# Patient Record
Sex: MALE | Race: AMERICAN INDIAN OR ALASKA NATIVE
[De-identification: names, ages, dates, MRNs, and addresses within clinical notes are randomized per-mention and may not be internally consistent; named-entity substitution may affect disease eponyms.]

---

## 2017-01-25 ENCOUNTER — HOSPITAL ENCOUNTER (EMERGENCY)
Dept: HOSPITAL 5 - ED | Age: 42
Discharge: HOME | End: 2017-01-25
Payer: SELF-PAY

## 2017-01-25 VITALS — DIASTOLIC BLOOD PRESSURE: 94 MMHG | SYSTOLIC BLOOD PRESSURE: 158 MMHG
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
## 2017-01-25 DIAGNOSIS — F12.10: ICD-10-CM

## 2017-01-25 DIAGNOSIS — I10: ICD-10-CM
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
## 2017-01-25 DIAGNOSIS — F17.200: ICD-10-CM

## 2017-01-25 DIAGNOSIS — F14.10: ICD-10-CM
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
## 2017-01-25 DIAGNOSIS — M79.1: ICD-10-CM

## 2017-01-25 DIAGNOSIS — S46.911A: Primary | ICD-10-CM

## 2017-01-25 PROCEDURE — 99282 EMERGENCY DEPT VISIT SF MDM: CPT
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
## 2018-10-04 ENCOUNTER — HOSPITAL ENCOUNTER (EMERGENCY)
Dept: HOSPITAL 5 - ED | Age: 43
Discharge: LEFT BEFORE DISCHARGE | End: 2018-10-04
Payer: SELF-PAY

## 2018-10-04 VITALS — DIASTOLIC BLOOD PRESSURE: 104 MMHG | SYSTOLIC BLOOD PRESSURE: 167 MMHG

## 2018-10-04 DIAGNOSIS — Z53.21: ICD-10-CM

## 2018-10-04 DIAGNOSIS — R07.89: Primary | ICD-10-CM

## 2018-10-04 LAB
BASOPHILS # (AUTO): 0.1 K/MM3 (ref 0–0.1)
BASOPHILS NFR BLD AUTO: 1 % (ref 0–1.8)
BUN SERPL-MCNC: 20 MG/DL (ref 9–20)
BUN/CREAT SERPL: 14 %
CALCIUM SERPL-MCNC: 8.9 MG/DL (ref 8.4–10.2)
EOSINOPHIL # BLD AUTO: 0.2 K/MM3 (ref 0–0.4)
EOSINOPHIL NFR BLD AUTO: 2.7 % (ref 0–4.3)
HCT VFR BLD CALC: 45.3 % (ref 35.5–45.6)
HEMOLYSIS INDEX: 7
HGB BLD-MCNC: 15.3 GM/DL (ref 11.8–15.2)
LYMPHOCYTES # BLD AUTO: 2.4 K/MM3 (ref 1.2–5.4)
LYMPHOCYTES NFR BLD AUTO: 39.7 % (ref 13.4–35)
MCH RBC QN AUTO: 30 PG (ref 28–32)
MCHC RBC AUTO-ENTMCNC: 34 % (ref 32–34)
MCV RBC AUTO: 89 FL (ref 84–94)
MONOCYTES # (AUTO): 0.5 K/MM3 (ref 0–0.8)
MONOCYTES % (AUTO): 9.1 % (ref 0–7.3)
PLATELET # BLD: 223 K/MM3 (ref 140–440)
RBC # BLD AUTO: 5.07 M/MM3 (ref 3.65–5.03)

## 2018-10-04 PROCEDURE — 93010 ELECTROCARDIOGRAM REPORT: CPT

## 2018-10-04 PROCEDURE — 93005 ELECTROCARDIOGRAM TRACING: CPT

## 2018-10-04 PROCEDURE — 85025 COMPLETE CBC W/AUTO DIFF WBC: CPT

## 2018-10-04 PROCEDURE — 80048 BASIC METABOLIC PNL TOTAL CA: CPT

## 2018-10-04 PROCEDURE — 36415 COLL VENOUS BLD VENIPUNCTURE: CPT

## 2018-10-04 PROCEDURE — 84484 ASSAY OF TROPONIN QUANT: CPT

## 2020-10-12 ENCOUNTER — HOSPITAL ENCOUNTER (EMERGENCY)
Dept: HOSPITAL 5 - ED | Age: 45
LOS: 1 days | Discharge: LEFT BEFORE DISCHARGE | End: 2020-10-13
Payer: SELF-PAY

## 2020-10-12 VITALS — SYSTOLIC BLOOD PRESSURE: 158 MMHG | DIASTOLIC BLOOD PRESSURE: 99 MMHG

## 2020-10-12 DIAGNOSIS — Z53.21: ICD-10-CM

## 2020-10-12 DIAGNOSIS — M54.2: Primary | ICD-10-CM

## 2022-03-20 ENCOUNTER — APPOINTMENT (OUTPATIENT)
Dept: GENERAL RADIOLOGY | Age: 47
End: 2022-03-20
Payer: MEDICAID

## 2022-03-20 ENCOUNTER — HOSPITAL ENCOUNTER (EMERGENCY)
Age: 47
Discharge: ANOTHER HOSPITAL | End: 2022-03-21
Attending: EMERGENCY MEDICINE
Payer: MEDICAID

## 2022-03-20 DIAGNOSIS — I50.21 ACUTE SYSTOLIC CONGESTIVE HEART FAILURE (HCC): ICD-10-CM

## 2022-03-20 DIAGNOSIS — I24.9 ACS (ACUTE CORONARY SYNDROME) (HCC): Primary | ICD-10-CM

## 2022-03-20 DIAGNOSIS — I25.9 CHEST PAIN DUE TO MYOCARDIAL ISCHEMIA, UNSPECIFIED ISCHEMIC CHEST PAIN TYPE: ICD-10-CM

## 2022-03-20 LAB
ANION GAP SERPL CALCULATED.3IONS-SCNC: 9 MMOL/L (ref 7–16)
APTT: 28.6 SEC (ref 24.5–35.1)
BUN BLDV-MCNC: 19 MG/DL (ref 6–20)
CALCIUM SERPL-MCNC: 8.5 MG/DL (ref 8.6–10.2)
CHLORIDE BLD-SCNC: 110 MMOL/L (ref 98–107)
CO2: 23 MMOL/L (ref 22–29)
CREAT SERPL-MCNC: 1.6 MG/DL (ref 0.7–1.2)
GFR AFRICAN AMERICAN: 57
GFR NON-AFRICAN AMERICAN: 57 ML/MIN/1.73
GLUCOSE BLD-MCNC: 133 MG/DL (ref 74–99)
HCT VFR BLD CALC: 43 % (ref 37–54)
HEMOGLOBIN: 13.9 G/DL (ref 12.5–16.5)
MCH RBC QN AUTO: 30.5 PG (ref 26–35)
MCHC RBC AUTO-ENTMCNC: 32.3 % (ref 32–34.5)
MCV RBC AUTO: 94.3 FL (ref 80–99.9)
PDW BLD-RTO: 15.2 FL (ref 11.5–15)
PLATELET # BLD: 213 E9/L (ref 130–450)
PMV BLD AUTO: 11.3 FL (ref 7–12)
POTASSIUM SERPL-SCNC: 4.2 MMOL/L (ref 3.5–5)
PRO-BNP: 3926 PG/ML (ref 0–125)
RBC # BLD: 4.56 E12/L (ref 3.8–5.8)
SODIUM BLD-SCNC: 142 MMOL/L (ref 132–146)
TROPONIN, HIGH SENSITIVITY: 25 NG/L (ref 0–11)
TROPONIN, HIGH SENSITIVITY: 26 NG/L (ref 0–11)
WBC # BLD: 5.1 E9/L (ref 4.5–11.5)

## 2022-03-20 PROCEDURE — 2500000003 HC RX 250 WO HCPCS: Performed by: EMERGENCY MEDICINE

## 2022-03-20 PROCEDURE — 6360000002 HC RX W HCPCS: Performed by: EMERGENCY MEDICINE

## 2022-03-20 PROCEDURE — 80048 BASIC METABOLIC PNL TOTAL CA: CPT

## 2022-03-20 PROCEDURE — 93005 ELECTROCARDIOGRAM TRACING: CPT | Performed by: EMERGENCY MEDICINE

## 2022-03-20 PROCEDURE — 96366 THER/PROPH/DIAG IV INF ADDON: CPT

## 2022-03-20 PROCEDURE — 83880 ASSAY OF NATRIURETIC PEPTIDE: CPT

## 2022-03-20 PROCEDURE — 6370000000 HC RX 637 (ALT 250 FOR IP): Performed by: EMERGENCY MEDICINE

## 2022-03-20 PROCEDURE — 84484 ASSAY OF TROPONIN QUANT: CPT

## 2022-03-20 PROCEDURE — 85730 THROMBOPLASTIN TIME PARTIAL: CPT

## 2022-03-20 PROCEDURE — 36415 COLL VENOUS BLD VENIPUNCTURE: CPT

## 2022-03-20 PROCEDURE — 71046 X-RAY EXAM CHEST 2 VIEWS: CPT

## 2022-03-20 PROCEDURE — 99284 EMERGENCY DEPT VISIT MOD MDM: CPT

## 2022-03-20 PROCEDURE — 96375 TX/PRO/DX INJ NEW DRUG ADDON: CPT

## 2022-03-20 PROCEDURE — 96365 THER/PROPH/DIAG IV INF INIT: CPT

## 2022-03-20 PROCEDURE — 85027 COMPLETE CBC AUTOMATED: CPT

## 2022-03-20 RX ORDER — NITROGLYCERIN 20 MG/100ML
5-200 INJECTION INTRAVENOUS CONTINUOUS
Status: DISCONTINUED | OUTPATIENT
Start: 2022-03-20 | End: 2022-03-21 | Stop reason: HOSPADM

## 2022-03-20 RX ORDER — HEPARIN SODIUM 1000 [USP'U]/ML
30 INJECTION, SOLUTION INTRAVENOUS; SUBCUTANEOUS PRN
Status: DISCONTINUED | OUTPATIENT
Start: 2022-03-20 | End: 2022-03-21 | Stop reason: HOSPADM

## 2022-03-20 RX ORDER — HEPARIN SODIUM 1000 [USP'U]/ML
60 INJECTION, SOLUTION INTRAVENOUS; SUBCUTANEOUS ONCE
Status: COMPLETED | OUTPATIENT
Start: 2022-03-20 | End: 2022-03-20

## 2022-03-20 RX ORDER — HEPARIN SODIUM 10000 [USP'U]/100ML
8.2 INJECTION, SOLUTION INTRAVENOUS CONTINUOUS
Status: DISCONTINUED | OUTPATIENT
Start: 2022-03-20 | End: 2022-03-21 | Stop reason: HOSPADM

## 2022-03-20 RX ORDER — HEPARIN SODIUM 1000 [USP'U]/ML
60 INJECTION, SOLUTION INTRAVENOUS; SUBCUTANEOUS PRN
Status: DISCONTINUED | OUTPATIENT
Start: 2022-03-20 | End: 2022-03-21 | Stop reason: HOSPADM

## 2022-03-20 RX ORDER — FENTANYL CITRATE 50 UG/ML
50 INJECTION, SOLUTION INTRAMUSCULAR; INTRAVENOUS ONCE
Status: COMPLETED | OUTPATIENT
Start: 2022-03-20 | End: 2022-03-20

## 2022-03-20 RX ORDER — FUROSEMIDE 10 MG/ML
40 INJECTION INTRAMUSCULAR; INTRAVENOUS ONCE
Status: COMPLETED | OUTPATIENT
Start: 2022-03-20 | End: 2022-03-20

## 2022-03-20 RX ORDER — ASPIRIN 81 MG/1
243 TABLET, CHEWABLE ORAL ONCE
Status: COMPLETED | OUTPATIENT
Start: 2022-03-20 | End: 2022-03-20

## 2022-03-20 RX ADMIN — HEPARIN SODIUM 7350 UNITS: 1000 INJECTION INTRAVENOUS; SUBCUTANEOUS at 21:01

## 2022-03-20 RX ADMIN — ASPIRIN 243 MG: 81 TABLET, CHEWABLE ORAL at 20:04

## 2022-03-20 RX ADMIN — Medication 8.2 UNITS/KG/HR: at 21:01

## 2022-03-20 RX ADMIN — NITROGLYCERIN 5 MCG/MIN: 20 INJECTION INTRAVENOUS at 19:56

## 2022-03-20 RX ADMIN — FENTANYL CITRATE 50 MCG: 50 INJECTION, SOLUTION INTRAMUSCULAR; INTRAVENOUS at 20:03

## 2022-03-20 RX ADMIN — FUROSEMIDE 40 MG: 10 INJECTION, SOLUTION INTRAMUSCULAR; INTRAVENOUS at 20:04

## 2022-03-20 ASSESSMENT — PAIN SCALES - GENERAL
PAINLEVEL_OUTOF10: 8

## 2022-03-20 NOTE — ED PROVIDER NOTES
HPI:  3/20/22, Time: 5:57 PM EDT           Fely Gambino is a 40 y.o. male presenting to the ED for SOB MCDONALD recently diagnosis with Acute CHF while in [facility] on 2/18/2022. States, \" he ran out of lasix. \", beginning several days ago. The complaint has been persistent, moderate in severity, and worsened by walking, moderate exertion. He states he symptoms are worse when he walks. Also states he is having chest heaviness it is midsternal started on Friday 2 days ago, radiates to his left arm. He was worked up while in [facility] diagnosed with acute congestive heart failure he had a EF on transcutaneous echo of less than 25%. CTA of his chest at that time demonstrated no PE or aortic dissection. Patient stated his only given 14 Lasix pills when he was discharged. He moved up to the [facility]. He has no local PCP. No fevers or chills reported he has no cough. He denies melena hematochezia. ROS:   Pertinent positives and negatives are stated within HPI, all other systems reviewed and are negative.  --------------------------------------------- PAST HISTORY ---------------------------------------------  Past Medical History:  has no past medical history on file. Past Surgical History:  has no past surgical history on file. Social History:      Family History: family history is not on file. The patients home medications have been reviewed. Allergies: Patient has no allergy information on record.    ---------------------------------------------------PHYSICAL EXAM--------------------------------------    Constitutional/General: Alert and oriented x3, well appearing, non toxic in NAD  Head: Normocephalic and atraumatic  Eyes: PERRL, EOMI  Mouth: Oropharynx clear, handling secretions, no trismus  Neck: Supple, full ROM, non tender to palpation in the midline, no stridor, no crepitus, no meningeal signs  Pulmonary: Lungs clear to auscultation bilaterally, no wheezes, POS  Rales BL , NO  rhonchi.  Not in respiratory distress  Cardiovascular:  Regular rate. Regular rhythm. No murmurs, gallops, or rubs. 2+ distal pulses  Chest: no chest wall tenderness  Abdomen: Soft. Non tender. Non distended. +BS. No rebound, guarding, or rigidity. No pulsatile masses appreciated. Musculoskeletal: Moves all extremities x 4. Warm and well perfused, no clubbing, cyanosis, or edema. Capillary refill <3 seconds  Skin: warm and dry. No rashes. Neurologic: GCS 15, CN 2-12 grossly intact, no focal deficits, symmetric strength 5/5 in the upper and lower extremities bilaterally  Psych: Normal Affect    -------------------------------------------------- RESULTS -------------------------------------------------  I have personally reviewed all laboratory and imaging results for this patient. Results are listed below.      LABS:  Results for orders placed or performed during the hospital encounter of 03/20/22   CBC   Result Value Ref Range    WBC 5.1 4.5 - 11.5 E9/L    RBC 4.56 3.80 - 5.80 E12/L    Hemoglobin 13.9 12.5 - 16.5 g/dL    Hematocrit 43.0 37.0 - 54.0 %    MCV 94.3 80.0 - 99.9 fL    MCH 30.5 26.0 - 35.0 pg    MCHC 32.3 32.0 - 34.5 %    RDW 15.2 (H) 11.5 - 15.0 fL    Platelets 173 388 - 648 E9/L    MPV 11.3 7.0 - 12.0 fL   Basic Metabolic Panel   Result Value Ref Range    Sodium 142 132 - 146 mmol/L    Potassium 4.2 3.5 - 5.0 mmol/L    Chloride 110 (H) 98 - 107 mmol/L    CO2 23 22 - 29 mmol/L    Anion Gap 9 7 - 16 mmol/L    Glucose 133 (H) 74 - 99 mg/dL    BUN 19 6 - 20 mg/dL    CREATININE 1.6 (H) 0.7 - 1.2 mg/dL    GFR Non-African American 57 >=60 mL/min/1.73    GFR African American 57     Calcium 8.5 (L) 8.6 - 10.2 mg/dL   Troponin   Result Value Ref Range    Troponin, High Sensitivity 26 (H) 0 - 11 ng/L   Brain Natriuretic Peptide   Result Value Ref Range    Pro-BNP 3,926 (H) 0 - 125 pg/mL   Troponin   Result Value Ref Range    Troponin, High Sensitivity 25 (H) 0 - 11 ng/L   APTT   Result Value Ref Range    aPTT 28.6 24.5 - 35.1 sec   EKG 12 Lead   Result Value Ref Range    Ventricular Rate 88 BPM    Atrial Rate 88 BPM    P-R Interval 160 ms    QRS Duration 102 ms    Q-T Interval 396 ms    QTc Calculation (Bazett) 479 ms    P Axis 68 degrees    R Axis -74 degrees    T Axis 145 degrees       RADIOLOGY:  Interpreted by Radiologist.  XR CHEST (2 VW)   Final Result   1. No acute cardiopulmonary pathology. 2.  Lung hyperinflation with coarse interstitial markings. Lingular   atelectasis or scarring. Atherosclerotic disease and cardiomegaly. EKG Interpretation  Interpreted by emergency department physician    Rhythm: normal sinus   Rate: normal  Axis: left  Conduction: normal  ST Segments: nonspecific changes  T Waves: inversion in  lateral leads    Clinical Impression: myocardial ischemia  Comparison to prior EKG: None      ------------------------- NURSING NOTES AND VITALS REVIEWED ---------------------------   The nursing notes within the ED encounter and vital signs as below have been reviewed by myself. BP (!) 160/101   Pulse 85   Temp 96.9 °F (36.1 °C) (Tympanic)   Resp 16   Ht 6' 2\" (1.88 m)   Wt 270 lb (122.5 kg)   SpO2 97%   BMI 34.67 kg/m²   Oxygen Saturation Interpretation: Normal    The patients available past medical records and past encounters were reviewed.         ------------------------------ ED COURSE/MEDICAL DECISION MAKING----------------------  Medications   nitroGLYCERIN 50 mg in dextrose 5% 250 mL infusion (15 mcg/min IntraVENous Rate/Dose Change 3/20/22 2208)   heparin (porcine) injection 7,350 Units (has no administration in time range)   heparin (porcine) injection 3,680 Units (has no administration in time range)   heparin 25,000 units in dextrose 5% 250 mL (premix) infusion (8.2 Units/kg/hr × 122.5 kg IntraVENous New Bag 3/20/22 2101)   aspirin chewable tablet 243 mg (243 mg Oral Given 3/20/22 2004)   furosemide (LASIX) injection 40 mg (40 mg IntraVENous Given 3/20/22 2004)   fentaNYL (SUBLIMAZE) injection 50 mcg (50 mcg IntraVENous Given 3/20/22 2003)   heparin (porcine) injection 7,350 Units (7,350 Units IntraVENous Given 3/20/22 2101)             Medical Decision Making:    Presents with chest pain shortness of breath. Had recent work-up in [facility] several weeks ago. Concern for acute coronary syndrome. Patient has elevated troponin delta was less than 5. Patient's EKG is concerning for lateral ischemia with no previous EKG to compare. He was given aspirin IV nitroglycerin Heparin Lasix and Sublimaze. Re-Evaluations:             Re-evaluation. Patients symptoms are improving, patient still complains of pain but it is improved with above treatment. Consultations:             Consultation was made with [facility] cardiology Dr. Felicia Lugo.  Recommend that transfer for acute coronary syndrome to [facility] for anticipated heart catheterization tomorrow. I did speak with [facility] hospitalist Dr. Jacob Harris who accepted transfer and admission. Critical Care:   CRITICAL CARE:   30 MINUTES. Please note that the withdrawal or failure to initiate urgent interventions for this patient would likely result in a life threatening deterioration or permanent disability. Accordingly this patient received the above mentioned time, excluding separately billable procedures. This patient's ED course included: a personal history and physicial examination, re-evaluation prior to disposition, multiple bedside re-evaluations, IV medications, cardiac monitoring, continuous pulse oximetry, complex medical decision making and emergency management and a personal history and physicial eaxmination    This patient has remained hemodynamically stable, improved and been closely monitored during their ED course. Counseling:    The emergency provider has spoken with the patient and spouse/SO and discussed todays results, in addition to providing specific details for the plan of care and counseling regarding the diagnosis and prognosis. Questions are answered at this time and they are agreeable with the plan.       --------------------------------- IMPRESSION AND DISPOSITION ---------------------------------    IMPRESSION  1. ACS (acute coronary syndrome) ([facility].)    2. Chest pain due to myocardial ischemia, unspecified ischemic chest pain type    3. Acute systolic congestive heart failure ([facility].)        DISPOSITION  Disposition: Transfer to [facility]  Patient condition is serious        NOTE: This report was transcribed using voice recognition software.  Every effort was made to ensure accuracy; however, inadvertent computerized transcription errors may be present        Joel Campbell DO  03/20/22 [id number]

## 2022-03-21 ENCOUNTER — HOSPITAL ENCOUNTER (INPATIENT)
Age: 47
LOS: 3 days | Discharge: HOME | DRG: 192 | End: 2022-03-24
Attending: FAMILY MEDICINE | Admitting: FAMILY MEDICINE
Payer: MEDICAID

## 2022-03-21 VITALS
DIASTOLIC BLOOD PRESSURE: 97 MMHG | OXYGEN SATURATION: 97 % | SYSTOLIC BLOOD PRESSURE: 157 MMHG | WEIGHT: 270 LBS | BODY MASS INDEX: 34.65 KG/M2 | RESPIRATION RATE: 16 BRPM | HEART RATE: 90 BPM | HEIGHT: 74 IN | TEMPERATURE: 96.9 F

## 2022-03-21 DIAGNOSIS — G89.29 CHRONIC MIDLINE LOW BACK PAIN WITHOUT SCIATICA: ICD-10-CM

## 2022-03-21 DIAGNOSIS — I24.9 ACUTE CORONARY SYNDROME (HCC): ICD-10-CM

## 2022-03-21 DIAGNOSIS — I50.23 ACUTE ON CHRONIC SYSTOLIC CONGESTIVE HEART FAILURE (HCC): Primary | ICD-10-CM

## 2022-03-21 DIAGNOSIS — M54.50 CHRONIC MIDLINE LOW BACK PAIN WITHOUT SCIATICA: ICD-10-CM

## 2022-03-21 LAB
AMPHETAMINE SCREEN, URINE: NOT DETECTED
ANION GAP SERPL CALCULATED.3IONS-SCNC: 10 MMOL/L (ref 7–16)
APTT: 34.1 SEC (ref 24.5–35.1)
BARBITURATE SCREEN URINE: NOT DETECTED
BENZODIAZEPINE SCREEN, URINE: POSITIVE
BUN BLDV-MCNC: 17 MG/DL (ref 6–20)
CALCIUM SERPL-MCNC: 8.8 MG/DL (ref 8.6–10.2)
CANNABINOID SCREEN URINE: POSITIVE
CHLORIDE BLD-SCNC: 104 MMOL/L (ref 98–107)
CO2: 24 MMOL/L (ref 22–29)
COCAINE METABOLITE SCREEN URINE: NOT DETECTED
CREAT SERPL-MCNC: 1.6 MG/DL (ref 0.7–1.2)
EKG ATRIAL RATE: 88 BPM
EKG P AXIS: 68 DEGREES
EKG P-R INTERVAL: 160 MS
EKG Q-T INTERVAL: 396 MS
EKG QRS DURATION: 102 MS
EKG QTC CALCULATION (BAZETT): 479 MS
EKG R AXIS: -74 DEGREES
EKG T AXIS: 145 DEGREES
EKG VENTRICULAR RATE: 88 BPM
FENTANYL SCREEN, URINE: NOT DETECTED
GFR AFRICAN AMERICAN: 57
GFR NON-AFRICAN AMERICAN: 57 ML/MIN/1.73
GLUCOSE BLD-MCNC: 106 MG/DL (ref 74–99)
Lab: ABNORMAL
METHADONE SCREEN, URINE: NOT DETECTED
OPIATE SCREEN URINE: NOT DETECTED
OXYCODONE URINE: NOT DETECTED
PHENCYCLIDINE SCREEN URINE: NOT DETECTED
POC ACT LR: 167 SECONDS
POTASSIUM SERPL-SCNC: 4.2 MMOL/L (ref 3.5–5)
SODIUM BLD-SCNC: 138 MMOL/L (ref 132–146)
TROPONIN, HIGH SENSITIVITY: 26 NG/L (ref 0–11)

## 2022-03-21 PROCEDURE — 2500000003 HC RX 250 WO HCPCS: Performed by: FAMILY MEDICINE

## 2022-03-21 PROCEDURE — 6360000002 HC RX W HCPCS

## 2022-03-21 PROCEDURE — 96376 TX/PRO/DX INJ SAME DRUG ADON: CPT

## 2022-03-21 PROCEDURE — 2580000003 HC RX 258: Performed by: INTERNAL MEDICINE

## 2022-03-21 PROCEDURE — 6370000000 HC RX 637 (ALT 250 FOR IP): Performed by: FAMILY MEDICINE

## 2022-03-21 PROCEDURE — 36415 COLL VENOUS BLD VENIPUNCTURE: CPT

## 2022-03-21 PROCEDURE — 85730 THROMBOPLASTIN TIME PARTIAL: CPT

## 2022-03-21 PROCEDURE — 4A023N7 MEASUREMENT OF CARDIAC SAMPLING AND PRESSURE, LEFT HEART, PERCUTANEOUS APPROACH: ICD-10-PCS | Performed by: INTERNAL MEDICINE

## 2022-03-21 PROCEDURE — 99255 IP/OBS CONSLTJ NEW/EST HI 80: CPT | Performed by: INTERNAL MEDICINE

## 2022-03-21 PROCEDURE — 6360000002 HC RX W HCPCS: Performed by: EMERGENCY MEDICINE

## 2022-03-21 PROCEDURE — 93458 L HRT ARTERY/VENTRICLE ANGIO: CPT | Performed by: INTERNAL MEDICINE

## 2022-03-21 PROCEDURE — 6360000002 HC RX W HCPCS: Performed by: INTERNAL MEDICINE

## 2022-03-21 PROCEDURE — 80307 DRUG TEST PRSMV CHEM ANLYZR: CPT

## 2022-03-21 PROCEDURE — 80048 BASIC METABOLIC PNL TOTAL CA: CPT

## 2022-03-21 PROCEDURE — C1760 CLOSURE DEV, VASC: HCPCS

## 2022-03-21 PROCEDURE — C1887 CATHETER, GUIDING: HCPCS

## 2022-03-21 PROCEDURE — 2140000000 HC CCU INTERMEDIATE R&B

## 2022-03-21 PROCEDURE — 93010 ELECTROCARDIOGRAM REPORT: CPT | Performed by: INTERNAL MEDICINE

## 2022-03-21 PROCEDURE — 6360000002 HC RX W HCPCS: Performed by: FAMILY MEDICINE

## 2022-03-21 PROCEDURE — 2709999900 HC NON-CHARGEABLE SUPPLY

## 2022-03-21 PROCEDURE — 6370000000 HC RX 637 (ALT 250 FOR IP): Performed by: INTERNAL MEDICINE

## 2022-03-21 PROCEDURE — C1769 GUIDE WIRE: HCPCS

## 2022-03-21 PROCEDURE — 99152 MOD SED SAME PHYS/QHP 5/>YRS: CPT | Performed by: INTERNAL MEDICINE

## 2022-03-21 PROCEDURE — B2111ZZ FLUOROSCOPY OF MULTIPLE CORONARY ARTERIES USING LOW OSMOLAR CONTRAST: ICD-10-PCS | Performed by: INTERNAL MEDICINE

## 2022-03-21 PROCEDURE — 93458 L HRT ARTERY/VENTRICLE ANGIO: CPT

## 2022-03-21 PROCEDURE — 84484 ASSAY OF TROPONIN QUANT: CPT

## 2022-03-21 PROCEDURE — 85347 COAGULATION TIME ACTIVATED: CPT

## 2022-03-21 PROCEDURE — C1894 INTRO/SHEATH, NON-LASER: HCPCS

## 2022-03-21 RX ORDER — ASPIRIN 81 MG/1
81 TABLET, CHEWABLE ORAL DAILY
COMMUNITY
End: 2022-08-21

## 2022-03-21 RX ORDER — SODIUM CHLORIDE 0.9 % (FLUSH) 0.9 %
5-40 SYRINGE (ML) INJECTION PRN
Status: DISCONTINUED | OUTPATIENT
Start: 2022-03-21 | End: 2022-03-21 | Stop reason: SDUPTHER

## 2022-03-21 RX ORDER — LABETALOL HYDROCHLORIDE 5 MG/ML
15 INJECTION, SOLUTION INTRAVENOUS EVERY 4 HOURS PRN
Status: DISCONTINUED | OUTPATIENT
Start: 2022-03-21 | End: 2022-03-24 | Stop reason: HOSPADM

## 2022-03-21 RX ORDER — ATORVASTATIN CALCIUM 80 MG/1
80 TABLET, FILM COATED ORAL DAILY
Status: DISCONTINUED | OUTPATIENT
Start: 2022-03-22 | End: 2022-03-24 | Stop reason: HOSPADM

## 2022-03-21 RX ORDER — ONDANSETRON 4 MG/1
4 TABLET, ORALLY DISINTEGRATING ORAL EVERY 8 HOURS PRN
Status: DISCONTINUED | OUTPATIENT
Start: 2022-03-21 | End: 2022-03-24 | Stop reason: HOSPADM

## 2022-03-21 RX ORDER — ISOSORBIDE DINITRATE 20 MG/1
20 TABLET ORAL 3 TIMES DAILY
Status: ON HOLD | COMMUNITY
End: 2022-03-24 | Stop reason: HOSPADM

## 2022-03-21 RX ORDER — HEPARIN SODIUM 1000 [USP'U]/ML
4000 INJECTION, SOLUTION INTRAVENOUS; SUBCUTANEOUS PRN
Status: DISCONTINUED | OUTPATIENT
Start: 2022-03-21 | End: 2022-03-24 | Stop reason: HOSPADM

## 2022-03-21 RX ORDER — ATORVASTATIN CALCIUM 40 MG/1
40 TABLET, FILM COATED ORAL NIGHTLY
Status: DISCONTINUED | OUTPATIENT
Start: 2022-03-21 | End: 2022-03-21

## 2022-03-21 RX ORDER — SODIUM CHLORIDE 0.9 % (FLUSH) 0.9 %
5-40 SYRINGE (ML) INJECTION EVERY 12 HOURS SCHEDULED
Status: DISCONTINUED | OUTPATIENT
Start: 2022-03-21 | End: 2022-03-24 | Stop reason: HOSPADM

## 2022-03-21 RX ORDER — HYDRALAZINE HYDROCHLORIDE 25 MG/1
25 TABLET, FILM COATED ORAL 3 TIMES DAILY
Status: ON HOLD | COMMUNITY
End: 2022-03-24 | Stop reason: HOSPADM

## 2022-03-21 RX ORDER — SODIUM CHLORIDE 0.9 % (FLUSH) 0.9 %
5-40 SYRINGE (ML) INJECTION EVERY 12 HOURS SCHEDULED
Status: DISCONTINUED | OUTPATIENT
Start: 2022-03-21 | End: 2022-03-21 | Stop reason: SDUPTHER

## 2022-03-21 RX ORDER — ASPIRIN 81 MG/1
324 TABLET, CHEWABLE ORAL ONCE
Status: COMPLETED | OUTPATIENT
Start: 2022-03-21 | End: 2022-03-21

## 2022-03-21 RX ORDER — SODIUM CHLORIDE 0.9 % (FLUSH) 0.9 %
5-40 SYRINGE (ML) INJECTION PRN
Status: DISCONTINUED | OUTPATIENT
Start: 2022-03-21 | End: 2022-03-24 | Stop reason: HOSPADM

## 2022-03-21 RX ORDER — ISOSORBIDE DINITRATE 10 MG/1
20 TABLET ORAL 3 TIMES DAILY
Status: DISCONTINUED | OUTPATIENT
Start: 2022-03-21 | End: 2022-03-21

## 2022-03-21 RX ORDER — NITROGLYCERIN 20 MG/100ML
5-200 INJECTION INTRAVENOUS CONTINUOUS
Status: DISCONTINUED | OUTPATIENT
Start: 2022-03-21 | End: 2022-03-21

## 2022-03-21 RX ORDER — ACETAMINOPHEN 650 MG/1
650 SUPPOSITORY RECTAL EVERY 6 HOURS PRN
Status: DISCONTINUED | OUTPATIENT
Start: 2022-03-21 | End: 2022-03-24 | Stop reason: HOSPADM

## 2022-03-21 RX ORDER — ONDANSETRON 2 MG/ML
4 INJECTION INTRAMUSCULAR; INTRAVENOUS EVERY 6 HOURS PRN
Status: DISCONTINUED | OUTPATIENT
Start: 2022-03-21 | End: 2022-03-24 | Stop reason: HOSPADM

## 2022-03-21 RX ORDER — ASPIRIN 81 MG/1
81 TABLET, CHEWABLE ORAL DAILY
Status: DISCONTINUED | OUTPATIENT
Start: 2022-03-22 | End: 2022-03-24 | Stop reason: HOSPADM

## 2022-03-21 RX ORDER — CARVEDILOL 6.25 MG/1
12.5 TABLET ORAL 2 TIMES DAILY WITH MEALS
Status: DISCONTINUED | OUTPATIENT
Start: 2022-03-21 | End: 2022-03-21

## 2022-03-21 RX ORDER — VALSARTAN 80 MG/1
80 TABLET ORAL DAILY
Status: DISCONTINUED | OUTPATIENT
Start: 2022-03-22 | End: 2022-03-21

## 2022-03-21 RX ORDER — SODIUM CHLORIDE 9 MG/ML
25 INJECTION, SOLUTION INTRAVENOUS PRN
Status: DISCONTINUED | OUTPATIENT
Start: 2022-03-21 | End: 2022-03-24 | Stop reason: HOSPADM

## 2022-03-21 RX ORDER — HYDRALAZINE HYDROCHLORIDE 25 MG/1
25 TABLET, FILM COATED ORAL 3 TIMES DAILY
Status: DISCONTINUED | OUTPATIENT
Start: 2022-03-21 | End: 2022-03-21

## 2022-03-21 RX ORDER — CARVEDILOL 25 MG/1
25 TABLET ORAL 2 TIMES DAILY WITH MEALS
Status: DISCONTINUED | OUTPATIENT
Start: 2022-03-21 | End: 2022-03-24 | Stop reason: HOSPADM

## 2022-03-21 RX ORDER — FUROSEMIDE 10 MG/ML
40 INJECTION INTRAMUSCULAR; INTRAVENOUS 2 TIMES DAILY
Status: COMPLETED | OUTPATIENT
Start: 2022-03-21 | End: 2022-03-22

## 2022-03-21 RX ORDER — HEPARIN SODIUM 1000 [USP'U]/ML
2000 INJECTION, SOLUTION INTRAVENOUS; SUBCUTANEOUS PRN
Status: DISCONTINUED | OUTPATIENT
Start: 2022-03-21 | End: 2022-03-24 | Stop reason: HOSPADM

## 2022-03-21 RX ORDER — ACETAMINOPHEN 325 MG/1
650 TABLET ORAL EVERY 4 HOURS PRN
Status: DISCONTINUED | OUTPATIENT
Start: 2022-03-21 | End: 2022-03-24 | Stop reason: HOSPADM

## 2022-03-21 RX ORDER — LANOLIN ALCOHOL/MO/W.PET/CERES
3 CREAM (GRAM) TOPICAL NIGHTLY PRN
Status: DISCONTINUED | OUTPATIENT
Start: 2022-03-21 | End: 2022-03-24 | Stop reason: HOSPADM

## 2022-03-21 RX ORDER — HEPARIN SODIUM 10000 [USP'U]/100ML
8.2 INJECTION, SOLUTION INTRAVENOUS CONTINUOUS
Status: DISCONTINUED | OUTPATIENT
Start: 2022-03-21 | End: 2022-03-21

## 2022-03-21 RX ORDER — FUROSEMIDE 40 MG/1
40 TABLET ORAL DAILY
Status: DISCONTINUED | OUTPATIENT
Start: 2022-03-21 | End: 2022-03-21

## 2022-03-21 RX ORDER — SODIUM CHLORIDE 9 MG/ML
25 INJECTION, SOLUTION INTRAVENOUS PRN
Status: DISCONTINUED | OUTPATIENT
Start: 2022-03-21 | End: 2022-03-21 | Stop reason: SDUPTHER

## 2022-03-21 RX ORDER — ACETAMINOPHEN 325 MG/1
650 TABLET ORAL EVERY 6 HOURS PRN
Status: DISCONTINUED | OUTPATIENT
Start: 2022-03-21 | End: 2022-03-21 | Stop reason: SDUPTHER

## 2022-03-21 RX ORDER — FUROSEMIDE 40 MG/1
40 TABLET ORAL DAILY
Status: ON HOLD | COMMUNITY
End: 2022-03-24 | Stop reason: HOSPADM

## 2022-03-21 RX ORDER — FENTANYL CITRATE 50 UG/ML
50 INJECTION, SOLUTION INTRAMUSCULAR; INTRAVENOUS ONCE
Status: COMPLETED | OUTPATIENT
Start: 2022-03-21 | End: 2022-03-21

## 2022-03-21 RX ORDER — POLYETHYLENE GLYCOL 3350 17 G/17G
17 POWDER, FOR SOLUTION ORAL DAILY PRN
Status: DISCONTINUED | OUTPATIENT
Start: 2022-03-21 | End: 2022-03-24 | Stop reason: HOSPADM

## 2022-03-21 RX ORDER — CARVEDILOL 12.5 MG/1
12.5 TABLET ORAL 2 TIMES DAILY WITH MEALS
Status: ON HOLD | COMMUNITY
End: 2022-03-24 | Stop reason: HOSPADM

## 2022-03-21 RX ORDER — VALSARTAN 80 MG/1
80 TABLET ORAL 2 TIMES DAILY
Status: DISCONTINUED | OUTPATIENT
Start: 2022-03-21 | End: 2022-03-23

## 2022-03-21 RX ADMIN — CARVEDILOL 25 MG: 25 TABLET, FILM COATED ORAL at 16:38

## 2022-03-21 RX ADMIN — FENTANYL CITRATE 50 MCG: 50 INJECTION, SOLUTION INTRAMUSCULAR; INTRAVENOUS at 00:44

## 2022-03-21 RX ADMIN — HYDRALAZINE HYDROCHLORIDE 25 MG: 25 TABLET, FILM COATED ORAL at 09:28

## 2022-03-21 RX ADMIN — FUROSEMIDE 40 MG: 10 INJECTION, SOLUTION INTRAMUSCULAR; INTRAVENOUS at 17:44

## 2022-03-21 RX ADMIN — HEPARIN SODIUM AND DEXTROSE 8.2 UNITS/KG/HR: 10000; 5 INJECTION INTRAVENOUS at 04:24

## 2022-03-21 RX ADMIN — VALSARTAN 80 MG: 80 TABLET, FILM COATED ORAL at 21:08

## 2022-03-21 RX ADMIN — SODIUM CHLORIDE, PRESERVATIVE FREE 10 ML: 5 INJECTION INTRAVENOUS at 21:08

## 2022-03-21 RX ADMIN — HEPARIN SODIUM 2000 UNITS: 1000 INJECTION INTRAVENOUS; SUBCUTANEOUS at 07:32

## 2022-03-21 RX ADMIN — ACETAMINOPHEN 650 MG: 325 TABLET ORAL at 05:22

## 2022-03-21 RX ADMIN — CARVEDILOL 12.5 MG: 6.25 TABLET, FILM COATED ORAL at 09:28

## 2022-03-21 RX ADMIN — ASPIRIN 81 MG 324 MG: 81 TABLET ORAL at 09:27

## 2022-03-21 RX ADMIN — ISOSORBIDE DINITRATE 20 MG: 10 TABLET ORAL at 09:28

## 2022-03-21 RX ADMIN — NITROGLYCERIN 15 MCG/MIN: 20 INJECTION INTRAVENOUS at 04:23

## 2022-03-21 ASSESSMENT — PAIN DESCRIPTION - FREQUENCY
FREQUENCY: CONTINUOUS

## 2022-03-21 ASSESSMENT — PAIN SCALES - GENERAL
PAINLEVEL_OUTOF10: 0
PAINLEVEL_OUTOF10: 8
PAINLEVEL_OUTOF10: 8
PAINLEVEL_OUTOF10: 7
PAINLEVEL_OUTOF10: 0
PAINLEVEL_OUTOF10: 0
PAINLEVEL_OUTOF10: 3
PAINLEVEL_OUTOF10: 0

## 2022-03-21 ASSESSMENT — PAIN DESCRIPTION - PROGRESSION
CLINICAL_PROGRESSION: GRADUALLY IMPROVING
CLINICAL_PROGRESSION: NOT CHANGED

## 2022-03-21 ASSESSMENT — PAIN DESCRIPTION - ORIENTATION
ORIENTATION: LEFT;LOWER

## 2022-03-21 ASSESSMENT — PAIN DESCRIPTION - ONSET
ONSET: ON-GOING
ONSET: ON-GOING

## 2022-03-21 ASSESSMENT — PAIN DESCRIPTION - PAIN TYPE
TYPE: ACUTE PAIN

## 2022-03-21 ASSESSMENT — PAIN DESCRIPTION - DESCRIPTORS
DESCRIPTORS: ACHING;HEADACHE;PRESSURE
DESCRIPTORS: ACHING;DISCOMFORT;PRESSURE
DESCRIPTORS: ACHING;PRESSURE;SHARP

## 2022-03-21 ASSESSMENT — PAIN - FUNCTIONAL ASSESSMENT: PAIN_FUNCTIONAL_ASSESSMENT: ACTIVITIES ARE NOT PREVENTED

## 2022-03-21 ASSESSMENT — PAIN DESCRIPTION - LOCATION
LOCATION: CHEST
LOCATION: CHEST
LOCATION: CHEST;HEAD

## 2022-03-21 NOTE — PROGRESS NOTES
Patient admitted after midnight  Taken to cath lab-- Nonischemic cardiomyopathy with severely elevated left filling pressure.  Mild nonobstructive CAD    Continue IV diuresis  Aspirin, statin, BB    Electronically signed by Vasile Jolley DO on 3/21/2022 at 6:10 PM]

## 2022-03-21 NOTE — ED NOTES
Pt reports chest pain still at an 8/10.  Nitro increased to 15units/min     Narda Vera RN  03/20/22 [id number]
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
## 2022-03-21 NOTE — PROGRESS NOTES
Messaged Dr. Carol Tovar re: Pt has arrived to room [phone] from [facility] ED. Home med rec has been updated. In need of admission orders. See orders.

## 2022-03-21 NOTE — PLAN OF CARE
Problem: Pain:  Goal: Pain level will decrease  Description: Pain level will decrease  Outcome: Met This Shift  Goal: Control of acute pain  Description: Control of acute pain  Outcome: Met This Shift  Goal: Control of chronic pain  Description: Control of chronic pain  Outcome: Met This Shift     Problem: Cardiac:  Goal: Ability to maintain an adequate cardiac output will improve  Description: Ability to maintain an adequate cardiac output will improve  Outcome: Met This Shift  Goal: Hemodynamic stability will improve  Description: Hemodynamic stability will improve  Outcome: Met This Shift     Problem: Fluid Volume:  Goal: Ability to achieve and maintain adequate urine output will improve  Description: Ability to achieve and maintain adequate urine output will improve  Outcome: Met This Shift     Problem: Respiratory:  Goal: Respiratory status will improve  Description: Respiratory status will improve  Outcome: Met This Shift

## 2022-03-21 NOTE — PROGRESS NOTES
2126 Called access center for patient transfer to [facility] for acute coronary syndrome   2149 Dr. Lavon Dickinson is accepting physician  [phone] call Nurse to Nurse to [phone] PAS ETA 0409

## 2022-03-21 NOTE — PATIENT CARE CONFERENCE
[facility] Quality Flow/Interdisciplinary Rounds Progress Note        Quality Flow Rounds held on March 21, 2022    Disciplines Attending:  Bedside Nurse, ,  and Nursing Unit Leadership    Lynne Ryan was admitted on 3/21/2022  3:08 AM    Anticipated Discharge Date:  Expected Discharge Date: 03/25/22    Disposition:    Jacky Score:  Jacky Scale Score: 21    Readmission Risk              Risk of Unplanned Readmission:  11           Discussed patient goal for the day, patient clinical progression, and barriers to discharge.   The following Goal(s) of the Day/Commitment(s) have been identified:  Diagnostics - Report Results      Makenzie Napier RN  March 21, 2022

## 2022-03-21 NOTE — PROCEDURES
CARDIAC CATHETERIZATION REPORT    : Merrill Blake MD     Date of procedure: 03/21/22     Indication:  1. HFrEF    Procedures:  Left heart catheterization and Coronary angiogram     Sedation/analgesia:  Midazolam IV and Fentanyl IV     Time sedation was administered: 1205. I was present in the room when sedation was administered. Procedure end time: 1240  Time spent with face to face monitoring of moderate sedation: 35 minutes    Brief history:  55-year-old -American male who was diagnosed with HFrEF with severe LV systolic dysfunction in [facility] last month. He did not undergo coronary angiogram but reportedly underwent a stress test.  He has been out of medications and presented with heart failure symptoms. His high-sensitivity troponin was very mildly elevated and flat. His EKG was consistent with an age-indeterminate inferior infarct with lateral T wave inversions. His history is significant for hypertension, prior cocaine abuse, GERD. Description of procedure: The patient presented to the Cath Lab in a(n) elective fashion. The patient was prepped and draped in a sterile manner. Timeout, airway and ASA assessment were completed. Local anesthesia with 1% lidocaine was administered and sedation/analgesia were administered as above. Access was obtained using the modified Seldinger technique and a micropuncture needle with ultrasound guidance. I was able to obtain access in the right radial and right ulnar arteries but the wire would not thread in either artery. On ultrasound both looked somewhat small and diseased. After failing to gain access there I switched to right femoral access. Coronary anatomy:  Dominance: Left  1. Left main: Short and angiographically unremarkable  2. Left anterior descending: This is a large wraparound vessel with 2 large diagonals. There is mild diffuse disease in the LAD. D1 has a segmental 60% proximal stenosis  3.  Ramus intermedius: Absent  4. Left circumflex: This is a large dominant vessel with small OM1, large OM 2, small OM 3, large bifurcating LPL and a small LPDA. Mild diffuse disease throughout  5. Right coronary artery: Large sized nondominant vessel with no significant LV supply      Hemodynamics:  LVEDP 38  Ao: 127/104 with a mean of 116. .. Hemostasis:  Transradial band was applied for patent arterial hemostasis. Complications: None  Estimated blood loss: Less than 10 mL  Air kerma: 351 mGy  Contrast used: 40 mL    Conclusions:  1. Nonischemic cardiomyopathy with severely elevated left filling pressure  2. Overall mild nonobstructive CAD      Recommendations:  1. Continue IV diuresis for at least another 24 hours  2. Continue aspirin and high intensity statin for CAD  3.  Increase carvedilol to 25 mg p.o. twice daily and start valsartan 80 mg p.o. twice daily with goal of switching to sacubitril/valsartan as outpatient      Al Wilson MD, [facility] [place]  Interventional Cardiology/Structural Heart Disease  Office: [phone]  Coordinator: Spencer Correa

## 2022-03-21 NOTE — CONSULTS
Met with patient and discussed that their physician has ordered a referral to our outpatient Phase II Cardiac Rehabilitation program. Reviewed the benefits of cardiac rehabilitation based on their diagnosis and personal risk factors. Patient demonstrates moderate interest in Cardiac Rehabilitation at this time. Cardiac Rehabilitation brochure provided to patient/family. The Cardiac Rehabilitation Program has been provided the patient's referral information and pertinent patient details and history. The patient may call [facility] at [phone] for additional information or questions. Contact information for [facility] and other choices close to the patient's residence have been provided in the discharge instructions so that the patient may call and schedule an appointment when cleared by their physician.  Thank you for the referral.

## 2022-03-21 NOTE — H&P
Hospitalist History & Physical      PCP: No primary care provider on file. Date of Service: Pt seen/examined on 3/21/2022     Chief Complaint:  had no chief complaint listed for this encounter. History Of Present Illness:    Mr. Fely Gambino, a 40y.o. year old male  who  has a past medical history of CHF (congestive heart failure) ([facility] [address] and Hypertension. Patient was seen at [facility] emergency department with complaints of shortness of breath and chest discomfort. Recently diagnosed with congestive heart failure. Ran out of Lasix several days ago. Shortness of breath worse with exertion. Chest heaviness started on Friday and radiates into his left arm. Vital signs reveal the patient to be hypertensive with pressures 151/111. Troponin of 26. EKG shows ischemic changes. No previous EKG to compare to. Emergency physician consulted cardiology. Started on heparin infusion as well as nitroglycerin infusion. He is being transferred to [facility] for cardiac cath in the morning. Past Medical History:   Diagnosis Date    CHF (congestive heart failure) ([facility] [address] 02/08/2022    Hypertension        Past Surgical History:   Procedure Laterality Date    SKIN GRAFT Right     R thigh       Prior to Admission medications    Medication Sig Start Date End Date Taking?  Authorizing Provider   hydrALAZINE (APRESOLINE) 25 MG tablet Take 25 mg by mouth 3 times daily Take 1.5 tabs three times daily   Yes Historical Provider, MD   furosemide (LASIX) 40 MG tablet Take 40 mg by mouth daily Take 0.5 tablets daily   Yes Historical Provider, MD   isosorbide dinitrate (ISORDIL) 20 MG tablet Take 20 mg by mouth 3 times daily   Yes Historical Provider, MD   carvedilol (COREG) 12.5 MG tablet Take 12.5 mg by mouth 2 times daily (with meals)   Yes Historical Provider, MD   aspirin 81 MG chewable tablet Take 81 mg by mouth daily   Yes Historical Provider, MD         Allergies:  Patient has no known allergies. Social History:    TOBACCO:   reports that he has been smoking cigarettes. He started smoking about 30 years ago. He has been smoking about 0.25 packs per day. He has never used smokeless tobacco.  ETOH:   reports previous alcohol use. Family History:    Reviewed in detail and negative for DM, CAD, Cancer, CVA. Positive as follows\"      Problem Relation Age of Onset    Stroke Maternal Grandmother     Hypertension Maternal Grandmother        REVIEW OF SYSTEMS:   Pertinent positives as noted in the HPI. All other systems reviewed and negative. PHYSICAL EXAM:  BP (!) 151/111   Pulse 89   Temp 97.2 °F (36.2 °C) (Temporal)   Resp 18   Ht 6' 2\" (1.88 m)   Wt 271 lb 12.8 oz (123.3 kg)   SpO2 100%   BMI 34.90 kg/m²   General appearance: No apparent distress, appears stated age and cooperative. HEENT: Normal cephalic, atraumatic without obvious deformity. Pupils equal, round, and reactive to light. Extra ocular muscles intact. Conjunctivae/corneas clear. Neck: Supple, with full range of motion. No jugular venous distention. Trachea midline. Respiratory: CTA  Cardiovascular: RRR  Abdomen: S/NT/ND  Musculoskeletal: No clubbing, cyanosis, edema of bilateral lower extremities. Brisk capillary refill. Skin: Normal skin color. No rashes or lesions. Neurologic:  Neurovascularly intact without any focal sensory/motor deficits. Cranial nerves: II-XII intact, grossly non-focal.  Psychiatric: Alert and oriented, thought content appropriate, normal insight    Reviewed EKG and CXR personally      CBC:   Recent Labs     03/20/22  1751   WBC 5.1   RBC 4.56   HGB 13.9   HCT 43.0   MCV 94.3   RDW 15.2*        BMP:   Recent Labs     03/20/22  1751      K 4.2   *   CO2 23   BUN 19   CREATININE 1.6*     LFT:  No results for input(s): PROT, ALB, ALKPHOS, ALT, AST, BILITOT, AMYLASE, LIPASE in the last 72 hours. CE:  No results for input(s): Josh Killings in the last 72 hours.   PT/INR: on file  Code Status: No Order  Surrogate decision maker confirmed with patient:   Extended Emergency Contact Information  Primary Emergency Contact: Rosa Sanford Phone: [phone]  Mobile Phone: [phone]  Relation: Domestic Partner   needed? No  Secondary Emergency Contact: [facility] Phone: [phone]  Mobile Phone: [phone]  Relation: Brother/Sister   needed? No    DVT Prophylaxis: []Lovenox []Heparin []PCD [] [address] []Encouraged ambulation  Disposition: []Med/Surg [] Intermediate [] ICU/CCU  Admit status: [] Observation [] Inpatient     +++++++++++++++++++++++++++++++++++++++++++++++++  Emerita Tian, DO  +++++++++++++++++++++++++++++++++++++++++++++++++  NOTE: This report was transcribed using voice recognition software. Every effort was made to ensure accuracy; however, inadvertent computerized transcription errors may be present.

## 2022-03-22 LAB
ANION GAP SERPL CALCULATED.3IONS-SCNC: 9 MMOL/L (ref 7–16)
BUN BLDV-MCNC: 22 MG/DL (ref 6–20)
CALCIUM SERPL-MCNC: 8.4 MG/DL (ref 8.6–10.2)
CHLORIDE BLD-SCNC: 105 MMOL/L (ref 98–107)
CHOLESTEROL, TOTAL: 167 MG/DL (ref 0–199)
CO2: 27 MMOL/L (ref 22–29)
CREAT SERPL-MCNC: 2 MG/DL (ref 0.7–1.2)
EKG ATRIAL RATE: 86 BPM
EKG P AXIS: 66 DEGREES
EKG P-R INTERVAL: 168 MS
EKG Q-T INTERVAL: 410 MS
EKG QRS DURATION: 100 MS
EKG QTC CALCULATION (BAZETT): 490 MS
EKG R AXIS: -74 DEGREES
EKG T AXIS: 143 DEGREES
EKG VENTRICULAR RATE: 86 BPM
GFR AFRICAN AMERICAN: 44
GFR NON-AFRICAN AMERICAN: 44 ML/MIN/1.73
GLUCOSE BLD-MCNC: 147 MG/DL (ref 74–99)
HCT VFR BLD CALC: 42.4 % (ref 37–54)
HDLC SERPL-MCNC: 47 MG/DL
HEMOGLOBIN: 13.9 G/DL (ref 12.5–16.5)
LDL CHOLESTEROL CALCULATED: 97 MG/DL (ref 0–99)
LV EF: 25 %
LVEF MODALITY: NORMAL
MAGNESIUM: 2.2 MG/DL (ref 1.6–2.6)
MCH RBC QN AUTO: 30.4 PG (ref 26–35)
MCHC RBC AUTO-ENTMCNC: 32.8 % (ref 32–34.5)
MCV RBC AUTO: 92.8 FL (ref 80–99.9)
PDW BLD-RTO: 15.3 FL (ref 11.5–15)
PLATELET # BLD: 212 E9/L (ref 130–450)
PMV BLD AUTO: 11.6 FL (ref 7–12)
POTASSIUM REFLEX MAGNESIUM: 3.7 MMOL/L (ref 3.5–5)
POTASSIUM SERPL-SCNC: 3.7 MMOL/L (ref 3.5–5)
RBC # BLD: 4.57 E12/L (ref 3.8–5.8)
SODIUM BLD-SCNC: 141 MMOL/L (ref 132–146)
TRIGL SERPL-MCNC: 117 MG/DL (ref 0–149)
VLDLC SERPL CALC-MCNC: 23 MG/DL
WBC # BLD: 4.8 E9/L (ref 4.5–11.5)

## 2022-03-22 PROCEDURE — 85027 COMPLETE CBC AUTOMATED: CPT

## 2022-03-22 PROCEDURE — 2580000003 HC RX 258: Performed by: INTERNAL MEDICINE

## 2022-03-22 PROCEDURE — 83735 ASSAY OF MAGNESIUM: CPT

## 2022-03-22 PROCEDURE — 36415 COLL VENOUS BLD VENIPUNCTURE: CPT

## 2022-03-22 PROCEDURE — 6370000000 HC RX 637 (ALT 250 FOR IP): Performed by: FAMILY MEDICINE

## 2022-03-22 PROCEDURE — 99254 IP/OBS CNSLTJ NEW/EST MOD 60: CPT | Performed by: INTERNAL MEDICINE

## 2022-03-22 PROCEDURE — 6370000000 HC RX 637 (ALT 250 FOR IP): Performed by: INTERNAL MEDICINE

## 2022-03-22 PROCEDURE — 80061 LIPID PANEL: CPT

## 2022-03-22 PROCEDURE — 2140000000 HC CCU INTERMEDIATE R&B

## 2022-03-22 PROCEDURE — 93306 TTE W/DOPPLER COMPLETE: CPT

## 2022-03-22 PROCEDURE — 93010 ELECTROCARDIOGRAM REPORT: CPT | Performed by: INTERNAL MEDICINE

## 2022-03-22 PROCEDURE — 93005 ELECTROCARDIOGRAM TRACING: CPT | Performed by: FAMILY MEDICINE

## 2022-03-22 PROCEDURE — 80048 BASIC METABOLIC PNL TOTAL CA: CPT

## 2022-03-22 PROCEDURE — 6360000002 HC RX W HCPCS: Performed by: INTERNAL MEDICINE

## 2022-03-22 RX ORDER — TORSEMIDE 20 MG/1
40 TABLET ORAL DAILY
Status: DISCONTINUED | OUTPATIENT
Start: 2022-03-23 | End: 2022-03-24 | Stop reason: HOSPADM

## 2022-03-22 RX ADMIN — SODIUM CHLORIDE, PRESERVATIVE FREE 10 ML: 5 INJECTION INTRAVENOUS at 20:38

## 2022-03-22 RX ADMIN — ASPIRIN 81 MG 81 MG: 81 TABLET ORAL at 08:38

## 2022-03-22 RX ADMIN — FUROSEMIDE 40 MG: 10 INJECTION, SOLUTION INTRAMUSCULAR; INTRAVENOUS at 08:38

## 2022-03-22 RX ADMIN — SODIUM CHLORIDE, PRESERVATIVE FREE 10 ML: 5 INJECTION INTRAVENOUS at 08:38

## 2022-03-22 RX ADMIN — CARVEDILOL 25 MG: 25 TABLET, FILM COATED ORAL at 08:38

## 2022-03-22 RX ADMIN — ATORVASTATIN CALCIUM 80 MG: 80 TABLET, FILM COATED ORAL at 08:47

## 2022-03-22 RX ADMIN — VALSARTAN 80 MG: 80 TABLET, FILM COATED ORAL at 20:38

## 2022-03-22 RX ADMIN — FUROSEMIDE 40 MG: 10 INJECTION, SOLUTION INTRAMUSCULAR; INTRAVENOUS at 17:52

## 2022-03-22 RX ADMIN — CARVEDILOL 25 MG: 25 TABLET, FILM COATED ORAL at 17:52

## 2022-03-22 RX ADMIN — VALSARTAN 80 MG: 80 TABLET, FILM COATED ORAL at 08:38

## 2022-03-22 ASSESSMENT — PAIN SCALES - GENERAL
PAINLEVEL_OUTOF10: 0
PAINLEVEL_OUTOF10: 0

## 2022-03-22 NOTE — PROGRESS NOTES
Cardiology Progress Note:    Narrative:  · No acute events overnight  · Remains orthopneic  · Blood pressures improved but remained with systolics in the 804Y 255T  · Net -1.6 L overnight. Creatinine 2.0 up from 1.6      Objective:  BP (!) 137/93   Pulse 77   Temp 97.8 °F (36.6 °C) (Temporal)   Resp 18   Ht 6' 2\" (1.88 m)   Wt 259 lb 8 oz (117.7 kg)   SpO2 96%   BMI 33.32 kg/m²    General: NAD  Lungs: CTAB  Heart: RRR. No M/R/G  Abdomen: soft, NT/ND  Extremities: no pitting edema. Warm. Access site in R groin c/d/i  Neuro: A&Ox3, MAEx4      Recent Labs     03/22/22  0607 03/20/22  1751   WBC 4.8 5.1   HGB 13.9 13.9   HCT 42.4 43.0   MCV 92.8 94.3    213       Lab Results   Component Value Date     03/22/2022    K 3.7 03/22/2022    K 3.7 03/22/2022     03/22/2022    CO2 27 03/22/2022    BUN 22 03/22/2022    CREATININE 2.0 03/22/2022    GLUCOSE 147 03/22/2022    CALCIUM 8.4 03/22/2022          Assessment:  1. Acute on chronic HFrEF due to nonischemic cardiomyopathy:  1. Coronary angiogram 3/21/2022 ([facility]): Mild nonobstructive CAD with LVEDP 38  2. Hypertension  3. Prior cocaine abuse, U tox negative on this admission  4. GERD        Recommendations:  · TTE pending  · Continue aspirin and atorvastatin 80 mg daily  · Continue carvedilol 25 mg p.o. twice daily  · Switch to oral torsemide tomorrow 40 mg daily  · Continue valsartan 80 mg p.o. twice daily for now. Will monitor renal function and adjust as needed  · Anticipate discharge in the next 48 hours        We will follow.       Dorothy Russell MD, [facility]  Interventional Cardiology/Structural Heart Disease  Office: [phone]

## 2022-03-22 NOTE — PATIENT CARE CONFERENCE
[facility] Quality Flow/Interdisciplinary Rounds Progress Note        Quality Flow Rounds held on March 22, 2022    Disciplines Attending:  Bedside Nurse, ,  and Nursing Unit Leadership    Gwen Zurita was admitted on 3/21/2022  3:08 AM    Anticipated Discharge Date:  Expected Discharge Date: 03/23/22    Disposition:    Jacky Score:  Jacky Scale Score: 22    Readmission Risk              Risk of Unplanned Readmission:  9           Discussed patient goal for the day, patient clinical progression, and barriers to discharge.   The following Goal(s) of the Day/Commitment(s) have been identified:  Diagnostics - Report Results and Labs - Report Results      Jonathan Sol RN  March 22, 2022

## 2022-03-22 NOTE — PLAN OF CARE
Referral to CHF Clinic in [place] received and patient scheduled for  4/18/2022 at 9:30am.  After Visit Summary updated to reflect appointment.     Future Appointments   Date Time Provider Henrique Watt   3/30/2022 10:00 AM Corrinne Ras, APRN - CNP [facility]   3/30/2022  2:00 PM MD Ninfa Lees [facility]   4/18/2022  9:30 AM [facility] ROOM 1 [facility] HOD     Electronically signed by Malinda Chou RN on 3/22/2022 at 4:17 PM

## 2022-03-22 NOTE — CARE COORDINATION
SOCIAL WORK/CASEMANAGEMENT TRANSITION OF CARE [id number] Aury Steiner, [address], [place], [phone]): met with pt and his girlfriend in the room this a.m. the pt is planning on returning home with no needs. Creatine Is 2.0 up from 1.6, on iv lasix bid with echo pending. Pastor/clovis to follow.  CASTILLO Sutherland  3/22/2022

## 2022-03-22 NOTE — PROGRESS NOTES
Hospitalist Progress Note      SYNOPSIS: Patient admitted on 3/21/2022 for Acute coronary syndrome [facility])      SUBJECTIVE:cc= ptn affirmed his cc=sob  Still expereincing pnd       Nonischemic cardiomyopathy   Mild nonobstructive CAD  HTN    Rev of systems  Gen= den fever  HEENT= den HA  Pulmon= + PND  GI= den diarrhea        OBJECTIVE:    BP (!) 136/92   Pulse 77   Temp 97.2 °F (36.2 °C) (Temporal)   Resp 17   Ht 6' 2\" (1.88 m)   Wt 259 lb 8 oz (117.7 kg)   SpO2 94%   BMI 33.32 kg/m²     General appearance: Not in extremis not diaphoretic  HEENT: no thrush  . Respiratory:no wheeze unlabored respiratory effort  Cardiovascular: no murmur audible S1-S2    Musculoskeletal: No sweaty palm.      Neurologic: awake, alert and attentive  Mood and affect and thinking are normal  Language intact    ASSESSMENT:    tobias  Nonischemic cardiomyopathy   Mild nonobstructive CAD  HTN  Congestive heart failure with EF of 25%  Chronic kidney disease stage III  Suspect sleep apnea       PLAN:diureses   monit cr    Await tte  Asa and atorvostat  Carvedilol  Valsartan  f-up renal chemistries  Home 02 eval  Would benefit from op polysomnogram    DISPOSITION: home expected    Medications:  REVIEWED DAILY    Infusion Medications    sodium chloride       Scheduled Medications    [START ON 3/23/2022] torsemide  40 mg Oral Daily    aspirin  81 mg Oral Daily    sodium chloride flush  5-40 mL IntraVENous 2 times per day    atorvastatin  80 mg Oral Daily    carvedilol  25 mg Oral BID WC    valsartan  80 mg Oral BID     PRN Meds: heparin (porcine), heparin (porcine), ondansetron **OR** ondansetron, [DISCONTINUED] acetaminophen **OR** acetaminophen, polyethylene glycol, perflutren lipid microspheres, sodium chloride flush, sodium chloride, acetaminophen, labetalol, melatonin    Labs:     Recent Labs     03/20/22  1751 03/22/22  0607   WBC 5.1 4.8   HGB 13.9 13.9   HCT 43.0 42.4    212       Recent Labs     03/20/22  1751 03/20/22  1751 03/21/22  1345 03/22/22  0607     --  138 141   K 4.2   < > 4.2 3.7  3.7   *  --  104 105   CO2 23  --  24 27   BUN 19  --  17 22*   CREATININE 1.6*  --  1.6* 2.0*   CALCIUM 8.5*  --  8.8 8.4*    < > = values in this interval not displayed. Chronic labs:    Lab Results   Component Value Date    CHOL 167 03/22/2022    TRIG 117 03/22/2022    HDL 47 03/22/2022    LDLCALC 97 03/22/2022       Radiology:none today  +++++++++++++++++++++++++++++++++++++++++++++++++  DO Karis Merritt Physician - [address] [place], [place]  +++++++++++++++++++++++++++++++++++++++++++++++++  NOTE: This report was transcribed using voice recognition software. Every effort was made to ensure accuracy; however, inadvertent computerized transcription errors may be present.

## 2022-03-22 NOTE — CONSULTS
New to [facility], last month dx CHF in [address]     Patient currently admitted with diagnosis of Systolic heart failure. Patient was finished with echo at bedside now laying in bed during the consultation. He was engaged and asked appropriate questions throughout the education session. He is agreeable to . Future Appointments   Date Time Provider Henrique Watt   3/30/2022 10:00 AM Lauren Gallagher APRN - CNP [facility]   3/30/2022  2:00 PM Sumaya Britton MD Bessie Holstein [facility]   4/18/2022  9:30 AM [facility] CHF ROOM 1 [facility] [place] HOD     Placed referral to [facility] [address] [place] [address]) Physician Pre-Service number on discharge to set PCP closest to him. He is now set with PCP DR Jose Antonio Almeida. We reviewed the introduction to Heart Failure, the HF zones, signs and symptoms to report on day 1 of onset, medications, medication compliance, the importance of obtaining daily weights, following a low sodium diet, reading food labels for the sodium content, keeping physician appointments, and smoking cessation. We discussed writing / tracking daily weights on a calendar / log because a 5 pound gain in 1 week can sneak up if you are not tracking it. You can also take your charted weights to your doctor appointments to be reviewed. Contributing risk factors for Heart Failure are identified as lives with [facility]. recently moved from [place] [address] (recent dx CHF systolic). ran out of water pill x 1 week, inc sob, chest pain. states he quit tobacco use this admission. Discussed tobacco cessation program, and placed information on discharge instructions. Needs scale. Provided scale and instructed on daily weights. Very eager to learn. teachback used to confirm education understanding. I advised patient they can reduce the risk for Heart Failure exacerbations by modifying / controlling the risk factors.  We discussed self-managed care which includes the following:  to take medications as prescribed, report any intolerable side effects of medications to the cardiologist / doctor, do not just stop taking the medication; follow a cardiac heart healthy / low sodium diet; weigh yourself daily, exercise regularly- per doctor recommendation and not to smoke or use an excess amount of alcohol. We discussed calling the cardiologist / doctor with a weight gain of 3 pounds in one day or a total of 5 pounds or more in one week. Also, if you should have a significant weight loss of 3# or more in one day to call the doctor, they may need to decrease or hold the diuretic dose. On days you feels nauseated and not eating / drinking, having emesis or diarrhea,  informed to call the cardiologist  / doctor, they may need to decrease or hold diuretic to avoid dehydration. I stressed the importance of informing their cardiologist the first day of onset of any of the signs and symptoms in the \"Yellow Zone\" of the HF Zones. Patient verbalizes understanding. Greater than 30 minutes was spent educating patient. The Heart Failure Booklet given to the patient with additional patient education addressing:  · What is Heart Failure?   · Things You Can Do to Live Well with HF  · How to Take Your Medications  · How to Eat Less Salt  · Exercising Well with Heart Failure  · Signs and symptoms of HF to report  · Weight Yourself Each Day  · Home Self Management- activity, weight tracking, taking medications as prescribed, meals /diet planning (sodium and fluid restriction), how to read food labels, keeping physician follow ups, smoking cessation, follow the Heart Failure Zones  · The Heart Failure zones  · Sodium content pamphlet  · What foods I should avoid tip sheet    Instructed  to call 911 if you have any of the following symptoms:    ·    Struggling to breathe unrelieved with rest,  ·    Having chest pain, confusion or can't think clearly  ·    Have confusion or cant think clearly      The patient is ordered:  Diet: ADULT DIET; Regular; Low Sodium (2 gm)   Sodium controlled diet Yes  Fluid restriction daily ordered (fluid restriction recommended if patient is hyponatremic and/or diuretic is initiated or increased) No  FR:   Daily Weights:   Patient Vitals for the past 96 hrs (Last 3 readings):   Weight   03/22/22 0436 259 lb 8 oz (117.7 kg)   03/21/22 0308 271 lb 12.8 oz (123.3 kg)     I/O:     Intake/Output Summary (Last 24 hours) at 3/22/2022 1249  Last data filed at 3/22/2022 1011  Gross per 24 hour   Intake 240 ml   Output 1600 ml   Net -1360 ml            Aspen Guerrier, RN BSN, RN  Heart Failure Navigator        CONGESTIVE HEART FAILURE (CHF) AHA GUIDELINES  (Must be completed for Primary Diagnosis CHF or History of CHF)    Discharge Plan:  I placed the Heart Failure Home Instructions in patient's discharge instructions. Per Heart Failure GWTG, the patient should have a follow-up appointment made within 7 days of discharge. New Diagnosis No   From [facility] EF 15%    ECHO Results most recent:  No results found for: LVEF, LVEFMODE                                     Social History     Tobacco Use   Smoking Status Current Every Day Smoker    Packs/day: 0.25    Types: Cigarettes    Start date: 1992   Smokeless Tobacco Never Used          There is no immunization history on file for this patient.        Angiotensin-Converting-Enzyme (ACE) inhibitor ordered:  [] Yes  [x] No (specify contraindication):    [] Contraindicated  [] Hypotensive patient who was at immediate risk of cardiogenic shock  [] Hospitalized patient who experienced marked azotemia  [] Other Contraindications  [] Not Eligible  [] Not Tolerant  [] Patient Reason  [] System Reason  [] Other Reason    Angiotensin II receptor blockers (ARB) ordered:  [x] Yes  [] No (specify contraindication):    [] Contraindicated  [] Hypotensive patient who was at immediate risk of cardiogenic shock  [] Hospitalized patient who experienced marked azotemia  [] Other Contraindications    ARNI - Angiotensin Receptor Neprilysin Inhibitor ordered:  [] Yes  [x] No (specify contraindication):    [] ACE inhibitor use within the prior 36 hours  [] Allergy  [] Hyperkalemia  [] Hypotension  [] Renal dysfunction defined as creatinine > 2.5 mg/dL in men or > 2.0 mg/dL in women  [] Other Contraindications  [] Not Eligible  [] Not Tolerant  [] Patient Reason  []System Reason  []Other Reason      Beta Blocker (Carvedilol, Metoprolol Succinate, or Bisoprolol) ordered:    [x] Yes  [] No (specify contraindication):    [] Contraindicated  [] Asthma  [] Fluid Overload  [] Low Blood Pressure  [] Patient recently treated with an intravenous positive inotropic agent  [] Other Contraindications  [] Not Eligible  [] Not Tolerant  [] Patient Reason  [] System Reason    SGLT2 Inhibitor ordered:  [] Yes  [x] No (specify contraindication):    [] Contraindicated  [] Patient currently on dialysis  [] Ketoacidosis  [] Known hypersensitivity to the medication  [] Type I diabetes (not approved for use in patients with Type I diabetes due to increased risk of ketoacidosis)  [] Other Contraindications  [] Not Eligible  [] Not Tolerant  [] Patient Reason  [x] System Reason  [] Other Reason    Aldosterone Antagonist ordered:  [] Yes  [x] No (specify contraindication):    [] Contraindicated  [] Allergy due to aldosterone receptor antagonist  [] Hyperkalemia  [] Renal dysfunction defined as creatinine >2.5 mg/dL in men or >2.0 mg/dL in women. [] Other contraindications  [] Not Eligible  [] Not Tolerant  [] Patient Reason  [] System Reason  [x] Other Reason  GDMT to be achieved outpatient f/u cardiology and CHF Clinic.

## 2022-03-22 NOTE — CARE COORDINATION
Patient had cardiac cath and coronary angiogram yesterday. Remains on Iv lasix 2 x day . Echo needs completed. Rhona Keith will transport home when patient is medically cleared. Will need PCP arranged at discharge. CM/Sw will continue to follow.

## 2022-03-22 NOTE — PLAN OF CARE
Patient's chart updated to reflect:      . - HF care plan, HF education points and HF discharge instructions.  -Orders: 2 gram sodium diet, daily weights, I/O.  -PCP and cardiology follow up appointments to be scheduled within 7 days of hospital discharge. -CHF education session will be provided to the patient prior to hospital discharge.     Agata Garay RN RN, BSN  Heart Failure Navigator

## 2022-03-22 NOTE — PLAN OF CARE
Problem: Pain:  Goal: Pain level will decrease  Description: Pain level will decrease  Outcome: Met This Shift  Goal: Control of acute pain  Description: Control of acute pain  Outcome: Met This Shift  Goal: Control of chronic pain  Description: Control of chronic pain  Outcome: Met This Shift     Problem: Cardiac:  Goal: Ability to maintain an adequate cardiac output will improve  Description: Ability to maintain an adequate cardiac output will improve  Outcome: Ongoing  Goal: Hemodynamic stability will improve  Description: Hemodynamic stability will improve  Outcome: Ongoing     Problem: Fluid Volume:  Goal: Ability to achieve and maintain adequate urine output will improve  Description: Ability to achieve and maintain adequate urine output will improve  Outcome: Ongoing     Problem: Respiratory:  Goal: Respiratory status will improve  Description: Respiratory status will improve  Outcome: Ongoing   Pt. Had no complaints of chest pain throughout shift. Uses call light appropriately and is safe ambulating on his own. VS WNL. R groin site intact with small amount of old drainage noted.  Slept well during the night

## 2022-03-23 LAB
ANION GAP SERPL CALCULATED.3IONS-SCNC: 10 MMOL/L (ref 7–16)
BUN BLDV-MCNC: 23 MG/DL (ref 6–20)
CALCIUM SERPL-MCNC: 8.8 MG/DL (ref 8.6–10.2)
CHLORIDE BLD-SCNC: 106 MMOL/L (ref 98–107)
CO2: 26 MMOL/L (ref 22–29)
CREAT SERPL-MCNC: 1.7 MG/DL (ref 0.7–1.2)
GFR AFRICAN AMERICAN: 53
GFR NON-AFRICAN AMERICAN: 53 ML/MIN/1.73
GLUCOSE BLD-MCNC: 117 MG/DL (ref 74–99)
HCT VFR BLD CALC: 45.9 % (ref 37–54)
HEMOGLOBIN: 15.1 G/DL (ref 12.5–16.5)
MAGNESIUM: 2.2 MG/DL (ref 1.6–2.6)
MCH RBC QN AUTO: 30.4 PG (ref 26–35)
MCHC RBC AUTO-ENTMCNC: 32.9 % (ref 32–34.5)
MCV RBC AUTO: 92.5 FL (ref 80–99.9)
PDW BLD-RTO: 15 FL (ref 11.5–15)
PLATELET # BLD: 231 E9/L (ref 130–450)
PMV BLD AUTO: 11.3 FL (ref 7–12)
POTASSIUM SERPL-SCNC: 3.7 MMOL/L (ref 3.5–5)
PRO-BNP: 1720 PG/ML (ref 0–125)
RBC # BLD: 4.96 E12/L (ref 3.8–5.8)
SODIUM BLD-SCNC: 142 MMOL/L (ref 132–146)
WBC # BLD: 4.9 E9/L (ref 4.5–11.5)

## 2022-03-23 PROCEDURE — 36415 COLL VENOUS BLD VENIPUNCTURE: CPT

## 2022-03-23 PROCEDURE — 6370000000 HC RX 637 (ALT 250 FOR IP): Performed by: INTERNAL MEDICINE

## 2022-03-23 PROCEDURE — 83880 ASSAY OF NATRIURETIC PEPTIDE: CPT

## 2022-03-23 PROCEDURE — 6370000000 HC RX 637 (ALT 250 FOR IP): Performed by: FAMILY MEDICINE

## 2022-03-23 PROCEDURE — 85027 COMPLETE CBC AUTOMATED: CPT

## 2022-03-23 PROCEDURE — 80048 BASIC METABOLIC PNL TOTAL CA: CPT

## 2022-03-23 PROCEDURE — 99253 IP/OBS CNSLTJ NEW/EST LOW 45: CPT | Performed by: INTERNAL MEDICINE

## 2022-03-23 PROCEDURE — 2580000003 HC RX 258: Performed by: INTERNAL MEDICINE

## 2022-03-23 PROCEDURE — 94762 N-INVAS EAR/PLS OXIMTRY CONT: CPT

## 2022-03-23 PROCEDURE — 83735 ASSAY OF MAGNESIUM: CPT

## 2022-03-23 PROCEDURE — 2140000000 HC CCU INTERMEDIATE R&B

## 2022-03-23 PROCEDURE — 6370000000 HC RX 637 (ALT 250 FOR IP): Performed by: PHYSICIAN ASSISTANT

## 2022-03-23 RX ORDER — POTASSIUM CHLORIDE 20 MEQ/1
40 TABLET, EXTENDED RELEASE ORAL ONCE
Status: COMPLETED | OUTPATIENT
Start: 2022-03-23 | End: 2022-03-23

## 2022-03-23 RX ORDER — VALSARTAN 160 MG/1
160 TABLET ORAL 2 TIMES DAILY
Status: DISCONTINUED | OUTPATIENT
Start: 2022-03-23 | End: 2022-03-24 | Stop reason: HOSPADM

## 2022-03-23 RX ADMIN — VALSARTAN 160 MG: 160 TABLET, FILM COATED ORAL at 09:22

## 2022-03-23 RX ADMIN — POTASSIUM CHLORIDE 40 MEQ: 20 TABLET, EXTENDED RELEASE ORAL at 12:10

## 2022-03-23 RX ADMIN — SODIUM CHLORIDE, PRESERVATIVE FREE 10 ML: 5 INJECTION INTRAVENOUS at 21:30

## 2022-03-23 RX ADMIN — SODIUM CHLORIDE, PRESERVATIVE FREE 10 ML: 5 INJECTION INTRAVENOUS at 09:23

## 2022-03-23 RX ADMIN — CARVEDILOL 25 MG: 25 TABLET, FILM COATED ORAL at 09:22

## 2022-03-23 RX ADMIN — CARVEDILOL 25 MG: 25 TABLET, FILM COATED ORAL at 17:39

## 2022-03-23 RX ADMIN — TORSEMIDE 40 MG: 20 TABLET ORAL at 09:22

## 2022-03-23 RX ADMIN — ASPIRIN 81 MG 81 MG: 81 TABLET ORAL at 09:23

## 2022-03-23 RX ADMIN — VALSARTAN 160 MG: 160 TABLET, FILM COATED ORAL at 21:27

## 2022-03-23 RX ADMIN — ATORVASTATIN CALCIUM 80 MG: 80 TABLET, FILM COATED ORAL at 09:22

## 2022-03-23 ASSESSMENT — PAIN SCALES - GENERAL
PAINLEVEL_OUTOF10: 0

## 2022-03-23 NOTE — PATIENT CARE CONFERENCE
[facility] Quality Flow/Interdisciplinary Rounds Progress Note        Quality Flow Rounds held on March 23, 2022    Disciplines Attending:  Bedside Nurse, ,  and Nursing Unit Leadership    Gwen Zurita was admitted on 3/21/2022  3:08 AM    Anticipated Discharge Date:  Expected Discharge Date: 03/23/22    Disposition:    Jacky Score:  Jacky Scale Score: 22    Readmission Risk              Risk of Unplanned Readmission:  12           Discussed patient goal for the day, patient clinical progression, and barriers to discharge.   The following Goal(s) of the Day/Commitment(s) have been identified:  Diagnostics - Report Results      Ignacio Lopes RN  March 23, 2022

## 2022-03-23 NOTE — PLAN OF CARE
Problem: Pain:  Goal: Pain level will decrease  Description: Pain level will decrease  Outcome: Met This Shift  Goal: Control of acute pain  Description: Control of acute pain  Outcome: Met This Shift  Goal: Control of chronic pain  Description: Control of chronic pain  Outcome: Met This Shift     Problem: Cardiac:  Goal: Ability to maintain an adequate cardiac output will improve  Description: Ability to maintain an adequate cardiac output will improve  Outcome: Ongoing  Goal: Hemodynamic stability will improve  Description: Hemodynamic stability will improve  Outcome: Ongoing     Problem: Fluid Volume:  Goal: Ability to achieve and maintain adequate urine output will improve  Description: Ability to achieve and maintain adequate urine output will improve  Outcome: Ongoing     Problem: OXYGENATION/RESPIRATORY FUNCTION  Goal: Patient will maintain patent airway  Outcome: Ongoing     Problem: HEMODYNAMIC STATUS  Goal: Patient has stable vital signs and fluid balance  Outcome: Ongoing   Utilizes call light appropriately. No skin issues. Groin site post cath has not changed since 3/21, old drainage remains on dressing. Diastolic BP remains intermittently elevated. Pt. Had 4 beat run of Vtach. Cardiology to be notified this am. Pt. Denied SOB or CP throughout the night.

## 2022-03-23 NOTE — PLAN OF CARE
Problem: Cardiac:  Goal: Ability to maintain an adequate cardiac output will improve  Description: Ability to maintain an adequate cardiac output will improve  3/23/2022 1637 by Glenis Mckee RN  Outcome: Met This Shift

## 2022-03-23 NOTE — PROGRESS NOTES
Hospitalist Progress Note      SYNOPSIS: Patient admitted on 3/21/2022 for Acute coronary syndrome [facility])      SUBJECTIVE:cc= ptn affirmed his cc=sob  Slept better  Less PND       Nonischemic cardiomyopathy   Mild nonobstructive CAD  HTN    Rev of systems  Gen= den fever  HEENT= den HA  Pulmon= + PND  GI= den diarrhea    Echo TTE  Summary    Moderately dilated left ventricle (LADY 6.8, ESD 6.3)    Estimated left ventricle ejection fraction 25+/-5%.    Normal right ventricular size and function.    Moderate mitral regurgitation is present. The jet is very eccentric    related to posterior leaflet tethering and thus may be underestimated.    Mild aortic regurgitation is noted.      OBJECTIVE:    /81   Pulse 77   Temp 96.9 °F (36.1 °C) (Temporal)   Resp 16   Ht 6' 2\" (1.88 m)   Wt 259 lb 2 oz (117.5 kg)   SpO2 97%   BMI 33.27 kg/m²     General appearance: Not in extremis not diaphoretic  Respiratory: unlabored respiratory effort  Neurologic: awake, alert and attentive  Mood and affect and thinking are normal  Language intact    ASSESSMENT:    tobias ser cr holding  Nonischemic cardiomyopathy   Mild nonobstructive CAD  HTN  Congestive heart failure with EF of 25%  Chronic kidney disease stage III  Suspect sleep apnea  chrnc back pain followed with pain manag specialist  in [place]     PLAN:home 02 eval + overnight pulse oximetry   monit cr/fernando chemis am labs  Patient requests pain meds for chrnc back pain -pain meds for discharge  Per cardiology   init Torsemide 40 mg qd  Asa and atorvostat 80 mg qd  Carvedilol 25mg po bid  Valsartan 160 mg po bid    Would benefit from op polysomnogram    DISPOSITION: home expected    Medications:  REVIEWED DAILY    Infusion Medications    sodium chloride       Scheduled Medications    valsartan  160 mg Oral BID    torsemide  40 mg Oral Daily    aspirin  81 mg Oral Daily    sodium chloride flush  5-40 mL IntraVENous 2 times per day    atorvastatin  80 mg Oral Daily    carvedilol  25 mg Oral BID      PRN Meds: heparin (porcine), heparin (porcine), ondansetron **OR** ondansetron, [DISCONTINUED] acetaminophen **OR** acetaminophen, polyethylene glycol, perflutren lipid microspheres, sodium chloride flush, sodium chloride, acetaminophen, labetalol, melatonin    Labs:     Recent Labs     03/20/22  1751 03/22/22  0607 03/23/22  0616   WBC 5.1 4.8 4.9   HGB 13.9 13.9 15.1   HCT 43.0 42.4 45.9    212 231       Recent Labs     03/21/22  1345 03/22/22  0607 03/23/22  0616    141 142   K 4.2 3.7  3.7 3.7    105 106   CO2 24 27 26   BUN 17 22* 23*   CREATININE 1.6* 2.0* 1.7*   CALCIUM 8.8 8.4* 8.8       Chronic labs:    Lab Results   Component Value Date    CHOL 167 03/22/2022    TRIG 117 03/22/2022    HDL 47 03/22/2022    LDLCALC 97 03/22/2022       Radiology:none today  +++++++++++++++++++++++++++++++++++++++++++++++++  DO Karis Dobson Physician - [address] [place], [place]  +++++++++++++++++++++++++++++++++++++++++++++++++  NOTE: This report was transcribed using voice recognition software. Every effort was made to ensure accuracy; however, inadvertent computerized transcription errors may be present.

## 2022-03-24 VITALS
WEIGHT: 259.6 LBS | OXYGEN SATURATION: 100 % | DIASTOLIC BLOOD PRESSURE: 95 MMHG | SYSTOLIC BLOOD PRESSURE: 128 MMHG | HEIGHT: 74 IN | RESPIRATION RATE: 16 BRPM | TEMPERATURE: 97.6 F | HEART RATE: 84 BPM | BODY MASS INDEX: 33.32 KG/M2

## 2022-03-24 PROBLEM — I50.21 ACUTE SYSTOLIC CONGESTIVE HEART FAILURE (HCC): Status: ACTIVE | Noted: 2022-03-24

## 2022-03-24 LAB
ANION GAP SERPL CALCULATED.3IONS-SCNC: 9 MMOL/L (ref 7–16)
BUN BLDV-MCNC: 22 MG/DL (ref 6–20)
CALCIUM SERPL-MCNC: 8.5 MG/DL (ref 8.6–10.2)
CHLORIDE BLD-SCNC: 106 MMOL/L (ref 98–107)
CO2: 27 MMOL/L (ref 22–29)
CREAT SERPL-MCNC: 1.8 MG/DL (ref 0.7–1.2)
GFR AFRICAN AMERICAN: 50
GFR NON-AFRICAN AMERICAN: 50 ML/MIN/1.73
GLUCOSE BLD-MCNC: 103 MG/DL (ref 74–99)
HCT VFR BLD CALC: 48.5 % (ref 37–54)
HEMOGLOBIN: 15.5 G/DL (ref 12.5–16.5)
MAGNESIUM: 2.2 MG/DL (ref 1.6–2.6)
MCH RBC QN AUTO: 29.9 PG (ref 26–35)
MCHC RBC AUTO-ENTMCNC: 32 % (ref 32–34.5)
MCV RBC AUTO: 93.4 FL (ref 80–99.9)
PDW BLD-RTO: 15 FL (ref 11.5–15)
PLATELET # BLD: 227 E9/L (ref 130–450)
PMV BLD AUTO: 11.6 FL (ref 7–12)
POTASSIUM SERPL-SCNC: 4.3 MMOL/L (ref 3.5–5)
RBC # BLD: 5.19 E12/L (ref 3.8–5.8)
SODIUM BLD-SCNC: 142 MMOL/L (ref 132–146)
WBC # BLD: 5.2 E9/L (ref 4.5–11.5)

## 2022-03-24 PROCEDURE — 87591 N.GONORRHOEAE DNA AMP PROB: CPT

## 2022-03-24 PROCEDURE — 2580000003 HC RX 258: Performed by: INTERNAL MEDICINE

## 2022-03-24 PROCEDURE — 83735 ASSAY OF MAGNESIUM: CPT

## 2022-03-24 PROCEDURE — 80048 BASIC METABOLIC PNL TOTAL CA: CPT

## 2022-03-24 PROCEDURE — 6370000000 HC RX 637 (ALT 250 FOR IP): Performed by: INTERNAL MEDICINE

## 2022-03-24 PROCEDURE — 36415 COLL VENOUS BLD VENIPUNCTURE: CPT

## 2022-03-24 PROCEDURE — 99232 SBSQ HOSP IP/OBS MODERATE 35: CPT | Performed by: PHYSICIAN ASSISTANT

## 2022-03-24 PROCEDURE — 85027 COMPLETE CBC AUTOMATED: CPT

## 2022-03-24 PROCEDURE — 87491 CHLMYD TRACH DNA AMP PROBE: CPT

## 2022-03-24 PROCEDURE — 6370000000 HC RX 637 (ALT 250 FOR IP): Performed by: FAMILY MEDICINE

## 2022-03-24 PROCEDURE — 6360000002 HC RX W HCPCS: Performed by: INTERNAL MEDICINE

## 2022-03-24 RX ORDER — DOXYCYCLINE HYCLATE 100 MG/1
100 CAPSULE ORAL 2 TIMES DAILY
Status: DISCONTINUED | OUTPATIENT
Start: 2022-03-24 | End: 2022-03-24

## 2022-03-24 RX ORDER — DOXYCYCLINE HYCLATE 100 MG/1
100 CAPSULE ORAL 2 TIMES DAILY
Status: DISCONTINUED | OUTPATIENT
Start: 2022-03-24 | End: 2022-03-24 | Stop reason: HOSPADM

## 2022-03-24 RX ORDER — DOXYCYCLINE HYCLATE 100 MG/1
100 CAPSULE ORAL EVERY 12 HOURS SCHEDULED
Qty: 7 CAPSULE | Refills: 0 | Status: SHIPPED | OUTPATIENT
Start: 2022-03-24 | End: 2022-03-24

## 2022-03-24 RX ORDER — ATORVASTATIN CALCIUM 80 MG/1
80 TABLET, FILM COATED ORAL DAILY
Qty: 30 TABLET | Refills: 3 | Status: SHIPPED | OUTPATIENT
Start: 2022-03-25 | End: 2022-08-21

## 2022-03-24 RX ORDER — VALSARTAN 160 MG/1
160 TABLET ORAL 2 TIMES DAILY
Qty: 30 TABLET | Refills: 3 | Status: SHIPPED | OUTPATIENT
Start: 2022-03-24 | End: 2022-03-24

## 2022-03-24 RX ORDER — VALSARTAN 160 MG/1
160 TABLET ORAL 2 TIMES DAILY
Qty: 60 TABLET | Refills: 3 | Status: ON HOLD | OUTPATIENT
Start: 2022-03-24 | End: 2022-05-05 | Stop reason: HOSPADM

## 2022-03-24 RX ORDER — OXYCODONE AND ACETAMINOPHEN 10; 325 MG/1; MG/1
1 TABLET ORAL EVERY 6 HOURS PRN
Qty: 12 TABLET | Refills: 0 | Status: SHIPPED | OUTPATIENT
Start: 2022-03-24 | End: 2022-03-27

## 2022-03-24 RX ORDER — DOXYCYCLINE HYCLATE 100 MG/1
100 CAPSULE ORAL EVERY 12 HOURS SCHEDULED
Qty: 14 CAPSULE | Refills: 0 | Status: SHIPPED | OUTPATIENT
Start: 2022-03-24 | End: 2022-03-28

## 2022-03-24 RX ORDER — CEFTRIAXONE 1 G/1
1000 INJECTION, POWDER, FOR SOLUTION INTRAMUSCULAR; INTRAVENOUS ONCE
Status: COMPLETED | OUTPATIENT
Start: 2022-03-24 | End: 2022-03-24

## 2022-03-24 RX ORDER — CARVEDILOL 25 MG/1
25 TABLET ORAL 2 TIMES DAILY WITH MEALS
Qty: 60 TABLET | Refills: 3 | Status: SHIPPED | OUTPATIENT
Start: 2022-03-24 | End: 2022-04-14 | Stop reason: ALTCHOICE

## 2022-03-24 RX ADMIN — CEFTRIAXONE 1000 MG: 1 INJECTION, POWDER, FOR SOLUTION INTRAMUSCULAR; INTRAVENOUS at 14:42

## 2022-03-24 RX ADMIN — TORSEMIDE 40 MG: 20 TABLET ORAL at 10:07

## 2022-03-24 RX ADMIN — CARVEDILOL 25 MG: 25 TABLET, FILM COATED ORAL at 10:06

## 2022-03-24 RX ADMIN — VALSARTAN 160 MG: 160 TABLET, FILM COATED ORAL at 10:06

## 2022-03-24 RX ADMIN — CARVEDILOL 25 MG: 25 TABLET, FILM COATED ORAL at 17:02

## 2022-03-24 RX ADMIN — WATER 3.6 ML: 1 INJECTION INTRAMUSCULAR; INTRAVENOUS; SUBCUTANEOUS at 14:43

## 2022-03-24 RX ADMIN — DOXYCYCLINE HYCLATE 100 MG: 100 CAPSULE ORAL at 16:59

## 2022-03-24 RX ADMIN — ATORVASTATIN CALCIUM 80 MG: 80 TABLET, FILM COATED ORAL at 10:06

## 2022-03-24 RX ADMIN — ASPIRIN 81 MG 81 MG: 81 TABLET ORAL at 10:06

## 2022-03-24 ASSESSMENT — PAIN SCALES - GENERAL: PAINLEVEL_OUTOF10: 0

## 2022-03-24 NOTE — PROGRESS NOTES
Cardiology Progress Note:    Narrative:  · No acute events overnight  · Resting comfortably in bed; denies complaints. Ambulating without difficulty  · Blood pressures improved ranging 120-130s last 24 hours  · I/O not accurate last 24 hours; Cr 1.8 today (stable)      Objective:  BP (!) 128/95   Pulse 84   Temp 97.6 °F (36.4 °C) (Temporal)   Resp 16   Ht 6' 2\" (1.88 m)   Wt 259 lb 9.6 oz (117.8 kg)   SpO2 100%   BMI 33.33 kg/m²    General: NAD  Lungs: CTAB  Heart: RRR. No M/R/G  Abdomen: soft, NT/ND  Extremities: no pitting edema. Warm. Neuro: A&Ox3, MAEx4      Recent Labs     03/24/22  0642 03/23/22  0616 03/22/22  0607   WBC 5.2 4.9 4.8   HGB 15.5 15.1 13.9   HCT 48.5 45.9 42.4   MCV 93.4 92.5 92.8    231 212       Lab Results   Component Value Date     03/24/2022    K 4.3 03/24/2022    K 3.7 03/22/2022     03/24/2022    CO2 27 03/24/2022    BUN 22 03/24/2022    CREATININE 1.8 03/24/2022    GLUCOSE 103 03/24/2022    CALCIUM 8.5 03/24/2022        Echocardiogram 3/22/22: Moderately dilated left ventricle (LADY 6.8, ESD 6.3)   Estimated left ventricle ejection fraction 25+/-5%. Normal right ventricular size and function. Moderate mitral regurgitation is present. The jet is very eccentric   related to posterior leaflet tethering and thus may be underestimated. Mild aortic regurgitation is noted. Assessment:  1. Acute on chronic HFrEF due to nonischemic cardiomyopathy:  1. Coronary angiogram 3/21/2022 ([facility]): Mild nonobstructive CAD with LVEDP 38  2. Hypertension - controlled  3. Prior cocaine abuse, U tox negative on this admission  4.  GERD        Recommendations:  · Continue aspirin and atorvastatin 80 mg daily  · Continue carvedilol 25 mg p.o. twice daily  · Continue Torsemide 40 mg daily  · Increase valsartan to 160 mg p.o. twice daily   · Ok for discharge from cardiac standpoint      Discussed with Dr. Penelope Barber PA-C

## 2022-03-24 NOTE — PROGRESS NOTES
Hospitalist Progress Note      SYNOPSIS: Patient admitted on 6/51/1078 for Acute systolic congestive heart failure (HCC)      SUBJECTIVE:cc= ptn affirmed his cc=sob  02 nocturnal   40 sec episode below 88  Transient         Nonischemic cardiomyopathy   Mild nonobstructive CAD  HTN    Rev of systems  States he notices drainage from penile region   He has some past episode's of std's /gonorrhea  Per cardiol patient will not need WCD  Patient states he has hx of chrnc back pain  OBJECTIVE:    BP (!) 128/95   Pulse 84   Temp 97.6 °F (36.4 °C) (Temporal)   Resp 16   Ht 6' 2\" (1.88 m)   Wt 259 lb 9.6 oz (117.8 kg)   SpO2 100%   BMI 33.33 kg/m²     General appearance: Not in extremis not diaphoretic  Respiratory: unlabored respiratory effort  Neurologic: awake, alert and attentive  Mood and affect and thinking are normal  Language intact    ASSESSMENT:pssble std  Prev gonorrhea std    tobias ser cr holding  Nonischemic cardiomyopathy   Mild nonobstructive CAD  HTN  Congestive heart failure with EF of 25%  Chronic kidney disease stage III  Suspect sleep apnea  chrnc back pain followed with pain manag specialist  in [place]     PLAN:UA for dna Heisser and chlamydia   1 st void  Empiric abx regimen for std   ceftriaxone 1 gm IM  As 117kg  And either doxy 100mg po bid  X 7 days   or azithrom 500mg 1st days then 250 mg po qd x 4 days   there is a suggested 1 dose 1 gm  in one day zithromax option however cure /clearance rate for Chlamy Trachom is lower  And 1 gm of azithromax may have more associated gi side effects  Patient requests pain meds for chrnc back pain -pain meds for discharge  Per cardiology   init Torsemide 40 mg qd  Asa and atorvostat 80 mg qd  Carvedilol 25mg po bid  Valsartan 160 mg po bid    Would benefit from op polysomnogram    DISPOSITION: home expected    Medications:  REVIEWED DAILY    Infusion Medications    sodium chloride       Scheduled Medications    cefTRIAXone  1,000 mg IntraMUSCular Once    doxycycline hyclate  100 mg Oral BID    valsartan  160 mg Oral BID    torsemide  40 mg Oral Daily    aspirin  81 mg Oral Daily    sodium chloride flush  5-40 mL IntraVENous 2 times per day    atorvastatin  80 mg Oral Daily    carvedilol  25 mg Oral BID      PRN Meds: heparin (porcine), heparin (porcine), ondansetron **OR** ondansetron, [DISCONTINUED] acetaminophen **OR** acetaminophen, polyethylene glycol, perflutren lipid microspheres, sodium chloride flush, sodium chloride, acetaminophen, labetalol, melatonin    Labs:     Recent Labs     03/22/22  0607 03/23/22  0616 03/24/22  0642   WBC 4.8 4.9 5.2   HGB 13.9 15.1 15.5   HCT 42.4 45.9 48.5    231 227       Recent Labs     03/22/22  0607 03/23/22  0616 03/24/22  0642    142 142   K 3.7  3.7 3.7 4.3    106 106   CO2 27 26 27   BUN 22* 23* 22*   CREATININE 2.0* 1.7* 1.8*   CALCIUM 8.4* 8.8 8.5*       Chronic labs:    Lab Results   Component Value Date    CHOL 167 03/22/2022    TRIG 117 03/22/2022    HDL 47 03/22/2022    LDLCALC 97 03/22/2022       Radiology:none today  +++++++++++++++++++++++++++++++++++++++++++++++++  Christian Zimmer DO  [place] [address], [place]  +++++++++++++++++++++++++++++++++++++++++++++++++  NOTE: This report was transcribed using voice recognition software. Every effort was made to ensure accuracy; however, inadvertent computerized transcription errors may be present.

## 2022-03-24 NOTE — CARE COORDINATION
Discharge order on chart. Plan remains to return home with his sister, Steph Toledo. Voiced no needs. PCCU  aware to set pt up with new [facility] pcp.

## 2022-03-24 NOTE — DISCHARGE SUMMARY
Hospitalist Discharge Summary    Patient ID: Jonny Pryor   Patient : 1975  Patient's PCP: No primary care provider on file. Admit Date: 3/21/2022   Admitting Physician: Alex Sanchez DO    Discharge Date:  3/24/2022  Discharge Physician: Yandy Gage DO   Discharge Condition: Stable  Discharge Disposition: Home    History of presenting illness:  Cc= sob  Patient was seen at [facility] emergency department with complaints of shortness of breath and chest discomfort. Recently diagnosed with congestive heart failure. Ran out of Lasix several days ago. Shortness of breath worse with exertion. Chest heaviness started on Friday and radiates into his left arm. Vital signs reveal the patient to be hypertensive with pressures 151/111. Troponin of 26. EKG shows ischemic changes. No previous EKG to compare to. Emergency physician consulted cardiology. Started on heparin infusion as well as nitroglycerin infusion.   He is being transferred to [facility] for cardiac cath in the morning    Hospital course in brief:  (Please refer to daily progress notes for a comprehensive review of the hospitalization by requesting medical records)  Remained in hospit for several days  eval by cardiology  Treated for hf symptoms  Patient  did respond to treatment    His symptoms improved    I followed the Patient  for several days  On 3/24 he was felt to have reached a point where ip care was no longer warranted and it was felt safe to dc the patient to home  I did evaluate the patient please see progress notes for details  notabele events of that day  Patient requested prescrip for opiod /narcoti to treat chrnc back pain   stating that he had been prescribed oxy-acetamin 10/325 and tramadol by pain managem special in [place]-   written prescription provide  He also requested treatment and evaluation for urethritis   uc sent  For ness gonorrhe and chlamyd trac   empiric rx with IM ceftriaxone  And doxycycline 100mg po bid x 7 days (14tabs) emr script    Patient felt his cc had been addressed and the diagnosis explained his cc  He also felt that his symptoms improved and cc resolved    Order for dc entered and I had no further contact with patient following that      Consults:   [facility] NURSE/COORDINATOR    Discharge Diagnoses:acute CHF  HFrEF/KVSD/Systolic chf     Nonischemic cardiomyopathy   Mild nonobstructive CAD  HTN  Urethritis possible std  chrnc back pain    Discharge Instructions / Follow up:    Continued appropriate risk factor modification of blood pressure, diabetes and serum lipids will remain essential to reducing risk of future atherosclerotic development    Activity: activity as tolerated    Significant labs:  CBC:   Recent Labs     03/22/22  0607 03/23/22  0616 03/24/22  0642   WBC 4.8 4.9 5.2   RBC 4.57 4.96 5.19   HGB 13.9 15.1 15.5   HCT 42.4 45.9 48.5   MCV 92.8 92.5 93.4   RDW 15.3* 15.0 15.0    231 227     BMP:   Recent Labs     03/22/22  0607 03/23/22  0616 03/24/22  0642    142 142   K 3.7  3.7 3.7 4.3    106 106   CO2 27 26 27   BUN 22* 23* 22*   CREATININE 2.0* 1.7* 1.8*   MG 2.2 2.2 2.2     Urinalysis:  UA c for neisser and chlamyd T pending    Imaging:  Echo Complete    Result Date: 3/23/2022  Transthoracic Echocardiography Report (TTE)  Demographics   Patient Name          Tereza Riddle Gender                 Male   Medical Record Number [id number]      Room Number            6423   Account #             [de-identified]     Procedure Date         03/22/2022   Corporate ID                        Ordering Physician     Alice Leavitt MD   Accession Number      [id number]    Referring Physician   Date of Birth         1975    Sonographer   Age                   40 year(s)    Interpreting Physician Alice Leavitt MD                                       Any Other  Procedure Type of Study   TTE procedure:Echo Complete W/Doppler & Color Flow. Procedure Date Date: 03/22/2022 Start: 10:45 AM Height: 74 inches Weight: 271 pounds BSA: 2.47 m^2 BMI: 34.79 kg/m^2 Allergies   - No known allergies. Findings   Left Ventricle  Moderately dilated left ventricle (LADY 6.8, ESD 6.3)  Estimated left ventricle ejection fraction 25+/-5%. Right Ventricle  Normal right ventricular size and function. Left Atrium  The left atrium is severely dilated. Right Atrium  Normal right atrium size. Mitral Valve  Moderate mitral regurgitation is present. The jet is very eccentric  related to posterior leaflet tethering and thus may be underestimated. Tricuspid Valve  Physiologic and/or trace tricuspid regurgitation. PA systolic pressure could not be estimated. Aortic Valve  Mild aortic regurgitation is noted. Individual aortic valve leaflets are not clearly visualized. No hemodynamically significant aortic stenosis is present. Pulmonic Valve  The pulmonic valve was not well visualized. Pericardial Effusion  No evidence of pericardial effusion. Aorta  Aortic root dimension within normal limits. Miscellaneous  Inferior Vena Cava not well visualized. Conclusions   Summary  Moderately dilated left ventricle (LADY 6.8, ESD 6.3)  Estimated left ventricle ejection fraction 25+/-5%. Normal right ventricular size and function. Moderate mitral regurgitation is present. The jet is very eccentric  related to posterior leaflet tethering and thus may be underestimated. Mild aortic regurgitation is noted.    Signature   ----------------------------------------------------------------  Electronically signed by Fe Trujillo MD(Interpreting physician)  on 03/23/2022 12:26 PM  ----------------------------------------------------------------  M-Mode/2D Measurements & Calculations   LV Diastolic    LV Systolic Dimension: 5.6   AV Cusp Separation: 2.1 cmLA  Dimension: 6.8  cm                           Dimension: 5.6 cmAO Root  cm LV Volume Diastolic: 101 ml  Dimension: 3.6 cm  LV FS:17.7 %    LV Volume Systolic: 317.7 ml  LV PW           LV EDV/LV EDV Index: 533  Diastolic: 1.4  JAEGER/56 DF/Y^0HV ESV/LV ESV  cm              Index: 154.9 ml/63ml/ m^2    RV Diastolic Dimension: 3.5  Septum          EF Calculated: 35.7 %        cm  Diastolic: 1.3  LV Mass Index: 183 l/min*m^2  cm              LV Length: 9.4 cm            Ascending Aorta: 3.4 cm                                               LA volume/Index: 140.6 ml  LV Mass: 452.33 LVOT: 2 cm                   /56.92ml/m^2  g                                            RA Area: 22.9 cm^2  Doppler Measurements & Calculations   MV Peak E-Wave: 1 m/s       AV Peak Velocity:     LVOT Peak Velocity: 0.84  MV Peak A-Wave: 0.43 m/s    1.19 m/s              m/s  MV E/A Ratio: 2.31          AV Peak Gradient:     LVOT Mean Velocity: 0.59  MV Peak Gradient: 5.2 mmHg  5.67 mmHg             m/s  MV Mean Gradient: 2.4 mmHg  AV Mean Velocity:     LVOT Peak Gradient: 2.8  MV Mean Velocity: 0.73 m/s  0.87 m/s              mmHgLVOT Mean Gradient:  MV Deceleration Time: 117   AV Mean Gradient: 3.2 1.6 mmHg  msec                        mmHg  MV P1/2t: 43 msec           AV VTI: 19.9 cm  MVA by PHT:5.12 cm^2        AV Area  MV Area (continuity): 2.5   (Continuity):2.62  cm^2                        cm^2  MV E' Septal Velocity: 0.03 AV Deceleration Time: PV Peak Velocity: 0.66  m/s                         1565.9 msec           m/s  MV E' Lateral Velocity: 4   LVOT VTI: 16.6 cm     PV Peak Gradient: 1.73  m/s                         IVRT: 23.1 msec       mmHg  MR Velocity: 5.05 m/s                             PV Mean Velocity: 0.52  MV GAYLE PISA: 0.22 cm^2                            m/s  MR VTI: 165.7 cm                                  PV Mean Gradient: 1.1  Alias Velocity: 0.36                              mmHg  m/sPISA Radius: 0.7 cm   PISA area: 3.08 cm^2MR flow  rate: 109.34 ml/sMR  volume:36.45 ml [id number]    XR CHEST (2 VW)    Result Date: 3/20/2022  EXAMINATION: TWO XRAY VIEWS OF THE CHEST 3/20/2022 6:14 pm COMPARISON: None. HISTORY: ORDERING SYSTEM PROVIDED HISTORY: SOB MCDONALD TECHNOLOGIST PROVIDED HISTORY: Reason for exam:->SOB MCDONALD FINDINGS: Symmetric lung hyperinflation with coarse interstitial markings and flattening of both hemidiaphragms. Subsegmental atelectasis or scarring in the vicinity of the lingula. There are no additional formed consolidations, pleural effusions, or pneumothoraces. Trachea and central mainstem bronchi appear clear. Minimal atherosclerotic disease and prominence of the cardiac silhouette. The remaining cardiomediastinal silhouette and pulmonary vascularity appear within normal limits. Osseous and thoracic soft tissue structures demonstrate no acute findings. 1.  No acute cardiopulmonary pathology. 2.  Lung hyperinflation with coarse interstitial markings. Lingular atelectasis or scarring. Atherosclerotic disease and cardiomegaly. Discharge Medications:      Medication List      START taking these medications    atorvastatin 80 MG tablet  Commonly known as: LIPITOR  Take 1 tablet by mouth daily  Start taking on: March 25, 2022     doxycycline hyclate 100 MG capsule  Commonly known as: VIBRAMYCIN  Take 1 capsule by mouth every 12 hours for 7 doses     oxyCODONE-acetaminophen  MG per tablet  Commonly known as: Endocet  Take 1 tablet by mouth every 6 hours as needed for Pain for up to 3 days.  Intended supply: 30 days     Torsemide 40 MG Tabs  Take 40 mg by mouth daily  Start taking on: March 25, 2022     valsartan 160 MG tablet  Commonly known as: DIOVAN  Take 1 tablet by mouth 2 times daily        CHANGE how you take these medications    carvedilol 25 MG tablet  Commonly known as: COREG  Take 1 tablet by mouth 2 times daily (with meals)  What changed:   · medication strength  · how much to take        CONTINUE taking these medications    aspirin 81 MG chewable tablet        STOP taking these medications    furosemide 40 MG tablet  Commonly known as: LASIX     hydrALAZINE 25 MG tablet  Commonly known as: APRESOLINE     isosorbide dinitrate 20 MG tablet  Commonly known as: ISORDIL           Where to Get Your Medications      These medications were sent to [facility] [address], [address] [address]    Phone: [phone]   · atorvastatin 80 MG tablet  · carvedilol 25 MG tablet  · doxycycline hyclate 100 MG capsule  · Torsemide 40 MG Tabs  · valsartan 160 MG tablet     You can get these medications from any pharmacy    Bring a paper prescription for each of these medications  · oxyCODONE-acetaminophen  MG per tablet         Time Spent on discharge is more than 35 minutes in the examination, evaluation, counseling and review of medications and discharge plan.    +++++++++++++++++++++++++++++++++++++++++++++++++  Erin Rico DO  [address]  +++++++++++++++++++++++++++++++++++++++++++++++++  NOTE: This report was transcribed using voice recognition software. Every effort was made to ensure accuracy; however, inadvertent computerized transcription errors may be present.

## 2022-03-24 NOTE — PATIENT CARE CONFERENCE
[facility] Quality Flow/Interdisciplinary Rounds Progress Note        Quality Flow Rounds held on March 24, 2022    Disciplines Attending:  Bedside Nurse, ,  and Nursing Unit Leadership    Lynne Ryan was admitted on 3/21/2022  3:08 AM    Anticipated Discharge Date:  Expected Discharge Date: 03/23/22    Disposition:    Jacky Score:  Jacky Scale Score: 22    Readmission Risk              Risk of Unplanned Readmission:  11           Discussed patient goal for the day, patient clinical progression, and barriers to discharge.   The following Goal(s) of the Day/Commitment(s) have been identified:  Diagnostics - Report Results and Labs - Report Results      Marty Hartman RN  March 24, 2022

## 2022-03-25 PROCEDURE — 95806 SLEEP STUDY UNATT&RESP EFFT: CPT | Performed by: INTERNAL MEDICINE

## 2022-03-25 NOTE — PROGRESS NOTES
CLINICAL PHARMACY NOTE: MEDS TO BEDS    Total # of Prescriptions Filled: 6   The following medications were delivered to the patient:  · Valsartan 180 mg  · Atorvastatin 80 mg  · Doxycycline 100 mg  · toresmide 20 mg  · Carvedilol 25 mg  · Oxycodone-APAP 5-25 mg    Additional Documentation:  Delivered meds at 5:15

## 2022-03-25 NOTE — PROGRESS NOTES
[address]                  [address] [place],  [facility]                               SLEEP STUDY REPORT    PATIENT NAME: Meaghan Amador                      :        1975  MED REC NO:   [id number]                            ROOM:       6512  ACCOUNT NO:   [de-identified]                           ADMIT DATE: 2022  PROVIDER:     John Hutson DO    DATE OF STUDY:  2022    Overnight recording oximeter on room air for evaluation of nocturnal  hypoxemia. Total recording time is 8 hours and 22 minutes with adequate  sampling time noted. Highest heart rate recorded 87 beats per minute,  lowest heart rate recorded 56 beats per minute with an average heart  rate of 75 beats per minute. The highest oxygen saturation recorded 99%, the lowest oxygen saturation  was 84% with an average oxygen saturation of 94%. The patient spent 96%  of the total sleep time with adequate oxygenation and 4%, which is  reasonable, saturation is less than 89%. The desaturation event is defined by decreased saturation of 4% or more. Twelve events were desaturation events, over 3 minutes in duration, and  118 desaturation events less than 3 minutes in duration. IMPRESSION:  Multiple desaturation events; however, total reported  desaturation time is reasonable. Recommend overnight polysomnography.         Kareen Sharma DO    D: 2022 18:40:32       T: 2022 22:08:46     [email or web address]  Job#: [id number]     Doc#: [id number]

## 2022-03-28 LAB
C. TRACHOMATIS DNA ,URINE: NEGATIVE
N. GONORRHOEAE DNA, URINE: ABNORMAL
SOURCE: ABNORMAL

## 2022-03-28 SDOH — HEALTH STABILITY: PHYSICAL HEALTH: ON AVERAGE, HOW MANY MINUTES DO YOU ENGAGE IN EXERCISE AT THIS LEVEL?: 0 MIN

## 2022-03-28 SDOH — HEALTH STABILITY: PHYSICAL HEALTH: ON AVERAGE, HOW MANY DAYS PER WEEK DO YOU ENGAGE IN MODERATE TO STRENUOUS EXERCISE (LIKE A BRISK WALK)?: 0 DAYS
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
## 2022-03-29 ENCOUNTER — OFFICE VISIT (OUTPATIENT)
Dept: PRIMARY CARE CLINIC | Age: 47
End: 2022-03-29
Payer: MEDICAID

## 2022-03-29 VITALS
HEIGHT: 74 IN | OXYGEN SATURATION: 97 % | WEIGHT: 263 LBS | DIASTOLIC BLOOD PRESSURE: 62 MMHG | RESPIRATION RATE: 18 BRPM | SYSTOLIC BLOOD PRESSURE: 100 MMHG | BODY MASS INDEX: 33.75 KG/M2 | TEMPERATURE: 96.8 F | HEART RATE: 86 BPM

## 2022-03-29 DIAGNOSIS — R79.89 ELEVATED SERUM CREATININE: ICD-10-CM

## 2022-03-29 DIAGNOSIS — G47.30 SLEEP DISORDER BREATHING: ICD-10-CM

## 2022-03-29 DIAGNOSIS — Z09 HOSPITAL DISCHARGE FOLLOW-UP: Primary | ICD-10-CM

## 2022-03-29 DIAGNOSIS — G89.29 CHRONIC BILATERAL LOW BACK PAIN WITH BILATERAL SCIATICA: ICD-10-CM

## 2022-03-29 DIAGNOSIS — M54.42 CHRONIC BILATERAL LOW BACK PAIN WITH BILATERAL SCIATICA: ICD-10-CM

## 2022-03-29 DIAGNOSIS — K40.21 BILATERAL RECURRENT INGUINAL HERNIA WITHOUT OBSTRUCTION OR GANGRENE: ICD-10-CM

## 2022-03-29 DIAGNOSIS — I42.8 NONISCHEMIC CARDIOMYOPATHY (HCC): ICD-10-CM

## 2022-03-29 DIAGNOSIS — M54.41 CHRONIC BILATERAL LOW BACK PAIN WITH BILATERAL SCIATICA: ICD-10-CM

## 2022-03-29 DIAGNOSIS — I50.20 HFREF (HEART FAILURE WITH REDUCED EJECTION FRACTION) (HCC): ICD-10-CM

## 2022-03-29 LAB
ANION GAP SERPL CALCULATED.3IONS-SCNC: 19 MMOL/L (ref 7–16)
BUN BLDV-MCNC: 35 MG/DL (ref 6–20)
CALCIUM SERPL-MCNC: 9.6 MG/DL (ref 8.6–10.2)
CHLORIDE BLD-SCNC: 110 MMOL/L (ref 98–107)
CO2: 22 MMOL/L (ref 22–29)
CREAT SERPL-MCNC: 2.1 MG/DL (ref 0.7–1.2)
GFR AFRICAN AMERICAN: 41
GFR NON-AFRICAN AMERICAN: 41 ML/MIN/1.73
GLUCOSE BLD-MCNC: 90 MG/DL (ref 74–99)
POTASSIUM SERPL-SCNC: 4.5 MMOL/L (ref 3.5–5)
SODIUM BLD-SCNC: 151 MMOL/L (ref 132–146)

## 2022-03-29 PROCEDURE — 99204 OFFICE O/P NEW MOD 45 MIN: CPT | Performed by: STUDENT IN AN ORGANIZED HEALTH CARE EDUCATION/TRAINING PROGRAM

## 2022-03-29 RX ORDER — LIDOCAINE 50 MG/G
1 PATCH TOPICAL DAILY
Qty: 30 PATCH | Refills: 0 | Status: SHIPPED | OUTPATIENT
Start: 2022-03-29 | End: 2022-04-28

## 2022-03-29 RX ORDER — ACETAMINOPHEN 500 MG
1000 TABLET ORAL 3 TIMES DAILY PRN
Qty: 540 TABLET | Refills: 1 | Status: SHIPPED | OUTPATIENT
Start: 2022-03-29

## 2022-03-29 SDOH — ECONOMIC STABILITY: FOOD INSECURITY: WITHIN THE PAST 12 MONTHS, THE FOOD YOU BOUGHT JUST DIDN'T LAST AND YOU DIDN'T HAVE MONEY TO GET MORE.: NEVER TRUE

## 2022-03-29 SDOH — ECONOMIC STABILITY: FOOD INSECURITY: WITHIN THE PAST 12 MONTHS, YOU WORRIED THAT YOUR FOOD WOULD RUN OUT BEFORE YOU GOT MONEY TO BUY MORE.: NEVER TRUE

## 2022-03-29 ASSESSMENT — PATIENT HEALTH QUESTIONNAIRE - PHQ9
1. LITTLE INTEREST OR PLEASURE IN DOING THINGS: 0
SUM OF ALL RESPONSES TO PHQ QUESTIONS 1-9: 0
SUM OF ALL RESPONSES TO PHQ QUESTIONS 1-9: 0
SUM OF ALL RESPONSES TO PHQ9 QUESTIONS 1 & 2: 0
2. FEELING DOWN, DEPRESSED OR HOPELESS: 0
SUM OF ALL RESPONSES TO PHQ QUESTIONS 1-9: 0
SUM OF ALL RESPONSES TO PHQ QUESTIONS 1-9: 0

## 2022-03-29 ASSESSMENT — SOCIAL DETERMINANTS OF HEALTH (SDOH): HOW HARD IS IT FOR YOU TO PAY FOR THE VERY BASICS LIKE FOOD, HOUSING, MEDICAL CARE, AND HEATING?: NOT HARD AT ALL

## 2022-03-30 DIAGNOSIS — E87.0 HYPERNATREMIA: Primary | ICD-10-CM

## 2022-04-08 PROBLEM — I50.23 ACUTE ON CHRONIC SYSTOLIC CONGESTIVE HEART FAILURE (HCC): Status: ACTIVE | Noted: 2022-03-24

## 2022-04-14 ENCOUNTER — OFFICE VISIT (OUTPATIENT)
Dept: CARDIOLOGY CLINIC | Age: 47
End: 2022-04-14
Payer: MEDICAID

## 2022-04-14 VITALS
WEIGHT: 265 LBS | OXYGEN SATURATION: 98 % | SYSTOLIC BLOOD PRESSURE: 90 MMHG | DIASTOLIC BLOOD PRESSURE: 48 MMHG | BODY MASS INDEX: 34.01 KG/M2 | HEIGHT: 74 IN | RESPIRATION RATE: 16 BRPM | HEART RATE: 79 BPM

## 2022-04-14 DIAGNOSIS — E61.1 IRON DEFICIENCY: ICD-10-CM

## 2022-04-14 DIAGNOSIS — I50.20 HFREF (HEART FAILURE WITH REDUCED EJECTION FRACTION) (HCC): Primary | ICD-10-CM

## 2022-04-14 PROCEDURE — 93000 ELECTROCARDIOGRAM COMPLETE: CPT | Performed by: INTERNAL MEDICINE

## 2022-04-14 PROCEDURE — 99215 OFFICE O/P EST HI 40 MIN: CPT | Performed by: NURSE PRACTITIONER

## 2022-04-14 RX ORDER — METOPROLOL SUCCINATE 25 MG/1
25 TABLET, EXTENDED RELEASE ORAL 2 TIMES DAILY
Qty: 90 TABLET | Refills: 1 | Status: ON HOLD
Start: 2022-04-14 | End: 2022-05-05 | Stop reason: HOSPADM

## 2022-04-14 ASSESSMENT — EJECTION FRACTION
EF_VALUE: 25%
EF_SOURCE: 2D ECHO

## 2022-04-14 NOTE — Clinical Note
Please have viral studies, SPEP, UPEP, immunofixation and iron studies drawn when at CHF clinic next week.  Thank you

## 2022-04-14 NOTE — PATIENT INSTRUCTIONS
1. Decrease torsemide to 20 mg daily - however if you noticed weight gain, shortness of breath, swelling you can take the 40 mg as needed    2. Stop coreg    3. Start toprol 25 mg twice daily     4. Go to CHF clinic as scheduled next week - once we review your visit we will continue to adjust your heart failure medication    5. Referral to cardiac rehab    6. Has sleep study schedule for this week     7. Follow up with Dr. Christy Trujillo as scheduled     8. Weigh yourself daily    -Stay Hydrated    -Diet should sodium restricted to 2 grams    -Again watch your daily weight trends and if you gain water weight please follow below instructions.    -If you gain 3-5 pounds in 2-3 days OR notice that you are retaining fluid in anyway just like you did before then take an extra dose of your water pill (Demadex/Torsemide) every day until you lose the weight or feel better.     -If you notice that you have taken more than 2 extra doses in 1 week then please call and let us know. -If at any time you feel that you are retaining fluid, your medications are not working, or you feel ill in anyway, then please call us for either same day appointment or the next day, and for instructions. Our goal is to keep you out of the emergency room and the hospital and we have ways to do it. You just need to call us in a timely manner.     -If you become sick for other reasons, and notice that you are not urinating as much, the urine is very dark, you have significant diarrhea or vomiting, then please DO NOT take your water pill and CALL US immediately.

## 2022-04-14 NOTE — PROGRESS NOTES
[facility] Cardiology  Office Visit         Reason for Visit: Heart failure    Primary Cardiologist: Dr. Yuridia England        History of Present Illness:     Mr. Maya Cr is a 40year old male with a PMHx of chronic HFrEF, nonischemic cardiomyopathy, mild nonobstructive CAD, HTN, CKD, morbid obesity, probable DERRICK, chronic back pain and prior cocaine abuse. He recently presented to the emergency room with complaints of increased shortness of breath. He had previously been diagnosed with acute heart failure while in [facility] earlier this year he had ran out of his medications including Lasix, developed worsening heart failure symptoms and presented to the emergency room. He was admitted for acute heart failure, IV diuresed and during hospitalization underwent TTE that demonstrated LVEF 25±5%, preserved RV size and function, moderate MR. He was initiated on GDMT and during hospitalization underwent left heart catheterization with moderate nonobstructive CAD. He presents today in postacute heart failure hospitalization follow-up, since discharge from the hospital he has been compliant with all his current cardiac medications. He reports improvement in his symptoms, is monitoring his diet for sodium content and staying well-hydrated. He has chronic dyspnea with exertion, denies worsening shortness of breath, or decline in overall functional capacity. He denies orthopnea, PND, nocturnal cough or hemoptysis. He denies abdominal distention or bloating, early satiety, anorexia/change in appetite, unintentional weight loss. He does not lower extremity edema. He has occasional lightheadedness. He denies palpitations, syncope or near syncope. Review of systems is negative for chest pain, pressure, discomfort. When ambulating on an incline, He does not leg claudication. History is negative for neurological symptoms including transient loss of vision, asymmetric weakness, aphasia, dysphasia, numbness, tingling. Patient Active Problem List    Diagnosis Date Noted    Chronic bilateral low back pain with bilateral sciatica 03/29/2022    Elevated serum creatinine 03/29/2022    Sleep disorder breathing 03/29/2022    Bilateral recurrent inguinal hernia without obstruction or gangrene 03/29/2022    [facility]FrEF (heart failure with reduced ejection fraction) [facility] 03/29/2022    Nonischemic cardiomyopathy ([facility] 03/29/2022    Acute on chronic [facility] 03/24/[facility]22    Acute coronary syndrome ([facility] 03/21/2022           Past Medical History:   Diagnosis Date    CAD (coronary artery disease)     [facility]    GERD (gastroesophageal reflux disease)     Hx of drug abuse ([facility]     cocaine    Hypertension     NICM (nonischemic cardiomyopathy) ([facility]            Past Surgical History:   Procedure Laterality Date    CARDIAC CATHETERIZATION      SKIN GRAFT Right     R thigh             No Known Allergies      Outpatient Medications Marked as Taking for the 4/14/22 encounter (Office Visit) with JONATHAN Carpenter CNP   Medication Sig Dispense Refill    acetaminophen (TYLENOL) 500 MG tablet Take 2 tablets by mouth 3 times daily as needed for Pain 540 tablet 1    diclofenac sodium (VOLTAREN) 1 % GEL Apply 4 g topically 4 times daily 350 g 1    lidocaine (LIDODERM) 5 % Place 1 patch onto the skin daily 12 hours on, 12 hours off.  30 patch 0    atorvastatin (LIPITOR) 80 MG tablet Take 1 tablet by mouth daily 30 tablet 3    carvedilol (COREG) 25 MG tablet Take 1 tablet by mouth 2 times daily (with meals) 60 tablet 3    torsemide 40 MG TABS Take 40 mg by mouth daily 30 tablet 3    valsartan (DIOVAN) 160 MG tablet Take 1 tablet by mouth 2 times daily 60 tablet 3    aspirin 81 MG chewable tablet Take 81 mg by mouth daily         Review of Systems:   Cardiac: As per HPI  General: No fever, chills, rigors  Pulmonary: As per HPI  HEENT: No visual disturbances, difficult swallowing  GI: No nausea, vomiting, abdominal pain  : No dysuria or hematuria  Endocrine: No thyroid disease or diabetes  Musculoskeletal: GIBSON x 4, no focal motor deficits  Skin: Intact, no rashes  Neuro/Psych: No headache or seizures          Weights: Wt Readings from Last 3 Encounters:   04/14/22 265 lb (120.2 kg)   03/29/22 263 lb (119.3 kg)   03/24/22 259 lb 9.6 oz (117.8 kg)           Physical Examination:     BP (!) 90/48 (Site: Right Upper Arm, Position: Sitting, Cuff Size: Large Adult)   Pulse 79   Resp 16   Ht 6' 2\" (1.88 m)   Wt 265 lb (120.2 kg)   SpO2 98%   BMI 34.02 kg/m²     CONSTITUTIONAL: Alert and oriented times 3, no acute distress and cooperative to examination with proper mood and affect. SKIN: Skin color, texture, turgor normal. No rashes or lesions. LYMPH: no cervical nodes, no inguinal nodes  HEENT: Head is normocephalic, atraumatic. EOMI, PERRLA. NECK: Supple, symmetrical, trachea midline, no adenopathy, thyroid symmetric, not enlarged and no tenderness, skin normal.  CHEST/LUNGS: chest symmetric with normal A/P diameter, normal respiratory rate and rhythm, lungs clear to auscultation without wheezes, rales or rhonchi. No accessory muscle use. Scars None   CARDIOVASCULAR: Heart sounds are normal.  Regular rate and rhythm without murmur, gallop or rub. Normal S1 and S2. . Carotid and femoral pulses 2+/4 and equal bilaterally. ABDOMEN: Morbidly obese. No and Laparoscopic scar(s) present. Normal bowel sounds. No bruits. soft, nondistended, no masses or organomegaly. no evidence of hernia. Percussion: Normal without hepatosplenomegally. Tenderness: absent. RECTAL: deferred, not clinically indicated  NEUROLOGIC: There are no focalizing motor or sensory deficits. CN II-XII are grossly intact. Blanchie Bevels EXTREMITIES: no cyanosis, no clubbing and no edema. All the following diagnostics were personally reviewed and interpreted by me.        LAB DATA:     3/29/2022 14:02 Sodium 151 (H)   Potassium 4.5   Chloride 110 (H)   CO2 22   BUN,BUNPL 35 (H)   Creatinine 2.1 (H)   Anion Gap 19 (H)   GFR Non- 41   GFR  41   GLUCOSE, FASTING,GF 90   CALCIUM, SERUM, 267109 9.6       IMAGING:    CXR (3/20/2022)  Impression  1.  No acute cardiopulmonary pathology. 2.  Lung hyperinflation with coarse interstitial markings.  Lingular  atelectasis or scarring.  Atherosclerotic disease and cardiomegaly. CARDIAC TESTING:    [facility] (3/2022)  Coronary anatomy:  Dominance: Left  1. Left main: Short and angiographically unremarkable  2. Left anterior descending: This is a large wraparound vessel with 2 large diagonals. There is mild diffuse disease in the LAD. D1 has a segmental 60% proximal stenosis  3. Ramus intermedius: Absent  4. Left circumflex: This is a large dominant vessel with small OM1, large OM 2, small OM 3, large bifurcating LPL and a small LPDA. Mild diffuse disease throughout  5. Right coronary artery: Large sized nondominant vessel with no significant LV supply  Hemodynamics:  LVEDP 38  Ao: 127/104 with a mean of 116. .. Conclusions:  1. Nonischemic cardiomyopathy with severely elevated left filling pressure  2. Overall mild nonobstructive CAD      TTE (3/23/2022)  Summary   Moderately dilated left ventricle (LADY 6.8, ESD 6.3)   Estimated left ventricle ejection fraction 25+/-5%. Normal right ventricular size and function. Moderate mitral regurgitation is present. The jet is very eccentric   related to posterior leaflet tethering and thus may be underestimated. Mild aortic regurgitation is noted. EKG  Sinus Rhythm   Left axis -anterior fascicular block. Left atrial enlargement. T-abnormality       ASSESSMENT:  1. Chronic HFrEF  2. ACC stage C / NYHA class II  3. Euvolemic   4. Nonischemic cardiomyopathy, possible hypertensive  5. LVEF 25+/-5%, LVEDD 6.8, LVMI 183  6. Mild nonobstructive CAD per [facility] 3/21/2022  7. HTN  8. CKD  9.  Morbid obesity  10. Probable DERRICK  11. GERD  12. Bilateral recurrent inguinal hernia  13. Chronic back pain  14. Prior cocaine abuse      PLAN:  1. Decrease torsemide to 20 mg daily - however if you noticed weight gain, shortness of breath, swelling you can take the 40 mg as needed    2. Stop coreg to allow increased BP room to titrate GDMT    3. Start toprol 25 mg twice daily     4. Go to CHF clinic as scheduled next week - once we review your visit we will continue to adjust your heart failure medication. Check viral and iron studies as well as SPEP, UPEP and immunofixation. 5. Referral to cardiac rehab    6. Has sleep study schedule for this week     7. Follow up with Dr. Davian Vyas as scheduled     8. Weigh yourself daily    -Stay Hydrated    -Diet should sodium restricted to 2 grams    -Again watch your daily weight trends and if you gain water weight please follow below instructions.    -If you gain 3-5 pounds in 2-3 days OR notice that you are retaining fluid in anyway just like you did before then take an extra dose of your water pill (Demadex/Torsemide) every day until you lose the weight or feel better.     -If you notice that you have taken more than 2 extra doses in 1 week then please call and let us know. -If at any time you feel that you are retaining fluid, your medications are not working, or you feel ill in anyway, then please call us for either same day appointment or the next day, and for instructions. Our goal is to keep you out of the emergency room and the hospital and we have ways to do it. You just need to call us in a timely manner.     -If you become sick for other reasons, and notice that you are not urinating as much, the urine is very dark, you have significant diarrhea or vomiting, then please DO NOT take your water pill and CALL US immediately.     > 40 minutes was spent reviewing chart and more than 50 % of that time was spent face to face with patient educating on heart failure, prognosis, treatment, medications and diet.      Evans Rodarte APRN-CNP  [facility]

## 2022-04-18 ENCOUNTER — TELEPHONE (OUTPATIENT)
Dept: OTHER | Age: 47
End: 2022-04-18

## 2022-04-18 ENCOUNTER — HOSPITAL ENCOUNTER (OUTPATIENT)
Dept: OTHER | Age: 47
Setting detail: THERAPIES SERIES
Discharge: HOME | End: 2022-04-18

## 2022-04-18 NOTE — TELEPHONE ENCOUNTER
Patient a no show today for CHF clinic appointment in [facility] and was reminded of CHF clinic visit on Thursday 4/14/2022 because patient had verbalized he would come to the chf clinic after his sleep study was finished today, Monday morning since he would already be in [facility]. Message left for patient to call and reschedule.     Electronically signed by Erma Hays RN on 4/18/2022 at 4:13 PM

## 2022-04-19 ENCOUNTER — TELEPHONE (OUTPATIENT)
Dept: OTHER | Age: 47
End: 2022-04-19

## 2022-04-19 NOTE — TELEPHONE ENCOUNTER
Call placed to patient again regarding rescheduling appointment with CHF clinic since patient did not come on 4/18/2022.     Electronically signed by Jorge Ashley RN on 4/19/2022 at 5:38 PM

## 2022-04-30 ENCOUNTER — ANESTHESIA (OUTPATIENT)
Dept: OPERATING ROOM | Age: 47
DRG: 230 | End: 2022-04-30
Payer: MEDICAID

## 2022-04-30 ENCOUNTER — APPOINTMENT (OUTPATIENT)
Dept: GENERAL RADIOLOGY | Age: 47
DRG: 230 | End: 2022-04-30
Payer: MEDICAID

## 2022-04-30 ENCOUNTER — HOSPITAL ENCOUNTER (INPATIENT)
Age: 47
LOS: 5 days | Discharge: HOME | DRG: 230 | End: 2022-05-05
Attending: EMERGENCY MEDICINE | Admitting: FAMILY MEDICINE
Payer: MEDICAID

## 2022-04-30 ENCOUNTER — APPOINTMENT (OUTPATIENT)
Dept: CT IMAGING | Age: 47
DRG: 230 | End: 2022-04-30
Payer: MEDICAID

## 2022-04-30 ENCOUNTER — ANESTHESIA EVENT (OUTPATIENT)
Dept: OPERATING ROOM | Age: 47
DRG: 230 | End: 2022-04-30
Payer: MEDICAID

## 2022-04-30 VITALS — DIASTOLIC BLOOD PRESSURE: 51 MMHG | TEMPERATURE: 98.4 F | OXYGEN SATURATION: 100 % | SYSTOLIC BLOOD PRESSURE: 110 MMHG

## 2022-04-30 DIAGNOSIS — E87.70 HYPERVOLEMIA, UNSPECIFIED HYPERVOLEMIA TYPE: ICD-10-CM

## 2022-04-30 DIAGNOSIS — N28.9 ACUTE RENAL INSUFFICIENCY: ICD-10-CM

## 2022-04-30 DIAGNOSIS — K40.30 STRANGULATED INGUINAL HERNIA: Primary | ICD-10-CM

## 2022-04-30 LAB
ALBUMIN SERPL-MCNC: 4.4 G/DL (ref 3.5–5.2)
ALP BLD-CCNC: 73 U/L (ref 40–129)
ALT SERPL-CCNC: 33 U/L (ref 0–40)
ANION GAP SERPL CALCULATED.3IONS-SCNC: 11 MMOL/L (ref 7–16)
ANION GAP SERPL CALCULATED.3IONS-SCNC: 11 MMOL/L (ref 7–16)
AST SERPL-CCNC: 30 U/L (ref 0–39)
BASOPHILS ABSOLUTE: 0.04 E9/L (ref 0–0.2)
BASOPHILS ABSOLUTE: 0.08 E9/L (ref 0–0.2)
BASOPHILS RELATIVE PERCENT: 0.2 % (ref 0–2)
BASOPHILS RELATIVE PERCENT: 1.1 % (ref 0–2)
BILIRUB SERPL-MCNC: 0.4 MG/DL (ref 0–1.2)
BUN BLDV-MCNC: 18 MG/DL (ref 6–20)
BUN BLDV-MCNC: 19 MG/DL (ref 6–20)
CALCIUM SERPL-MCNC: 9 MG/DL (ref 8.6–10.2)
CALCIUM SERPL-MCNC: 9.9 MG/DL (ref 8.6–10.2)
CHLORIDE BLD-SCNC: 104 MMOL/L (ref 98–107)
CHLORIDE BLD-SCNC: 105 MMOL/L (ref 98–107)
CO2: 23 MMOL/L (ref 22–29)
CO2: 27 MMOL/L (ref 22–29)
CREAT SERPL-MCNC: 1.7 MG/DL (ref 0.7–1.2)
CREAT SERPL-MCNC: 2.1 MG/DL (ref 0.7–1.2)
CREATININE URINE: 189 MG/DL (ref 40–278)
CREATININE URINE: 189 MG/DL (ref 40–278)
EOSINOPHILS ABSOLUTE: 0 E9/L (ref 0.05–0.5)
EOSINOPHILS ABSOLUTE: 0.17 E9/L (ref 0.05–0.5)
EOSINOPHILS RELATIVE PERCENT: 0 % (ref 0–6)
EOSINOPHILS RELATIVE PERCENT: 2.3 % (ref 0–6)
GFR AFRICAN AMERICAN: 41
GFR AFRICAN AMERICAN: 53
GFR NON-AFRICAN AMERICAN: 41 ML/MIN/1.73
GFR NON-AFRICAN AMERICAN: 53 ML/MIN/1.73
GLUCOSE BLD-MCNC: 173 MG/DL (ref 74–99)
GLUCOSE BLD-MCNC: 90 MG/DL (ref 74–99)
HCT VFR BLD CALC: 46.6 % (ref 37–54)
HCT VFR BLD CALC: 51.3 % (ref 37–54)
HEMOGLOBIN: 15.7 G/DL (ref 12.5–16.5)
HEMOGLOBIN: 17.1 G/DL (ref 12.5–16.5)
IMMATURE GRANULOCYTES #: 0.01 E9/L
IMMATURE GRANULOCYTES #: 0.07 E9/L
IMMATURE GRANULOCYTES %: 0.1 % (ref 0–5)
IMMATURE GRANULOCYTES %: 0.4 % (ref 0–5)
INFLUENZA A: NOT DETECTED
INFLUENZA B: NOT DETECTED
LACTIC ACID: 1 MMOL/L (ref 0.5–2.2)
LACTIC ACID: 1.6 MMOL/L (ref 0.5–2.2)
LIPASE: 36 U/L (ref 13–60)
LYMPHOCYTES ABSOLUTE: 0.77 E9/L (ref 1.5–4)
LYMPHOCYTES ABSOLUTE: 2.51 E9/L (ref 1.5–4)
LYMPHOCYTES RELATIVE PERCENT: 34.2 % (ref 20–42)
LYMPHOCYTES RELATIVE PERCENT: 4.2 % (ref 20–42)
MAGNESIUM: 1.9 MG/DL (ref 1.6–2.6)
MCH RBC QN AUTO: 29.8 PG (ref 26–35)
MCH RBC QN AUTO: 30.2 PG (ref 26–35)
MCHC RBC AUTO-ENTMCNC: 33.3 % (ref 32–34.5)
MCHC RBC AUTO-ENTMCNC: 33.7 % (ref 32–34.5)
MCV RBC AUTO: 89.4 FL (ref 80–99.9)
MCV RBC AUTO: 89.6 FL (ref 80–99.9)
MICROALBUMIN UR-MCNC: 26 MG/L
MICROALBUMIN/CREAT UR-RTO: 13.8 (ref 0–30)
MONOCYTES ABSOLUTE: 0.84 E9/L (ref 0.1–0.95)
MONOCYTES ABSOLUTE: 0.86 E9/L (ref 0.1–0.95)
MONOCYTES RELATIVE PERCENT: 11.7 % (ref 2–12)
MONOCYTES RELATIVE PERCENT: 4.6 % (ref 2–12)
NEUTROPHILS ABSOLUTE: 16.63 E9/L (ref 1.8–7.3)
NEUTROPHILS ABSOLUTE: 3.71 E9/L (ref 1.8–7.3)
NEUTROPHILS RELATIVE PERCENT: 50.6 % (ref 43–80)
NEUTROPHILS RELATIVE PERCENT: 90.6 % (ref 43–80)
OSMOLALITY URINE: 730 MOSM/KG (ref 300–900)
PDW BLD-RTO: 13.9 FL (ref 11.5–15)
PDW BLD-RTO: 14.1 FL (ref 11.5–15)
PHOSPHORUS: 2.9 MG/DL (ref 2.5–4.5)
PLATELET # BLD: 256 E9/L (ref 130–450)
PLATELET # BLD: 322 E9/L (ref 130–450)
PMV BLD AUTO: 11.1 FL (ref 7–12)
PMV BLD AUTO: 12 FL (ref 7–12)
POTASSIUM SERPL-SCNC: 4.1 MMOL/L (ref 3.5–5)
POTASSIUM SERPL-SCNC: 4.6 MMOL/L (ref 3.5–5)
PRO-BNP: 3430 PG/ML (ref 0–125)
RBC # BLD: 5.2 E12/L (ref 3.8–5.8)
RBC # BLD: 5.74 E12/L (ref 3.8–5.8)
SARS-COV-2 RNA, RT PCR: NOT DETECTED
SODIUM BLD-SCNC: 139 MMOL/L (ref 132–146)
SODIUM BLD-SCNC: 142 MMOL/L (ref 132–146)
SODIUM URINE: 91 MMOL/L
TOTAL PROTEIN: 7.3 G/DL (ref 6.4–8.3)
TROPONIN, HIGH SENSITIVITY: 14 NG/L (ref 0–11)
TROPONIN, HIGH SENSITIVITY: 20 NG/L (ref 0–11)
UREA NITROGEN, UR: 853 MG/DL (ref 800–1666)
WBC # BLD: 18.4 E9/L (ref 4.5–11.5)
WBC # BLD: 7.3 E9/L (ref 4.5–11.5)

## 2022-04-30 PROCEDURE — 96375 TX/PRO/DX INJ NEW DRUG ADDON: CPT

## 2022-04-30 PROCEDURE — 83605 ASSAY OF LACTIC ACID: CPT

## 2022-04-30 PROCEDURE — 7100000001 HC PACU RECOVERY - ADDTL 15 MIN: Performed by: SURGERY

## 2022-04-30 PROCEDURE — 6360000002 HC RX W HCPCS: Performed by: ANESTHESIOLOGY

## 2022-04-30 PROCEDURE — 2709999900 HC NON-CHARGEABLE SUPPLY: Performed by: SURGERY

## 2022-04-30 PROCEDURE — 80053 COMPREHEN METABOLIC PANEL: CPT

## 2022-04-30 PROCEDURE — 2580000003 HC RX 258: Performed by: EMERGENCY MEDICINE

## 2022-04-30 PROCEDURE — 44120 REMOVAL OF SMALL INTESTINE: CPT | Performed by: SURGERY

## 2022-04-30 PROCEDURE — 99285 EMERGENCY DEPT VISIT HI MDM: CPT

## 2022-04-30 PROCEDURE — 44604 SUTURE LARGE INTESTINE: CPT | Performed by: SURGERY

## 2022-04-30 PROCEDURE — 2580000003 HC RX 258: Performed by: ANESTHESIOLOGY

## 2022-04-30 PROCEDURE — 87081 CULTURE SCREEN ONLY: CPT

## 2022-04-30 PROCEDURE — 74176 CT ABD & PELVIS W/O CONTRAST: CPT

## 2022-04-30 PROCEDURE — 2580000003 HC RX 258: Performed by: SURGERY

## 2022-04-30 PROCEDURE — 83880 ASSAY OF NATRIURETIC PEPTIDE: CPT

## 2022-04-30 PROCEDURE — A4217 STERILE WATER/SALINE, 500 ML: HCPCS | Performed by: SURGERY

## 2022-04-30 PROCEDURE — 44050 REDUCE BOWEL OBSTRUCTION: CPT | Performed by: SURGERY

## 2022-04-30 PROCEDURE — 2000000000 HC ICU R&B

## 2022-04-30 PROCEDURE — 37799 UNLISTED PX VASCULAR SURGERY: CPT

## 2022-04-30 PROCEDURE — A4216 STERILE WATER/SALINE, 10 ML: HCPCS | Performed by: STUDENT IN AN ORGANIZED HEALTH CARE EDUCATION/TRAINING PROGRAM

## 2022-04-30 PROCEDURE — 84100 ASSAY OF PHOSPHORUS: CPT

## 2022-04-30 PROCEDURE — 6360000002 HC RX W HCPCS: Performed by: STUDENT IN AN ORGANIZED HEALTH CARE EDUCATION/TRAINING PROGRAM

## 2022-04-30 PROCEDURE — 2720000010 HC SURG SUPPLY STERILE: Performed by: SURGERY

## 2022-04-30 PROCEDURE — 2500000003 HC RX 250 WO HCPCS: Performed by: STUDENT IN AN ORGANIZED HEALTH CARE EDUCATION/TRAINING PROGRAM

## 2022-04-30 PROCEDURE — 84300 ASSAY OF URINE SODIUM: CPT

## 2022-04-30 PROCEDURE — 2500000003 HC RX 250 WO HCPCS

## 2022-04-30 PROCEDURE — 88302 TISSUE EXAM BY PATHOLOGIST: CPT

## 2022-04-30 PROCEDURE — 2580000003 HC RX 258: Performed by: NURSE PRACTITIONER

## 2022-04-30 PROCEDURE — 84484 ASSAY OF TROPONIN QUANT: CPT

## 2022-04-30 PROCEDURE — 88307 TISSUE EXAM BY PATHOLOGIST: CPT

## 2022-04-30 PROCEDURE — 83935 ASSAY OF URINE OSMOLALITY: CPT

## 2022-04-30 PROCEDURE — 99223 1ST HOSP IP/OBS HIGH 75: CPT | Performed by: SURGERY

## 2022-04-30 PROCEDURE — 84540 ASSAY OF URINE/UREA-N: CPT

## 2022-04-30 PROCEDURE — 83735 ASSAY OF MAGNESIUM: CPT

## 2022-04-30 PROCEDURE — 36415 COLL VENOUS BLD VENIPUNCTURE: CPT

## 2022-04-30 PROCEDURE — 3600000014 HC SURGERY LEVEL 4 ADDTL 15MIN: Performed by: SURGERY

## 2022-04-30 PROCEDURE — 2700000000 HC OXYGEN THERAPY PER DAY

## 2022-04-30 PROCEDURE — 6360000002 HC RX W HCPCS

## 2022-04-30 PROCEDURE — 0DB80ZZ EXCISION OF SMALL INTESTINE, OPEN APPROACH: ICD-10-PCS | Performed by: SURGERY

## 2022-04-30 PROCEDURE — 3700000000 HC ANESTHESIA ATTENDED CARE: Performed by: SURGERY

## 2022-04-30 PROCEDURE — 3700000001 HC ADD 15 MINUTES (ANESTHESIA): Performed by: SURGERY

## 2022-04-30 PROCEDURE — 93005 ELECTROCARDIOGRAM TRACING: CPT | Performed by: EMERGENCY MEDICINE

## 2022-04-30 PROCEDURE — 82044 UR ALBUMIN SEMIQUANTITATIVE: CPT

## 2022-04-30 PROCEDURE — 88304 TISSUE EXAM BY PATHOLOGIST: CPT

## 2022-04-30 PROCEDURE — 6370000000 HC RX 637 (ALT 250 FOR IP): Performed by: NURSE PRACTITIONER

## 2022-04-30 PROCEDURE — 87636 SARSCOV2 & INF A&B AMP PRB: CPT

## 2022-04-30 PROCEDURE — 85025 COMPLETE CBC W/AUTO DIFF WBC: CPT

## 2022-04-30 PROCEDURE — 71046 X-RAY EXAM CHEST 2 VIEWS: CPT

## 2022-04-30 PROCEDURE — 3600000004 HC SURGERY LEVEL 4 BASE: Performed by: SURGERY

## 2022-04-30 PROCEDURE — 0YUA0JZ SUPPLEMENT BILATERAL INGUINAL REGION WITH SYNTHETIC SUBSTITUTE, OPEN APPROACH: ICD-10-PCS | Performed by: SURGERY

## 2022-04-30 PROCEDURE — 2580000003 HC RX 258: Performed by: STUDENT IN AN ORGANIZED HEALTH CARE EDUCATION/TRAINING PROGRAM

## 2022-04-30 PROCEDURE — 0YJA4ZZ INSPECTION OF BILATERAL INGUINAL REGION, PERCUTANEOUS ENDOSCOPIC APPROACH: ICD-10-PCS | Performed by: SURGERY

## 2022-04-30 PROCEDURE — 82570 ASSAY OF URINE CREATININE: CPT

## 2022-04-30 PROCEDURE — 96374 THER/PROPH/DIAG INJ IV PUSH: CPT

## 2022-04-30 PROCEDURE — 83690 ASSAY OF LIPASE: CPT

## 2022-04-30 PROCEDURE — 6360000002 HC RX W HCPCS: Performed by: EMERGENCY MEDICINE

## 2022-04-30 PROCEDURE — 7100000000 HC PACU RECOVERY - FIRST 15 MIN: Performed by: SURGERY

## 2022-04-30 PROCEDURE — 80048 BASIC METABOLIC PNL TOTAL CA: CPT

## 2022-04-30 PROCEDURE — C1781 MESH (IMPLANTABLE): HCPCS | Performed by: SURGERY

## 2022-04-30 PROCEDURE — 49521 REREPAIR ING HERNIA BLOCKED: CPT | Performed by: SURGERY

## 2022-04-30 DEVICE — MESH HERN W10XL15CM POLYPR FLAT L PORE MFIL SFT KNIT LTWT: Type: IMPLANTABLE DEVICE | Site: ABDOMEN | Status: FUNCTIONAL

## 2022-04-30 RX ORDER — MIDAZOLAM HYDROCHLORIDE 2 MG/2ML
2 INJECTION, SOLUTION INTRAMUSCULAR; INTRAVENOUS
Status: DISCONTINUED | OUTPATIENT
Start: 2022-04-30 | End: 2022-04-30 | Stop reason: HOSPADM

## 2022-04-30 RX ORDER — SODIUM CHLORIDE 9 MG/ML
INJECTION, SOLUTION INTRAVENOUS PRN
Status: DISCONTINUED | OUTPATIENT
Start: 2022-04-30 | End: 2022-05-05 | Stop reason: HOSPADM

## 2022-04-30 RX ORDER — MEPERIDINE HYDROCHLORIDE 25 MG/ML
12.5 INJECTION INTRAMUSCULAR; INTRAVENOUS; SUBCUTANEOUS EVERY 5 MIN PRN
Status: DISCONTINUED | OUTPATIENT
Start: 2022-04-30 | End: 2022-04-30 | Stop reason: HOSPADM

## 2022-04-30 RX ORDER — ONDANSETRON 2 MG/ML
INJECTION INTRAMUSCULAR; INTRAVENOUS PRN
Status: DISCONTINUED | OUTPATIENT
Start: 2022-04-30 | End: 2022-04-30 | Stop reason: SDUPTHER

## 2022-04-30 RX ORDER — METOPROLOL SUCCINATE 50 MG/1
25 TABLET, EXTENDED RELEASE ORAL 2 TIMES DAILY
Status: DISCONTINUED | OUTPATIENT
Start: 2022-04-30 | End: 2022-05-01

## 2022-04-30 RX ORDER — PROPOFOL 10 MG/ML
INJECTION, EMULSION INTRAVENOUS PRN
Status: DISCONTINUED | OUTPATIENT
Start: 2022-04-30 | End: 2022-04-30 | Stop reason: SDUPTHER

## 2022-04-30 RX ORDER — ONDANSETRON 4 MG/1
4 TABLET, ORALLY DISINTEGRATING ORAL EVERY 8 HOURS PRN
Status: CANCELLED | OUTPATIENT
Start: 2022-04-30

## 2022-04-30 RX ORDER — HYDRALAZINE HYDROCHLORIDE 20 MG/ML
20 INJECTION INTRAMUSCULAR; INTRAVENOUS EVERY 30 MIN PRN
Status: DISCONTINUED | OUTPATIENT
Start: 2022-04-30 | End: 2022-05-05 | Stop reason: HOSPADM

## 2022-04-30 RX ORDER — ETOMIDATE 2 MG/ML
INJECTION INTRAVENOUS PRN
Status: DISCONTINUED | OUTPATIENT
Start: 2022-04-30 | End: 2022-04-30 | Stop reason: SDUPTHER

## 2022-04-30 RX ORDER — ACETAMINOPHEN 325 MG/1
650 TABLET ORAL EVERY 6 HOURS PRN
Status: CANCELLED | OUTPATIENT
Start: 2022-04-30

## 2022-04-30 RX ORDER — SODIUM CHLORIDE 0.9 % (FLUSH) 0.9 %
5-40 SYRINGE (ML) INJECTION PRN
Status: DISCONTINUED | OUTPATIENT
Start: 2022-04-30 | End: 2022-05-02

## 2022-04-30 RX ORDER — FUROSEMIDE 10 MG/ML
40 INJECTION INTRAMUSCULAR; INTRAVENOUS 2 TIMES DAILY
Status: CANCELLED | OUTPATIENT
Start: 2022-04-30

## 2022-04-30 RX ORDER — HYDROMORPHONE HCL 110MG/55ML
PATIENT CONTROLLED ANALGESIA SYRINGE INTRAVENOUS PRN
Status: DISCONTINUED | OUTPATIENT
Start: 2022-04-30 | End: 2022-04-30 | Stop reason: SDUPTHER

## 2022-04-30 RX ORDER — FENTANYL CITRATE 50 UG/ML
INJECTION, SOLUTION INTRAMUSCULAR; INTRAVENOUS PRN
Status: DISCONTINUED | OUTPATIENT
Start: 2022-04-30 | End: 2022-04-30 | Stop reason: SDUPTHER

## 2022-04-30 RX ORDER — 0.9 % SODIUM CHLORIDE 0.9 %
1000 INTRAVENOUS SOLUTION INTRAVENOUS ONCE
Status: COMPLETED | OUTPATIENT
Start: 2022-04-30 | End: 2022-04-30

## 2022-04-30 RX ORDER — HEPARIN SODIUM 10000 [USP'U]/ML
5000 INJECTION, SOLUTION INTRAVENOUS; SUBCUTANEOUS EVERY 8 HOURS SCHEDULED
Status: DISCONTINUED | OUTPATIENT
Start: 2022-04-30 | End: 2022-05-05 | Stop reason: HOSPADM

## 2022-04-30 RX ORDER — ROCURONIUM BROMIDE 10 MG/ML
INJECTION, SOLUTION INTRAVENOUS PRN
Status: DISCONTINUED | OUTPATIENT
Start: 2022-04-30 | End: 2022-04-30 | Stop reason: SDUPTHER

## 2022-04-30 RX ORDER — VECURONIUM BROMIDE 1 MG/ML
INJECTION, POWDER, LYOPHILIZED, FOR SOLUTION INTRAVENOUS PRN
Status: DISCONTINUED | OUTPATIENT
Start: 2022-04-30 | End: 2022-04-30 | Stop reason: SDUPTHER

## 2022-04-30 RX ORDER — LIDOCAINE HYDROCHLORIDE 20 MG/ML
INJECTION, SOLUTION INTRAVENOUS PRN
Status: DISCONTINUED | OUTPATIENT
Start: 2022-04-30 | End: 2022-04-30 | Stop reason: SDUPTHER

## 2022-04-30 RX ORDER — VALSARTAN 80 MG/1
160 TABLET ORAL 2 TIMES DAILY
Status: CANCELLED | OUTPATIENT
Start: 2022-04-30

## 2022-04-30 RX ORDER — ONDANSETRON 2 MG/ML
4 INJECTION INTRAMUSCULAR; INTRAVENOUS EVERY 6 HOURS PRN
Status: CANCELLED | OUTPATIENT
Start: 2022-04-30

## 2022-04-30 RX ORDER — ASPIRIN 81 MG/1
81 TABLET, CHEWABLE ORAL DAILY
Status: CANCELLED | OUTPATIENT
Start: 2022-04-30

## 2022-04-30 RX ORDER — LABETALOL HYDROCHLORIDE 5 MG/ML
20 INJECTION, SOLUTION INTRAVENOUS EVERY 30 MIN PRN
Status: DISCONTINUED | OUTPATIENT
Start: 2022-04-30 | End: 2022-05-05 | Stop reason: HOSPADM

## 2022-04-30 RX ORDER — ONDANSETRON 2 MG/ML
4 INJECTION INTRAMUSCULAR; INTRAVENOUS
Status: DISCONTINUED | OUTPATIENT
Start: 2022-04-30 | End: 2022-04-30 | Stop reason: HOSPADM

## 2022-04-30 RX ORDER — MIDAZOLAM HYDROCHLORIDE 1 MG/ML
INJECTION INTRAMUSCULAR; INTRAVENOUS PRN
Status: DISCONTINUED | OUTPATIENT
Start: 2022-04-30 | End: 2022-04-30 | Stop reason: SDUPTHER

## 2022-04-30 RX ORDER — ATORVASTATIN CALCIUM 80 MG/1
80 TABLET, FILM COATED ORAL DAILY
Status: DISCONTINUED | OUTPATIENT
Start: 2022-05-01 | End: 2022-05-05 | Stop reason: HOSPADM

## 2022-04-30 RX ORDER — MAGNESIUM HYDROXIDE 1200 MG/15ML
LIQUID ORAL CONTINUOUS PRN
Status: COMPLETED | OUTPATIENT
Start: 2022-04-30 | End: 2022-04-30

## 2022-04-30 RX ORDER — MORPHINE SULFATE 4 MG/ML
4 INJECTION, SOLUTION INTRAMUSCULAR; INTRAVENOUS ONCE
Status: COMPLETED | OUTPATIENT
Start: 2022-04-30 | End: 2022-04-30

## 2022-04-30 RX ORDER — PHENYLEPHRINE HCL IN 0.9% NACL 1 MG/10 ML
SYRINGE (ML) INTRAVENOUS PRN
Status: DISCONTINUED | OUTPATIENT
Start: 2022-04-30 | End: 2022-04-30 | Stop reason: SDUPTHER

## 2022-04-30 RX ORDER — SODIUM CHLORIDE, SODIUM LACTATE, POTASSIUM CHLORIDE, CALCIUM CHLORIDE 600; 310; 30; 20 MG/100ML; MG/100ML; MG/100ML; MG/100ML
INJECTION, SOLUTION INTRAVENOUS CONTINUOUS
Status: DISCONTINUED | OUTPATIENT
Start: 2022-04-30 | End: 2022-05-05 | Stop reason: HOSPADM

## 2022-04-30 RX ORDER — HYDRALAZINE HYDROCHLORIDE 20 MG/ML
10 INJECTION INTRAMUSCULAR; INTRAVENOUS
Status: DISCONTINUED | OUTPATIENT
Start: 2022-04-30 | End: 2022-04-30 | Stop reason: HOSPADM

## 2022-04-30 RX ORDER — POLYETHYLENE GLYCOL 3350 17 G/17G
17 POWDER, FOR SOLUTION ORAL DAILY PRN
Status: CANCELLED | OUTPATIENT
Start: 2022-04-30

## 2022-04-30 RX ORDER — SODIUM CHLORIDE 0.9 % (FLUSH) 0.9 %
5-40 SYRINGE (ML) INJECTION EVERY 12 HOURS SCHEDULED
Status: DISCONTINUED | OUTPATIENT
Start: 2022-04-30 | End: 2022-04-30 | Stop reason: HOSPADM

## 2022-04-30 RX ORDER — SODIUM CHLORIDE 9 MG/ML
25 INJECTION, SOLUTION INTRAVENOUS PRN
Status: DISCONTINUED | OUTPATIENT
Start: 2022-04-30 | End: 2022-05-02

## 2022-04-30 RX ORDER — NITROGLYCERIN 5 MG/ML
INJECTION, SOLUTION INTRAVENOUS PRN
Status: DISCONTINUED | OUTPATIENT
Start: 2022-04-30 | End: 2022-04-30 | Stop reason: SDUPTHER

## 2022-04-30 RX ORDER — ACETAMINOPHEN 650 MG/1
650 SUPPOSITORY RECTAL EVERY 6 HOURS PRN
Status: CANCELLED | OUTPATIENT
Start: 2022-04-30

## 2022-04-30 RX ORDER — LABETALOL HYDROCHLORIDE 5 MG/ML
20 INJECTION, SOLUTION INTRAVENOUS
Status: DISCONTINUED | OUTPATIENT
Start: 2022-04-30 | End: 2022-04-30

## 2022-04-30 RX ORDER — DEXAMETHASONE SODIUM PHOSPHATE 10 MG/ML
INJECTION INTRAMUSCULAR; INTRAVENOUS PRN
Status: DISCONTINUED | OUTPATIENT
Start: 2022-04-30 | End: 2022-04-30 | Stop reason: SDUPTHER

## 2022-04-30 RX ORDER — SODIUM CHLORIDE 9 MG/ML
25 INJECTION, SOLUTION INTRAVENOUS PRN
Status: DISCONTINUED | OUTPATIENT
Start: 2022-04-30 | End: 2022-04-30 | Stop reason: HOSPADM

## 2022-04-30 RX ORDER — HYDROMORPHONE HCL IN WATER/PF 6 MG/30 ML
PATIENT CONTROLLED ANALGESIA SYRINGE INTRAVENOUS CONTINUOUS
Status: DISCONTINUED | OUTPATIENT
Start: 2022-04-30 | End: 2022-04-30

## 2022-04-30 RX ORDER — SODIUM CHLORIDE 0.9 % (FLUSH) 0.9 %
5-40 SYRINGE (ML) INJECTION EVERY 12 HOURS SCHEDULED
Status: DISCONTINUED | OUTPATIENT
Start: 2022-04-30 | End: 2022-05-05 | Stop reason: HOSPADM

## 2022-04-30 RX ORDER — ONDANSETRON 2 MG/ML
4 INJECTION INTRAMUSCULAR; INTRAVENOUS EVERY 6 HOURS PRN
Status: DISCONTINUED | OUTPATIENT
Start: 2022-04-30 | End: 2022-05-05 | Stop reason: HOSPADM

## 2022-04-30 RX ORDER — SODIUM CHLORIDE 0.9 % (FLUSH) 0.9 %
5-40 SYRINGE (ML) INJECTION PRN
Status: DISCONTINUED | OUTPATIENT
Start: 2022-04-30 | End: 2022-05-05 | Stop reason: HOSPADM

## 2022-04-30 RX ORDER — LABETALOL HYDROCHLORIDE 5 MG/ML
10 INJECTION, SOLUTION INTRAVENOUS
Status: DISCONTINUED | OUTPATIENT
Start: 2022-04-30 | End: 2022-04-30 | Stop reason: HOSPADM

## 2022-04-30 RX ORDER — POTASSIUM CHLORIDE 20 MEQ/1
40 TABLET, EXTENDED RELEASE ORAL PRN
Status: DISCONTINUED | OUTPATIENT
Start: 2022-04-30 | End: 2022-05-02

## 2022-04-30 RX ORDER — IPRATROPIUM BROMIDE AND ALBUTEROL SULFATE 2.5; .5 MG/3ML; MG/3ML
1 SOLUTION RESPIRATORY (INHALATION)
Status: DISCONTINUED | OUTPATIENT
Start: 2022-04-30 | End: 2022-04-30 | Stop reason: HOSPADM

## 2022-04-30 RX ORDER — HYDRALAZINE HYDROCHLORIDE 20 MG/ML
20 INJECTION INTRAMUSCULAR; INTRAVENOUS
Status: DISCONTINUED | OUTPATIENT
Start: 2022-04-30 | End: 2022-04-30

## 2022-04-30 RX ORDER — GLYCOPYRROLATE 1 MG/5 ML
SYRINGE (ML) INTRAVENOUS PRN
Status: DISCONTINUED | OUTPATIENT
Start: 2022-04-30 | End: 2022-04-30 | Stop reason: SDUPTHER

## 2022-04-30 RX ORDER — HYDRALAZINE HYDROCHLORIDE 20 MG/ML
INJECTION INTRAMUSCULAR; INTRAVENOUS PRN
Status: DISCONTINUED | OUTPATIENT
Start: 2022-04-30 | End: 2022-04-30 | Stop reason: SDUPTHER

## 2022-04-30 RX ORDER — POTASSIUM CHLORIDE 7.45 MG/ML
10 INJECTION INTRAVENOUS PRN
Status: DISCONTINUED | OUTPATIENT
Start: 2022-04-30 | End: 2022-05-02

## 2022-04-30 RX ORDER — MAGNESIUM SULFATE IN WATER 40 MG/ML
2000 INJECTION, SOLUTION INTRAVENOUS PRN
Status: DISCONTINUED | OUTPATIENT
Start: 2022-04-30 | End: 2022-05-02

## 2022-04-30 RX ORDER — ONDANSETRON 4 MG/1
4 TABLET, ORALLY DISINTEGRATING ORAL EVERY 8 HOURS PRN
Status: DISCONTINUED | OUTPATIENT
Start: 2022-04-30 | End: 2022-05-05 | Stop reason: HOSPADM

## 2022-04-30 RX ORDER — NEOSTIGMINE METHYLSULFATE 1 MG/ML
INJECTION, SOLUTION INTRAVENOUS PRN
Status: DISCONTINUED | OUTPATIENT
Start: 2022-04-30 | End: 2022-04-30 | Stop reason: SDUPTHER

## 2022-04-30 RX ORDER — SODIUM CHLORIDE 0.9 % (FLUSH) 0.9 %
5-40 SYRINGE (ML) INJECTION PRN
Status: DISCONTINUED | OUTPATIENT
Start: 2022-04-30 | End: 2022-04-30 | Stop reason: HOSPADM

## 2022-04-30 RX ORDER — NALOXONE HYDROCHLORIDE 0.4 MG/ML
INJECTION, SOLUTION INTRAMUSCULAR; INTRAVENOUS; SUBCUTANEOUS PRN
Status: DISCONTINUED | OUTPATIENT
Start: 2022-04-30 | End: 2022-04-30

## 2022-04-30 RX ORDER — SODIUM CHLORIDE 0.9 % (FLUSH) 0.9 %
5-40 SYRINGE (ML) INJECTION EVERY 12 HOURS SCHEDULED
Status: DISCONTINUED | OUTPATIENT
Start: 2022-04-30 | End: 2022-05-02

## 2022-04-30 RX ORDER — HYDRALAZINE HYDROCHLORIDE 20 MG/ML
10 INJECTION INTRAMUSCULAR; INTRAVENOUS EVERY 4 HOURS PRN
Status: DISCONTINUED | OUTPATIENT
Start: 2022-04-30 | End: 2022-05-02

## 2022-04-30 RX ADMIN — HYDRALAZINE HYDROCHLORIDE 5 MG: 20 INJECTION INTRAMUSCULAR; INTRAVENOUS at 14:51

## 2022-04-30 RX ADMIN — Medication 10 ML: at 21:21

## 2022-04-30 RX ADMIN — PROPOFOL 20 MG: 10 INJECTION, EMULSION INTRAVENOUS at 14:37

## 2022-04-30 RX ADMIN — NITROGLYCERIN 25 MCG: 5 INJECTION, SOLUTION INTRAVENOUS at 17:43

## 2022-04-30 RX ADMIN — ETOMIDATE 20 MG: 20 INJECTION, SOLUTION INTRAVENOUS at 14:13

## 2022-04-30 RX ADMIN — SODIUM CHLORIDE, PRESERVATIVE FREE 10 ML: 5 INJECTION INTRAVENOUS at 21:21

## 2022-04-30 RX ADMIN — HYDROMORPHONE HYDROCHLORIDE 0.5 MG: 2 INJECTION, SOLUTION INTRAMUSCULAR; INTRAVENOUS; SUBCUTANEOUS at 17:41

## 2022-04-30 RX ADMIN — MIDAZOLAM 2 MG: 1 INJECTION INTRAMUSCULAR; INTRAVENOUS at 14:03

## 2022-04-30 RX ADMIN — Medication 2000 MG: at 14:28

## 2022-04-30 RX ADMIN — HYDROMORPHONE HYDROCHLORIDE 0.5 MG: 2 INJECTION, SOLUTION INTRAMUSCULAR; INTRAVENOUS; SUBCUTANEOUS at 15:54

## 2022-04-30 RX ADMIN — HYDRALAZINE HYDROCHLORIDE 5 MG: 20 INJECTION INTRAMUSCULAR; INTRAVENOUS at 15:21

## 2022-04-30 RX ADMIN — VECURONIUM BROMIDE 2 MG: 10 INJECTION, POWDER, LYOPHILIZED, FOR SOLUTION INTRAVENOUS at 15:17

## 2022-04-30 RX ADMIN — VECURONIUM BROMIDE 2 MG: 10 INJECTION, POWDER, LYOPHILIZED, FOR SOLUTION INTRAVENOUS at 17:13

## 2022-04-30 RX ADMIN — NITROGLYCERIN 25 MCG: 5 INJECTION, SOLUTION INTRAVENOUS at 17:40

## 2022-04-30 RX ADMIN — PROPOFOL 50 MG: 10 INJECTION, EMULSION INTRAVENOUS at 14:24

## 2022-04-30 RX ADMIN — HYDRALAZINE HYDROCHLORIDE 5 MG: 20 INJECTION INTRAMUSCULAR; INTRAVENOUS at 15:34

## 2022-04-30 RX ADMIN — Medication 100 MCG: at 14:35

## 2022-04-30 RX ADMIN — HYDROMORPHONE HYDROCHLORIDE 0.5 MG: 2 INJECTION, SOLUTION INTRAMUSCULAR; INTRAVENOUS; SUBCUTANEOUS at 17:30

## 2022-04-30 RX ADMIN — HYDRALAZINE HYDROCHLORIDE 5 MG: 20 INJECTION INTRAMUSCULAR; INTRAVENOUS at 18:38

## 2022-04-30 RX ADMIN — LIDOCAINE HYDROCHLORIDE 100 MG: 20 INJECTION, SOLUTION INTRAVENOUS at 14:13

## 2022-04-30 RX ADMIN — ROCURONIUM BROMIDE 50 MG: 10 SOLUTION INTRAVENOUS at 14:26

## 2022-04-30 RX ADMIN — Medication 0.6 MG: at 18:18

## 2022-04-30 RX ADMIN — FAMOTIDINE 20 MG: 10 INJECTION INTRAVENOUS at 11:28

## 2022-04-30 RX ADMIN — HYDROMORPHONE HYDROCHLORIDE 1 MG: 1 INJECTION, SOLUTION INTRAMUSCULAR; INTRAVENOUS; SUBCUTANEOUS at 23:10

## 2022-04-30 RX ADMIN — ONDANSETRON 4 MG: 2 INJECTION INTRAMUSCULAR; INTRAVENOUS at 17:48

## 2022-04-30 RX ADMIN — HYDROMORPHONE HYDROCHLORIDE 0.5 MG: 2 INJECTION, SOLUTION INTRAMUSCULAR; INTRAVENOUS; SUBCUTANEOUS at 14:50

## 2022-04-30 RX ADMIN — SODIUM CHLORIDE 1000 ML: 9 INJECTION, SOLUTION INTRAVENOUS at 09:27

## 2022-04-30 RX ADMIN — HEPARIN SODIUM 5000 UNITS: 10000 INJECTION, SOLUTION INTRAVENOUS; SUBCUTANEOUS at 21:30

## 2022-04-30 RX ADMIN — METHOCARBAMOL 500 MG: 100 INJECTION INTRAMUSCULAR; INTRAVENOUS at 23:13

## 2022-04-30 RX ADMIN — HYDROMORPHONE HYDROCHLORIDE 0.5 MG: 2 INJECTION, SOLUTION INTRAMUSCULAR; INTRAVENOUS; SUBCUTANEOUS at 14:49

## 2022-04-30 RX ADMIN — VECURONIUM BROMIDE 2 MG: 10 INJECTION, POWDER, LYOPHILIZED, FOR SOLUTION INTRAVENOUS at 16:44

## 2022-04-30 RX ADMIN — MORPHINE SULFATE 4 MG: 4 INJECTION, SOLUTION INTRAMUSCULAR; INTRAVENOUS at 08:05

## 2022-04-30 RX ADMIN — PROPOFOL 80 MG: 10 INJECTION, EMULSION INTRAVENOUS at 14:56

## 2022-04-30 RX ADMIN — HYDROMORPHONE HYDROCHLORIDE 0.5 MG: 1 INJECTION, SOLUTION INTRAMUSCULAR; INTRAVENOUS; SUBCUTANEOUS at 12:21

## 2022-04-30 RX ADMIN — SODIUM CHLORIDE, POTASSIUM CHLORIDE, SODIUM LACTATE AND CALCIUM CHLORIDE: 600; 310; 30; 20 INJECTION, SOLUTION INTRAVENOUS at 21:19

## 2022-04-30 RX ADMIN — HYDROMORPHONE HYDROCHLORIDE 0.5 MG: 1 INJECTION, SOLUTION INTRAMUSCULAR; INTRAVENOUS; SUBCUTANEOUS at 20:04

## 2022-04-30 RX ADMIN — HYDRALAZINE HYDROCHLORIDE 5 MG: 20 INJECTION INTRAMUSCULAR; INTRAVENOUS at 14:47

## 2022-04-30 RX ADMIN — LABETALOL HYDROCHLORIDE 20 MG: 5 INJECTION INTRAVENOUS at 22:12

## 2022-04-30 RX ADMIN — FENTANYL CITRATE 50 MCG: 50 INJECTION, SOLUTION INTRAMUSCULAR; INTRAVENOUS at 14:40

## 2022-04-30 RX ADMIN — VECURONIUM BROMIDE 1 MG: 10 INJECTION, POWDER, LYOPHILIZED, FOR SOLUTION INTRAVENOUS at 16:19

## 2022-04-30 RX ADMIN — METOPROLOL SUCCINATE 25 MG: 50 TABLET, EXTENDED RELEASE ORAL at 21:30

## 2022-04-30 RX ADMIN — HYDRALAZINE HYDROCHLORIDE 10 MG: 20 INJECTION INTRAMUSCULAR; INTRAVENOUS at 17:47

## 2022-04-30 RX ADMIN — VECURONIUM BROMIDE 2 MG: 10 INJECTION, POWDER, LYOPHILIZED, FOR SOLUTION INTRAVENOUS at 15:28

## 2022-04-30 RX ADMIN — PROPOFOL 20 MG: 10 INJECTION, EMULSION INTRAVENOUS at 14:38

## 2022-04-30 RX ADMIN — HYDROMORPHONE HYDROCHLORIDE 0.5 MG: 2 INJECTION, SOLUTION INTRAMUSCULAR; INTRAVENOUS; SUBCUTANEOUS at 17:53

## 2022-04-30 RX ADMIN — SODIUM CHLORIDE: 9 INJECTION, SOLUTION INTRAVENOUS at 14:01

## 2022-04-30 RX ADMIN — HYDROMORPHONE HYDROCHLORIDE 0.5 MG: 2 INJECTION, SOLUTION INTRAMUSCULAR; INTRAVENOUS; SUBCUTANEOUS at 18:23

## 2022-04-30 RX ADMIN — FENTANYL CITRATE 50 MCG: 50 INJECTION, SOLUTION INTRAMUSCULAR; INTRAVENOUS at 14:45

## 2022-04-30 RX ADMIN — Medication 100 MCG: at 18:16

## 2022-04-30 RX ADMIN — FENTANYL CITRATE 100 MCG: 50 INJECTION, SOLUTION INTRAMUSCULAR; INTRAVENOUS at 14:04

## 2022-04-30 RX ADMIN — Medication 3 MG: at 18:18

## 2022-04-30 RX ADMIN — DEXAMETHASONE SODIUM PHOSPHATE 10 MG: 10 INJECTION INTRAMUSCULAR; INTRAVENOUS at 14:31

## 2022-04-30 RX ADMIN — PROPOFOL 10 MG: 10 INJECTION, EMULSION INTRAVENOUS at 14:36

## 2022-04-30 RX ADMIN — HYDROMORPHONE HYDROCHLORIDE 0.5 MG: 2 INJECTION, SOLUTION INTRAMUSCULAR; INTRAVENOUS; SUBCUTANEOUS at 18:27

## 2022-04-30 ASSESSMENT — PULMONARY FUNCTION TESTS
PIF_VALUE: 1
PIF_VALUE: 22
PIF_VALUE: 22
PIF_VALUE: 24
PIF_VALUE: 19
PIF_VALUE: 22
PIF_VALUE: 23
PIF_VALUE: 24
PIF_VALUE: 24
PIF_VALUE: 23
PIF_VALUE: 0
PIF_VALUE: 23
PIF_VALUE: 22
PIF_VALUE: 21
PIF_VALUE: 23
PIF_VALUE: 23
PIF_VALUE: 24
PIF_VALUE: 23
PIF_VALUE: 22
PIF_VALUE: 21
PIF_VALUE: 26
PIF_VALUE: 21
PIF_VALUE: 33
PIF_VALUE: 40
PIF_VALUE: 24
PIF_VALUE: 23
PIF_VALUE: 22
PIF_VALUE: 23
PIF_VALUE: 22
PIF_VALUE: 23
PIF_VALUE: 20
PIF_VALUE: 3
PIF_VALUE: 23
PIF_VALUE: 23
PIF_VALUE: 21
PIF_VALUE: 26
PIF_VALUE: 23
PIF_VALUE: 22
PIF_VALUE: 23
PIF_VALUE: 23
PIF_VALUE: 24
PIF_VALUE: 24
PIF_VALUE: 32
PIF_VALUE: 23
PIF_VALUE: 0
PIF_VALUE: 22
PIF_VALUE: 23
PIF_VALUE: 21
PIF_VALUE: 1
PIF_VALUE: 1
PIF_VALUE: 23
PIF_VALUE: 22
PIF_VALUE: 23
PIF_VALUE: 26
PIF_VALUE: 23
PIF_VALUE: 24
PIF_VALUE: 23
PIF_VALUE: 23
PIF_VALUE: 24
PIF_VALUE: 22
PIF_VALUE: 23
PIF_VALUE: 24
PIF_VALUE: 22
PIF_VALUE: 23
PIF_VALUE: 22
PIF_VALUE: 23
PIF_VALUE: 24
PIF_VALUE: 21
PIF_VALUE: 22
PIF_VALUE: 23
PIF_VALUE: 21
PIF_VALUE: 22
PIF_VALUE: 21
PIF_VALUE: 24
PIF_VALUE: 23
PIF_VALUE: 40
PIF_VALUE: 23
PIF_VALUE: 24
PIF_VALUE: 23
PIF_VALUE: 23
PIF_VALUE: 21
PIF_VALUE: 24
PIF_VALUE: 24
PIF_VALUE: 22
PIF_VALUE: 23
PIF_VALUE: 21
PIF_VALUE: 20
PIF_VALUE: 22
PIF_VALUE: 20
PIF_VALUE: 23
PIF_VALUE: 23
PIF_VALUE: 24
PIF_VALUE: 21
PIF_VALUE: 22
PIF_VALUE: 24
PIF_VALUE: 22
PIF_VALUE: 24
PIF_VALUE: 24
PIF_VALUE: 22
PIF_VALUE: 1
PIF_VALUE: 23
PIF_VALUE: 22
PIF_VALUE: 24
PIF_VALUE: 22
PIF_VALUE: 23
PIF_VALUE: 1
PIF_VALUE: 23
PIF_VALUE: 23
PIF_VALUE: 24
PIF_VALUE: 24
PIF_VALUE: 22
PIF_VALUE: 22
PIF_VALUE: 23
PIF_VALUE: 8
PIF_VALUE: 25
PIF_VALUE: 25
PIF_VALUE: 22
PIF_VALUE: 19
PIF_VALUE: 23
PIF_VALUE: 33
PIF_VALUE: 24
PIF_VALUE: 23
PIF_VALUE: 24
PIF_VALUE: 0
PIF_VALUE: 23
PIF_VALUE: 25
PIF_VALUE: 23
PIF_VALUE: 25
PIF_VALUE: 22
PIF_VALUE: 22
PIF_VALUE: 23
PIF_VALUE: 25
PIF_VALUE: 21
PIF_VALUE: 0
PIF_VALUE: 33
PIF_VALUE: 22
PIF_VALUE: 22
PIF_VALUE: 26
PIF_VALUE: 22
PIF_VALUE: 23
PIF_VALUE: 23
PIF_VALUE: 21
PIF_VALUE: 23
PIF_VALUE: 33
PIF_VALUE: 0
PIF_VALUE: 23
PIF_VALUE: 23
PIF_VALUE: 15
PIF_VALUE: 32
PIF_VALUE: 3
PIF_VALUE: 24
PIF_VALUE: 22
PIF_VALUE: 22
PIF_VALUE: 24
PIF_VALUE: 24
PIF_VALUE: 22
PIF_VALUE: 24
PIF_VALUE: 23
PIF_VALUE: 24
PIF_VALUE: 22
PIF_VALUE: 22
PIF_VALUE: 23
PIF_VALUE: 22
PIF_VALUE: 21
PIF_VALUE: 22
PIF_VALUE: 24
PIF_VALUE: 21
PIF_VALUE: 24
PIF_VALUE: 23
PIF_VALUE: 22
PIF_VALUE: 23
PIF_VALUE: 24
PIF_VALUE: 0
PIF_VALUE: 22
PIF_VALUE: 31
PIF_VALUE: 0
PIF_VALUE: 19
PIF_VALUE: 23
PIF_VALUE: 22
PIF_VALUE: 0
PIF_VALUE: 23
PIF_VALUE: 22
PIF_VALUE: 24
PIF_VALUE: 22
PIF_VALUE: 23
PIF_VALUE: 24
PIF_VALUE: 22
PIF_VALUE: 25
PIF_VALUE: 22
PIF_VALUE: 1
PIF_VALUE: 21
PIF_VALUE: 21
PIF_VALUE: 26
PIF_VALUE: 23
PIF_VALUE: 23
PIF_VALUE: 27
PIF_VALUE: 20
PIF_VALUE: 24
PIF_VALUE: 0
PIF_VALUE: 22
PIF_VALUE: 23
PIF_VALUE: 20
PIF_VALUE: 23
PIF_VALUE: 24
PIF_VALUE: 23
PIF_VALUE: 23
PIF_VALUE: 24
PIF_VALUE: 24
PIF_VALUE: 23
PIF_VALUE: 25
PIF_VALUE: 0
PIF_VALUE: 24
PIF_VALUE: 22
PIF_VALUE: 23
PIF_VALUE: 23
PIF_VALUE: 22
PIF_VALUE: 23
PIF_VALUE: 33
PIF_VALUE: 31
PIF_VALUE: 24
PIF_VALUE: 23
PIF_VALUE: 22
PIF_VALUE: 28
PIF_VALUE: 22
PIF_VALUE: 25
PIF_VALUE: 20
PIF_VALUE: 23
PIF_VALUE: 19
PIF_VALUE: 23
PIF_VALUE: 27
PIF_VALUE: 23
PIF_VALUE: 24
PIF_VALUE: 23
PIF_VALUE: 41
PIF_VALUE: 24
PIF_VALUE: 0
PIF_VALUE: 23
PIF_VALUE: 23
PIF_VALUE: 33
PIF_VALUE: 20
PIF_VALUE: 24
PIF_VALUE: 25
PIF_VALUE: 24
PIF_VALUE: 23
PIF_VALUE: 23

## 2022-04-30 ASSESSMENT — PAIN DESCRIPTION - ORIENTATION
ORIENTATION: MID;UPPER;LOWER
ORIENTATION: RIGHT;LEFT;LOWER
ORIENTATION: MID;LOWER

## 2022-04-30 ASSESSMENT — ENCOUNTER SYMPTOMS
NAUSEA: 1
EYES NEGATIVE: 1
BLOOD IN STOOL: 0
DIARRHEA: 0
SHORTNESS OF BREATH: 1
VOMITING: 0
CONSTIPATION: 1
ABDOMINAL DISTENTION: 1
ABDOMINAL PAIN: 0

## 2022-04-30 ASSESSMENT — PAIN DESCRIPTION - DESCRIPTORS
DESCRIPTORS: PATIENT UNABLE TO DESCRIBE
DESCRIPTORS: DISCOMFORT
DESCRIPTORS: ACHING;DISCOMFORT

## 2022-04-30 ASSESSMENT — PAIN DESCRIPTION - PAIN TYPE: TYPE: SURGICAL PAIN

## 2022-04-30 ASSESSMENT — PAIN DESCRIPTION - LOCATION
LOCATION: ABDOMEN
LOCATION: GROIN
LOCATION: ABDOMEN
LOCATION: ABDOMEN

## 2022-04-30 ASSESSMENT — PAIN SCALES - GENERAL
PAINLEVEL_OUTOF10: 8
PAINLEVEL_OUTOF10: 7
PAINLEVEL_OUTOF10: 8
PAINLEVEL_OUTOF10: 8
PAINLEVEL_OUTOF10: 10

## 2022-04-30 ASSESSMENT — LIFESTYLE VARIABLES: SMOKING_STATUS: 1

## 2022-04-30 ASSESSMENT — PAIN DESCRIPTION - FREQUENCY: FREQUENCY: INTERMITTENT

## 2022-04-30 ASSESSMENT — PAIN DESCRIPTION - ONSET: ONSET: ON-GOING

## 2022-04-30 ASSESSMENT — PAIN - FUNCTIONAL ASSESSMENT: PAIN_FUNCTIONAL_ASSESSMENT: 0-10

## 2022-04-30 NOTE — ED NOTES
Pt consent forms signed by Dr. Conrad Holder, patient, and this RN, placed in pt roller chart      Roscoe Beck RN  04/30/22 [id number]

## 2022-04-30 NOTE — ED NOTES
Patient transport form signed at this time by patient, and placed into the chart.      Bianca Ponce RN  04/30/22 6820

## 2022-04-30 NOTE — BRIEF OP NOTE
Brief Postoperative Note      Patient: Ulysses Gross  YOB: 1975  MRN: [id number]    Date of Procedure: 4/30/2022    Pre-Op Diagnosis: bilateral inguinal hernia, incarcerated    Post-Op Diagnosis: ischemic small bowel segment, L indirect inguinal and femoral hernia, R indirect inguinal hernia, R cord lipoma       Procedure(s):  Diagnostic laparoscopy converted to exploratory laparotomy, resection of ~20cm segment of distal small bowel >20cm from the terminal ileum with primary stapled side to side anastomosis, bilateral open inguinal hernia repair with mesh      Surgeon(s):  Santhosh Kelsey MD    Assistant:  Resident: Curt Camilo MD; Awa Amaya MD    Anesthesia: General    Estimated Blood Loss (mL): less than 50     Complications: None    Specimens:   ID Type Source Tests Collected by Time Destination   A : A. SMALL BOWEL Tissue Duodenum SURGICAL PATHOLOGY Santhosh Kelsey MD 4/30/2022 1501    B : [address] [place] [id number] Tissue Tissue SURGICAL PATHOLOGY Santhosh Kelsey MD 4/30/2022 1700    C : MARGARET Qureshi MD 4/30/2022 1702        Implants:  Implant Name Type Inv.  Item Serial No.  Lot No. LRB No. Used Action   MESH PERLA [id number] POLYPR FLAT L PORE MFIL SFT KNIT LTWT - [id number]  MESH PERLA [id number] POLYPR FLAT L PORE MFIL SFT KNIT LTWT  BARD DAVOL-WD [id number] Left 1 Implanted   MESH PERLA [id number] POLYPR FLAT L PORE MFIL SFT KNIT LTWT - [id number]  MESH PERLA [id number] POLYPR FLAT L PORE MFIL SFT KNIT LTWT  BARD DAVOL-WD [id number] Right 1 Implanted         Drains:   Urinary Catheter Loya (Active)   Site Assessment No urethral drainage 04/30/22 1745   Urine Color Yellow 04/30/22 1745   Urine Appearance Clear 04/30/22 1745   Collection Container Standard 04/30/22 1745   Securement Method Securing device (Describe) 04/30/22 1745       Findings: ischemic distal small bowel segment with creeping fat and significant thickening of the wall with non-palpable lumen that was resected (~20cm) >20cm proximal to the TI; after resection we performed bilateral open inguinal hernia repairs; at the L, there was a large sac at the indirect space with herniated sigmoid colon which was associated with a non-expanding mesenteric hematoma and was thinner walled than the remainder of the colon but intact, a serosal tear was noted proximal to the herniated segment of sigmoid colon which was lemberted; the floor of the inguinal canal was reapproximated and a femoral defect was also repaired; mesh was used and the incision was closed; then performed right open inguinal hernia repair, indirect defect was found and hernia sac was freed and amputated at a stalk; a zeeshan repair was performed at the right side only and this incision was closed; we then performed the stapled anastomosis of small bowel and closed the mesenteric defect; the abdominal wall fascia was reapproximated, the subcutaneous tissues were approximated with layers of vicryl and midline stapled closed     Testicles palpated within the scrotum at the conclusion of the procedure    Electronically signed by Jodie Gunn MD on 4/30/2022 at 6:40 PM

## 2022-04-30 NOTE — ED NOTES
Nurse to nurse report called to the floor, spoke to charge RN. Patient's test results reviewed, and vitals signs reported to the receiving nurse. And any and all other important information regarding the patient disclosed.         Sunshine Mauro RN  04/30/22 1037

## 2022-04-30 NOTE — ED NOTES
Blood collected and sent to the lab. Rapid influenza/COVID  swab collected and sent to lab at this time.      Coco Miller RN  04/30/22 1897

## 2022-04-30 NOTE — ED PROVIDER NOTES
HPI:  4/30/22, Time: 7:55 AM ARVINDT         Inez Juddsin is a 40 y.o. male presenting to the ED for bilateral inguinal hernias for a couple years but suddenly became worse with increased pain and bulging that won't go away, beginning a couple hours ago this morning. The complaint has been persistent, moderate in severity, and worsened by standing up, coughing or anything that increases pressure in that area. He denies trauma, hematuria, dysuria, abdominal pain, nausea vomiting or diarrhea. He also reports non-productive cough for 2 weeks without fever, chills or other associated symptoms. Patient states he recently relocated here from [facility] . Patient denies fever/chills, sore throat, congestion, chest pain, shortness of breath, edema, headache, visual disturbances, focal paresthesias, focal weakness, abdominal pain, nausea, vomiting, diarrhea, constipation, dysuria, hematuria, trauma, neck or back pain, rash or other complaints. ROS:   A complete review of systems was performed and all pertinent positives and negatives are stated within HPI, all other systems reviewed and are negative.      --------------------------------------------- PAST HISTORY ---------------------------------------------  Past Medical History:  has a past medical history of CAD (coronary artery disease), CHF (congestive heart failure) ([facility] GERD (gastroesophageal reflux disease), Hx of drug abuse ([facility].), Hypertension, and NICM (nonischemic cardiomyopathy) ([facility] Past Surgical History:  has a past surgical history that includes Skin graft (Right) and Cardiac catheterization. Social History:  reports that he quit smoking about 2 months ago. His smoking use included cigarettes. He started smoking about 30 years ago. He has a 7.50 pack-year smoking history. He has never used smokeless tobacco. He reports previous alcohol use. He reports previous drug use. Drugs: Cocaine and Marijuana (Gladystine Blanco).     Family History: family history includes Hypertension in his maternal grandmother and mother; Stroke in his maternal grandmother; Thyroid Disease in his mother. The patients home medications have been reviewed. Allergies: Patient has no known allergies. ----------------------------------------PHYSICAL EXAM--------------------------------------  Constitutional:  Well developed, well nourished, no acute distress, non-toxic appearance   Eyes:  PERRL, conjunctiva normal, EOMI  HENT:  Atraumatic, external ears normal, nose normal, oropharynx moist. Neck- normal range of motion, no nuchal rigidity   Respiratory:  No respiratory distress, normal breath sounds, no rales, no wheezing   Cardiovascular:  Normal rate, normal rhythm, no murmurs, no gallops, no rubs. Radial and DP pulses 2+ bilaterally. Compartments are soft. He is warm and well perfused. Cap refill less than 3 seconds. GI:  Soft, nondistended, normal bowel sounds, nontender, no organomegaly, no mass, no rebound, no guarding. Bilateral inguinal hernias noted that are tender, firm and extend into the scrotum. They are too tender to reduce. :  No costovertebral angle tenderness   Musculoskeletal:  No edema, no tenderness, no deformities. Back- no tenderness  Integument:  Well hydrated, no visible rash. Adequate perfusion. Lymphatic:  No cervical lymphadenopathy noted   Neurologic:  Alert & oriented x 3, CN 2-12 normal,no focal deficits noted. Normal gait. Psychiatric:  Speech and behavior appropriate       -------------------------------------------------- RESULTS -------------------------------------------------  I have personally reviewed all laboratory and imaging results for this patient. Results are listed below.      LABS:  Results for orders placed or performed during the hospital encounter of 04/30/22   COVID-19 & Influenza Combo    Specimen: Nasopharyngeal Swab   Result Value Ref Range    SARS-CoV-2 RNA, RT PCR NOT DETECTED NOT DETECTED    INFLUENZA A NOT DETECTED NOT DETECTED    INFLUENZA B NOT DETECTED NOT DETECTED   CBC with Auto Differential   Result Value Ref Range    WBC 7.3 4.5 - 11.5 E9/L    RBC 5.20 3.80 - 5.80 E12/L    Hemoglobin 15.7 12.5 - 16.5 g/dL    Hematocrit 46.6 37.0 - 54.0 %    MCV 89.6 80.0 - 99.9 fL    MCH 30.2 26.0 - 35.0 pg    MCHC 33.7 32.0 - 34.5 %    RDW 13.9 11.5 - 15.0 fL    Platelets 252 714 - 431 E9/L    MPV 11.1 7.0 - 12.0 fL    Neutrophils % 50.6 43.0 - 80.0 %    Immature Granulocytes % 0.1 0.0 - 5.0 %    Lymphocytes % 34.2 20.0 - 42.0 %    Monocytes % 11.7 2.0 - 12.0 %    Eosinophils % 2.3 0.0 - 6.0 %    Basophils % 1.1 0.0 - 2.0 %    Neutrophils Absolute 3.71 1.80 - 7.30 E9/L    Immature Granulocytes # 0.01 E9/L    Lymphocytes Absolute 2.51 1.50 - 4.00 E9/L    Monocytes Absolute 0.86 0.10 - 0.95 E9/L    Eosinophils Absolute 0.17 0.05 - 0.50 E9/L    Basophils Absolute 0.08 0.00 - 0.20 E9/L   Comprehensive Metabolic Panel   Result Value Ref Range    Sodium 142 132 - 146 mmol/L    Potassium 4.1 3.5 - 5.0 mmol/L    Chloride 104 98 - 107 mmol/L    CO2 27 22 - 29 mmol/L    Anion Gap 11 7 - 16 mmol/L    Glucose 90 74 - 99 mg/dL    BUN 19 6 - 20 mg/dL    CREATININE 2.1 (H) 0.7 - 1.2 mg/dL    GFR Non-African American 41 >=60 mL/min/1.73    GFR African American 41     Calcium 9.9 8.6 - 10.2 mg/dL    Total Protein 7.3 6.4 - 8.3 g/dL    Albumin 4.4 3.5 - 5.2 g/dL    Total Bilirubin 0.4 0.0 - 1.2 mg/dL    Alkaline Phosphatase 73 40 - 129 U/L    ALT 33 0 - 40 U/L    AST 30 0 - 39 U/L   Troponin   Result Value Ref Range    Troponin, High Sensitivity 20 (H) 0 - 11 ng/L   Brain Natriuretic Peptide   Result Value Ref Range    Pro-BNP 3,430 (H) 0 - 125 pg/mL   Lactic Acid   Result Value Ref Range    Lactic Acid 1.6 0.5 - 2.2 mmol/L   Lipase   Result Value Ref Range    Lipase 36 13 - 60 U/L       RADIOLOGY:  Interpreted by Radiologist.  CT ABDOMEN PELVIS WO CONTRAST Additional Contrast? None   Preliminary Result   Strangulation identified of the bowel within the right inguinal hernia with   fluid and stranding of the fat. The proximal small bowel demonstrates   dilatation with fluid filling the proximal small bowel. Early partial   obstruction is of concern. The left inguinal hernia contains colon and fat with no signs of   strangulation at this time. Small umbilical hernia containing fat only. XR CHEST (2 VW)   Final Result   The lungs are without acute focal process. There is no effusion or   pneumothorax. The cardiomediastinal silhouette is without acute process. The   osseous structures are without acute process. IMPRESSION:   No acute process. EKG Interpretation  Time: 7:31 AM EDT  Rhythm: normal sinus   Rate: 75  Axis: left  Conduction: normal  ST Segments: nonspecific changes  T Waves: non specific changes  Clinical Impression: non-specific EKG  Comparison to prior EKG: stable as compared to patient's most recent EKG      ------------------------- NURSING NOTES AND VITALS REVIEWED ---------------------------  The nursing notes within the ED encounter and vital signs as below have been reviewed by myself. BP (!) 168/110   Pulse 66   Temp 97.6 °F (36.4 °C) (Oral)   Resp 18   Wt 260 lb (117.9 kg)   SpO2 99%   BMI 33.38 kg/m²   Oxygen Saturation Interpretation: Normal      The patients available past medical records and past encounters were reviewed.         ------------------------------ ED COURSE/MEDICAL DECISION MAKING----------------------  Medications   0.9 % sodium chloride bolus (1,000 mLs IntraVENous New Bag 4/30/22 0466)   morphine sulfate (PF) injection 4 mg (4 mg IntraVENous Given 4/30/22 2558)           Procedures:   none      Medical Decision Making:    IV saline, NPO, IV morphine   Incarcerated right inguinal hernia   Left inguinal hernia, not incarcerated   Renal insufficiency and volume overload, no pulm edema on CXR       This patient's ED course included: re-evaluation prior to disposition, IV medications, cardiac monitoring, continuous pulse oximetry and a personal history and physicial eaxmination    This patient has remained hemodynamically stable, remained unchanged and been closely monitored during their ED course. Re-Evaluations:  Time: 9:30 AM EDT   Re-evaluation. Patients symptoms show no change  Repeat physical examination is not changed      Consultations:   9:30 AM EDT  Spoke with Dr Raphael Manzanares, general surgery, discussed case, accepts transfer to [facility] ER for general surgery consultatin  9:30 AM EDT  Spoke with Dr. Lida Christie , [facility] ER, discussed case, accepts transfer to ER    Critical Care: none    I, Yonis Kolb, DO, am the Primary Provider of Record    Counseling: The emergency provider has spoken with the patient and discussed todays results, in addition to providing specific details for the plan of care and counseling regarding the diagnosis and prognosis. Questions are answered at this time and they are agreeable with the plan.    --------------------------- IMPRESSION AND DISPOSITION ---------------------------------    IMPRESSION  1. Strangulated inguinal hernia    2. Acute renal insufficiency    3.  Hypervolemia, unspecified hypervolemia type        DISPOSITION  Disposition: Transfer to [facility] to ER  Patient condition is stable             Jose Alfredo Ríos DO  04/30/22 8947

## 2022-04-30 NOTE — ED PROVIDER NOTES
Department of Emergency Medicine   ED  Provider Note  Admit Date/RoomTime: 4/30/2022  7:29 AM  ED Room: 11/11          History of Present Illness:  4/30/22, Time: 12:01 PM EDT  Chief Complaint   Patient presents with    Hernia     FEELS LIKE HERNIA BROKE INSIDE THEM    Cough     CAUGHT A COLD, AND COUGHING ALLOT HURTS CHEST         Agata Puri is a 40 y.o. male presenting to the ED for Abdominal pain. The patient reports that he has multiple hernias. Is been present for 3 years. He states he was going to follow-up as an outpatient however he did not get them taken care of where he lived in [facility] at the time. Patient returns with worsening abdominal pain particularly in the right lower quadrant and bilateral lower abdominal region. He was found to have a strangulated inguinal hernia at the outlying facility. Patient reports intermittent nausea with pain in this region. Other makes symptoms better or worse they are constant duration. Review of Systems:  Review of systems obtained and negative unless stated otherwise above in the HPI.    --------------------------------------------- PAST HISTORY ---------------------------------------------  Past Medical History:  has a past medical history of CAD (coronary artery disease), CHF (congestive heart failure) ([facility] GERD (gastroesophageal reflux disease), Hx of drug abuse ([facility].), Hypertension, and NICM (nonischemic cardiomyopathy) ([facility].). Past Surgical History:  has a past surgical history that includes Skin graft (Right) and Cardiac catheterization. Social History:  reports that he quit smoking about 2 months ago. His smoking use included cigarettes. He started smoking about 30 years ago. He has a 7.50 pack-year smoking history. He has never used smokeless tobacco. He reports previous alcohol use. He reports previous drug use. Drugs: Cocaine and Marijuana (Janyth Rilbenny).     Family History: family history includes Hypertension in his maternal grandmother and mother; Stroke in his maternal grandmother; Thyroid Disease in his mother. . Unless otherwise noted, family history is non contributory    The patients home medications have been reviewed. Allergies: Patient has no known allergies. I have reviewed the past medical history, past surgical history, social history, and family history    ---------------------------------------------------PHYSICAL EXAM--------------------------------------    Constitutional: Appears in no distress  Head: Normocephalic, atraumatic  Eyes: Non-icteric slcera, no conjunctival injection  ENT: Moist mucous membranes,  Neck: Trachea midline, no JVD  Respiratory: Nonlabored respirations. Lungs clear to auscultation bilaterally, no wheezes, rales, or rhonchi. Cardiovascular: Regular rate. Regular rhythm. No murmurs, no gallops, no rubs. Gastrointestinal: Abdomen is soft, tender in the bilateral lower quadrants, the patient has a obvious inguinal hernia tracking down into his scrotum  Extremities: No lower extremity edema  Genitourinary: No CVA tenderness, no suprapubic tenderness  Musculoskeletal: Moves all extremities, no deformity  Skin: Pink, warm, dry without rash. Neurologic: Alert, symmetric facies, no aphasia    -------------------------------------------------- RESULTS -------------------------------------------------  I have personally reviewed all laboratory and imaging results for this patient. Results are listed below.      LABS: (Lab results interpreted by me)  Results for orders placed or performed during the hospital encounter of 04/30/22   COVID-19 & Influenza Combo    Specimen: Nasopharyngeal Swab   Result Value Ref Range    SARS-CoV-2 RNA, RT PCR NOT DETECTED NOT DETECTED    INFLUENZA A NOT DETECTED NOT DETECTED    INFLUENZA B NOT DETECTED NOT DETECTED   CBC with Auto Differential   Result Value Ref Range    WBC 7.3 4.5 - 11.5 E9/L    RBC 5.20 3.80 - 5.80 E12/L    Hemoglobin 15.7 12.5 - 16.5 g/dL    Hematocrit 46.6 37.0 - 54.0 %    MCV 89.6 80.0 - 99.9 fL    MCH 30.2 26.0 - 35.0 pg    MCHC 33.7 32.0 - 34.5 %    RDW 13.9 11.5 - 15.0 fL    Platelets 409 996 - 283 E9/L    MPV 11.1 7.0 - 12.0 fL    Neutrophils % 50.6 43.0 - 80.0 %    Immature Granulocytes % 0.1 0.0 - 5.0 %    Lymphocytes % 34.2 20.0 - 42.0 %    Monocytes % 11.7 2.0 - 12.0 %    Eosinophils % 2.3 0.0 - 6.0 %    Basophils % 1.1 0.0 - 2.0 %    Neutrophils Absolute 3.71 1.80 - 7.30 E9/L    Immature Granulocytes # 0.01 E9/L    Lymphocytes Absolute 2.51 1.50 - 4.00 E9/L    Monocytes Absolute 0.86 0.10 - 0.95 E9/L    Eosinophils Absolute 0.17 0.05 - 0.50 E9/L    Basophils Absolute 0.08 0.00 - 0.20 E9/L   Comprehensive Metabolic Panel   Result Value Ref Range    Sodium 142 132 - 146 mmol/L    Potassium 4.1 3.5 - 5.0 mmol/L    Chloride 104 98 - 107 mmol/L    CO2 27 22 - 29 mmol/L    Anion Gap 11 7 - 16 mmol/L    Glucose 90 74 - 99 mg/dL    BUN 19 6 - 20 mg/dL    CREATININE 2.1 (H) 0.7 - 1.2 mg/dL    GFR Non-African American 41 >=60 mL/min/1.73    GFR African American 41     Calcium 9.9 8.6 - 10.2 mg/dL    Total Protein 7.3 6.4 - 8.3 g/dL    Albumin 4.4 3.5 - 5.2 g/dL    Total Bilirubin 0.4 0.0 - 1.2 mg/dL    Alkaline Phosphatase 73 40 - 129 U/L    ALT 33 0 - 40 U/L    AST 30 0 - 39 U/L   Troponin   Result Value Ref Range    Troponin, High Sensitivity 20 (H) 0 - 11 ng/L   Brain Natriuretic Peptide   Result Value Ref Range    Pro-BNP 3,430 (H) 0 - 125 pg/mL   Lactic Acid   Result Value Ref Range    Lactic Acid 1.6 0.5 - 2.2 mmol/L   Lipase   Result Value Ref Range    Lipase 36 13 - 60 U/L   ,       RADIOLOGY:  Interpreted by Radiologist unless otherwise specified  CT ABDOMEN PELVIS WO CONTRAST Additional Contrast? None   Preliminary Result   Strangulation identified of the bowel within the right inguinal hernia with   fluid and stranding of the fat. The proximal small bowel demonstrates   dilatation with fluid filling of the proximal small bowel.   Early partial   obstruction is of concern. The left inguinal hernia contains colon and fat with no signs of   strangulation at this time. Small umbilical hernia containing fat only. XR CHEST (2 VW)   Final Result   The lungs are without acute focal process. There is no effusion or   pneumothorax. The cardiomediastinal silhouette is without acute process. The   osseous structures are without acute process. IMPRESSION:   No acute process. ------------------------- NURSING NOTES AND VITALS REVIEWED ---------------------------   The nursing notes within the ED encounter and vital signs as below have been reviewed by myself  BP (!) 175/112   Pulse 71   Temp 97.3 °F (36.3 °C)   Resp 15   Wt 260 lb (117.9 kg)   SpO2 98%   BMI 33.38 kg/m²     Oxygen Saturation Interpretation: Normal    The patients available past medical records and past encounters were reviewed. ------------------------------ ED COURSE/MEDICAL DECISION MAKING----------------------  Medications   HYDROmorphone (DILAUDID) injection 0.5 mg (has no administration in time range)   morphine sulfate (PF) injection 4 mg (4 mg IntraVENous Given 4/30/22 0805)   0.9 % sodium chloride bolus (0 mLs IntraVENous Stopped 4/30/22 1034)   famotidine (PEPCID) 20 mg in sodium chloride (PF) 10 mL injection (20 mg IntraVENous Given 4/30/22 1128)        Re-Evaluations: This patient's ED course included:a personal history and physicial examination and re-evaluation prior to disposition    This patient has remained hemodynamically stable during their ED course. Consultations:  None    Medical Decision Making:   Patient presents emergency department with the aforementioned complaint. His vital signs are remarkable for elevated blood pressure otherwise normal.    Labs and imaging are reviewed. Patient is a mild lactic acidosis of 2.1. CT abdomen pelvis is remarkable for strangulation in the right inguinal hernia region.     Patient's pain is increasing now on reevaluation so I did order him a dose of pain medication. He will be evaluated and admitted by the general surgery service    Counseling: The emergency provider has spoken with the patient and discussed todays results, in addition to providing specific details for the plan of care and counseling regarding the diagnosis and prognosis. Questions are answered at this time and they are agreeable with the plan.       --------------------------------- IMPRESSION AND DISPOSITION ---------------------------------    IMPRESSION  1. Strangulated inguinal hernia    2. Acute renal insufficiency    3. Hypervolemia, unspecified hypervolemia type        DISPOSITION  Disposition: Admit to medicine  Patient condition is stable    IDr. Susanna, am the primary provider of record    Susanna Willingham DO  Emergency Medicine    NOTE: This report was transcribed using voice recognition software.  Every effort was made to ensure accuracy; however, inadvertent computerized transcription errors may be present         Shadia Munguia DO  04/30/22 0521

## 2022-04-30 NOTE — ANESTHESIA PROCEDURE NOTES
Arterial Line:    An arterial line was placed using surface landmarks, in the OR for the following indication(s): continuous blood pressure monitoring and blood sampling needed. A 20 gauge (size), 1 and 3/4 inch (length), Arrow (type) catheter was placed, Seldinger technique used, into the left radial artery, secured by Tegaderm. Anesthesia type: Local  Local infiltration: Injection    Events:  patient tolerated procedure well with no complications and EBL < 5mL. 4/30/2022 2:05 PM4/30/2022 2:15 PM  Anesthesiologist: Lovely Herrera MD  Resident/CRNA: Phillip Ramon APRN - ROHIT  Other anesthesia staff: Bernarda Archer RN  Performed: Resident/CRNA   Preanesthetic Checklist  Completed: patient identified, IV checked, site marked, risks and benefits discussed, surgical consent, monitors and equipment checked, pre-op evaluation, timeout performed, anesthesia consent given, oxygen available and patient being monitored

## 2022-04-30 NOTE — ED NOTES
Pt procedural and anesthetic consent forms have been printed, filled out, and placed in the pt roller chart, awaiting signature acquisition.      Claudetta Parker, RN  04/30/22 6668

## 2022-04-30 NOTE — ANESTHESIA PRE PROCEDURE
Department of Anesthesiology  Preprocedure Note       Name:  Jigar Murguia   Age:  40 y.o.  :  1975                                          MRN:  [id number]         Date:  2022      Surgeon: Yazan Burns):  Naga Hui MD    Procedure: Procedure(s):  LAPAROSCOPY DIAGNOSTIC,  EXPLORATORY  LAPAROTOMY EXPLORATORY BILATERAL INGUINAL HERNIA REPAIR, POSSIBLE OPEN, POSSIBLE BOWEL RESECTION, POSSIBLE MESH, POSSIBLE OSTOM    Medications prior to admission:   Prior to Admission medications    Medication Sig Start Date End Date Taking?  Authorizing Provider   metoprolol succinate (TOPROL XL) 25 MG extended release tablet Take 1 tablet by mouth 2 times daily 22   Lily Lorenzana APRN - CNP   acetaminophen (TYLENOL) 500 MG tablet Take 2 tablets by mouth 3 times daily as needed for Pain 3/29/22   Layman Line, DO   diclofenac sodium (VOLTAREN) 1 % GEL Apply 4 g topically 4 times daily 3/29/22   Layman Line, DO   atorvastatin (LIPITOR) 80 MG tablet Take 1 tablet by mouth daily 3/25/22   [place] Parcel, DO   torsemide 40 MG TABS Take 40 mg by mouth daily 3/25/22   [place] Parcel, DO   valsartan (DIOVAN) 160 MG tablet Take 1 tablet by mouth 2 times daily 3/24/22   [facility], DO   aspirin 81 MG chewable tablet Take 81 mg by mouth daily    Historical Provider, MD       Current medications:    Current Facility-Administered Medications   Medication Dose Route Frequency Provider Last Rate Last Admin    ceFAZolin (ANCEF) 2000 mg in sterile water 20 mL IV syringe  2,000 mg IntraVENous On Call to [facility]         Current Outpatient Medications   Medication Sig Dispense Refill    metoprolol succinate (TOPROL XL) 25 MG extended release tablet Take 1 tablet by mouth 2 times daily 90 tablet 1    acetaminophen (TYLENOL) 500 MG tablet Take 2 tablets by mouth 3 times daily as needed for Pain 540 tablet 1    diclofenac sodium (VOLTAREN) 1 % GEL Apply 4 g topically 4 times daily 350 g 1    atorvastatin (LIPITOR) 80 MG tablet Take 1 tablet by mouth daily 30 tablet 3    torsemide 40 MG TABS Take 40 mg by mouth daily 30 tablet 3    valsartan (DIOVAN) 160 MG tablet Take 1 tablet by mouth 2 times daily 60 tablet 3    aspirin 81 MG chewable tablet Take 81 mg by mouth daily         Allergies:  No Known Allergies    Problem List:    Patient Active Problem List   Diagnosis Code    Acute coronary syndrome ([facility]) I24.9    Acute on chronic systolic congestive heart failure ([facility]) I50.23    Chronic bilateral low back pain with bilateral sciatica M54.42, M54.41, G89.29    Elevated serum creatinine R79.89    Sleep disorder breathing G47.30    Bilateral recurrent inguinal hernia without obstruction or gangrene K40.21    HFrEF (heart failure with reduced ejection fraction) ([facility]) I50.20    Nonischemic cardiomyopathy ([facility]) I42.8    Strangulated inguinal hernia K40.30       Past Medical History:        Diagnosis Date    CAD (coronary artery disease)     CHF (congestive heart failure) ([facility]) 2022    GERD (gastroesophageal reflux disease)     Hx of drug abuse ([facility])     cocaine    Hypertension     NICM (nonischemic cardiomyopathy) ([facility] [address]        Past Surgical History:        Procedure Laterality Date    CARDIAC CATHETERIZATION      SKIN GRAFT Right     R thigh       Social History:    Social History     Tobacco Use    Smoking status: Former Smoker     Packs/day: 0.25     Years: 30.00     Pack years: 7.50     Types: Cigarettes     Start date:      Quit date: 2022     Years since quittin.2    Smokeless tobacco: Never Used   Substance Use Topics    Alcohol use: Not Currently                                Counseling given: Not Answered      Vital Signs (Current):   Vitals:    22 1019 22 1101 22 1121 22 1217   BP: (!) 174/116 (!) 193/131 (!) 175/112 (!) 163/110   Pulse: 76 84 71 75   Resp: 18 16 15 18   Temp:  97.3 °F (36.3 °C)     TempSrc:       SpO2: 100% 97% 98% 98%   Weight:                                                  BP Readings from Last 3 Encounters:   04/30/22 (!) 163/110   04/14/22 (!) 90/48   03/29/22 100/62       NPO Status:                                                                                 BMI:   Wt Readings from Last 3 Encounters:   04/30/22 260 lb (117.9 kg)   04/14/22 265 lb (120.2 kg)   03/29/22 263 lb (119.3 kg)     Body mass index is 33.38 kg/m². CBC:   Lab Results   Component Value Date    WBC 7.3 04/30/2022    RBC 5.20 04/30/2022    HGB 15.7 04/30/2022    HCT 46.6 04/30/2022    MCV 89.6 04/30/2022    RDW 13.9 04/30/2022     04/30/2022       CMP:   Lab Results   Component Value Date     04/30/2022    K 4.1 04/30/2022    K 3.7 03/22/2022     04/30/2022    CO2 27 04/30/2022    BUN 19 04/30/2022    CREATININE 2.1 04/30/2022    GFRAA 41 04/30/2022    LABGLOM 41 04/30/2022    GLUCOSE 90 04/30/2022    PROT 7.3 04/30/2022    CALCIUM 9.9 04/30/2022    BILITOT 0.4 04/30/2022    ALKPHOS 73 04/30/2022    AST 30 04/30/2022    ALT 33 04/30/2022       POC Tests: No results for input(s): POCGLU, POCNA, POCK, POCCL, POCBUN, POCHEMO, POCHCT in the last 72 hours.     Coags:   Lab Results   Component Value Date    APTT 34.1 03/21/2022       HCG (If Applicable): No results found for: PREGTESTUR, PREGSERUM, HCG, HCGQUANT     ABGs: No results found for: PHART, PO2ART, TUF2ZZO, ZRJ0LTN, BEART, N7SJKJCA     Type & Screen (If Applicable):  No results found for: LABABO, LABRH    Drug/Infectious Status (If Applicable):  No results found for: HIV, HEPCAB    COVID-19 Screening (If Applicable):   Lab Results   Component Value Date    COVID19 NOT DETECTED 04/30/2022           Anesthesia Evaluation  Patient summary reviewed no history of anesthetic complications:   Airway: Mallampati: II  TM distance: >3 FB   Neck ROM: full  Mouth opening: > = 3 FB Dental: normal exam         Pulmonary:Negative Pulmonary ROS breath sounds clear to auscultation  (+) current smoker (Smokes Marijuana)          Patient did not smoke on day of surgery. Cardiovascular:    (+) hypertension:, CAD:, CHF (Nonischemic cardiomyopathy ):,       ECG reviewed  Rhythm: regular  Rate: normal  Echocardiogram reviewed               ROS comment: ECHO 3/21/22  Summary   Moderately dilated left ventricle (LADY 6.8, ESD 6.3)   Estimated left ventricle ejection fraction 25+/-5%. Normal right ventricular size and function. Moderate mitral regurgitation is present. The jet is very eccentric   related to posterior leaflet tethering and thus may be underestimated. Mild aortic regurgitation is noted. Neuro/Psych:   (+) neuromuscular disease:,              ROS comment: Chronic bilateral low back pain with bilateral sciatica GI/Hepatic/Renal:   (+) GERD:,          ROS comment: POSSIBLE STRANGULATED HERNIA. Endo/Other:                      ROS comment: Cocaine use 4 Days Ago Abdominal:   (+) obese,           Vascular: negative vascular ROS. Other Findings:           Anesthesia Plan      general     ASA 4 - emergent       Induction: intravenous. arterial line  MIPS: Postoperative opioids intended and Prophylactic antiemetics administered. Anesthetic plan and risks discussed with patient. Use of blood products discussed with patient whom consented to blood products. Plan discussed with CRNA.             Last ate 6:30 AM    Mk Gonzalez MD   4/30/2022

## 2022-04-30 NOTE — CONSULTS
GENERAL SURGERY  CONSULT NOTE  4/30/2022    Physician Consulted: Dr. Rissa Flowers  Reason for Consult: bilateral inguinal hernias  Referring Physician: Dr. Ada Cabello    CONCEPCION Walton is a 40 y.o. male who presents to the ER for evaluation of bilateral inguinal hernias. The patient has known about these hernias for at least 3 years. He feels daily pain from these. This morning, around 5am, he began to experience severe pain at his right groin. The pain has persisted and nothing seems to make the pain better. He last ate breakfast this morning. He has had some nausea, but no vomiting. Last BM was yesterday evening and was hard stool, which is common for him. He continues to pass flatus. Former smoker. Used cocaine last 4 days ago. No other substances. He has never had abdominal surgery. Medical history is significant for HF w/ reduced EF - 20% about 1 month ago when he was admitted for CHF exacerbation. He also has HTN and says he had an MI in 2016 w/o stent placement. He does not take blood thinners at home. Past Medical History:   Diagnosis Date    CAD (coronary artery disease)     CHF (congestive heart failure) ([facility] [address] 02/08/2022    GERD (gastroesophageal reflux disease)     Hx of drug abuse ([facility] [address]     cocaine    Hypertension     NICM (nonischemic cardiomyopathy) ([facility] [address]        Past Surgical History:   Procedure Laterality Date    CARDIAC CATHETERIZATION      SKIN GRAFT Right     R thigh       Medications Prior to Admission:    Prior to Admission medications    Medication Sig Start Date End Date Taking?  Authorizing Provider   metoprolol succinate (TOPROL XL) 25 MG extended release tablet Take 1 tablet by mouth 2 times daily 4/14/22   JONATHAN Rankin CNP   acetaminophen (TYLENOL) 500 MG tablet Take 2 tablets by mouth 3 times daily as needed for Pain 3/29/22   Amos Quiros DO   diclofenac sodium (VOLTAREN) 1 % GEL Apply 4 g topically 4 times daily 3/29/22   Usman Alarcon DO atorvastatin (LIPITOR) 80 MG tablet Take 1 tablet by mouth daily 3/25/22   Jones Ayala, DO   torsemide 40 MG TABS Take 40 mg by mouth daily 3/25/22   Jones Ayala, DO   valsartan (DIOVAN) 160 MG tablet Take 1 tablet by mouth 2 times daily 3/24/22   Jones Escobaring, DO   aspirin 81 MG chewable tablet Take 81 mg by mouth daily    Historical Provider, MD       No Known Allergies    Family History   Problem Relation Age of Onset    Thyroid Disease Mother     Hypertension Mother     Stroke Maternal Grandmother     Hypertension Maternal Grandmother        Social History     Tobacco Use    Smoking status: Former Smoker     Packs/day: 0.25     Years: 30.00     Pack years: 7.50     Types: Cigarettes     Start date:      Quit date: 2022     Years since quittin.2    Smokeless tobacco: Never Used   Vaping Use    Vaping Use: Never used   Substance Use Topics    Alcohol use: Not Currently    Drug use: Not Currently     Types: Cocaine, Marijuana (Lyndal Locus)     Comment: past use; none since 2022         Review of Systems   Constitutional: Negative. HENT: Negative. Eyes: Negative. Respiratory: Positive for shortness of breath. With activity, after 100 yards he says     Cardiovascular: Negative for chest pain and leg swelling. Gastrointestinal: Positive for abdominal distention, constipation and nausea. Negative for abdominal pain, blood in stool, diarrhea and vomiting. Endocrine: Negative. Genitourinary: Negative for difficulty urinating. Bulge at bilateral groins     Musculoskeletal: Negative. Skin: Negative. Neurological: Negative. PHYSICAL EXAM:    Vitals:    22 1121   BP: (!) 175/112   Pulse: 71   Resp: 15   Temp:    SpO2: 98%       General Appearance:  awake, alert, oriented, lying in bed, responds to questions appropriately  Skin:  Skin color, texture, turgor normal. No rashes or lesions.  There is no erythema at the bilateral groins or scrotum  Head:  NCAT. No scleral icterus or conjunctival pallor  Lungs/Chest:  Normal expansion. No chest wall tenderness  Cardiovascular:  Warm throughout. Normal S1 and s2 WITHOUT S3 or murmurs  Abdomen:  Soft, non tender , minimally distended. No guarding. Right groin with TTP within the inguinal canal, +impulse with cough, reducible, no erythema. Left groin with TTP within the inguinal canal, mass appreciated that is non reducible. No erythema. Extremities: Extremities warm to touch with no edema. LABS:  CBC  Recent Labs     04/30/22  0807   WBC 7.3   HGB 15.7   HCT 46.6        BMP  Recent Labs     04/30/22  0807      K 4.1      CO2 27   BUN 19   CREATININE 2.1*   CALCIUM 9.9     Liver Function  Recent Labs     04/30/22  0807   LIPASE 36   BILITOT 0.4   AST 30   ALT 33   ALKPHOS 73   PROT 7.3   LABALBU 4.4     No results for input(s): LACTATE in the last 72 hours. No results for input(s): INR, PTT in the last 72 hours. Invalid input(s): PT    RADIOLOGY  CT scan reviewed   Pertinent findings bilateral inguinal hernias with reactive change worse on the right; no signs of perforation but non con scan limited for evaluation for bowel viability      I have personally reviewed all relevant labs and imaging. ASSESSMENT:  40 y.o. male with bilateral inguinal hernias with incarceration and possibly strangulation. Discussed with the patient need for operative intervention for evaluation of bowel viability, possible resection, possible ostomy and repair of bilateral inguinal hernias. Risks and benefits discussed with the patient at length including bleeding, infection, damage to surrounding structures and nearby tissues, and need for additional operations or procedures in the future. I also discussed with him the higher risk that he has with anesthetic complications given his CHF. He voiced understanding and is willing to proceed.     PLAN  Npo    OR - treatment consent for laparoscopic bilateral inguinal hernia repair, possible open, possible mesh, possible bowel resection, possible ostomy    Judicious IVF use    Pre-op antibiotics          Electronically signed by Sergey Castellanos DO on 4/30/22 at 12:14 PM EDT

## 2022-04-30 NOTE — H&P
Hospitalist History & Physical      PCP: Saskia Garcia DO    Date of Service: Pt seen/examined on 4/30/2022 and is admitted to Inpatient with expected LOS greater than two midnights due to medical therapy. Chief Complaint:  had concerns including Hernia (FEELS LIKE HERNIA BROKE INSIDE THEM) and Cough (CAUGHT A COLD, AND COUGHING ALLOT HURTS CHEST). History of Present Illness:    Mr. Evelina Crawford, a 40y.o. year old male  who  has a past medical history of CAD (coronary artery disease), CHF (congestive heart failure) ([address] GERD (gastroesophageal reflux disease), Hx of drug abuse ([facility] [address] Hypertension, and NICM (nonischemic cardiomyopathy) ([facility] [address] Patient originally presented to [facility] ED with sudden worsening of pain and bulging of right groin. He has chronic bilateral inguinal hernias that are mildly bothersome at baseline. He also endorses a cough productive of clear sputum x 2 weeks. He denies any chest pain or SOB. Of note, he had a follow up appointment with cardiology on 4/14 at which time he was told to start taking 20 mg of torsemide daily as opposed to 40 mg d/t hypotension. ED workup revealed a strangulated right inguinal hernia and stable left inguinal hernia. He was transferred [facility] to [facility] ED per the request of the surgical team, however, we are asked to admit the patient given decompensated HFrEF and JOSE.     Last admission: 3/21/22 - 3/24/2022   Decompensated HFrEF (EF 20-30%)  Nonischemic cardiomyopathy  HTN  Chronic back pain  Suspected STI - treated empirically with IM ceftri + doxy     Past Medical History:        Diagnosis Date    CAD (coronary artery disease)     CHF (congestive heart failure) ([facility] [address] 02/08/2022    GERD (gastroesophageal reflux disease)     Hx of drug abuse ([facility] [address]     cocaine    Hypertension     NICM (nonischemic cardiomyopathy) ([facility] [address]        Past Surgical History:        Procedure Laterality Date    CARDIAC CATHETERIZATION      SKIN GRAFT Right     R thigh     Medications Prior to Admission:      Prior to Admission medications    Medication Sig Start Date End Date Taking? Authorizing Provider   metoprolol succinate (TOPROL XL) 25 MG extended release tablet Take 1 tablet by mouth 2 times daily 4/14/22   JONATHAN Rivas CNP   acetaminophen (TYLENOL) 500 MG tablet Take 2 tablets by mouth 3 times daily as needed for Pain 3/29/22   Andrew Shirts, DO   diclofenac sodium (VOLTAREN) 1 % GEL Apply 4 g topically 4 times daily 3/29/22   Andrew Shirts, DO   atorvastatin (LIPITOR) 80 MG tablet Take 1 tablet by mouth daily 3/25/22   [facility], DO   torsemide 40 MG TABS Take 40 mg by mouth daily 3/25/22   [facility]ianaCare Drones, DO   valsartan (DIOVAN) 160 MG tablet Take 1 tablet by mouth 2 times daily 3/24/22   [facility] Drones, DO   aspirin 81 MG chewable tablet Take 81 mg by mouth daily    Historical Provider, MD       Allergies:  Patient has no known allergies. Social History:    RESIDENCE: Private  TOBACCO:   reports that he quit smoking about 2 months ago. His smoking use included cigarettes. He started smoking about 30 years ago. He has a 7.50 pack-year smoking history. He has never used smokeless tobacco.  ETOH:   reports previous alcohol use. Family History:          Problem Relation Age of Onset    Thyroid Disease Mother     Hypertension Mother     Stroke Maternal Grandmother     Hypertension Maternal Grandmother        Review of Systems: All bolded are positive; please see HPI  General:  Fever, chills, diaphoresis, fatigue, malaise, night sweats, weight loss  Psychological:  Anxiety, disorientation, hallucinations. ENT:  Epistaxis, headaches, vertigo, visual changes. Cardiovascular:  Chest pain, irregular heartbeats, palpitations, paroxysmal nocturnal dyspnea. Respiratory:  Shortness of breath, coughing, sputum production, hemoptysis, wheezing, orthopnea.   Gastrointestinal:  Nausea, vomiting, diarrhea, heartburn, constipation, abdominal pain, hematemesis, hematochezia, melena, acholic stools  Genito-Urinary:  Dysuria, urgency, frequency, hematuria  Musculoskeletal:  Joint pain, joint stiffness, joint swelling, muscle pain, right groin pain  Neurology:  Headache, focal neurological deficits, weakness, numbness, paresthesia  Derm:  Rashes, ulcers, excoriations, bruising  Extremities:  Decreased ROM, peripheral edema, mottling    Physical Exam:  BP (!) 163/110   Pulse 75   Temp 97.3 °F (36.3 °C)   Resp 18   Wt 260 lb (117.9 kg)   SpO2 98%   BMI 33.38 kg/m²   General appearance: Obese middle aged male in no apparent distress, appears stated age and cooperative. HEENT: Conjunctivae/corneas clear. Mucous membranes moist.  Neck: Supple. No JVD. Respiratory:  Clear to auscultation bilaterally. Normal respiratory effort. Cardiovascular:  IRRR. S1, S2 without MRG. SR with PVCs on bedside monitor. PV: Pulses palpable. No edema. Abdomen: Soft, non-tender, non-distended. +BS  Musculoskeletal: No obvious deformities. Skin: Normal skin color. No rashes or lesions. Good turgor. Neurologic:  Grossly non-focal. Awake, alert, following commands. Psychiatric: Alert and oriented, thought content appropriate, normal insight and judgement    Reviewed EKG and CXR personally      CBC:   Recent Labs     04/30/22  0807   WBC 7.3   RBC 5.20   HGB 15.7   HCT 46.6   MCV 89.6   RDW 13.9        BMP:   Recent Labs     04/30/22  0807      K 4.1      CO2 27   BUN 19   CREATININE 2.1*     LFT:  Recent Labs     04/30/22  0807   PROT 7.3   ALKPHOS 73   ALT 33   AST 30   BILITOT 0.4   LIPASE 36     CE:  No results for input(s): Bobby Favre in the last 72 hours. PT/INR: No results for input(s): INR, APTT in the last 72 hours. BNP: No results for input(s): BNP in the last 72 hours.   ESR: No results found for: SEDRATE  CRP: No results found for: CRP  D Dimer: No results found for: DDIMER   Folate and B12: No results found for: [id number], No results found for: FOLATE  Lactic Acid:   Lab Results   Component Value Date    LACTA 1.6 04/30/2022     Thyroid Studies: No results found for: TSH, [facility] Heart    Oupatient labs:  Lab Results   Component Value Date    CHOL 167 03/22/2022    TRIG 117 03/22/2022    HDL 47 03/22/2022    LDLCALC 97 03/22/2022       Urinalysis:  No results found for: NITRU, WBCUA, BACTERIA, RBCUA, BLOODU, SPECGRAV, GLUCOSEU    Imaging:  CT ABDOMEN PELVIS WO CONTRAST Additional Contrast? None    Result Date: 4/30/2022  Strangulation identified of the bowel within the right inguinal hernia with fluid and stranding of the fat. The proximal small bowel demonstrates dilatation with fluid filling of the proximal small bowel. Early partial obstruction is of concern. The left inguinal hernia contains colon and fat with no signs of strangulation at this time. Small umbilical hernia containing fat only. XR CHEST (2 VW)    Result Date: 4/30/2022  The lungs are without acute focal process. There is no effusion or pneumothorax. The cardiomediastinal silhouette is without acute process. The osseous structures are without acute process. IMPRESSION: No acute process.          Assessment:  Strangulated right inguinal hernia  Decompensated HFrEF (EF 20-30% per echo 3/2022)  JOSE on suspected CKD 2/2 to above  Elevated troponin  Nonischemic cardiomyopathy  Suspected DERRICK  HTN  Chronic back pain  Hx of cocaine abuse    Plan:  General surgery following  Start ASA and Heparin for DVT prophy pending surgical plans  IV lasix BID  Monitor renal function, I&O  Check urine studies   Repeat troponin to assess delta  Trend BNP  Holding valsartan for now given JOSE  PRN hydralazine   Recommend OP sleep study    Diet: Diet NPO  Code Status: Prior  Surrogate decision maker confirmed with patient:   Extended Emergency Contact Information  Primary Emergency Contact: SalvadorJazmyn Gaby [place] Phone: [phone]  Mobile Phone: [phone]  Relation: Domestic Partner   needed? No  Secondary Emergency Contact: [facility] Phone: [phone]  Mobile Phone: [phone]  Relation: Brother/Sister   needed? No    DVT Prophylaxis: []Lovenox []Heparin [x]PCD [] [address] []Encouraged ambulation  Disposition: []Med/Surg [x] Intermediate [] ICU/CCU  Admit status: [] Observation [x] Inpatient     +++++++++++++++++++++++++++++++++++++++++++++++++  JOAN Adams/ [address]  +++++++++++++++++++++++++++++++++++++++++++++++++  NOTE: This report was transcribed using voice recognition software. Every effort was made to ensure accuracy; however, inadvertent computerized transcription errors may be present.

## 2022-05-01 LAB
AMPHETAMINE SCREEN, URINE: NOT DETECTED
ANION GAP SERPL CALCULATED.3IONS-SCNC: 14 MMOL/L (ref 7–16)
BARBITURATE SCREEN URINE: NOT DETECTED
BENZODIAZEPINE SCREEN, URINE: POSITIVE
BUN BLDV-MCNC: 18 MG/DL (ref 6–20)
CALCIUM SERPL-MCNC: 8.8 MG/DL (ref 8.6–10.2)
CANNABINOID SCREEN URINE: NOT DETECTED
CHLORIDE BLD-SCNC: 105 MMOL/L (ref 98–107)
CO2: 21 MMOL/L (ref 22–29)
COCAINE METABOLITE SCREEN URINE: POSITIVE
CREAT SERPL-MCNC: 1.6 MG/DL (ref 0.7–1.2)
FENTANYL SCREEN, URINE: POSITIVE
GFR AFRICAN AMERICAN: 57
GFR NON-AFRICAN AMERICAN: 57 ML/MIN/1.73
GLUCOSE BLD-MCNC: 119 MG/DL (ref 74–99)
HCT VFR BLD CALC: 49.8 % (ref 37–54)
HEMOGLOBIN: 16 G/DL (ref 12.5–16.5)
Lab: ABNORMAL
MAGNESIUM: 2.1 MG/DL (ref 1.6–2.6)
MCH RBC QN AUTO: 29.5 PG (ref 26–35)
MCHC RBC AUTO-ENTMCNC: 32.1 % (ref 32–34.5)
MCV RBC AUTO: 91.9 FL (ref 80–99.9)
METHADONE SCREEN, URINE: NOT DETECTED
OPIATE SCREEN URINE: POSITIVE
OXYCODONE URINE: NOT DETECTED
PDW BLD-RTO: 14.4 FL (ref 11.5–15)
PHENCYCLIDINE SCREEN URINE: NOT DETECTED
PHOSPHORUS: 3.7 MG/DL (ref 2.5–4.5)
PLATELET # BLD: 284 E9/L (ref 130–450)
PMV BLD AUTO: 11.8 FL (ref 7–12)
POTASSIUM SERPL-SCNC: 4.7 MMOL/L (ref 3.5–5)
RBC # BLD: 5.42 E12/L (ref 3.8–5.8)
SODIUM BLD-SCNC: 140 MMOL/L (ref 132–146)
WBC # BLD: 14.7 E9/L (ref 4.5–11.5)

## 2022-05-01 PROCEDURE — 2580000003 HC RX 258: Performed by: STUDENT IN AN ORGANIZED HEALTH CARE EDUCATION/TRAINING PROGRAM

## 2022-05-01 PROCEDURE — 87491 CHLMYD TRACH DNA AMP PROBE: CPT

## 2022-05-01 PROCEDURE — 99255 IP/OBS CONSLTJ NEW/EST HI 80: CPT | Performed by: INTERNAL MEDICINE

## 2022-05-01 PROCEDURE — 6360000002 HC RX W HCPCS: Performed by: STUDENT IN AN ORGANIZED HEALTH CARE EDUCATION/TRAINING PROGRAM

## 2022-05-01 PROCEDURE — 6360000002 HC RX W HCPCS: Performed by: NURSE PRACTITIONER

## 2022-05-01 PROCEDURE — 36415 COLL VENOUS BLD VENIPUNCTURE: CPT

## 2022-05-01 PROCEDURE — 2500000003 HC RX 250 WO HCPCS: Performed by: STUDENT IN AN ORGANIZED HEALTH CARE EDUCATION/TRAINING PROGRAM

## 2022-05-01 PROCEDURE — 2580000003 HC RX 258: Performed by: NURSE PRACTITIONER

## 2022-05-01 PROCEDURE — 2000000000 HC ICU R&B

## 2022-05-01 PROCEDURE — 2700000000 HC OXYGEN THERAPY PER DAY

## 2022-05-01 PROCEDURE — 84100 ASSAY OF PHOSPHORUS: CPT

## 2022-05-01 PROCEDURE — 6370000000 HC RX 637 (ALT 250 FOR IP): Performed by: NURSE PRACTITIONER

## 2022-05-01 PROCEDURE — 6370000000 HC RX 637 (ALT 250 FOR IP): Performed by: INTERNAL MEDICINE

## 2022-05-01 PROCEDURE — 80048 BASIC METABOLIC PNL TOTAL CA: CPT

## 2022-05-01 PROCEDURE — 6370000000 HC RX 637 (ALT 250 FOR IP): Performed by: STUDENT IN AN ORGANIZED HEALTH CARE EDUCATION/TRAINING PROGRAM

## 2022-05-01 PROCEDURE — 99291 CRITICAL CARE FIRST HOUR: CPT | Performed by: SURGERY

## 2022-05-01 PROCEDURE — 85027 COMPLETE CBC AUTOMATED: CPT

## 2022-05-01 PROCEDURE — 83735 ASSAY OF MAGNESIUM: CPT

## 2022-05-01 PROCEDURE — 37799 UNLISTED PX VASCULAR SURGERY: CPT

## 2022-05-01 PROCEDURE — 80307 DRUG TEST PRSMV CHEM ANLYZR: CPT

## 2022-05-01 PROCEDURE — 87591 N.GONORRHOEAE DNA AMP PROB: CPT

## 2022-05-01 RX ORDER — ASPIRIN 81 MG/1
81 TABLET ORAL DAILY
Status: DISCONTINUED | OUTPATIENT
Start: 2022-05-01 | End: 2022-05-05 | Stop reason: HOSPADM

## 2022-05-01 RX ORDER — CARVEDILOL 25 MG/1
25 TABLET ORAL 2 TIMES DAILY WITH MEALS
Status: DISCONTINUED | OUTPATIENT
Start: 2022-05-01 | End: 2022-05-05 | Stop reason: HOSPADM

## 2022-05-01 RX ADMIN — LABETALOL HYDROCHLORIDE 20 MG: 5 INJECTION INTRAVENOUS at 10:42

## 2022-05-01 RX ADMIN — CARVEDILOL 25 MG: 25 TABLET, FILM COATED ORAL at 08:55

## 2022-05-01 RX ADMIN — METHOCARBAMOL 500 MG: 100 INJECTION INTRAMUSCULAR; INTRAVENOUS at 08:46

## 2022-05-01 RX ADMIN — HYDROMORPHONE HYDROCHLORIDE 1 MG: 1 INJECTION, SOLUTION INTRAMUSCULAR; INTRAVENOUS; SUBCUTANEOUS at 08:43

## 2022-05-01 RX ADMIN — HYDROMORPHONE HYDROCHLORIDE 1 MG: 1 INJECTION, SOLUTION INTRAMUSCULAR; INTRAVENOUS; SUBCUTANEOUS at 18:14

## 2022-05-01 RX ADMIN — CARVEDILOL 25 MG: 25 TABLET, FILM COATED ORAL at 16:23

## 2022-05-01 RX ADMIN — SODIUM CHLORIDE, PRESERVATIVE FREE 10 ML: 5 INJECTION INTRAVENOUS at 08:46

## 2022-05-01 RX ADMIN — HYDRALAZINE HYDROCHLORIDE 10 MG: 20 INJECTION INTRAMUSCULAR; INTRAVENOUS at 16:11

## 2022-05-01 RX ADMIN — SACUBITRIL AND VALSARTAN 1 TABLET: 24; 26 TABLET, FILM COATED ORAL at 20:36

## 2022-05-01 RX ADMIN — METHOCARBAMOL 500 MG: 100 INJECTION INTRAMUSCULAR; INTRAVENOUS at 20:35

## 2022-05-01 RX ADMIN — ATORVASTATIN CALCIUM 80 MG: 80 TABLET, FILM COATED ORAL at 08:43

## 2022-05-01 RX ADMIN — HEPARIN SODIUM 5000 UNITS: 10000 INJECTION, SOLUTION INTRAVENOUS; SUBCUTANEOUS at 07:39

## 2022-05-01 RX ADMIN — HEPARIN SODIUM 5000 UNITS: 10000 INJECTION, SOLUTION INTRAVENOUS; SUBCUTANEOUS at 13:47

## 2022-05-01 RX ADMIN — SODIUM CHLORIDE, POTASSIUM CHLORIDE, SODIUM LACTATE AND CALCIUM CHLORIDE: 600; 310; 30; 20 INJECTION, SOLUTION INTRAVENOUS at 05:20

## 2022-05-01 RX ADMIN — SACUBITRIL AND VALSARTAN 1 TABLET: 24; 26 TABLET, FILM COATED ORAL at 12:21

## 2022-05-01 RX ADMIN — LABETALOL HYDROCHLORIDE 20 MG: 5 INJECTION INTRAVENOUS at 04:34

## 2022-05-01 RX ADMIN — ASPIRIN 81 MG: 81 TABLET, COATED ORAL at 08:55

## 2022-05-01 RX ADMIN — HYDROMORPHONE HYDROCHLORIDE 1 MG: 1 INJECTION, SOLUTION INTRAMUSCULAR; INTRAVENOUS; SUBCUTANEOUS at 04:39

## 2022-05-01 RX ADMIN — HYDROMORPHONE HYDROCHLORIDE 1 MG: 1 INJECTION, SOLUTION INTRAMUSCULAR; INTRAVENOUS; SUBCUTANEOUS at 22:23

## 2022-05-01 RX ADMIN — HYDROMORPHONE HYDROCHLORIDE 1 MG: 1 INJECTION, SOLUTION INTRAMUSCULAR; INTRAVENOUS; SUBCUTANEOUS at 15:13

## 2022-05-01 RX ADMIN — METHOCARBAMOL 500 MG: 100 INJECTION INTRAMUSCULAR; INTRAVENOUS at 03:01

## 2022-05-01 RX ADMIN — HEPARIN SODIUM 5000 UNITS: 10000 INJECTION, SOLUTION INTRAVENOUS; SUBCUTANEOUS at 22:24

## 2022-05-01 RX ADMIN — HYDROMORPHONE HYDROCHLORIDE 1 MG: 1 INJECTION, SOLUTION INTRAMUSCULAR; INTRAVENOUS; SUBCUTANEOUS at 12:18

## 2022-05-01 RX ADMIN — SODIUM CHLORIDE, POTASSIUM CHLORIDE, SODIUM LACTATE AND CALCIUM CHLORIDE: 600; 310; 30; 20 INJECTION, SOLUTION INTRAVENOUS at 17:47

## 2022-05-01 RX ADMIN — LABETALOL HYDROCHLORIDE 20 MG: 5 INJECTION INTRAVENOUS at 00:53

## 2022-05-01 RX ADMIN — Medication 10 ML: at 08:46

## 2022-05-01 RX ADMIN — LABETALOL HYDROCHLORIDE 20 MG: 5 INJECTION INTRAVENOUS at 13:44

## 2022-05-01 RX ADMIN — METHOCARBAMOL 500 MG: 100 INJECTION INTRAMUSCULAR; INTRAVENOUS at 14:40

## 2022-05-01 RX ADMIN — Medication 10 ML: at 20:36

## 2022-05-01 RX ADMIN — EMPAGLIFLOZIN 10 MG: 10 TABLET, FILM COATED ORAL at 12:21

## 2022-05-01 ASSESSMENT — PAIN DESCRIPTION - LOCATION
LOCATION: ABDOMEN
LOCATION: ABDOMEN

## 2022-05-01 ASSESSMENT — PAIN DESCRIPTION - PAIN TYPE
TYPE: SURGICAL PAIN
TYPE: SURGICAL PAIN

## 2022-05-01 ASSESSMENT — PAIN SCALES - GENERAL
PAINLEVEL_OUTOF10: 10
PAINLEVEL_OUTOF10: 4
PAINLEVEL_OUTOF10: 10
PAINLEVEL_OUTOF10: 2
PAINLEVEL_OUTOF10: 8
PAINLEVEL_OUTOF10: 8

## 2022-05-01 ASSESSMENT — PAIN DESCRIPTION - ONSET: ONSET: ON-GOING

## 2022-05-01 ASSESSMENT — PAIN DESCRIPTION - FREQUENCY: FREQUENCY: INTERMITTENT

## 2022-05-01 ASSESSMENT — PAIN - FUNCTIONAL ASSESSMENT
PAIN_FUNCTIONAL_ASSESSMENT: PREVENTS OR INTERFERES WITH MANY ACTIVE NOT PASSIVE ACTIVITIES
PAIN_FUNCTIONAL_ASSESSMENT: PREVENTS OR INTERFERES WITH MANY ACTIVE NOT PASSIVE ACTIVITIES

## 2022-05-01 ASSESSMENT — PAIN DESCRIPTION - DESCRIPTORS
DESCRIPTORS: ACHING;DULL
DESCRIPTORS: ACHING;DISCOMFORT

## 2022-05-01 ASSESSMENT — PAIN DESCRIPTION - ORIENTATION: ORIENTATION: LOWER

## 2022-05-01 NOTE — PLAN OF CARE
Problem: Pain  Goal: Verbalizes/displays adequate comfort level or baseline comfort level  5/1/2022 1008 by Pavan Díaz RN  Outcome: Progressing  5/1/2022 0009 by Veronica Gold RN  Outcome: Progressing  Flowsheets (Taken 5/1/2022 0009)  Verbalizes/displays adequate comfort level or baseline comfort level:   Encourage patient to monitor pain and request assistance   Assess pain using appropriate pain scale   Administer analgesics based on type and severity of pain and evaluate response   Implement non-pharmacological measures as appropriate and evaluate response   Consider cultural and social influences on pain and pain management   Notify Licensed Independent Practitioner if interventions unsuccessful or patient reports new pain     Problem: Safety - Adult  Goal: Free from fall injury  5/1/2022 1008 by Pavan Díaz RN  Outcome: Progressing  5/1/2022 0009 by Veronica Gold RN  Outcome: Progressing     Problem: ABCDS Injury Assessment  Goal: Absence of physical injury  5/1/2022 1008 by Pavan Díaz RN  Outcome: Progressing  5/1/2022 0009 by Veronica Gold RN  Outcome: Progressing

## 2022-05-01 NOTE — ANESTHESIA POSTPROCEDURE EVALUATION
Department of Anesthesiology  Postprocedure Note    Patient: Clara Marin  MRN: [id number]  YOB: 1975  Date of evaluation: 5/1/2022  Time:  6:41 AM     Procedure Summary     Date: 04/30/22 Room / Location: [facility] OR  / [facility]    Anesthesia Start: 0673 Anesthesia Stop: 4605    Procedures:       LAPAROSCOPY DIAGNOSTIC,  EXPLORATORY INVERTED TO EXPLORATORY LAPOROTOMY, DISTAL SMALL BOWEL RESECTION, PRIMARY STAPLED ANASTAMOSIS, BILATERAL INGUINAL HERNIA REPAIR WITH MESH (N/A Abdomen)      LAPAROTOMY EXPLORATORY BILATERAL INGUINAL HERNIA REPAIR OPEN,  BOWEL RESECTION,  MESH X2 (N/A Abdomen) Diagnosis: (POSSIBLE STRANGULATED HERNIA)    Surgeons: Subhash Morse MD Responsible Provider: Cortes Jama MD    Anesthesia Type: general ASA Status: 4 - Emergent          Anesthesia Type: general    Cecilia Phase I: Cecilia Score: 8    Cecilia Phase II:      Last vitals: Reviewed and per EMR flowsheets.        Anesthesia Post Evaluation    Patient location during evaluation: PACU  Patient participation: complete - patient participated  Level of consciousness: awake  Airway patency: patent  Nausea & Vomiting: no nausea and no vomiting  Complications: no  Cardiovascular status: hemodynamically stable  Respiratory status: acceptable  Hydration status: stable

## 2022-05-01 NOTE — PROGRESS NOTES
Dr. Sami Miramontes bedside assessing pt, due to pt continued hypertension and tachycardia, he felt pt needed to be monitored more closely as his was a more involved abdominal surgery. Will await orders. He is aware PCA pump unavailable at this time, call placed to nursing supervisor, will await her call back.

## 2022-05-01 NOTE — PLAN OF CARE
Problem: Pain  Goal: Verbalizes/displays adequate comfort level or baseline comfort level  5/1/2022 1736 by Topher Garcia RN  Outcome: Progressing  5/1/2022 1008 by Topher Garcia RN  Outcome: Progressing     Problem: Safety - Adult  Goal: Free from fall injury  5/1/2022 1736 by Topher Garcia RN  Outcome: Progressing  5/1/2022 1008 by Topher Garcia RN  Outcome: Progressing     Problem: ABCDS Injury Assessment  Goal: Absence of physical injury  5/1/2022 1736 by Topher Garcia RN  Outcome: Progressing  5/1/2022 1008 by Topher Garcia RN  Outcome: Progressing
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
## 2022-05-01 NOTE — PROGRESS NOTES
Hospitalist Progress Note      Synopsis: Patient admitted for a right strangulated inguinal hernia. Patient originally presented to [facility] ED with sudden worsening of pain and bulging of right groin. He has chronic bilateral inguinal hernias that are mildly bothersome at baseline. He also endorses a cough productive of clear sputum x 2 weeks. He denies any chest pain or SOB. Of note, he had a follow up appointment with cardiology on  at which time he was told to start taking 20 mg of torsemide daily as opposed to 40 mg d/t hypotension. ED workup revealed a strangulated right inguinal hernia and stable left inguinal hernia. He was transferred here to [facility] ED per the request of the surgical team, however, we are asked to admit the patient given decompensated HFrEF and JOSE. He underwent a bilateral hernia repair with a distal small bowel resection on  and was subsequently admitted to ICU. Hospital day 1     Subjective:  Stable overnight. No issues reported. Patient seen and examined. Doing well. Pain is well controlled. No SOB or cough. Records reviewed. Temp (24hrs), Av.2 °F (36.2 °C), Min:95 °F (35 °C), Max:99.7 °F (37.6 °C)    DIET: Diet NPO  CODE: Full Code    Intake/Output Summary (Last 24 hours) at 2022 0731  Last data filed at 2022 7661  Gross per 24 hour   Intake 2155.77 ml   Output 1170 ml   Net 985.77 ml       Review of Systems: All bolded are positive; please see HPI  General:  Fever, chills, diaphoresis, fatigue, malaise, night sweats, weight loss  Psychological:  Anxiety, disorientation, hallucinations. ENT:  Epistaxis, headaches, vertigo, visual changes. Cardiovascular:  Chest pain, irregular heartbeats, palpitations, paroxysmal nocturnal dyspnea. Respiratory:  Shortness of breath, coughing, sputum production, hemoptysis, wheezing, orthopnea.   Gastrointestinal:  Nausea, vomiting, diarrhea, heartburn, constipation, abdominal pain, hematemesis, hematochezia, melena, acholic stools  Genito-Urinary:  Dysuria, urgency, frequency, hematuria  Musculoskeletal:  Joint pain, joint stiffness, joint swelling, muscle pain,   Neurology:  Headache, focal neurological deficits, weakness, numbness, paresthesia  Derm:  Rashes, ulcers, excoriations, bruising  Extremities:  Decreased ROM, peripheral edema, mottling    Objective:    BP (!) 160/95   Pulse 85   Temp 99.4 °F (37.4 °C) (Oral)   Resp 14   Ht 6' 2\" (1.88 m)   Wt 254 lb 3.1 oz (115.3 kg)   SpO2 100%   BMI 32.64 kg/m²     General appearance: Obese middle aged male in no apparent distress, appears stated age and cooperative. HEENT: Conjunctivae/corneas clear. Mucous membranes moist.  Neck: Supple. No JVD. Respiratory:  Clear to auscultation bilaterally. Normal respiratory effort. Cardiovascular:  IRRR. S1, S2 without MRG. SR with PVCs on bedside monitor. PV: Pulses palpable. No edema. Abdomen: Soft, tenderness with palpation, non-distended. +BS, hypoactive  Musculoskeletal: No obvious deformities. Skin: Normal skin color. No rashes or lesions. Good turgor. Incisions with dressing that are CDI   Neurologic:  Grossly non-focal. Awake, alert, following commands.    Psychiatric: Alert and oriented, thought content appropriate, normal insight and judgement    Medications:  REVIEWED DAILY    Infusion Medications    sodium chloride      lactated ringers 100 mL/hr at 05/01/22 4600    sodium chloride       Scheduled Medications    atorvastatin  80 mg Oral Daily    metoprolol succinate  25 mg Oral BID    sodium chloride flush  5-40 mL IntraVENous 2 times per day    sodium chloride flush  5-40 mL IntraVENous 2 times per day    heparin (porcine)  5,000 Units SubCUTAneous 3 times per day    methocarbamol IVPB  500 mg IntraVENous Q6H     PRN Meds: sodium chloride flush, sodium chloride, potassium chloride **OR** potassium alternative oral replacement **OR** potassium chloride, magnesium sulfate, hydrALAZINE, sodium chloride flush, sodium chloride, ondansetron **OR** ondansetron, HYDROmorphone **OR** HYDROmorphone, labetalol, hydrALAZINE    Labs:     Recent Labs     04/30/22  0807 04/30/22 1920 05/01/22  0430   WBC 7.3 18.4* 14.7*   HGB 15.7 17.1* 16.0   HCT 46.6 51.3 49.8    322 284       Recent Labs     04/30/22  0807 04/30/22  1920 05/01/22  0430    139 140   K 4.1 4.6 4.7    105 105   CO2 27 23 21*   BUN 19 18 18   CREATININE 2.1* 1.7* 1.6*   CALCIUM 9.9 9.0 8.8   PHOS  --  2.9 3.7       Recent Labs     04/30/22  0807   PROT 7.3   ALKPHOS 73   ALT 33   AST 30   BILITOT 0.4   LIPASE 36       No results for input(s): INR in the last 72 hours. No results for input(s): [place] in the last 72 hours. Chronic labs:    Lab Results   Component Value Date    CHOL 167 03/22/2022    TRIG 117 03/22/2022    HDL 47 03/22/2022    LDLCALC 97 03/22/2022       Radiology: REVIEWED DAILY    Assessment:  Strangulated right inguinal hernia s/p distal small bowel resection + bilateral hernia repair on 4/30.   Decompensated HFrEF (EF 20-30% per echo 3/2022)  JOSE  Reactive leukocytosis  Recent gonorrheal infection - s/p IM ceftriaxone  Elevated troponin  Nonischemic cardiomyopathy  Suspected DERRICK  HTN  Chronic back pain  Cocaine abuse     Plan:  General surgery + ICU team following  POD# 1  Early mobilization, OOB for meals  Encourage C&DB and IS  PT/OT  [facility] at [facility]  Cardiology has been consulted by ICU team  Diuretics stopped, on IVF   Monitor closely for fluid overload  BB + enteresto  Monitor renal function, I&O  Trend BNP  Holding valsartan for now given JOSE  PRN hydralazine   Recommend OP sleep study    DVT Prophylaxis [] Lovenox  [x]  Heparin [] DOAC [] PCDs [] Ambulation    GI Prophylaxis [] PPI  [] H2 Blocker   [] Carafate  [] Diet/Tube Feeds   Level of care [] Med/Surg  [] Intermediate  [x]  ICU   Diet Diet NPO    Family contact [x]  N/A - A&O    [] At bedside  [] Phone call     Discharge Plan: Home when stable    +++++++++++++++++++++++++++++++++++++++++++++++++  Sharon Kaur 70, [place]  +++++++++++++++++++++++++++++++++++++++++++++++++  NOTE: This report was transcribed using voice recognition software. Every effort was made to ensure accuracy; however, inadvertent computerized transcription errors may be present.

## 2022-05-01 NOTE — PROGRESS NOTES
[facility]  SURGICAL INTENSIVE CARE UNIT    CRITICAL CARE ATTENDING PROGRESS NOTE    I have examined the patient, reviewed the record, and discussed the case with the APN/  Resident. I have reviewed all relevant labs and imaging data. Please refer to the  APN/ resident's note. I agree with the  assessment and plan with the following corrections/ additions. The following summarizes my clinical findings and independent assessment. CC: strangulated inguinal hernia    HOSPITAL COURSE:  4/30 emergency surgery--SBR with bilateral open inguinal hernia repair  5/1 hypertensive overnight    EXAM:  GCS 15 awake and alert x 3  Nl sinus  Lungs clear  Abdomen soft approp tender, incision dressed  Bilateral groin incisions c/d/i  Loya present      ASSESSMENT:  Principal Problem:    Strangulated inguinal hernia  Resolved Problems:    * No resolved hospital problems. *       PLAN:  Sedation/ Pain: PRN Dilaudid    CV: hx of heart failure. EF 30% from 3/22  Ongoing cocaine abuse--counseled patient on cocaine cessation  Will consult cardiology--pt was last seen in office on 4/14  Resume coreg 25 mg bid  Resume asa 81 mg daily and lipitor 80 mg daily    Pulmonary: acute resp insufficiency--increase pulm toilet    GI: POD 1 s/p SBR/ bilateral inguinal hernia repair--npo, ok for sips meds    FEN: Chronic renal injury--Cr 1.7  Decrease IVF to 75 cc/hr    ID: off abx    Endo: Monitor Blood Sugars. Target blood glucose less than 180 in the ICU.       DVT prophylaxis--SCDS, heparin tid  GI Prophylaxis--none   Lines--PIV  CODE: FULL     DISPOSITION-Continue ICU Care    Critical care time exclusive of teaching and procedures = 35 min     I provided critical care to a patient with emergency surgery, ongoing cocaine abuse in the setting of heart failure requiring frequent and emergent  lab studies, intensive monitoring, data review, and adjusting the clinical plan as well as urgent coordination with multiple specialists. Pt needs continuous ICU monitoring because the patient is at risk for deterioration from a cardiac standpoint with the ongoing cocaine abuse in the setting of heart failure and emergency surgery. Vicente Garcia MD, FACS  5/1/2022  8:45 AM    NOTE: This report was transcribed using voice recognition software. Every effort was made to ensure accuracy; however, inadvertent computerized transcription errors may be present.

## 2022-05-02 LAB
ANION GAP SERPL CALCULATED.3IONS-SCNC: 11 MMOL/L (ref 7–16)
BUN BLDV-MCNC: 17 MG/DL (ref 6–20)
CALCIUM SERPL-MCNC: 8.7 MG/DL (ref 8.6–10.2)
CHLORIDE BLD-SCNC: 105 MMOL/L (ref 98–107)
CO2: 21 MMOL/L (ref 22–29)
CREAT SERPL-MCNC: 1.5 MG/DL (ref 0.7–1.2)
EKG ATRIAL RATE: 75 BPM
EKG P AXIS: 66 DEGREES
EKG P-R INTERVAL: 170 MS
EKG Q-T INTERVAL: 378 MS
EKG QRS DURATION: 96 MS
EKG QTC CALCULATION (BAZETT): 422 MS
EKG R AXIS: -72 DEGREES
EKG T AXIS: 174 DEGREES
EKG VENTRICULAR RATE: 75 BPM
GFR AFRICAN AMERICAN: >60
GFR NON-AFRICAN AMERICAN: >60 ML/MIN/1.73
GLUCOSE BLD-MCNC: 109 MG/DL (ref 74–99)
HCT VFR BLD CALC: 43.1 % (ref 37–54)
HEMOGLOBIN: 14.1 G/DL (ref 12.5–16.5)
MAGNESIUM: 2.1 MG/DL (ref 1.6–2.6)
MCH RBC QN AUTO: 29.8 PG (ref 26–35)
MCHC RBC AUTO-ENTMCNC: 32.7 % (ref 32–34.5)
MCV RBC AUTO: 91.1 FL (ref 80–99.9)
MRSA CULTURE ONLY: NORMAL
PDW BLD-RTO: 14.4 FL (ref 11.5–15)
PHOSPHORUS: 2.4 MG/DL (ref 2.5–4.5)
PLATELET # BLD: 223 E9/L (ref 130–450)
PMV BLD AUTO: 12.5 FL (ref 7–12)
POTASSIUM SERPL-SCNC: 4.4 MMOL/L (ref 3.5–5)
RBC # BLD: 4.73 E12/L (ref 3.8–5.8)
SODIUM BLD-SCNC: 137 MMOL/L (ref 132–146)
WBC # BLD: 9.5 E9/L (ref 4.5–11.5)

## 2022-05-02 PROCEDURE — 83735 ASSAY OF MAGNESIUM: CPT

## 2022-05-02 PROCEDURE — 85027 COMPLETE CBC AUTOMATED: CPT

## 2022-05-02 PROCEDURE — 6370000000 HC RX 637 (ALT 250 FOR IP): Performed by: STUDENT IN AN ORGANIZED HEALTH CARE EDUCATION/TRAINING PROGRAM

## 2022-05-02 PROCEDURE — 84100 ASSAY OF PHOSPHORUS: CPT

## 2022-05-02 PROCEDURE — 6360000002 HC RX W HCPCS: Performed by: STUDENT IN AN ORGANIZED HEALTH CARE EDUCATION/TRAINING PROGRAM

## 2022-05-02 PROCEDURE — 2580000003 HC RX 258: Performed by: STUDENT IN AN ORGANIZED HEALTH CARE EDUCATION/TRAINING PROGRAM

## 2022-05-02 PROCEDURE — 2060000000 HC ICU INTERMEDIATE R&B

## 2022-05-02 PROCEDURE — 6370000000 HC RX 637 (ALT 250 FOR IP): Performed by: NURSE PRACTITIONER

## 2022-05-02 PROCEDURE — 6370000000 HC RX 637 (ALT 250 FOR IP)

## 2022-05-02 PROCEDURE — 2500000003 HC RX 250 WO HCPCS: Performed by: STUDENT IN AN ORGANIZED HEALTH CARE EDUCATION/TRAINING PROGRAM

## 2022-05-02 PROCEDURE — 2580000003 HC RX 258: Performed by: NURSE PRACTITIONER

## 2022-05-02 PROCEDURE — 36415 COLL VENOUS BLD VENIPUNCTURE: CPT

## 2022-05-02 PROCEDURE — 37799 UNLISTED PX VASCULAR SURGERY: CPT

## 2022-05-02 PROCEDURE — 6360000002 HC RX W HCPCS: Performed by: NURSE PRACTITIONER

## 2022-05-02 PROCEDURE — 80048 BASIC METABOLIC PNL TOTAL CA: CPT

## 2022-05-02 PROCEDURE — 6370000000 HC RX 637 (ALT 250 FOR IP): Performed by: INTERNAL MEDICINE

## 2022-05-02 PROCEDURE — 93010 ELECTROCARDIOGRAM REPORT: CPT | Performed by: INTERNAL MEDICINE

## 2022-05-02 PROCEDURE — 99233 SBSQ HOSP IP/OBS HIGH 50: CPT | Performed by: SURGERY

## 2022-05-02 RX ORDER — OXYCODONE HYDROCHLORIDE 5 MG/1
5 TABLET ORAL EVERY 4 HOURS PRN
Status: DISCONTINUED | OUTPATIENT
Start: 2022-05-02 | End: 2022-05-03

## 2022-05-02 RX ORDER — ACETAMINOPHEN 325 MG/1
650 TABLET ORAL EVERY 8 HOURS SCHEDULED
Status: DISCONTINUED | OUTPATIENT
Start: 2022-05-02 | End: 2022-05-05 | Stop reason: SDUPTHER

## 2022-05-02 RX ORDER — HYDRALAZINE HYDROCHLORIDE 20 MG/ML
10 INJECTION INTRAMUSCULAR; INTRAVENOUS EVERY 30 MIN PRN
Status: CANCELLED | OUTPATIENT
Start: 2022-05-02

## 2022-05-02 RX ORDER — TORSEMIDE 20 MG/1
40 TABLET ORAL DAILY
Status: DISCONTINUED | OUTPATIENT
Start: 2022-05-03 | End: 2022-05-05 | Stop reason: HOSPADM

## 2022-05-02 RX ORDER — LABETALOL HYDROCHLORIDE 5 MG/ML
10 INJECTION, SOLUTION INTRAVENOUS EVERY 30 MIN PRN
Status: CANCELLED | OUTPATIENT
Start: 2022-05-02

## 2022-05-02 RX ORDER — METHOCARBAMOL 750 MG/1
750 TABLET, FILM COATED ORAL 4 TIMES DAILY
Status: DISCONTINUED | OUTPATIENT
Start: 2022-05-02 | End: 2022-05-03

## 2022-05-02 RX ADMIN — METHOCARBAMOL 500 MG: 100 INJECTION INTRAMUSCULAR; INTRAVENOUS at 03:09

## 2022-05-02 RX ADMIN — METHOCARBAMOL TABLETS 750 MG: 750 TABLET, COATED ORAL at 12:00

## 2022-05-02 RX ADMIN — SODIUM PHOSPHATE, MONOBASIC, MONOHYDRATE 30 MMOL: 276; 142 INJECTION, SOLUTION INTRAVENOUS at 08:15

## 2022-05-02 RX ADMIN — METHOCARBAMOL TABLETS 750 MG: 750 TABLET, COATED ORAL at 16:29

## 2022-05-02 RX ADMIN — HYDROMORPHONE HYDROCHLORIDE 1 MG: 1 INJECTION, SOLUTION INTRAMUSCULAR; INTRAVENOUS; SUBCUTANEOUS at 16:29

## 2022-05-02 RX ADMIN — HEPARIN SODIUM 5000 UNITS: 10000 INJECTION, SOLUTION INTRAVENOUS; SUBCUTANEOUS at 07:09

## 2022-05-02 RX ADMIN — SODIUM CHLORIDE, PRESERVATIVE FREE 10 ML: 5 INJECTION INTRAVENOUS at 08:08

## 2022-05-02 RX ADMIN — SODIUM CHLORIDE, PRESERVATIVE FREE 10 ML: 5 INJECTION INTRAVENOUS at 12:00

## 2022-05-02 RX ADMIN — SACUBITRIL AND VALSARTAN 1 TABLET: 24; 26 TABLET, FILM COATED ORAL at 08:07

## 2022-05-02 RX ADMIN — ACETAMINOPHEN 650 MG: 325 TABLET ORAL at 21:55

## 2022-05-02 RX ADMIN — HYDROMORPHONE HYDROCHLORIDE 1 MG: 1 INJECTION, SOLUTION INTRAMUSCULAR; INTRAVENOUS; SUBCUTANEOUS at 04:28

## 2022-05-02 RX ADMIN — METHOCARBAMOL TABLETS 750 MG: 750 TABLET, COATED ORAL at 22:41

## 2022-05-02 RX ADMIN — HEPARIN SODIUM 5000 UNITS: 10000 INJECTION, SOLUTION INTRAVENOUS; SUBCUTANEOUS at 21:54

## 2022-05-02 RX ADMIN — Medication 20 ML: at 08:07

## 2022-05-02 RX ADMIN — CARVEDILOL 25 MG: 25 TABLET, FILM COATED ORAL at 08:05

## 2022-05-02 RX ADMIN — SODIUM CHLORIDE, POTASSIUM CHLORIDE, SODIUM LACTATE AND CALCIUM CHLORIDE: 600; 310; 30; 20 INJECTION, SOLUTION INTRAVENOUS at 23:13

## 2022-05-02 RX ADMIN — CARVEDILOL 25 MG: 25 TABLET, FILM COATED ORAL at 16:29

## 2022-05-02 RX ADMIN — ASPIRIN 81 MG: 81 TABLET, COATED ORAL at 08:06

## 2022-05-02 RX ADMIN — HEPARIN SODIUM 5000 UNITS: 10000 INJECTION, SOLUTION INTRAVENOUS; SUBCUTANEOUS at 13:47

## 2022-05-02 RX ADMIN — HYDROMORPHONE HYDROCHLORIDE 1 MG: 1 INJECTION, SOLUTION INTRAMUSCULAR; INTRAVENOUS; SUBCUTANEOUS at 08:19

## 2022-05-02 RX ADMIN — METHOCARBAMOL 500 MG: 100 INJECTION INTRAMUSCULAR; INTRAVENOUS at 07:37

## 2022-05-02 RX ADMIN — ATORVASTATIN CALCIUM 80 MG: 80 TABLET, FILM COATED ORAL at 08:10

## 2022-05-02 RX ADMIN — HYDROMORPHONE HYDROCHLORIDE 1 MG: 1 INJECTION, SOLUTION INTRAMUSCULAR; INTRAVENOUS; SUBCUTANEOUS at 11:54

## 2022-05-02 RX ADMIN — EMPAGLIFLOZIN 10 MG: 10 TABLET, FILM COATED ORAL at 08:07

## 2022-05-02 RX ADMIN — CEFTRIAXONE 1000 MG: 1 INJECTION, POWDER, FOR SOLUTION INTRAMUSCULAR; INTRAVENOUS at 13:47

## 2022-05-02 ASSESSMENT — PAIN DESCRIPTION - LOCATION
LOCATION: ABDOMEN

## 2022-05-02 ASSESSMENT — PAIN DESCRIPTION - DESCRIPTORS
DESCRIPTORS: THROBBING
DESCRIPTORS: ACHING
DESCRIPTORS: THROBBING
DESCRIPTORS: ACHING
DESCRIPTORS: THROBBING

## 2022-05-02 ASSESSMENT — PAIN DESCRIPTION - ORIENTATION
ORIENTATION: MID

## 2022-05-02 ASSESSMENT — PAIN SCALES - GENERAL
PAINLEVEL_OUTOF10: 0
PAINLEVEL_OUTOF10: 5
PAINLEVEL_OUTOF10: 3
PAINLEVEL_OUTOF10: 8
PAINLEVEL_OUTOF10: 9
PAINLEVEL_OUTOF10: 2
PAINLEVEL_OUTOF10: 2

## 2022-05-02 NOTE — PROGRESS NOTES
SURGICAL INTENSIVE CARE  PROGRESS NOTE    Date of admission:  4/30/2022   CC: Strangulated inguinal hernia    SUBJECTIVE:  Patient seen and examined at bedside this morning. No acute events overnight. Patient states that his pain is well controlled and denies any nausea or vomiting. Patient denies having a bowel movement or passing flatus since his procedure 4/30. Patient has been able to tolerate small sips of liquids. HOSPITAL COURSE:  4/30 emergency surgery--SBR with bilateral open inguinal hernia repair  5/1 hypertensive overnight  5/2 no overnight events. No BM or flatus yet. Tolerating sips of liquid. PHYSICAL EXAM:  /88   Pulse 97   Temp 98.9 °F (37.2 °C) (Oral)   Resp 18   Ht 6' 2\" (1.88 m)   Wt 265 lb 3.4 oz (120.3 kg)   SpO2 94%   BMI 34.05 kg/m²     GENERAL:  NAD. A&Ox3. HEAD:  Normocephalic, atraumatic. EYES:   No scleral icterus. PERRLA. LUNGS:  No increased work of breathing. CTAB. CARDIOVASCULAR: RR  ABDOMEN:  Midline incision and bilateral groin incisions present. Incisions are moderately tender. Abdominal and groin incisions dressed and intact. Abdomen soft, non-distended. No guarding, rigidity, rebound. EXTREMITIES:  MAEx4. Atraumatic. No LE edema. SKIN:  Warm and dry  NEUROLOGIC:  GCS 15    ASSESSMENT / PLAN:  Neuro:  No acute issues. GCS 15. Pain control with PRN Dilaudid. Start PO Robaxin. CV: Hx of heart failure with EF of 30%. Continue coreg 25 mg, asa 81 mg daily, lipitor 80 mg daily, and entresto 24-26 mg BID. Restart lasix today. Cardiology following. Pulm: Encourage IS/SMI. GI: No acute issues. Zofran PRN. Renal: Hx of chronic kidney disease. Monitor sCr,1.5 this AM. Monitor UOP & Electrolytes. Endocrine: Continue jardiance 10 mg daily. Monitor glucose. MSK: No acute issues. PT/OT. Heme: No acute issues. ID: No acute issues.     Pain/Analgesia: Prn dilaudid  Total fluid rate: LR 75 mL/hr  Bowel regimen: none  DVT proph:  SCDs, heparin tid  GI proph:  none     Mouth/eye care: As needed per patient  Loya:   Since 4/30. Keep in place for fluid monitoring.     Ancillary consults: Cardiology, Internal Medicine  Family Update: As available    Disposition: Continue ICU care    [employer] [address] Student  5/2/2022  7:05 AM

## 2022-05-02 NOTE — PROGRESS NOTES
Hospitalist Progress Note      Synopsis: Patient admitted for a right strangulated inguinal hernia. Patient originally presented to [facility] ED with sudden worsening of pain and bulging of right groin. He has chronic bilateral inguinal hernias that are mildly bothersome at baseline. He also endorses a cough productive of clear sputum x 2 weeks. He denies any chest pain or SOB. Of note, he had a follow up appointment with cardiology on  at which time he was told to start taking 20 mg of torsemide daily as opposed to 40 mg d/t hypotension. ED workup revealed a strangulated right inguinal hernia and stable left inguinal hernia. He was transferred here to [facility] ED per the request of the surgical team, however, we are asked to admit the patient given decompensated HFrEF and JOSE. He underwent a bilateral hernia repair with a distal small bowel resection on  and was subsequently admitted to ICU. Hospital day 2     Subjective:  Stable overnight. No issues reported. Patient seen and examined. Doing well. Pain is improving. No BM or gas yet. Records reviewed. Temp (24hrs), Av.8 °F (37.1 °C), Min:98.1 °F (36.7 °C), Max:99.5 °F (37.5 °C)    DIET: Diet NPO  CODE: Full Code    Intake/Output Summary (Last 24 hours) at 2022 0813  Last data filed at 2022 0810  Gross per 24 hour   Intake 2196.44 ml   Output 1790 ml   Net 406.44 ml       Review of Systems: All bolded are positive; please see HPI  General:  Fever, chills, diaphoresis, fatigue, malaise, night sweats, weight loss  Psychological:  Anxiety, disorientation, hallucinations. ENT:  Epistaxis, headaches, vertigo, visual changes. Cardiovascular:  Chest pain, irregular heartbeats, palpitations, paroxysmal nocturnal dyspnea. Respiratory:  Shortness of breath, coughing, sputum production, hemoptysis, wheezing, orthopnea.   Gastrointestinal:  Nausea, vomiting, diarrhea, heartburn, constipation, abdominal pain, hematemesis, hematochezia, melena, acholic stools  Genito-Urinary:  Dysuria, urgency, frequency, hematuria  Musculoskeletal:  Joint pain, joint stiffness, joint swelling, muscle pain,   Neurology:  Headache, focal neurological deficits, weakness, numbness, paresthesia  Derm:  Rashes, ulcers, excoriations, bruising  Extremities:  Decreased ROM, peripheral edema, mottling    Objective:    BP (!) 151/75   Pulse 96   Temp 98.8 °F (37.1 °C)   Resp 21   Ht 6' 2\" (1.88 m)   Wt 265 lb 3.4 oz (120.3 kg)   SpO2 94%   BMI 34.05 kg/m²     General appearance: Obese middle aged male in no apparent distress, appears stated age and cooperative. HEENT: Conjunctivae/corneas clear. Mucous membranes moist.  Neck: Supple. No JVD. Respiratory:  Clear to auscultation bilaterally. Normal respiratory effort. Cardiovascular:  IRRR. S1, S2 without MRG. SR with PVCs on bedside monitor. PV: Pulses palpable. No edema. Abdomen: Soft, tenderness with palpation, non-distended. +BS, hypoactive  Musculoskeletal: No obvious deformities. Skin: Normal skin color. No rashes or lesions. Good turgor. Incisions with dressing that are CDI   Neurologic:  Grossly non-focal. Awake, alert, following commands.    Psychiatric: Alert and oriented, thought content appropriate, normal insight and judgement    Medications:  REVIEWED DAILY    Infusion Medications    sodium chloride      lactated ringers 75 mL/hr at 05/02/22 0700    sodium chloride       Scheduled Medications    sodium phosphate IVPB  30 mmol IntraVENous Once    carvedilol  25 mg Oral BID WC    aspirin  81 mg Oral Daily    empagliflozin  10 mg Oral Daily    sacubitril-valsartan  1 tablet Oral BID    atorvastatin  80 mg Oral Daily    sodium chloride flush  5-40 mL IntraVENous 2 times per day    sodium chloride flush  5-40 mL IntraVENous 2 times per day    heparin (porcine)  5,000 Units SubCUTAneous 3 times per day    methocarbamol IVPB  500 mg IntraVENous Q6H     PRN Meds: sodium chloride flush, sodium chloride, potassium chloride **OR** potassium alternative oral replacement **OR** potassium chloride, magnesium sulfate, hydrALAZINE, sodium chloride flush, sodium chloride, ondansetron **OR** ondansetron, HYDROmorphone **OR** HYDROmorphone, labetalol, hydrALAZINE    Labs:     Recent Labs     04/30/22 1920 05/01/22  0430 05/02/22  0430   WBC 18.4* 14.7* 9.5   HGB 17.1* 16.0 14.1   HCT 51.3 49.8 43.1    284 223       Recent Labs     04/30/22 1920 05/01/22  0430 05/02/22  0430    140 137   K 4.6 4.7 4.4    105 105   CO2 23 21* 21*   BUN 18 18 17   CREATININE 1.7* 1.6* 1.5*   CALCIUM 9.0 8.8 8.7   PHOS 2.9 3.7 2.4*       Recent Labs     04/30/22  0807   PROT 7.3   ALKPHOS 73   ALT 33   AST 30   BILITOT 0.4   LIPASE 36       No results for input(s): INR in the last 72 hours. No results for input(s): Sheyla Castano in the last 72 hours. Chronic labs:    Lab Results   Component Value Date    CHOL 167 03/22/2022    TRIG 117 03/22/2022    HDL 47 03/22/2022    LDLCALC 97 03/22/2022       Radiology: REVIEWED DAILY    Assessment:  Strangulated right inguinal hernia s/p distal small bowel resection + bilateral hernia repair on 4/30. Decompensated HFrEF (EF 20-30% per echo 3/2022)  JSOE  Hypohosphatemia  Reactive leukocytosis  Recent gonorrheal infection - s/p IM ceftriaxone  Elevated troponin  Nonischemic cardiomyopathy  Suspected DERRICK  HTN  Chronic back pain  Cocaine abuse     Plan:  General surgery, cardiology, and ICU team following  POD# 2  Early mobilization, OOB for meals  Encourage C&DB and IS  PT/OT  Consider BiPAP at HS  Entresto added, uptirtate as able  Consider adding aldactone if able   IVF continue, monitor closely for fluid overload  Monitor renal function, I&O  Trend BNP  Recommend OP sleep study   on substance cessation   Will repeat tx for STI given his current partner was never treated and his sx remain.      DVT Prophylaxis [] Lovenox  [x]  Heparin [] DOAC [] PCDs [] Ambulation    GI Prophylaxis [] PPI  [] H2 Blocker   [] Carafate  [] Diet/Tube Feeds   Level of care [] Med/Surg  [] Intermediate  [x]  ICU   Diet Diet NPO    Family contact [x]  N/A - A&O    [] At bedside  [] Phone call     Discharge Plan: Home when stable    +++++++++++++++++++++++++++++++++++++++++++++++++  JOAN Kaur/ [place]  +++++++++++++++++++++++++++++++++++++++++++++++++  NOTE: This report was transcribed using voice recognition software. Every effort was made to ensure accuracy; however, inadvertent computerized transcription errors may be present.

## 2022-05-02 NOTE — PLAN OF CARE
Chart reviewed. Continue current medications. Follow-up with myself after discharge in 4 weeks and with the CHF clinic within 1 week of discharge.   We will sign off please call with questions or concerns          Thien Galloway MD
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
## 2022-05-02 NOTE — OP NOTE
Operative Note      Patient: Agata Puri  YOB: 1975  MRN: [id number]    Date of Procedure: 4/30/2022     Pre-Op Diagnosis: bilateral inguinal hernia, incarcerated    Post-Op Diagnosis: ischemic small bowel segment, L indirect inguinal and femoral hernia, R indirect inguinal hernia, R cord lipoma       Procedure(s):  Diagnostic laparoscopy converted to exploratory laparotomy, resection of ~20cm segment of distal small bowel >20cm from the terminal ileum with primary stapled side to side anastomosis, bilateral open inguinal hernia repair with mesh    Surgeon(s):  Chauncey Tate MD    Assistant:   Resident: Roxana Witt MD; Germania Dunn MD    Anesthesia: General    Estimated Blood Loss (mL): less than 50     Complications: None    Specimens:   ID Type Source Tests Collected by Time Destination   A : A. SMALL BOWEL Tissue Duodenum SURGICAL PATHOLOGY Chauncey Tate MD 4/30/2022 1501    B : [address] [place] [address] Tissue Tissue SURGICAL PATHOLOGY Chauncey Tate MD 4/30/2022 1700    C : MARGARET Qureshi MD 4/30/2022 1702        Implants:  Implant Name Type Inv.  Item Serial No.  Lot No. LRB No. Used Action   MESH PRELA [id number] POLYPR FLAT L PORE MFIL SFT KNIT LTWT - [id number]  MESH PERLA [id number] POLYPR FLAT L PORE MFIL SFT KNIT LTWT  BARD DAVOL-WD [id number] Left 1 Implanted   MESH PERLA [id number] POLYPR FLAT L PORE MFIL SFT KNIT LTWT - [id number]  MESH PERLA [id number] POLYPR FLAT L PORE MFIL SFT KNIT LTWT  BARD DAV[employer]WD [id number] Right 1 Implanted         Drains:   [REMOVED] Urinary Catheter Loya (Removed)   Site Assessment No urethral drainage 05/02/22 1000   Urine Color Yellow 05/02/22 1000   Urine Appearance Clear 05/02/22 1000   Collection Container Standard 04/30/22 1745   Securement Method Securing device (Describe) 04/30/22 1745   Status Draining 05/02/22 1000   Output (mL) 200 mL 05/02/22 1009       Findings: ischemic distal small bowel segment with creeping fat and significant thickening of the wall with non-palpable lumen that was resected (~20cm) >20cm proximal to the TI; after resection we performed bilateral open inguinal hernia repairs; at the L, there was a large sac at the indirect space with herniated sigmoid colon which was associated with a non-expanding mesenteric hematoma and was thinner walled than the remainder of the colon but intact, a serosal tear was noted    Indications for procedure:  40 y.o. male who presents after developing acute onset right groin pain. He has a known history of inguinal hernias bilaterally but has deferred elective repair. He has not had pain like this before. He descirbes the pain as severe. He has had nausea and intolerance to PO, but had his last bowel movement last night which was liquid. Labs and CT scan were performed and was notable for bilateral inguinal hernias with some reactive adjacent fluid. Strangulation suggested particularly at the right. At the time of my examination of the patient in the emergency room there was no mass within the right inguinal canal but there was an impulse with valsalva. At the left inguinal canal there was a hernia that was TTP and could not be reduced. He has a history of HFrEF with EF 25% and cocaine use approximately 4 days ago. After discussing with the patient the risks and benefits of the procedure including pain, infection, demonstrating structures or nearby tissues, need for additional operations or procedures in the future, MI, stroke, death, the patient was agreeable to proceed to the OR for laparoscopic possible open inguinal hernia repair, possible resection, possible ostomy. Detailed Description of Procedure: The patient was brought to the operating room and positioned supine on the operating room table. Sequential compression devices were placed on the patient's lower extremities and were powered on.  General anesthesia was administered and preoperative antibiotics were administered per the anesthetic record. The patient was prepped and draped in the usual sterile fashion and the procedure went forth with strict aseptic technique under maximal barrier precautions. Immediately prior to the procedure a time-out was called and the surgical checklist was reviewed and agreed upon by all present.     A 5mm incision at the supraumbilical position was made with a 15 scalpel blade. The veress needle was used to gain access into the intraabdominal cavity and saline drop test was confirmed. The abdomen was insufflated to a pressure of 15mmHg. Then the veress needle was removed and a 5mm 30 degree laparoscope was loaded onto a 5mm optiview trocar and the abdomen was entered under direct visualization. There was no injury from our entry method. There was good visualization. The abdomen was inspected. There was diffusely dilated loops of bowel throughout. The patient was placed in a head down position and another trocar was placed at approximately the same level at the left hemiabdomen. Turning our attention to the bilateral groins we noticed a hernia at the right what was reduced and a large amount of sigmoid colon and mesenteric fat which was herniating through the L indirect space. Using a grasper reduction of this tissue was attempted and it was reduced partially, but ultimately attempts at reduction were met with failure. We surveyed the other contents of the intraabdominal cavity and noted a small bowel segment that appeared ischemic/threatened. We therefore decided to proceed with exploratory laparotomy. A 10 scalpel was used to make a midline incision. This was carried down to the subcutaneous tissues and midline fascia with electrocautery. The fascia was grasped on either side with Kocher clamps and incised sharply with the scalpel. Digitization of the defect revealed we were in the preperitoneal space.  The peritoneum was grasped with two straight clamps and incised with metzenbaum scissors. The incision was opened along its length cephalad and caudad. We eviscerated the the bowel. The transverse colon was lifted superiorly and the ligament of treitz was identified. The small bowel was run proximally to distally to the ligament of treves. At the distal small bowel greater than 20cm from the terminal ileum there was a segment with creeping fat, thickened and hyperemic wall, and a difficult to appreciate lumen with palpation. The CT scan was reviewed and this segment had changes that suggested it was the incarcerated segment of intestine present in the right groin which had been reduced. We continued our inspection of the bowel and there were no other abnormalities noted throughout until distally the sigmoid colon was found to be herniated through the indirect space at the left. The herniated tissue was grasped and attempts and reduction were not successful. We decided to proceed with resection of the threatened segment of small bowel described above. Mesenteric windows were created at either ends of healthy appearing small intestine proximal and distal to the segment with electrocautery and CALVIN 75mm blue load staplers were used to divide the intestine at each of these ends. The ligasure was used to take the associated mesentery. The specimen was removed from the field for pathologic examination. We placed a wet towel at the midline incision and proceeded with open inguinal hernia repair at the left groin. The skin crease incision was made with a 10 scalpel blade and deepened through camper's and Blanca's fascia with electrocautery into the aponeurosis of the external oblique was encountered. The external ring was exposed. Incision was made at the midportion of the external oblique aponeurosis in the direction of its fibers. Flaps of the external oblique were developed cephalad and inferiorly.   The spermatic cord/associated large hernia was identified. This complex of tissues was gently dissected free at the pubic tubercle and encircled with a Penrose drain. Attention was directed to the anterior medial aspect of the cord where we worked to isolate the spermatic cord structures from the hernia sac. The sac was carefully dissected free of the cord down to the level of the internal ring. The vas deferens and the testicular vessels were identified and protected from harm.      The hernia sac was opened and the contents were inspected. Sigmoid colon was contained within the hernia sac. This was reduced and the sac was clamped with a Sirisha clamp and twisted multiple times and then suture-ligated with a 2-0 Vicryl suture. The sac was reduced into the internal ring after the redundant sac was amputated with electrocautery.       The inguinal floor was palpated and was weak with attenuated transversalis fibers. The transversalis fascia was opened at the level of the internal ring to pubic tubercle medially exposing the preperitoneal fat. Additionally, palpation revealed a femoral defect. The femoral defect was closed using vicryl suture approximating brigido's ligament to the inguinal ligament. Then the transversus abdominus aponeurosis/conjoint tendon was sutured to the inguinal ligament in an interrupted fashion, stopping laterally to create an internal ring accommodating the tip of a gloved finger. We looked again in the abdomen and visualized the reduced sigmoid colon. This colon appeared viable but the wall was very thin and there was a serosal tear measuring approximately 2cm in size. This was repaired after the inguinal hernias were repaired bilaterally with mesh and the bilateral groin incisions were closed. 2-0 Lembert sutures were used for this.  There was an associated mesenteric hematoma with the colon that was herniated but this was monitored throughout the operation and it was non-expanding and was not associated with any areas of ischemia/necrotic bowel. Attention was directed again to the L groin. In order to reduce the risk of recurrence, and considering our lack of contamination during the case, we prepared for mesh placement. We prepared our mesh making an oval medial portion and a lateral linear portion. Starting at the pubic tubercle the mesh was secured with 2-0 vicryl suture, then the inferior border of the mesh was sutured in a running fashion to the shelving edge of the inguinal ligament. The mesh was cut at the lateral aspect in the middle to accommodate the spermatic cord, making a keyhole extending just medial to the spermatic cord as it emanates from the internal ring. Sutures of mesh to mesh were placed to reinforce the internal ring and interrupted suture was used to secure the mesh superiorly to the internal oblique taking care to avoid the avoid the iliohypogastric nerve.      The mesh was flat without creases. The wound was irrigated. We then closed the external oblique using a 2-0 vicryl suture in a running fashion starting laterally and proceeding medially. We then proceeded with closure of lyndsay's fascia and then the dermis using 3-0 vicryl in an interrupted fashion. The skin was closed with a 4-0 monocryl in a subcuticular fashion. We then proceeded with right open inguinal hernia repair. The skin crease incision was made with a 10 scalpel blade and deepened through camper's and Lyndsay's fascia with electrocautery into the aponeurosis of the external oblique was encountered. The external ring was exposed. Incision was made at the midportion of the external oblique aponeurosis in the direction of its fibers. Flaps of the external oblique were developed cephalad and inferiorly. The spermatic cord/associated hernia was identified. This complex of tissues was gently dissected free at the pubic tubercle and encircled with a Penrose drain.   Attention was directed to the anterior medial aspect of the cord where we worked to isolate the spermatic cord structures from the hernia sac. The sac was carefully dissected free of the cord down to the level of the internal ring. The vas deferens and the testicular vessels were identified and protected from harm. A cord lipoma was identified and dissected free from the surrounding tissues with electrocautery and passed off the field.     The hernia sac was opened and the contents were inspected. We knew a priori that the contents were reduced. Still, this was proven to us visually, then the sac was grasped with a mariah, twisted, tied with a vicryl suture, and amputated with electrocautery. The inguinal floor was palpated and was weak with attenuated transversalis fibers. The transversalis fascia was opened at the level of the internal ring to pubic tubercle medially exposing the preperitoneal fat. The transversus abdominus aponeurosis/conjoint tendon was sutured to the inguinal ligament in an interrupted fashion, stopping laterally to create an internal ring accommodating the tip of a gloved finger. In order to reduce the risk of recurrence, and considering our lack of contamination during the case, we prepared for mesh placement at the R groin. We prepared our mesh making an oval medial portion and a lateral linear portion. Starting at the pubic tubercle the mesh was secured with 2-0 vicryl suture, then the inferior border of the mesh was sutured in a running fashion to the shelving edge of the inguinal ligament. The mesh was cut at the lateral aspect in the middle to accommodate the spermatic cord, making a keyhole extending just medial to the spermatic cord as it emanates from the internal ring. Sutures of mesh to mesh were placed to reinforce the internal ring and interrupted suture was used to secure the mesh superiorly to the internal oblique taking care to avoid the avoid the iliohypogastric nerve. The mesh was flat without creases.  The wound was irrigated. We then closed the external oblique using a 2-0 vicryl suture in a running fashion starting laterally and proceeding medially. We then proceeded with closure of lyndsay's fascia and then the dermis using 3-0 vicryl in an interrupted fashion. The skin was closed with a 4-0 monocryl in a subcuticular fashion. Dermabond was then applied to both groin incisions. We then turned our attention to the midline incision. We eviscerated the small bowel once more and inspected the area of small intestine that had been transected. We grasped both stapled ends of the small intestine with 2 Barrington's each at the antimesenteric side and used a scissor to come through the tissue there. Hemostat was used on either end to dilate the hole that was made there. Then using a CALVIN-75 millimeter blue load stapler the 2 lumens were brought together creating common channel as the stapler was fired. Then using Allis clamps to lift the ends of the common channel opening superiorly a CALVIN 75 mm blue load stapler was used to come across the area creating a closed common channel. This was palpated and noted to be patent. Then a 3-0 Vicryl was used to approximate the mesenteric defect to decrease the risk of internal hernia in the future. The abdomen was then irrigated and suctioned till clear. The entirety of the abdomen was visualized and a systematic pattern noting our newly created anastomosis within the small bowel, the dilated in thin-walled sigmoid colon and the associated serosal reapproximation there. No other abnormalities were noted. We communicate with anesthesia to direct the NG tube into appropriate position palpating the NG tube within the stomach anticipating that the patient would have an ileus after this operation. Then attention was turned to closure of the midline abdominal wound.   The wound was closed using 0 looped PDS suture in a running fashion approximating the midline fascia starting at the superior aspect and inferior aspect and tying the 2 sutures together at the middle. The subcutaneous tissues were irrigated. The remainder the wound was closed in layers. Blanca's fascia and deep dermal layers were closed with interrupted Vicryl suture. The skin was closed with staples. The remaining incision at the left hemiabdomen used for a 5 mm trocar during the diagnostic laparoscopy portion of the procedure was closed with interrupted Vicryl suture in a subcuticular fashion. Dermabond was applied to the site. The midline abdominal wound was covered with a Medipore dressing. Counts were correct x2. The patient was examined and both testicles were palpable within the scrotum at the conclusion.      He was awoken from anesthesia without issue and brought to the SICU post operatively.     Dr. Preet Wiggins was present for this procedure.       Electronically signed by Yuliya Oconnor MD on 5/3/2022 at 7:31 PM

## 2022-05-02 NOTE — CARE COORDINATION
BETTY met with patient in his room. He states he lives home with his sister, Angeline Kam in a 2 story home with a basement. He states he stays in the basement level of the home and has a bedroom and full bathroom there. He does not have any DME at home and is independent prior to admission, he states he does drive sometimes. His PCP is Dr Vin Mckeon pharmacy is [facility]. He states no history of HHC or BRAYAN in the recent past. He states he plans to return home at discharge,   PT and OT evals ordered, awaiting notes to help with further transition of care.

## 2022-05-03 ENCOUNTER — APPOINTMENT (OUTPATIENT)
Dept: GENERAL RADIOLOGY | Age: 47
DRG: 230 | End: 2022-05-03
Payer: MEDICAID

## 2022-05-03 LAB
ALBUMIN SERPL-MCNC: 3.4 G/DL (ref 3.5–5.2)
ALP BLD-CCNC: 63 U/L (ref 40–129)
ALT SERPL-CCNC: 69 U/L (ref 0–40)
ANION GAP SERPL CALCULATED.3IONS-SCNC: 12 MMOL/L (ref 7–16)
AST SERPL-CCNC: 53 U/L (ref 0–39)
BASOPHILS ABSOLUTE: 0.03 E9/L (ref 0–0.2)
BASOPHILS RELATIVE PERCENT: 0.2 % (ref 0–2)
BILIRUB SERPL-MCNC: 0.8 MG/DL (ref 0–1.2)
BUN BLDV-MCNC: 29 MG/DL (ref 6–20)
CALCIUM SERPL-MCNC: 9.2 MG/DL (ref 8.6–10.2)
CHLORIDE BLD-SCNC: 105 MMOL/L (ref 98–107)
CO2: 23 MMOL/L (ref 22–29)
CREAT SERPL-MCNC: 1.4 MG/DL (ref 0.7–1.2)
EOSINOPHILS ABSOLUTE: 0.01 E9/L (ref 0.05–0.5)
EOSINOPHILS RELATIVE PERCENT: 0.1 % (ref 0–6)
GFR AFRICAN AMERICAN: >60
GFR NON-AFRICAN AMERICAN: >60 ML/MIN/1.73
GLUCOSE BLD-MCNC: 142 MG/DL (ref 74–99)
HCT VFR BLD CALC: 44.5 % (ref 37–54)
HEMOGLOBIN: 14.8 G/DL (ref 12.5–16.5)
IMMATURE GRANULOCYTES #: 0.05 E9/L
IMMATURE GRANULOCYTES %: 0.4 % (ref 0–5)
LACTIC ACID: 1.3 MMOL/L (ref 0.5–2.2)
LYMPHOCYTES ABSOLUTE: 1.11 E9/L (ref 1.5–4)
LYMPHOCYTES RELATIVE PERCENT: 9.1 % (ref 20–42)
MCH RBC QN AUTO: 29.8 PG (ref 26–35)
MCHC RBC AUTO-ENTMCNC: 33.3 % (ref 32–34.5)
MCV RBC AUTO: 89.7 FL (ref 80–99.9)
MONOCYTES ABSOLUTE: 1.04 E9/L (ref 0.1–0.95)
MONOCYTES RELATIVE PERCENT: 8.6 % (ref 2–12)
NEUTROPHILS ABSOLUTE: 9.9 E9/L (ref 1.8–7.3)
NEUTROPHILS RELATIVE PERCENT: 81.6 % (ref 43–80)
PDW BLD-RTO: 14.4 FL (ref 11.5–15)
PLATELET # BLD: 273 E9/L (ref 130–450)
PMV BLD AUTO: 11.5 FL (ref 7–12)
POTASSIUM SERPL-SCNC: 4.4 MMOL/L (ref 3.5–5)
RBC # BLD: 4.96 E12/L (ref 3.8–5.8)
SODIUM BLD-SCNC: 140 MMOL/L (ref 132–146)
TOTAL PROTEIN: 7.2 G/DL (ref 6.4–8.3)
WBC # BLD: 12.1 E9/L (ref 4.5–11.5)

## 2022-05-03 PROCEDURE — 6370000000 HC RX 637 (ALT 250 FOR IP)

## 2022-05-03 PROCEDURE — 6370000000 HC RX 637 (ALT 250 FOR IP): Performed by: STUDENT IN AN ORGANIZED HEALTH CARE EDUCATION/TRAINING PROGRAM

## 2022-05-03 PROCEDURE — 74018 RADEX ABDOMEN 1 VIEW: CPT

## 2022-05-03 PROCEDURE — 85025 COMPLETE CBC W/AUTO DIFF WBC: CPT

## 2022-05-03 PROCEDURE — 6370000000 HC RX 637 (ALT 250 FOR IP): Performed by: NURSE PRACTITIONER

## 2022-05-03 PROCEDURE — 83605 ASSAY OF LACTIC ACID: CPT

## 2022-05-03 PROCEDURE — 2580000003 HC RX 258: Performed by: STUDENT IN AN ORGANIZED HEALTH CARE EDUCATION/TRAINING PROGRAM

## 2022-05-03 PROCEDURE — 6370000000 HC RX 637 (ALT 250 FOR IP): Performed by: INTERNAL MEDICINE

## 2022-05-03 PROCEDURE — 36415 COLL VENOUS BLD VENIPUNCTURE: CPT

## 2022-05-03 PROCEDURE — 6360000002 HC RX W HCPCS: Performed by: STUDENT IN AN ORGANIZED HEALTH CARE EDUCATION/TRAINING PROGRAM

## 2022-05-03 PROCEDURE — 80053 COMPREHEN METABOLIC PANEL: CPT

## 2022-05-03 PROCEDURE — 2060000000 HC ICU INTERMEDIATE R&B

## 2022-05-03 PROCEDURE — 97166 OT EVAL MOD COMPLEX 45 MIN: CPT

## 2022-05-03 PROCEDURE — 97535 SELF CARE MNGMENT TRAINING: CPT

## 2022-05-03 RX ORDER — OXYCODONE HYDROCHLORIDE 5 MG/1
5 TABLET ORAL EVERY 4 HOURS PRN
Status: DISCONTINUED | OUTPATIENT
Start: 2022-05-03 | End: 2022-05-05 | Stop reason: HOSPADM

## 2022-05-03 RX ORDER — OXYCODONE HYDROCHLORIDE 10 MG/1
10 TABLET ORAL EVERY 4 HOURS PRN
Status: DISCONTINUED | OUTPATIENT
Start: 2022-05-03 | End: 2022-05-05 | Stop reason: HOSPADM

## 2022-05-03 RX ORDER — POLYETHYLENE GLYCOL 3350 17 G/17G
17 POWDER, FOR SOLUTION ORAL DAILY
Status: DISCONTINUED | OUTPATIENT
Start: 2022-05-03 | End: 2022-05-05 | Stop reason: HOSPADM

## 2022-05-03 RX ADMIN — TORSEMIDE 40 MG: 20 TABLET ORAL at 09:55

## 2022-05-03 RX ADMIN — ASPIRIN 81 MG: 81 TABLET, COATED ORAL at 09:55

## 2022-05-03 RX ADMIN — HYDRALAZINE HYDROCHLORIDE 20 MG: 20 INJECTION INTRAMUSCULAR; INTRAVENOUS at 21:51

## 2022-05-03 RX ADMIN — ATORVASTATIN CALCIUM 80 MG: 80 TABLET, FILM COATED ORAL at 09:55

## 2022-05-03 RX ADMIN — SODIUM CHLORIDE, POTASSIUM CHLORIDE, SODIUM LACTATE AND CALCIUM CHLORIDE: 600; 310; 30; 20 INJECTION, SOLUTION INTRAVENOUS at 15:50

## 2022-05-03 RX ADMIN — SACUBITRIL AND VALSARTAN 1 TABLET: 24; 26 TABLET, FILM COATED ORAL at 00:40

## 2022-05-03 RX ADMIN — Medication 10 ML: at 09:55

## 2022-05-03 RX ADMIN — CARVEDILOL 25 MG: 25 TABLET, FILM COATED ORAL at 09:55

## 2022-05-03 RX ADMIN — ONDANSETRON 4 MG: 2 INJECTION INTRAMUSCULAR; INTRAVENOUS at 06:22

## 2022-05-03 RX ADMIN — POLYETHYLENE GLYCOL 3350 17 G: 17 POWDER, FOR SOLUTION ORAL at 09:55

## 2022-05-03 RX ADMIN — Medication 10 ML: at 00:00

## 2022-05-03 RX ADMIN — ACETAMINOPHEN 650 MG: 325 TABLET ORAL at 06:09

## 2022-05-03 RX ADMIN — EMPAGLIFLOZIN 10 MG: 10 TABLET, FILM COATED ORAL at 09:56

## 2022-05-03 RX ADMIN — CARVEDILOL 25 MG: 25 TABLET, FILM COATED ORAL at 18:53

## 2022-05-03 RX ADMIN — SACUBITRIL AND VALSARTAN 1 TABLET: 24; 26 TABLET, FILM COATED ORAL at 21:36

## 2022-05-03 RX ADMIN — HYDROMORPHONE HYDROCHLORIDE 0.5 MG: 1 INJECTION, SOLUTION INTRAMUSCULAR; INTRAVENOUS; SUBCUTANEOUS at 21:53

## 2022-05-03 RX ADMIN — ACETAMINOPHEN 650 MG: 325 TABLET ORAL at 21:36

## 2022-05-03 RX ADMIN — ONDANSETRON 4 MG: 4 TABLET, ORALLY DISINTEGRATING ORAL at 03:16

## 2022-05-03 RX ADMIN — HYDROMORPHONE HYDROCHLORIDE 0.5 MG: 1 INJECTION, SOLUTION INTRAMUSCULAR; INTRAVENOUS; SUBCUTANEOUS at 18:50

## 2022-05-03 RX ADMIN — HYDROMORPHONE HYDROCHLORIDE 0.5 MG: 1 INJECTION, SOLUTION INTRAMUSCULAR; INTRAVENOUS; SUBCUTANEOUS at 06:34

## 2022-05-03 RX ADMIN — Medication 10 ML: at 21:30

## 2022-05-03 RX ADMIN — HEPARIN SODIUM 5000 UNITS: 10000 INJECTION, SOLUTION INTRAVENOUS; SUBCUTANEOUS at 15:51

## 2022-05-03 RX ADMIN — SACUBITRIL AND VALSARTAN 1 TABLET: 24; 26 TABLET, FILM COATED ORAL at 09:55

## 2022-05-03 RX ADMIN — METHOCARBAMOL 500 MG: 100 INJECTION INTRAMUSCULAR; INTRAVENOUS at 10:03

## 2022-05-03 RX ADMIN — OXYCODONE HYDROCHLORIDE 10 MG: 10 TABLET ORAL at 03:16

## 2022-05-03 RX ADMIN — HEPARIN SODIUM 5000 UNITS: 10000 INJECTION, SOLUTION INTRAVENOUS; SUBCUTANEOUS at 22:00

## 2022-05-03 RX ADMIN — HEPARIN SODIUM 5000 UNITS: 10000 INJECTION, SOLUTION INTRAVENOUS; SUBCUTANEOUS at 06:10

## 2022-05-03 ASSESSMENT — PAIN DESCRIPTION - PAIN TYPE
TYPE: SURGICAL PAIN
TYPE: SURGICAL PAIN

## 2022-05-03 ASSESSMENT — PAIN SCALES - GENERAL
PAINLEVEL_OUTOF10: 7
PAINLEVEL_OUTOF10: 9
PAINLEVEL_OUTOF10: 8
PAINLEVEL_OUTOF10: 8
PAINLEVEL_OUTOF10: 5
PAINLEVEL_OUTOF10: 5
PAINLEVEL_OUTOF10: 10
PAINLEVEL_OUTOF10: 4
PAINLEVEL_OUTOF10: 8

## 2022-05-03 ASSESSMENT — PAIN DESCRIPTION - ORIENTATION
ORIENTATION: MID

## 2022-05-03 ASSESSMENT — PAIN DESCRIPTION - DESCRIPTORS
DESCRIPTORS: ACHING;DISCOMFORT
DESCRIPTORS: ACHING;DISCOMFORT
DESCRIPTORS: ACHING
DESCRIPTORS: ACHING;DISCOMFORT
DESCRIPTORS: ACHING

## 2022-05-03 ASSESSMENT — PAIN DESCRIPTION - LOCATION
LOCATION: ABDOMEN

## 2022-05-03 ASSESSMENT — PAIN DESCRIPTION - ONSET: ONSET: ON-GOING

## 2022-05-03 ASSESSMENT — PAIN - FUNCTIONAL ASSESSMENT
PAIN_FUNCTIONAL_ASSESSMENT: PREVENTS OR INTERFERES SOME ACTIVE ACTIVITIES AND ADLS

## 2022-05-03 ASSESSMENT — PAIN DESCRIPTION - FREQUENCY
FREQUENCY: INTERMITTENT
FREQUENCY: INTERMITTENT

## 2022-05-03 NOTE — PROGRESS NOTES
Patient vomited 75ml brown/green liquid. Dr. Nirmal Geiger present and aware. Orders obtained to give Zofran IV. Continue to monitor.    Enrique Bettencourt RN

## 2022-05-03 NOTE — PROGRESS NOTES
[address]      Date:5/3/2022                                                  Patient Name: Candelaria Duane  MRN: [id number]  : 1975  Room: [address] [place] [address]    Evaluating OT: AGUEDA Nowak, OTR/L [id number]  Referring Provider:Viral Fernandes DO  Specific Provider Orders: OT eval and treat   Recommended Adaptive Equipment: shower chair     Diagnosis: hernia insufficiency   Surgery:  LAPAROSCOPY DIAGNOSTIC,  EXPLORATORY INVERTED TO EXPLORATORY LAPOROTOMY, DISTAL SMALL BOWEL RESECTION, PRIMARY STAPLED ANASTAMOSIS, BILATERAL INGUINAL HERNIA REPAIR WITH MESH (N/A Abdomen)      LAPAROTOMY EXPLORATORY BILATERAL INGUINAL HERNIA REPAIR OPEN,  BOWEL RESECTION,  MESH X2   Pertinent Medical History: CAD, CHF, HTN, substance abuse   Precautions:  Fall Risk, abdominal    Assessment of current deficits   [x] Functional mobility  [x]ADLs  [x] Strength               [x]Cognition   [x] Functional transfers   [x] IADLs         [x] Safety Awareness   [x]Endurance   [] Fine Coordination              [x] Balance      [] Vision/perception   [x]Sensation    []Gross Motor Coordination  [] ROM  [] Delirium                   [] Motor Control     OT PLAN OF CARE   OT POC based on physician orders, patient diagnosis and results of clinical assessment    Frequency/Duration: 2-4 days/wk for 2 weeks PRN   Specific OT Treatment to include:   * Instruction/training on adapted ADL techniques and AE recommendations to increase functional independence within precautions       * Training on energy conservation strategies, correct breathing pattern and techniques to improve independence/tolerance for self-care routine  * Functional transfer/mobility training/DME recommendations for increased independence, safety, and fall prevention  * Patient/Family education to increase follow through with safety techniques and functional independence  * Recommendation of environmental modifications for increased safety with functional transfers/mobility and ADLs  * Therapeutic exercise to improve motor endurance, ROM, and functional strength for ADLs/functional transfers  * Therapeutic activities to facilitate/challenge dynamic balance, stand tolerance for increased safety and independence with ADLs  * Neuro-muscular re-education: facilitation of righting/equilibrium reactions, midline orientation, scapular stability/mobility, normalization of muscle tone, and facilitation of volitional active controled movement    Home Living: Pt lives with sister in a 2 story home; bed/bath in basement   Bathroom setup: walk in shower   Equipment owned: none  Prior Level of Function: IND with ADLs/IADLs; using no AD for functional mobility   Driving: yes    Pain Level: surgical site  Cognition: A&O: 3/4; Follows 1 step directions, with repetition and increased time   Memory:  good    Sequencing:  good    Problem solving:  fair    Judgement/safety:  fair     Functional Assessment:  AM-PAC Daily Activity Raw Score: 16/24   Initial Eval Status  Date: 5/3/22 Treatment Status  Date: STGs=LTGs  Time Frame: 10-14 days   Feeding Mod I (despite NPO status)      Grooming SBA (seated for oral care)   IND   UB Dressing SBA   IND   LB Dressing Mod A (assist with socks)  Min A    Bathing Mod A (simulated)  Min A    Toileting Mod A  Min A   Bed Mobility  Log roll: SBA  Supine to sit: SBA   Sit to supine: SBA   Log roll IND  Supine to sit: IND   Sit to supine: IND   Functional Transfers Sit to stand:SBA   Stand to sit:SBA  Commode: SBA  IND   Functional Mobility SBA (using no AD, to/from bathroom)  IND   Balance Sitting: SBA  Standing: SBA     Activity Tolerance fair     Visual/  Perceptual Glasses: no              UE ROM: RUE:  WFL  LUE:  WFL  Strength: RUE: grossly 4/5 LUE: grossly 4/5   Strength: B WFL  Fine Motor Coordination:  WFL     Hearing: WFL  Sensation:  No c/o numbness/tingling Tone:  WFL  Edema: none noted                            Comments:Cleared by RN to see pt. Upon arrival, patient supine in bed and agreeable to OT session. At end of session, patient supine in bed with call light and phone within reach, all lines and tubes intact. Pt would benefit from continued OT to increase functional independence and quality of life. Treatment:  Pt required vc's and physical assist for proper technique/safety with hand placement/body mechanics/posture for bed mobility/ADLs/functional tranfers/mobility. Pt required vc's for sequencing/initiation of ADLs/functional transfers. Pt able to  sit EOB ~10 mins to increase core strength/balance/activity tolerance for ease with ADLs. Pt required increased time to complete ADLs/functional transfers due to management of multiple lines and rest breaks. Educated on abdominal precautions. Pt appeared to have tolerated session well and appears motivated/cooperative/pleasant . Pt instructed on use of call light for assistance and fall prevention. Pt demo'ing fair understanding of education provided. Continue to educate. Pt had a large amount of emesis at end of session. RN notified. Eval Complexity: Low    Rehab Potential: Good for established goals, pt. assisted in establishment of goals. LTG: maximize independence with ADLs to return to PLOF    Patient instructed on diagnosis, prognosis/goals and plan of care. Demonstrated fair understanding. [] Malnutrition indicators have been identified and nursing has been notified to ensure a dietitian consult is ordered. Evaluation time includes thorough review of current medical information, gathering information on past medical & social history & PLOF, completion of standardized testing, informal observation of tasks, consultation with other medical professions/disciplines, assessment of data & development of POC/goals.      Time In: 1100       Time Out: 1125     Total treatment time: 15 Treatment Charges: Mins Units   OT Eval Low 10634 X    OT Eval Medium 33248     OT Eval High 63699     OT Re-Eval [id number]     Ther Ex  68314       Manual Therapy 36065       Thera Activities 23040       ADL/Home Mgt [id number] 15 1   Neuro Re-ed [id number]       Group Therapy        Orthotic manage/training  72614       Non-Billable Time           [facility], OTR/L [id number]

## 2022-05-03 NOTE — PROGRESS NOTES
Order placed for NG by physician at approx; 2:30 pm. This nurse explianed order for NG, patient is refusing, attempted to educate patient as to why NG was ordered, patient refused to allow placement. Messaged Surgeon who came to talk to the patient and placed NG at bedside with this nurse presant. Addendum: Assessed patient on floor and spoke with nurse at approximately noon. Verbal orders provided to RN for NG tube placement and repeat H/H at this time, RN agreed. At approximately 2:00pm is was found that orders were not complete and were placed at this time.

## 2022-05-03 NOTE — PLAN OF CARE
Patient's chart updated to reflect:      . - HF care plan, HF education points and HF discharge instructions.  -Orders: 2 gram sodium diet, daily weights, I/O.  -PCP and/or Cardiologist appointment to be scheduled within 7 days of hospital discharge.  -History of HF, not primary admission Dx.   Patient admitted for treatment of Incarcerated B/L inguinal hernia and ischemic small bowel segment s/p distal small bowel resection and bilateral open inguinal hernia repair with mesh on 4/30/22    Stacy Duarte RN RN, BSN  Heart Failure Navigator

## 2022-05-03 NOTE — PROGRESS NOTES
Patient seen this morning. He has had 1 episode of emesis which was dark brown and large-volume. The patient opted to not undergo NG tube placement at this time. Discussed with patient that I would plan for a KUB this morning and depending on his results and his clinical exam we would proceed with NG tube placement given his high risk for aspiration. The patient had multiple episodes of emesis since that time. Discussed with the staff to place NG tube however the patient refused. Went to see and examined patient and explained again that NG tube placement would result and resolution of his symptoms and would protect him from aspiration. The patient was agreeable and I placed an 25 French NG tube at the bedside. Confirmation with large volume of brown liquid output in the canister which smelled mostly like ginger ale. NG tube placement to be confirmed with KUB. Please place NG tube to low intermittent wall suction. The patient's 2 daughters were updated at the bedside during that time I had went to the patient's room to place his NG tube.         Rita Bertrand MD

## 2022-05-03 NOTE — PROGRESS NOTES
Patient did not have an NG placed when the x-ray techs went up to x-ray at 3:12. Nurse said [facility].

## 2022-05-03 NOTE — PROGRESS NOTES
Hospitalist Progress Note      Synopsis: Patient admitted for a right strangulated inguinal hernia. Patient originally presented to [facility] ED with sudden worsening of pain and bulging of right groin. He has chronic bilateral inguinal hernias that are mildly bothersome at baseline. He also endorses a cough productive of clear sputum x 2 weeks. He denies any chest pain or SOB. Of note, he had a follow up appointment with cardiology on  at which time he was told to start taking 20 mg of torsemide daily as opposed to 40 mg d/t hypotension. ED workup revealed a strangulated right inguinal hernia and stable left inguinal hernia. He was transferred here to [facility] ED per the request of the surgical team, however, we are asked to admit the patient given decompensated HFrEF and JOSE. He underwent a bilateral hernia repair with a distal small bowel resection on  and was subsequently admitted to ICU. Hospital day 3     Subjective:  He generally feels better but reports 1 episode of vomiting. No chest pain no abdominal pain. Still has not passed flatus no BM    Temp (24hrs), Av °F (36.7 °C), Min:97.3 °F (36.3 °C), Max:98.7 °F (37.1 °C)    DIET: Diet NPO Exceptions are: Sips of Water with Meds, Ice Chips  CODE: Full Code    Intake/Output Summary (Last 24 hours) at 5/3/2022 1339  Last data filed at 5/3/2022 0300  Gross per 24 hour   Intake 547.33 ml   Output 950 ml   Net -402.67 ml       Objective:    BP (!) 135/94   Pulse 100   Temp 97.3 °F (36.3 °C) (Temporal)   Resp 20   Ht 6' 2\" (1.88 m)   Wt 265 lb 6.4 oz (120.4 kg)   SpO2 95%   BMI 34.08 kg/m²     General appearance: Obese middle aged male in no apparent distress, appears stated age and cooperative. HEENT: Conjunctivae/corneas clear. Mucous membranes moist.  Neck: Supple. No JVD. Respiratory:  Clear to auscultation bilaterally. Normal respiratory effort. Cardiovascular:  IRRR. S1, S2 without MRG. SR with PVCs on bedside monitor.    PV: Pulses palpable. No edema. Abdomen: Soft, tenderness with palpation, non-distended. +BS, hypoactive  Musculoskeletal: No obvious deformities. Skin: Normal skin color. No rashes or lesions. Incisions with dressing that are CDI   Neurologic:  Grossly non-focal. Awake, alert, following commands. Psychiatric: Alert and oriented, thought content appropriate, normal insight and judgement    Medications:  REVIEWED DAILY    Infusion Medications    sodium chloride      lactated ringers 75 mL/hr at 05/02/22 2313     Scheduled Medications    polyethylene glycol  17 g Oral Daily    methocarbamol IVPB  500 mg IntraVENous Q8H    torsemide  40 mg Oral Daily    acetaminophen  650 mg Oral 3 times per day    carvedilol  25 mg Oral BID WC    aspirin  81 mg Oral Daily    empagliflozin  10 mg Oral Daily    sacubitril-valsartan  1 tablet Oral BID    atorvastatin  80 mg Oral Daily    sodium chloride flush  5-40 mL IntraVENous 2 times per day    heparin (porcine)  5,000 Units SubCUTAneous 3 times per day     PRN Meds: oxyCODONE **OR** oxyCODONE, HYDROmorphone **OR** [DISCONTINUED] HYDROmorphone, promethazine, sodium chloride, sodium chloride flush, ondansetron **OR** ondansetron, labetalol, hydrALAZINE    Labs:     Recent Labs     04/30/22 1920 05/01/22  0430 05/02/22  0430   WBC 18.4* 14.7* 9.5   HGB 17.1* 16.0 14.1   HCT 51.3 49.8 43.1    284 223       Recent Labs     04/30/22 1920 05/01/22  0430 05/02/22  0430    140 137   K 4.6 4.7 4.4    105 105   CO2 23 21* 21*   BUN 18 18 17   CREATININE 1.7* 1.6* 1.5*   CALCIUM 9.0 8.8 8.7   PHOS 2.9 3.7 2.4*       No results for input(s): PROT, ALB, ALKPHOS, ALT, AST, BILITOT, AMYLASE, LIPASE in the last 72 hours. No results for input(s): INR in the last 72 hours. No results for input(s): Amrita Hall in the last 72 hours.     Chronic labs:    Lab Results   Component Value Date    CHOL 167 03/22/2022    TRIG 117 03/22/2022    HDL 47 03/22/2022 [address] 97 03/22/2022       Radiology: REVIEWED DAILY    Assessment:  Strangulated right inguinal hernia s/p distal small bowel resection + bilateral hernia repair on 4/30. Decompensated HFrEF (EF 20-30% per echo 3/2022)  JOSE  Hypohosphatemia  Reactive leukocytosis  Recent gonorrheal infection - s/p IM ceftriaxone  Elevated troponin  Nonischemic cardiomyopathy  Suspected DERRICK  HTN  Chronic back pain  Cocaine abuse     Plan:  General surgery, cardiology, and ICU team following, continue to monitor   POD# 3  Early mobilization, OOB for meals  Encourage C&DB and IS  PT/OT  Consider BiPAP at HS  Entresto added, uptirtate as able  Consider adding aldactone if able   IVF continue, monitor closely for fluid overload, serum creatinine improved to 1.5 from 2.1  Trend BNP  Recommend OP sleep study   on substance cessation   repeat tx for STI given his current partner was never treated and his sx remain. DVT Prophylaxis [] Lovenox  [x]  Heparin [] DOAC [] PCDs [] Ambulation    GI Prophylaxis [] PPI  [] H2 Blocker   [] Carafate  [] Diet/Tube Feeds   Level of care [] Med/Surg  [] Intermediate  [x]  ICU   Diet Diet NPO Exceptions are: Sips of Water with Meds, Ice Chips    Family contact [x]  N/A - A&O    [] At bedside  [] Phone call     Discharge Plan: Home when stable pending bowel movement improvement    +++++++++++++++++++++++++++++++++++++++++++++++++  MD JOAN Giron/ Ziggy [address]  +++++++++++++++++++++++++++++++++++++++++++++++++  NOTE: This report was transcribed using voice recognition software. Every effort was made to ensure accuracy; however, inadvertent computerized transcription errors may be present.

## 2022-05-03 NOTE — PROGRESS NOTES
Patient wanting to ambulate at this time. Patient states this is the first time out of bed since surgery. Patient unable to tolerate more than to steps. Assisted back to bed and medicated for pain and nausea. Patient education provided on the importance of ambulation after surgery. Goal set to get OOB to chair today.    Nat Aguirre RN

## 2022-05-03 NOTE — PROGRESS NOTES
[facility]  PROGRESS NOTE  ATTENDING NOTE    CRITICAL CARE    Chief Complaint   Patient presents with    Hernia     FEELS LIKE HERNIA BROKE INSIDE THEM    Cough     CAUGHT A COLD, AND COUGHING ALLOT HURTS CHEST       HPI  40 y.o. male  with chronic bilatereral inguinal hernias that are reducible     The patient reported  acute, constant   pain localized to the right groin that started this AM. He developed nausea. .   The intensity of the pain is severe. There are no alleviating or worsening factors regarding the pain. He ate and had nausea. Last BM was last night. Pt has an extensive cardiac history. He last used cocaine 4 days ago. He has heart failure with EF 25%    Patient Active Problem List   Diagnosis    Acute coronary syndrome (HCC)    Acute on chronic systolic congestive heart failure (HCC)    Chronic bilateral low back pain with bilateral sciatica    Elevated serum creatinine    Sleep disorder breathing    Bilateral recurrent inguinal hernia without obstruction or gangrene    HFrEF (heart failure with reduced ejection fraction) ([address]    Nonischemic cardiomyopathy ([address]    Strangulated inguinal hernia       OVERNIGHT EVENTS:  No events overnight, no flatus yet    HOSPITAL COURSE:  4/30 emergency surgery--SBR with bilateral open inguinal hernia repair  5/1 hypertensive overnight  5/2 transferred from SICU, Robaxin stopped, A-line removed    BP 84/64   Pulse 85   Temp 98.7 °F (37.1 °C) (Axillary)   Resp 25   Ht 6' 2\" (1.88 m)   Wt 265 lb 3.4 oz (120.3 kg)   SpO2 92%   BMI 34.05 kg/m²   Physical Exam  Constitutional:       Appearance: Normal appearance. He is obese. HENT:      Head: Normocephalic and atraumatic. Nose: Nose normal.      Mouth/Throat:      Mouth: Mucous membranes are moist.      Pharynx: Oropharynx is clear. Eyes:      Extraocular Movements: Extraocular movements intact. Pupils: Pupils are equal, round, and reactive to light. Cardiovascular:      Rate and Rhythm: Normal rate and regular rhythm. Pulses: Normal pulses. Heart sounds: Normal heart sounds. Pulmonary:      Effort: Pulmonary effort is normal.      Breath sounds: Normal breath sounds. Abdominal:      General: There is distension. Palpations: Abdomen is soft. Tenderness: There is abdominal tenderness. Comments: Dressing with moderate amount of strikethrough  Abdomen softly distended with appropriate TTP  Bilateral inguinal wounds:  C/d/i   Musculoskeletal:         General: No tenderness or signs of injury. Cervical back: Normal range of motion and neck supple. Skin:     General: Skin is warm and dry. Neurological:      General: No focal deficit present. Mental Status: He is alert and oriented to person, place, and time. Psychiatric:         Mood and Affect: Mood normal.         Behavior: Behavior normal.         Thought Content: Thought content normal.         Judgment: Judgment normal.         Lines: Loya:  no  Central line:  no  PICC:  no    I have reviewed the laboratory studies    CXR:  none    ASSESSMENT/PLAN:  1. Neuro: acute pain syndrome--continue oxycodone and Dilaudid as needed  a. Cocaine abuse--social work for SBI  2. Cardio: Heart failure with ejection fraction 25%--cardiology following, continue Demadex, Jardiance, Entresto, Coreg, aspirin  a. Hypertension--continue Coreg  b. Hyperlipidemia--continue Lipitor  3. Respiratory: No acute issues  4. GI: Strangulated right inguinal hernia--status post small bowel resection, await return of bowel function  5. FEN: Hypophosphatemia-replace  6. Renal: CKD stage II--monitor urine output, monitor creatinine  7. Heme: No acute issues  8. Endocrine: Keep glucose less than 180  9.  ID: Recent gonorrhea infection--treated by medicine with IM ceftriaxone      DVT/GI ppx: Heparin, GI prophylaxis not required    Ok to transfer to monitored floor    Sana Goodman MD, MSc, FACS  5/2/2022  8:25 PM

## 2022-05-04 LAB
ALBUMIN SERPL-MCNC: 3.1 G/DL (ref 3.5–5.2)
ALP BLD-CCNC: 61 U/L (ref 40–129)
ALT SERPL-CCNC: 58 U/L (ref 0–40)
ANION GAP SERPL CALCULATED.3IONS-SCNC: 9 MMOL/L (ref 7–16)
AST SERPL-CCNC: 35 U/L (ref 0–39)
BILIRUB SERPL-MCNC: 0.6 MG/DL (ref 0–1.2)
BUN BLDV-MCNC: 31 MG/DL (ref 6–20)
C. TRACHOMATIS DNA ,URINE: NEGATIVE
CALCIUM SERPL-MCNC: 8.8 MG/DL (ref 8.6–10.2)
CHLORIDE BLD-SCNC: 105 MMOL/L (ref 98–107)
CO2: 27 MMOL/L (ref 22–29)
CREAT SERPL-MCNC: 1.4 MG/DL (ref 0.7–1.2)
GFR AFRICAN AMERICAN: >60
GFR NON-AFRICAN AMERICAN: >60 ML/MIN/1.73
GLUCOSE BLD-MCNC: 114 MG/DL (ref 74–99)
HCT VFR BLD CALC: 41.7 % (ref 37–54)
HEMOGLOBIN: 13.8 G/DL (ref 12.5–16.5)
MCH RBC QN AUTO: 29.9 PG (ref 26–35)
MCHC RBC AUTO-ENTMCNC: 33.1 % (ref 32–34.5)
MCV RBC AUTO: 90.3 FL (ref 80–99.9)
N. GONORRHOEAE DNA, URINE: NEGATIVE
PDW BLD-RTO: 14.5 FL (ref 11.5–15)
PLATELET # BLD: 334 E9/L (ref 130–450)
PMV BLD AUTO: 11.5 FL (ref 7–12)
POTASSIUM REFLEX MAGNESIUM: 3.7 MMOL/L (ref 3.5–5)
RBC # BLD: 4.62 E12/L (ref 3.8–5.8)
SODIUM BLD-SCNC: 141 MMOL/L (ref 132–146)
SOURCE: NORMAL
TOTAL PROTEIN: 6.4 G/DL (ref 6.4–8.3)
WBC # BLD: 9.5 E9/L (ref 4.5–11.5)

## 2022-05-04 PROCEDURE — 6370000000 HC RX 637 (ALT 250 FOR IP): Performed by: STUDENT IN AN ORGANIZED HEALTH CARE EDUCATION/TRAINING PROGRAM

## 2022-05-04 PROCEDURE — 97161 PT EVAL LOW COMPLEX 20 MIN: CPT

## 2022-05-04 PROCEDURE — 36415 COLL VENOUS BLD VENIPUNCTURE: CPT

## 2022-05-04 PROCEDURE — 2060000000 HC ICU INTERMEDIATE R&B

## 2022-05-04 PROCEDURE — 6370000000 HC RX 637 (ALT 250 FOR IP)

## 2022-05-04 PROCEDURE — 6360000002 HC RX W HCPCS: Performed by: STUDENT IN AN ORGANIZED HEALTH CARE EDUCATION/TRAINING PROGRAM

## 2022-05-04 PROCEDURE — 85027 COMPLETE CBC AUTOMATED: CPT

## 2022-05-04 PROCEDURE — 97530 THERAPEUTIC ACTIVITIES: CPT

## 2022-05-04 PROCEDURE — 97535 SELF CARE MNGMENT TRAINING: CPT

## 2022-05-04 PROCEDURE — 6370000000 HC RX 637 (ALT 250 FOR IP): Performed by: INTERNAL MEDICINE

## 2022-05-04 PROCEDURE — 2580000003 HC RX 258: Performed by: STUDENT IN AN ORGANIZED HEALTH CARE EDUCATION/TRAINING PROGRAM

## 2022-05-04 PROCEDURE — 80053 COMPREHEN METABOLIC PANEL: CPT

## 2022-05-04 PROCEDURE — 99024 POSTOP FOLLOW-UP VISIT: CPT | Performed by: SURGERY

## 2022-05-04 PROCEDURE — 6370000000 HC RX 637 (ALT 250 FOR IP): Performed by: NURSE PRACTITIONER

## 2022-05-04 RX ORDER — SODIUM CHLORIDE, SODIUM LACTATE, POTASSIUM CHLORIDE, CALCIUM CHLORIDE 600; 310; 30; 20 MG/100ML; MG/100ML; MG/100ML; MG/100ML
1000 INJECTION, SOLUTION INTRAVENOUS ONCE
Status: COMPLETED | OUTPATIENT
Start: 2022-05-04 | End: 2022-05-04

## 2022-05-04 RX ADMIN — METHOCARBAMOL 500 MG: 100 INJECTION INTRAMUSCULAR; INTRAVENOUS at 09:28

## 2022-05-04 RX ADMIN — SACUBITRIL AND VALSARTAN 1 TABLET: 24; 26 TABLET, FILM COATED ORAL at 20:41

## 2022-05-04 RX ADMIN — ACETAMINOPHEN 650 MG: 325 TABLET ORAL at 13:55

## 2022-05-04 RX ADMIN — HEPARIN SODIUM 5000 UNITS: 10000 INJECTION, SOLUTION INTRAVENOUS; SUBCUTANEOUS at 13:54

## 2022-05-04 RX ADMIN — CARVEDILOL 25 MG: 25 TABLET, FILM COATED ORAL at 17:24

## 2022-05-04 RX ADMIN — POLYETHYLENE GLYCOL 3350 17 G: 17 POWDER, FOR SOLUTION ORAL at 09:24

## 2022-05-04 RX ADMIN — HYDROMORPHONE HYDROCHLORIDE 0.5 MG: 1 INJECTION, SOLUTION INTRAMUSCULAR; INTRAVENOUS; SUBCUTANEOUS at 03:48

## 2022-05-04 RX ADMIN — METHOCARBAMOL 500 MG: 100 INJECTION INTRAMUSCULAR; INTRAVENOUS at 17:20

## 2022-05-04 RX ADMIN — SODIUM CHLORIDE, POTASSIUM CHLORIDE, SODIUM LACTATE AND CALCIUM CHLORIDE 1000 ML: 600; 310; 30; 20 INJECTION, SOLUTION INTRAVENOUS at 11:00

## 2022-05-04 RX ADMIN — SACUBITRIL AND VALSARTAN 1 TABLET: 24; 26 TABLET, FILM COATED ORAL at 09:24

## 2022-05-04 RX ADMIN — METHOCARBAMOL 500 MG: 100 INJECTION INTRAMUSCULAR; INTRAVENOUS at 05:46

## 2022-05-04 RX ADMIN — SODIUM CHLORIDE, POTASSIUM CHLORIDE, SODIUM LACTATE AND CALCIUM CHLORIDE: 600; 310; 30; 20 INJECTION, SOLUTION INTRAVENOUS at 03:57

## 2022-05-04 RX ADMIN — HYDROMORPHONE HYDROCHLORIDE 0.5 MG: 1 INJECTION, SOLUTION INTRAMUSCULAR; INTRAVENOUS; SUBCUTANEOUS at 12:47

## 2022-05-04 RX ADMIN — OXYCODONE HYDROCHLORIDE 10 MG: 10 TABLET ORAL at 11:02

## 2022-05-04 RX ADMIN — BENZOCAINE AND MENTHOL 1 LOZENGE: 15; 3.6 LOZENGE ORAL at 10:53

## 2022-05-04 RX ADMIN — TORSEMIDE 40 MG: 20 TABLET ORAL at 09:23

## 2022-05-04 RX ADMIN — ATORVASTATIN CALCIUM 80 MG: 80 TABLET, FILM COATED ORAL at 09:24

## 2022-05-04 RX ADMIN — ASPIRIN 81 MG: 81 TABLET, COATED ORAL at 09:24

## 2022-05-04 RX ADMIN — HEPARIN SODIUM 5000 UNITS: 10000 INJECTION, SOLUTION INTRAVENOUS; SUBCUTANEOUS at 06:12

## 2022-05-04 RX ADMIN — Medication 10 ML: at 09:24

## 2022-05-04 RX ADMIN — SODIUM CHLORIDE, POTASSIUM CHLORIDE, SODIUM LACTATE AND CALCIUM CHLORIDE: 600; 310; 30; 20 INJECTION, SOLUTION INTRAVENOUS at 12:26

## 2022-05-04 RX ADMIN — ONDANSETRON 4 MG: 2 INJECTION INTRAMUSCULAR; INTRAVENOUS at 03:48

## 2022-05-04 RX ADMIN — ACETAMINOPHEN 650 MG: 325 TABLET ORAL at 20:41

## 2022-05-04 RX ADMIN — HYDROMORPHONE HYDROCHLORIDE 0.5 MG: 1 INJECTION, SOLUTION INTRAMUSCULAR; INTRAVENOUS; SUBCUTANEOUS at 20:46

## 2022-05-04 RX ADMIN — OXYCODONE HYDROCHLORIDE 10 MG: 10 TABLET ORAL at 18:55

## 2022-05-04 RX ADMIN — HEPARIN SODIUM 5000 UNITS: 10000 INJECTION, SOLUTION INTRAVENOUS; SUBCUTANEOUS at 20:41

## 2022-05-04 RX ADMIN — EMPAGLIFLOZIN 10 MG: 10 TABLET, FILM COATED ORAL at 09:23

## 2022-05-04 RX ADMIN — ACETAMINOPHEN 650 MG: 325 TABLET ORAL at 05:45

## 2022-05-04 RX ADMIN — CARVEDILOL 25 MG: 25 TABLET, FILM COATED ORAL at 09:23

## 2022-05-04 ASSESSMENT — PAIN DESCRIPTION - ORIENTATION
ORIENTATION: MID

## 2022-05-04 ASSESSMENT — PAIN DESCRIPTION - DESCRIPTORS
DESCRIPTORS: ACHING;SORE
DESCRIPTORS: ACHING;SORE
DESCRIPTORS: ACHING
DESCRIPTORS: ACHING;SORE
DESCRIPTORS: ACHING
DESCRIPTORS: ACHING;SORE
DESCRIPTORS: ACHING
DESCRIPTORS: SORE
DESCRIPTORS: ACHING;SORE

## 2022-05-04 ASSESSMENT — PAIN DESCRIPTION - LOCATION
LOCATION: ABDOMEN;THROAT
LOCATION: ABDOMEN
LOCATION: ABDOMEN
LOCATION: ABDOMEN;THROAT
LOCATION: ABDOMEN;THROAT
LOCATION: ABDOMEN
LOCATION: ABDOMEN;THROAT
LOCATION: ABDOMEN
LOCATION: ABDOMEN

## 2022-05-04 ASSESSMENT — PAIN DESCRIPTION - PAIN TYPE: TYPE: ACUTE PAIN;SURGICAL PAIN

## 2022-05-04 ASSESSMENT — PAIN SCALES - GENERAL
PAINLEVEL_OUTOF10: 2
PAINLEVEL_OUTOF10: 8
PAINLEVEL_OUTOF10: 8
PAINLEVEL_OUTOF10: 9
PAINLEVEL_OUTOF10: 7
PAINLEVEL_OUTOF10: 9
PAINLEVEL_OUTOF10: 7
PAINLEVEL_OUTOF10: 10
PAINLEVEL_OUTOF10: 7
PAINLEVEL_OUTOF10: 4

## 2022-05-04 ASSESSMENT — PAIN - FUNCTIONAL ASSESSMENT
PAIN_FUNCTIONAL_ASSESSMENT: PREVENTS OR INTERFERES SOME ACTIVE ACTIVITIES AND ADLS

## 2022-05-04 ASSESSMENT — PAIN DESCRIPTION - FREQUENCY: FREQUENCY: CONTINUOUS

## 2022-05-04 NOTE — CARE COORDINATION
Care Coordination:  Following for discharge needs. 39 yo s/p ex lap b/l IH repair with post op ileus in setting of CHF   Plan remains home to basement apartment at sister's home when medically cleared. PT OT following . Functional levels support safety of this plan. NG clamped and advanced to clear diet today. Discharge home when tolerating diet and medically stable.  No needs

## 2022-05-04 NOTE — PROGRESS NOTES
OCCUPATIONAL THERAPY TREATMENT NOTE     [address] [address] [address]         Date:2022  Patient Name: Jigar Murguia  MRN: [id number]  : 1975  Room: [address] [place] [address]          Evaluating OT: AGUEDA Carias, OTR/L [id number]  Referring Provider:Viral Fernandes DO  Specific Provider Orders: OT eval and treat   Recommended Adaptive Equipment: shower chair   Pt issued LE dressing/bathing AE      Diagnosis: hernia insufficiency   Surgery:  LAPAROSCOPY DIAGNOSTIC,  EXPLORATORY INVERTED TO EXPLORATORY LAPOROTOMY, DISTAL SMALL BOWEL RESECTION, PRIMARY STAPLED ANASTAMOSIS, BILATERAL INGUINAL HERNIA REPAIR WITH MESH (N/A Abdomen)      LAPAROTOMY EXPLORATORY BILATERAL INGUINAL HERNIA REPAIR OPEN,  BOWEL RESECTION,  MESH X2   Pertinent Medical History: CAD, CHF, HTN, substance abuse   Precautions:  Fall Risk, abdominal     Assessment of current deficits   [x]? Functional mobility           [x]? ADLs           [x]? Strength                  [x]? Cognition   [x]? Functional transfers         [x]? IADLs         [x]? Safety Awareness   [x]? Endurance   []? Fine Coordination                         [x]? Balance      []? Vision/perception   [x]? Sensation     []? Gross Motor Coordination             []? ROM           []?  Delirium                   []? Motor Control      OT PLAN OF CARE   OT POC based on physician orders, patient diagnosis and results of clinical assessment     Frequency/Duration: 2-4 days/wk for 2 weeks PRN   Specific OT Treatment to include:   * Instruction/training on adapted ADL techniques and AE recommendations to increase functional independence within precautions       * Training on energy conservation strategies, correct breathing pattern and techniques to improve independence/tolerance for self-care routine  * Functional transfer/mobility training/DME recommendations for increased independence, safety, and fall prevention  * Patient/Family education to increase follow through with safety techniques and functional independence  * Recommendation of environmental modifications for increased safety with functional transfers/mobility and ADLs  * Therapeutic exercise to improve motor endurance, ROM, and functional strength for ADLs/functional transfers  * Therapeutic activities to facilitate/challenge dynamic balance, stand tolerance for increased safety and independence with ADLs  * Neuro-muscular re-education: facilitation of righting/equilibrium reactions, midline orientation, scapular stability/mobility, normalization of muscle tone, and facilitation of volitional active controled movement     Home Living: Pt lives with sister in a 2 story home; bed/bath in basement   Bathroom setup: walk in shower   Equipment owned: none  Prior Level of Function: IND with ADLs/IADLs; using no AD for functional mobility   Driving: yes     Pain Level: surgical site  Cognition: A&O: 3/4; Follows 1 step directions, with repetition and increased time             Memory:  good              Sequencing:  good              Problem solving:  fair              Judgement/safety:  fair      Functional Assessment:  AM-PAC Daily Activity Raw Score: 16/24    Initial Eval Status  Date: 5/3/22 Treatment Status  Date: 5/4/22 STGs=LTGs  Time Frame: 10-14 days   Feeding Mod I (despite NPO status)  MOD I seated EOB nursing present giving meds with pt bringing cup to mouth and take drink with meds     Grooming SBA (seated for oral care)   SBA simulated EOB  IND   UB Dressing SBA  n/t IND   LB Dressing Mod A (assist with socks) MIN A to christa pants using reacher  To christa/doff socks using sock aide, LH shoe horn pt requiring assist to thread sock onto device 2* to instruction, v/c's for instruction on LE dressing AE  Min A    Bathing Mod A (simulated)  pt educated with regards to DME, bathing AE, safety awareness  Min A    Toileting Mod A N/t;  Min A   Bed Mobility  Log roll: SBA  Supine to sit: SBA   Sit to supine: SBA Supine<>sit SBA pt able to complete log roll technique Log roll IND  Supine to sit: IND   Sit to supine: IND   Functional Transfers Sit to stand:SBA   Stand to sit:SBA  Commode: SBA Sit<>stand from EOB no AD    IND   Functional Mobility SBA (using no AD, to/from bathroom) N/t  IND   Balance Sitting: SBA  Standing: SBA Sitting:   Static: SBA  Dynamic: SBA   Standing: SBA       Activity Tolerance fair  fair      Visual/  Perceptual Glasses: no                     Comments: Upon arrival pt supine in bed, agreeable to therapy session. Pt educated with regards to bed mobility, functional transfers, hand placement, fall prevention, abdominal precautions, LE dressing AE, DME, bathing AE, importance of increased activity. At end of session pt supine in bed,  all lines and tubes intact, call light within reach. · Pt has made fair+ progress towards set goals.    · Continue with current plan of care      Treatment Time In:1340            Treatment Time Out: 8158               Treatment Charges: Mins Units   Ther Ex  80683     Manual Therapy Oniel Spangler 4332 13720 15 1   ADL/Home Mgt 22325 23 2   Neuro Re-ed 16331     Group Therapy      Orthotic manage/training  [id number]     Non-Billable Time     Total Timed Treatment [address] [place]/L [address]

## 2022-05-04 NOTE — PROGRESS NOTES
Patient C/O abdominal pain and throat pain. Wants to speak to doctor about removing NG tube. Educated patient on the need for NG tube for Decompression of the stomach. Pt offered  dilaudid and Zofran for pain and comfort. Patient accepted & medicated. Continue to monitor.   Sharon Chery RN

## 2022-05-04 NOTE — PROGRESS NOTES
Patient C/O abdominal pain 8/10-N. Denies nausea at this time. Medicated with dilaudid 0.5mg IV. /95 medicated with Hydralazine 20mg IV. Asked patient if he voided, patient stated \"yes\" but was emptied by family in bathroom. Instructed patient that staff need to record how much urine he outputs, along with how much ice chip/sips or water he takes in. Patient and daughter verbalized understanding.   Malu Marquez RN

## 2022-05-04 NOTE — PROGRESS NOTES
Hospitalist Progress Note      Synopsis: Patient admitted for a right strangulated inguinal hernia. Patient originally presented to [facility] ED with sudden worsening of pain and bulging of right groin. He has chronic bilateral inguinal hernias that are mildly bothersome at baseline. He also endorses a cough productive of clear sputum x 2 weeks. He denies any chest pain or SOB. Of note, he had a follow up appointment with cardiology on  at which time he was told to start taking 20 mg of torsemide daily as opposed to 40 mg d/t hypotension. ED workup revealed a strangulated right inguinal hernia and stable left inguinal hernia. He was transferred here to [facility] ED per the request of the surgical team, however, we are asked to admit the patient given decompensated HFrEF and JOSE. He underwent a bilateral hernia repair with a distal small bowel resection on  and was subsequently admitted to ICU. Hospital day 4     Subjective:    Is better today NG tube in place. Patient reports passing Flatus. Remains afebrile. Temp (24hrs), Av.3 °F (36.3 °C), Min:97 °F (36.1 °C), Max:97.6 °F (36.4 °C)    DIET: ADULT DIET; Clear Liquid  CODE: Full Code    Intake/Output Summary (Last 24 hours) at 2022 1224  Last data filed at 2022 9848  Gross per 24 hour   Intake 420 ml   Output 2240 ml   Net -1820 ml       Objective:    /80   Pulse 90   Temp 97.4 °F (36.3 °C) (Oral)   Resp 16   Ht 6' 2\" (1.88 m)   Wt 268 lb 3.2 oz (121.7 kg)   SpO2 96%   BMI 34.43 kg/m²     General appearance: Obese middle aged male in no apparent distress, appears stated age and cooperative. NG tube in place  HEENT: Conjunctivae/corneas clear. Mucous membranes moist.  Neck: Supple. No JVD. Respiratory:  Clear to auscultation bilaterally. Normal respiratory effort. Cardiovascular:  IRRR. S1, S2 without MRG. SR with PVCs on bedside monitor. PV: Pulses palpable. No edema.    Abdomen: Soft, tenderness with palpation, non-distended. +BS, hypoactive  Skin: Normal skin color. No rashes or lesions. Incisions with dressing that are CDI   Neurologic:  Grossly non-focal. Awake, alert, following commands. Psychiatric: Alert and oriented, thought content appropriate, normal insight and judgement    Medications:  REVIEWED DAILY    Infusion Medications    sodium chloride      lactated ringers 75 mL/hr at 05/04/22 0357     Scheduled Medications    polyethylene glycol  17 g Oral Daily    methocarbamol IVPB  500 mg IntraVENous Q8H    torsemide  40 mg Oral Daily    acetaminophen  650 mg Oral 3 times per day    carvedilol  25 mg Oral BID WC    aspirin  81 mg Oral Daily    empagliflozin  10 mg Oral Daily    sacubitril-valsartan  1 tablet Oral BID    atorvastatin  80 mg Oral Daily    sodium chloride flush  5-40 mL IntraVENous 2 times per day    heparin (porcine)  5,000 Units SubCUTAneous 3 times per day     PRN Meds: benzocaine-menthol, oxyCODONE **OR** oxyCODONE, HYDROmorphone **OR** [DISCONTINUED] HYDROmorphone, promethazine, sodium chloride, sodium chloride flush, ondansetron **OR** ondansetron, labetalol, hydrALAZINE    Labs:     Recent Labs     05/02/22  0430 05/03/22  1441 05/04/22  0811   WBC 9.5 12.1* 9.5   HGB 14.1 14.8 13.8   HCT 43.1 44.5 41.7    273 334       Recent Labs     05/02/22  0430 05/03/22  1441 05/04/22  0811    140 141   K 4.4 4.4 3.7    105 105   CO2 21* 23 27   BUN 17 29* 31*   CREATININE 1.5* 1.4* 1.4*   CALCIUM 8.7 9.2 8.8   PHOS 2.4*  --   --        Recent Labs     05/03/22  1441 05/04/22  0811   PROT 7.2 6.4   ALKPHOS 63 61   ALT 69* 58*   AST 53* 35   BILITOT 0.8 0.6       No results for input(s): INR in the last 72 hours. No results for input(s): Khushi Kirby in the last 72 hours.     Chronic labs:    Lab Results   Component Value Date    CHOL 167 03/22/2022    TRIG 117 03/22/2022    HDL 47 03/22/2022    LDLCALC 97 03/22/2022       Radiology: REVIEWED DAILY    Assessment:  Strangulated right inguinal hernia s/p distal small bowel resection + bilateral hernia repair on 4/30. Decompensated HFrEF (EF 20-30% per echo 3/2022)  JOSE  Hypohosphatemia  Reactive leukocytosis  Recent gonorrheal infection - s/p IM ceftriaxone  Elevated troponin  Nonischemic cardiomyopathy  Suspected DERRICK  HTN  Chronic back pain  Cocaine abuse     Plan:  General surgery, cardiology, and ICU team following, continue to monitor   POD# 4  Early mobilization, OOB for meals  Encourage C&DB and IS  PT/OT  Consider BiPAP at HS  Entresto added, uptirtate as able  Consider adding aldactone if able   IVF continue, monitor closely for fluid overload, serum creatinine improved to 1.4 from 2.1  Recommend OP sleep study   on substance cessation       DVT Prophylaxis [] Lovenox  [x]  Heparin [] DOAC [] PCDs [] Ambulation    GI Prophylaxis [] PPI  [] H2 Blocker   [] Carafate  [] Diet/Tube Feeds   Level of care [] Med/Surg  [] Intermediate  [x]  ICU   Diet ADULT DIET; Clear Liquid    Family contact [x]  N/A - A&O    [] At bedside  [] Phone call     Discharge Plan: Home when stable pending bowel movement improvement    +++++++++++++++++++++++++++++++++++++++++++++++++  Sarah Oconnor MD   C/ Ziggy [address]  +++++++++++++++++++++++++++++++++++++++++++++++++  NOTE: This report was transcribed using voice recognition software. Every effort was made to ensure accuracy; however, inadvertent computerized transcription errors may be present.

## 2022-05-04 NOTE — PLAN OF CARE

## 2022-05-04 NOTE — PROGRESS NOTES
Physical Therapy  Physical Therapy Initial Assessment     Name: Sathya Ortega  : 1975  MRN: [id number]      Date of Service: 2022    Evaluating PT:  Walter Malinlem, PT, DPT [id number]    Room #:  7079/4419-X  Diagnosis:  Strangulated inguinal hernia [K40.30]  Acute renal insufficiency [N28.9]  Hypervolemia, unspecified hypervolemia type [E87.70]  PMHx/PSHx:  CHF, HTN, drug abuse, NICM, CAD, GERD  Procedure/Surgery:  Diagnostic laparoscopy converted to exploratory laparotomy, resection of ~20cm segment of distal small bowel >20cm from the terminal ileum with primary stapled side to side anastomosis, bilateral open inguinal hernia repair with mesh (2022)  Precautions:  Falls, abdominal precautions, NG tube  Equipment Needs:  TBD  Equipment Owned:  None    SUBJECTIVE:    Pt lives with sister in a 2 story home with + basement with 2 stair(s) to enter and 2 rail(s). Bed is on basement floor and bath is on basement floor. There is a full flight of stairs with 1 rail to basement; full flight of stairs with 2 rails to 2nd floor. Pt ambulated with no AD PTA. OBJECTIVE:   Initial Evaluation  Date: 2022 Treatment Short Term/ Long Term   Goals   AM-PAC 6 Clicks      Was pt agreeable to Eval/treatment? Yes     Does pt have pain? Mild throat, abdomen     Bed Mobility  Rolling: SBA  Supine to sit: NT  Sit to supine: SBA  Scooting: NT  Rolling: Independent  Supine to sit: Independent  Sit to supine: Independent  Scooting: Independent   Transfers Sit to stand: SBA  Stand to sit: SBA  Stand pivot: NT  Sit to stand: Independent  Stand to sit:  Independent  Stand pivot: Independent with no AD   Ambulation    20', 20' with no AD (pushing IV pole) SBA  150 feet with no AD Independent   Stair negotiation: ascended and descended  NT  12 step(s) with 1 rail(s) Sanjuana   ROM BUE:  See OT note  BLE:  WFL     Strength BUE:  See OT note  BLE:  WFL     Balance Sitting EOB:  Independent  Dynamic Standing:  SBA with no AD (pushing IV pole)  Sitting EOB:  Independent  Dynamic Standing:  Independent with no AD     Pt is A & O x 4  Sensation:  Pt denies numbness and tingling to extremities  Edema:  Unremarkable    Vitals:   , SpO2 96% at rest.  HR 94, SpO2 94% following activity. Patient education  Pt educated on role of PT, abdominal precautions, safety during functional mobility. Patient response to education:   Pt verbalized understanding Pt demonstrated skill Pt requires further education in this area   Yes Yes Yes     ASSESSMENT:    Conditions Requiring Skilled Therapeutic Intervention:    [x]Decreased strength     []Decreased ROM  [x]Decreased functional mobility  [x]Decreased balance   [x]Decreased endurance   [x]Decreased posture  []Decreased sensation  []Decreased coordination   []Decreased vision  [x]Decreased safety awareness   [x]Increased pain       Comments:  Session cleared by nursing. Patient standing in room upon arrival; agreeable to PT evaluation. Performed multiple sit <> stand transfers; required increased time, verbal cues related to positioning/sequencing to ensure safety during such. Ambulated with decreased speed and steadiness. Rest break provided between ambulation bouts. Required increased time to perform bed mobility via log roll technique. Denied dizziness, shortness of breath. Vitals monitored and noted. Patient left in semi-Post's position with nursing present, call light in reach. Patient would benefit from continued skilled PT services to address functional deficits and prevent deconditioning.     Treatment:  Patient practiced and was instructed in the following treatment:     Bed mobility - education and verbal cues to facilitate proper positioning and sequencing regarding log roll technique; physical assistance provided as needed during activity   Functional transfers - verbal cues to facilitate proper positioning and sequencing, particularly related to hand/foot placement; physical assistance provided as needed during activity   Ambulation - verbal cues to facilitate proper positioning; physical assistance provided as needed during activity    Pt's/ family goals   1. Return home    Prognosis is good for reaching above PT goals. Patient and or family understand(s) diagnosis, prognosis, and plan of care. Yes    PHYSICAL THERAPY PLAN OF CARE:    PT POC is established based on physician order and patient diagnosis     Referring provider/PT Order:    05/03/22 0930  PT eval and treat  Start:  05/03/22 0930,   End:  05/03/22 0930,   ONE TIME,   Standing Count:  1 Occurrences,   R         Rafa Meadows MD       Diagnosis:  Strangulated inguinal hernia [K40.30]  Acute renal insufficiency [N28.9]  Hypervolemia, unspecified hypervolemia type [E87.70]  Specific instructions for next treatment:  Continue to facilitate safe performance of functional mobility; progress as appropriate. Current Treatment Recommendations:     [x] Strengthening to improve independence with functional mobility   [] ROM to improve independence with functional mobility   [x] Balance Training to improve static/dynamic balance and to reduce fall risk  [x] Endurance Training to improve activity tolerance during functional mobility   [x] Transfer Training to improve safety and independence with all functional transfers   [x] Gait Training to improve gait mechanics, endurance and assess need for appropriate assistive device  [x] Stair Training in preparation for safe discharge home and/or into the community   [x] Positioning to prevent skin breakdown and contractures  [x] Safety and Education Training   [x] Patient/Caregiver Education   [] HEP  [] Other     PT long term treatment goals are located in above grid    Frequency of treatments: 2-5x/week x 1-2 weeks.     Time in  0905  Time out  0925    Total Treatment Time  10 minutes     Evaluation Time includes thorough review of current medical information, gathering information on past medical history/social history and prior level of function, completion of standardized testing/informal observation of tasks, assessment of data and education on plan of care and goals.     CPT codes:  [x] Low Complexity PT evaluation 53609  [] Moderate Complexity PT evaluation 59076  [] High Complexity PT evaluation 52872  [] PT Re-evaluation 32755  [] Gait training 28475 0 minutes  [] Manual therapy 47667 0 minutes  [x] Therapeutic activities 89874 10 minutes  [] Therapeutic exercises 48304 0 minutes  [] Neuromuscular reeducation 55985 0 minutes     Simon Griffin, PT, DPT  [id number]

## 2022-05-04 NOTE — PROGRESS NOTES
General Surgery Progress Note:    CC: post op    S: ilues starting to resolve, passing some flatus just c/o soar throat.        Objective:  @/80   Pulse 90   Temp 97.4 °F (36.3 °C) (Oral)   Resp 16   Ht 6' 2\" (1.88 m)   Wt 268 lb 3.2 oz (121.7 kg)   SpO2 96%   BMI 34.43 kg/m² @    Physical -     Gen: NAD  Resp: Breathing Comfortably, no resp distress clear   CV: Reg Rate no extra heart sounds   Abd: softly distended   EXT nvi    Assessment/Plan: 41 yo s/p ex lap b/l IH repair with post op ileus in setting of CHF     - clamp clear trial   - lozenges for throat ok gum hard candy   - ambulate    Asa heparin scds,     Taiwo Powell MD FACS   See d/c summary     9:49 AM

## 2022-05-05 VITALS
SYSTOLIC BLOOD PRESSURE: 122 MMHG | HEART RATE: 92 BPM | WEIGHT: 269.1 LBS | HEIGHT: 74 IN | TEMPERATURE: 97 F | DIASTOLIC BLOOD PRESSURE: 80 MMHG | BODY MASS INDEX: 34.53 KG/M2 | RESPIRATION RATE: 18 BRPM | OXYGEN SATURATION: 97 %

## 2022-05-05 LAB
ALBUMIN SERPL-MCNC: 3.3 G/DL (ref 3.5–5.2)
ALP BLD-CCNC: 66 U/L (ref 40–129)
ALT SERPL-CCNC: 61 U/L (ref 0–40)
ANION GAP SERPL CALCULATED.3IONS-SCNC: 12 MMOL/L (ref 7–16)
AST SERPL-CCNC: 42 U/L (ref 0–39)
BILIRUB SERPL-MCNC: 0.6 MG/DL (ref 0–1.2)
BUN BLDV-MCNC: 27 MG/DL (ref 6–20)
CALCIUM SERPL-MCNC: 8.6 MG/DL (ref 8.6–10.2)
CHLORIDE BLD-SCNC: 102 MMOL/L (ref 98–107)
CO2: 28 MMOL/L (ref 22–29)
CREAT SERPL-MCNC: 1.5 MG/DL (ref 0.7–1.2)
GFR AFRICAN AMERICAN: >60
GFR NON-AFRICAN AMERICAN: >60 ML/MIN/1.73
GLUCOSE BLD-MCNC: 120 MG/DL (ref 74–99)
HCT VFR BLD CALC: 38.5 % (ref 37–54)
HEMOGLOBIN: 12.7 G/DL (ref 12.5–16.5)
MAGNESIUM: 2.2 MG/DL (ref 1.6–2.6)
MCH RBC QN AUTO: 30.1 PG (ref 26–35)
MCHC RBC AUTO-ENTMCNC: 33 % (ref 32–34.5)
MCV RBC AUTO: 91.2 FL (ref 80–99.9)
PDW BLD-RTO: 14.2 FL (ref 11.5–15)
PLATELET # BLD: 287 E9/L (ref 130–450)
PMV BLD AUTO: 11.7 FL (ref 7–12)
POTASSIUM REFLEX MAGNESIUM: 3.4 MMOL/L (ref 3.5–5)
RBC # BLD: 4.22 E12/L (ref 3.8–5.8)
SODIUM BLD-SCNC: 142 MMOL/L (ref 132–146)
TOTAL PROTEIN: 6.2 G/DL (ref 6.4–8.3)
WBC # BLD: 7.2 E9/L (ref 4.5–11.5)

## 2022-05-05 PROCEDURE — 6370000000 HC RX 637 (ALT 250 FOR IP): Performed by: STUDENT IN AN ORGANIZED HEALTH CARE EDUCATION/TRAINING PROGRAM

## 2022-05-05 PROCEDURE — 6370000000 HC RX 637 (ALT 250 FOR IP)

## 2022-05-05 PROCEDURE — 2580000003 HC RX 258: Performed by: INTERNAL MEDICINE

## 2022-05-05 PROCEDURE — 6370000000 HC RX 637 (ALT 250 FOR IP): Performed by: INTERNAL MEDICINE

## 2022-05-05 PROCEDURE — 99024 POSTOP FOLLOW-UP VISIT: CPT | Performed by: SURGERY

## 2022-05-05 PROCEDURE — 6360000002 HC RX W HCPCS: Performed by: STUDENT IN AN ORGANIZED HEALTH CARE EDUCATION/TRAINING PROGRAM

## 2022-05-05 PROCEDURE — 36415 COLL VENOUS BLD VENIPUNCTURE: CPT

## 2022-05-05 PROCEDURE — 2580000003 HC RX 258: Performed by: STUDENT IN AN ORGANIZED HEALTH CARE EDUCATION/TRAINING PROGRAM

## 2022-05-05 PROCEDURE — 80053 COMPREHEN METABOLIC PANEL: CPT

## 2022-05-05 PROCEDURE — 6370000000 HC RX 637 (ALT 250 FOR IP): Performed by: NURSE PRACTITIONER

## 2022-05-05 PROCEDURE — 85027 COMPLETE CBC AUTOMATED: CPT

## 2022-05-05 PROCEDURE — 83735 ASSAY OF MAGNESIUM: CPT

## 2022-05-05 RX ORDER — HYDROCODONE BITARTRATE AND ACETAMINOPHEN 5; 325 MG/1; MG/1
1 TABLET ORAL EVERY 4 HOURS PRN
Qty: 12 TABLET | Refills: 0 | Status: SHIPPED | OUTPATIENT
Start: 2022-05-05 | End: 2022-05-07

## 2022-05-05 RX ORDER — SPIRONOLACTONE 25 MG/1
25 TABLET ORAL DAILY
Qty: 30 TABLET | Refills: 0 | Status: SHIPPED | OUTPATIENT
Start: 2022-05-05 | End: 2022-08-05 | Stop reason: SDUPTHER

## 2022-05-05 RX ORDER — POTASSIUM CHLORIDE 20 MEQ/1
20 TABLET, EXTENDED RELEASE ORAL ONCE
Status: COMPLETED | OUTPATIENT
Start: 2022-05-05 | End: 2022-05-05

## 2022-05-05 RX ORDER — ACETAMINOPHEN 160 MG/5ML
650 SOLUTION ORAL EVERY 8 HOURS SCHEDULED
Status: DISCONTINUED | OUTPATIENT
Start: 2022-05-05 | End: 2022-05-05 | Stop reason: SDUPTHER

## 2022-05-05 RX ORDER — ACETAMINOPHEN 325 MG/1
650 TABLET ORAL EVERY 8 HOURS SCHEDULED
Status: DISCONTINUED | OUTPATIENT
Start: 2022-05-05 | End: 2022-05-05 | Stop reason: HOSPADM

## 2022-05-05 RX ORDER — CARVEDILOL 25 MG/1
25 TABLET ORAL 2 TIMES DAILY WITH MEALS
Qty: 60 TABLET | Refills: 0 | Status: SHIPPED | OUTPATIENT
Start: 2022-05-05 | End: 2022-08-05 | Stop reason: SDUPTHER

## 2022-05-05 RX ADMIN — HEPARIN SODIUM 5000 UNITS: 10000 INJECTION, SOLUTION INTRAVENOUS; SUBCUTANEOUS at 05:54

## 2022-05-05 RX ADMIN — ASPIRIN 81 MG: 81 TABLET, COATED ORAL at 10:33

## 2022-05-05 RX ADMIN — SODIUM CHLORIDE, POTASSIUM CHLORIDE, SODIUM LACTATE AND CALCIUM CHLORIDE: 600; 310; 30; 20 INJECTION, SOLUTION INTRAVENOUS at 02:12

## 2022-05-05 RX ADMIN — POLYETHYLENE GLYCOL 3350 17 G: 17 POWDER, FOR SOLUTION ORAL at 10:40

## 2022-05-05 RX ADMIN — SACUBITRIL AND VALSARTAN 1 TABLET: 24; 26 TABLET, FILM COATED ORAL at 10:40

## 2022-05-05 RX ADMIN — ATORVASTATIN CALCIUM 80 MG: 80 TABLET, FILM COATED ORAL at 10:33

## 2022-05-05 RX ADMIN — TORSEMIDE 40 MG: 20 TABLET ORAL at 12:25

## 2022-05-05 RX ADMIN — METHOCARBAMOL 500 MG: 100 INJECTION INTRAMUSCULAR; INTRAVENOUS at 02:12

## 2022-05-05 RX ADMIN — POTASSIUM CHLORIDE 20 MEQ: 20 TABLET, EXTENDED RELEASE ORAL at 10:40

## 2022-05-05 RX ADMIN — EMPAGLIFLOZIN 10 MG: 10 TABLET, FILM COATED ORAL at 10:33

## 2022-05-05 RX ADMIN — CARVEDILOL 25 MG: 25 TABLET, FILM COATED ORAL at 10:33

## 2022-05-05 RX ADMIN — OXYCODONE HYDROCHLORIDE 10 MG: 10 TABLET ORAL at 10:31

## 2022-05-05 RX ADMIN — ACETAMINOPHEN 650 MG: 325 TABLET ORAL at 05:54

## 2022-05-05 RX ADMIN — OXYCODONE HYDROCHLORIDE 10 MG: 10 TABLET ORAL at 04:33

## 2022-05-05 ASSESSMENT — PAIN SCALES - GENERAL
PAINLEVEL_OUTOF10: 7
PAINLEVEL_OUTOF10: 10

## 2022-05-05 ASSESSMENT — PAIN DESCRIPTION - ONSET
ONSET: SUDDEN
ONSET: ON-GOING

## 2022-05-05 ASSESSMENT — PAIN DESCRIPTION - FREQUENCY
FREQUENCY: CONTINUOUS
FREQUENCY: CONTINUOUS

## 2022-05-05 ASSESSMENT — PAIN DESCRIPTION - LOCATION
LOCATION: ABDOMEN

## 2022-05-05 ASSESSMENT — PAIN - FUNCTIONAL ASSESSMENT: PAIN_FUNCTIONAL_ASSESSMENT: PREVENTS OR INTERFERES SOME ACTIVE ACTIVITIES AND ADLS

## 2022-05-05 ASSESSMENT — PAIN DESCRIPTION - DESCRIPTORS
DESCRIPTORS: ACHING;SHARP;DISCOMFORT
DESCRIPTORS: ACHING;DISCOMFORT
DESCRIPTORS: ACHING;DISCOMFORT

## 2022-05-05 ASSESSMENT — PAIN DESCRIPTION - PAIN TYPE
TYPE: ACUTE PAIN;SURGICAL PAIN

## 2022-05-05 ASSESSMENT — PAIN DESCRIPTION - ORIENTATION
ORIENTATION: RIGHT;LEFT;MID

## 2022-05-05 NOTE — DISCHARGE INSTR - COC
Continuity of Care Form    Patient Name: Erika Castro   :  1975  MRN:  [id number]    Admit date:  2022  Discharge date:  ***    Code Status Order: Full Code   Advance Directives:      Admitting Physician:  Leydi Oliver MD  PCP: Aleksandr Cano DO    Discharging Nurse: [facility] Unit/Room#: 6254/3051-E  Discharging Unit Phone Number: ***    Emergency Contact:   Extended Emergency Contact Information  Primary Emergency Contact: Rosa Sanford Phone: [phone]  Mobile Phone: [phone]  Relation: Domestic Partner   needed? No  Secondary Emergency Contact: [facility] Phone: [phone]  Mobile Phone: [phone]  Relation: Brother/Sister   needed? No    Past Surgical History:  Past Surgical History:   Procedure Laterality Date    CARDIAC CATHETERIZATION      LAPAROSCOPY N/A 2022    LAPAROSCOPY DIAGNOSTIC,  EXPLORATORY INVERTED TO EXPLORATORY LAPOROTOMY, DISTAL SMALL BOWEL RESECTION, PRIMARY STAPLED ANASTAMOSIS, BILATERAL INGUINAL HERNIA REPAIR WITH MESH performed by Jama Arteaga MD at [address] [place] [address] N/A 2022    LAPAROTOMY EXPLORATORY BILATERAL INGUINAL HERNIA REPAIR OPEN,  BOWEL RESECTION,  MESH X2 performed by Jama Arteaga MD at [place] [address] Right     R thigh       Immunization History: There is no immunization history on file for this patient.     Active Problems:  Patient Active Problem List   Diagnosis Code    Acute coronary syndrome ([facility]) I24.9    Acute on chronic systolic congestive heart failure (HCC) I50.23    Chronic bilateral low back pain with bilateral sciatica M54.42, M54.41, G89.29    Elevated serum creatinine R79.89    Sleep disorder breathing G47.30    Bilateral recurrent inguinal hernia without obstruction or gangrene K40.21    HFrEF (heart failure with reduced ejection fraction) ([facility]) I50.20    Nonischemic cardiomyopathy (HCC) I42.8    Strangulated inguinal hernia K40.30 Isolation/Infection:   Isolation            No Isolation          Patient Infection Status       Infection Onset Added Last Indicated Last Indicated By Review Planned Expiration Resolved Resolved By    None active    Resolved    COVID-19 (Rule Out) 22 COVID-19 & Influenza Combo (Ordered)   22 Rule-Out Test Resulted            Nurse Assessment:  Last Vital Signs: BP (!) 106/57   Pulse 83   Temp 96.6 °F (35.9 °C) (Temporal)   Resp 18   Ht 6' 2\" (1.88 m)   Wt 269 lb 1.6 oz (122.1 kg)   SpO2 97%   BMI 34.55 kg/m²     Last documented pain score (0-10 scale): Pain Level: 10  Last Weight:   Wt Readings from Last 1 Encounters:   22 269 lb 1.6 oz (122.1 kg)     Mental Status:  {IP PT MENTAL STATUS:}    IV Access:  { DINO IV ACCESS:584288716}    Nursing Mobility/ADLs:  Walking   {CHP DME QGVF:373348860}  Transfer  {CHP DME JPQC:923737188}  Bathing  {CHP DME DXW}  Dressing  {CHP DME VPVQ:770131246}  Toileting  {CHP DME CMMH:628677865}  Feeding  {CHP DME YLIO:659461146}  Med Admin  {CHP DME QGXD:786126943}  Med Delivery   { DINO MED Delivery:901705036}    Wound Care Documentation and Therapy:  Incision 22 Abdomen (Active)   Dressing Status Intact 22 0930   Dressing/Treatment Open to air 22 0016   Closure Staples 22 0016   Margins Approximated 22   Incision Assessment Dry 22 0016   Drainage Amount None 22 0016   Drainage Description Sanguinous 22 0830   Odor None 22 001   Tessa-incision Assessment Intact 22 001   Number of days: 4       Incision 22 Groin Anterior;Right (Active)   Dressing Status Intact 22 0930   Dressing/Treatment Open to air 22 0016   Closure Surgical glue 22 0016   Margins Approximated 22   Incision Assessment Dry 22 0016   Drainage Amount None 22 0016   Odor None 22   Tessa-incision Assessment Intact 22 0016   Number of days: 4       Incision 22 Groin Anterior; Left (Active)   Dressing Status Intact 22 0930   Dressing/Treatment Open to air 22 0016   Closure Surgical glue 22 0016   Margins Approximated 22 0016   Incision Assessment Dry 22 0016   Drainage Amount None 22 0016   Odor None 22 0016   Tessa-incision Assessment Intact 22 001   Number of days: 4        Elimination:  Continence: Bowel: {YES / IP:76086}  Bladder: {YES / CB:74643}  Urinary Catheter: {Urinary Catheter:472976272}   Colostomy/Ileostomy/Ileal Conduit: {YES / JQ:98799}       Date of Last BM: ***    Intake/Output Summary (Last 24 hours) at 2022 1048  Last data filed at 2022 0913  Gross per 24 hour   Intake 780 ml   Output 1000 ml   Net -220 ml     I/O last 3 completed shifts:   In: 0 [P.O.:1020; NG/GT:30]  Out: 2540 [Urine:1150; Emesis/NG output:1390]    Safety Concerns:     [employer] Safety Concerns:202747813}    Impairments/Disabilities:      [employer] Impairments/Disabilities:862501874}    Nutrition Therapy:  Current Nutrition Therapy:   [employer] Diet List:383704318}    Routes of Feeding: {CHP DME Other Feedings:968532302}  Liquids: {Slp liquid thickness:97944}  Daily Fluid Restriction: {CHP DME Yes amt example:765081118}  Last Modified Barium Swallow with Video (Video Swallowing Test): {Done Not Done WA:747172905}    Treatments at the Time of Hospital Discharge:   Respiratory Treatments: ***  Oxygen Therapy:  {Therapy; copd oxygen:93496}  Ventilator:    { CC Vent LCUC:360193354}    Rehab Therapies: {THERAPEUTIC INTERVENTION:0012563643}  Weight Bearing Status/Restrictions: [employer]  Weight Bearin}  Other Medical Equipment (for information only, NOT a DME order):  {EQUIPMENT:818222055}  Other Treatments: ***    Patient's personal belongings (please select all that are sent with patient):  {CHP DME Belongings:060500081}    RN SIGNATURE:  {Esignature:492349233}    CASE MANAGEMENT/SOCIAL WORK SECTION    Inpatient Status Date: ***    Readmission Risk Assessment Score:  Readmission Risk              Risk of Unplanned Readmission:  16           Discharging to Facility/ Agency   Name:   Address:  Phone:  Fax:    Dialysis Facility (if applicable)   Name:  Address:  Dialysis Schedule:  Phone:  Fax:    / signature: {Esignature:476203590}    PHYSICIAN SECTION    Prognosis: {Prognosis:3848643388}    Condition at Discharge: [address] Ashlie Medrano Patient Condition:814459040}    Rehab Potential (if transferring to Rehab): {Prognosis:1507362881}    Recommended Labs or Other Treatments After Discharge: ***    Physician Certification: I certify the above information and transfer of Aquilino Delgado  is necessary for the continuing treatment of the diagnosis listed and that he requires {Admit to Appropriate Level of Care:64418} for {GREATER/LESS:939464704} 30 days.      Update Admission H&P: {CHP DME Changes in JYVKV:617374344}    PHYSICIAN SIGNATURE:  Electronically signed by Adriel Clark MD on 5/5/22 at 10:49 AM EDT

## 2022-05-05 NOTE — PLAN OF CARE
Problem: Discharge Planning  Goal: Discharge to home or other facility with appropriate resources  5/4/2022 2327 by Jose Restrepo RN  Outcome: Progressing  5/4/2022 1251 by Kalyani Powell RN  Outcome: Progressing  Flowsheets (Taken 5/4/2022 0930)  Discharge to home or other facility with appropriate resources:   Identify barriers to discharge with patient and caregiver   Arrange for needed discharge resources and transportation as appropriate     Problem: Pain  Goal: Verbalizes/displays adequate comfort level or baseline comfort level  5/4/2022 2327 by Jose Restrepo RN  Outcome: Progressing  5/4/2022 1251 by Kalyani Powell RN  Outcome: Progressing     Problem: Safety - Adult  Goal: Free from fall injury  5/4/2022 2327 by Jose Restrepo RN  Outcome: Progressing  5/4/2022 1251 by Kalyani Powell RN  Outcome: Progressing  Flowsheets (Taken 5/4/2022 0930)  Free From Fall Injury: Instruct family/caregiver on patient safety     Problem: ABCDS Injury Assessment  Goal: Absence of physical injury  5/4/2022 2327 by Jose Restrepo RN  Outcome: Progressing  5/4/2022 1251 by Kalyani Powell RN  Outcome: Progressing     Problem: Skin/Tissue Integrity  Goal: Absence of new skin breakdown  5/4/2022 2327 by Jose Restrepo RN  Outcome: Progressing  5/4/2022 1251 by Kalyani Powell RN  Outcome: Progressing     Problem: Chronic Conditions and Co-morbidities  Goal: Patient's chronic conditions and co-morbidity symptoms are monitored and maintained or improved  5/4/2022 2327 by Jose Restrepo RN  Outcome: Progressing  5/4/2022 1251 by Kalyani Powell RN  Outcome: Progressing  Flowsheets (Taken 5/4/2022 0930)  Care Plan - Patient's Chronic Conditions and Co-Morbidity Symptoms are Monitored and Maintained or Improved: Monitor and assess patient's chronic conditions and comorbid symptoms for stability, deterioration, or improvement

## 2022-05-05 NOTE — PROGRESS NOTES
General Surgery Progress Note:    CC: post op    S: ilues starting to resolve, passing some flatus just c/o soar throat.        Objective:  @/80   Pulse 92   Temp 97 °F (36.1 °C) (Temporal)   Resp 18   Ht 6' 2\" (1.88 m)   Wt 269 lb 1.6 oz (122.1 kg)   SpO2 97%   BMI 34.55 kg/m² @    Physical -     Gen: NAD  Resp: Breathing Comfortably, no resp distress clear   CV: Reg Rate no extra heart sounds   Abd: softly distended   EXT nvi    Assessment/Plan: 39 yo s/p ex lap b/l IH repair with post op ileus in setting of CHF     - diet as tolerates     Ok for dc from my pov please call as needed     Asa heparin scds,     Tyree Valencia MD FACS   See d/c summary     1:24 PM

## 2022-05-05 NOTE — CARE COORDINATION
Discharge order noted, messaged general surgery via [employer], message read, awaiting response at time of review. Discharge plan is home today with no needs.

## 2022-05-05 NOTE — PROGRESS NOTES
[facility] messaged via perfect serve to check if patient can be discharged from their POV.   Dr Stevenson Almodovar taking messages

## 2022-05-05 NOTE — DISCHARGE INSTR - DIET

## 2022-05-05 NOTE — DISCHARGE SUMMARY
Hospitalist Discharge Summary    Patient ID: Sathya Ortega   Patient : 1975  Patient's PCP: Brennan Vernon DO    Admit Date: 2022   Admitting Physician: Ernesto Gannon MD    Discharge Date:  2022  Discharge Physician: Siomara Maloney MD   Discharge Condition: Stable  Discharge Disposition: Home    History of presenting illness:  Mr. Sathya Ortega, a 40y.o. year old male  who  has a past medical history of CAD (coronary artery disease), CHF (congestive heart failure) ([facility] [address] GERD (gastroesophageal reflux disease), Hx of drug abuse ([facility] [address] Hypertension, and NICM (nonischemic cardiomyopathy) ([facility] [address] Patient originally presented to [facility] ED with sudden worsening of pain and bulging of right groin. He has chronic bilateral inguinal hernias that are mildly bothersome at baseline. He also endorses a cough productive of clear sputum x 2 weeks. He denies any chest pain or SOB. Of note, he had a follow up appointment with cardiology on  at which time he was told to start taking 20 mg of torsemide daily as opposed to 40 mg d/t hypotension. ED workup revealed a strangulated right inguinal hernia and stable left inguinal hernia. He was transferred here to [facility] ED per the request of the surgical team, however, we are asked to admit the patient given decompensated HFrEF and JOSE. Hospital course in brief:  (Please refer to daily progress notes for a comprehensive review of the hospitalization by requesting medical records)  Patient originally presented to [facility] ED with sudden worsening of pain and bulging of right groin. He has chronic bilateral inguinal hernias that are mildly bothersome at baseline. He also endorses a cough productive of clear sputum x 2 weeks. He denies any chest pain or SOB.  Of note, he had a follow up appointment with cardiology on  at which time he was told to start taking 20 mg of torsemide daily as opposed to 40 mg d/t hypotension. ED workup revealed a strangulated right inguinal hernia and stable left inguinal hernia. He was transferred here to [address] [place] [address] ED per the request of the surgical team, however, we are asked to admit the patient given decompensated HFrEF and JOSE. He underwent a bilateral hernia repair with a distal small bowel resection on 4/30 and was subsequently admitted to ICU. He was started on diuretics with improvement in renal function. Serum creatinine improved to 1.4 at the time of discharge from 2.1 upon admission. Decompensated congestive heart failure improved  His EF is 20 to 30% per 2D echo March 2022  Had hypophosphatemia which was replaced  He had reactive leukocytosis which resolved  Mild elevation in troponin likely demand ischemia  He tested positive for illicit drug use including cocaine benzo opiates and fentanyl. He was evaluated by surgery and underwent surgical bilateral inguinal hernia repair  He had areas postsurgery requiring NG tube which was removed successfully. Patient had several bowel movements. Abdominal pain resolved  He remained hemodynamically stable and feels much better. He was evaluated by cardiology during his stay and medications were adjusted. He feels much better and will be discharged home to follow-up with PCP cardiology and surgery. He was found to have hyperglycemia and was started on Jardiance by cardiology      Consults:   IP [place] TO INTERNAL MEDICINE  IP CONSULT TO CARDIOLOGY    Discharge Diagnoses:  Strangulated right inguinal hernia s/p distal small bowel resection + bilateral hernia repair on 4/30.   Decompensated HFrEF (EF 20-30% per echo 3/2022)  JOSE  Hypohosphatemia  Reactive leukocytosis  Recent gonorrheal infection - s/p IM ceftriaxone  Elevated troponin  Nonischemic cardiomyopathy  Suspected DERRICK  HTN  Chronic back pain  Cocaine abuse  Hyperglycemia    Discharge Instructions / Follow up:    Continued appropriate risk factor modification of blood pressure, diabetes and serum lipids will remain essential to reducing risk of future atherosclerotic development    Activity: activity as tolerated    Significant labs:  CBC:   Recent Labs     05/03/22  1441 05/04/22  0811 05/05/22  0638   WBC 12.1* 9.5 7.2   RBC 4.96 4.62 4.22   HGB 14.8 13.8 12.7   HCT 44.5 41.7 38.5   MCV 89.7 90.3 91.2   RDW 14.4 14.5 14.2    334 287     BMP:   Recent Labs     05/03/22  1441 05/04/22  0811 05/05/22  0638    141 142   K 4.4 3.7 3.4*    105 102   CO2 23 27 28   BUN 29* 31* 27*   CREATININE 1.4* 1.4* 1.5*   MG  --   --  2.2     LFT:  Recent Labs     05/03/22  1441 05/04/22  0811 05/05/22  0638   PROT 7.2 6.4 6.2*   ALKPHOS 63 61 66   ALT 69* 58* 61*   AST 53* 35 42*   BILITOT 0.8 0.6 0.6     PT/INR: No results for input(s): INR, APTT in the last 72 hours. BNP: No results for input(s): BNP in the last 72 hours. Hgb A1C: No results found for: LABA1C  Folate and B12: No results found for: DWOUBNPN96, No results found for: FOLATE  Thyroid Studies: No results found for: TSH, W9ODOTQ, C9YOQGY, THYROIDAB    Urinalysis:  No results found for: NITRU, WBCUA, BACTERIA, RBCUA, BLOODU, SPECGRAV, GLUCOSEU    Imaging:  CT ABDOMEN PELVIS WO CONTRAST Additional Contrast? None    Result Date: 4/30/2022  EXAMINATION: CT OF THE ABDOMEN AND PELVIS WITHOUT CONTRAST 4/30/2022 8:35 am TECHNIQUE: CT of the abdomen and pelvis was performed without the administration of intravenous contrast. Multiplanar reformatted images are provided for review. Dose modulation, iterative reconstruction, and/or weight based adjustment of the mA/kV was utilized to reduce the radiation dose to as low as reasonably achievable.  COMPARISON: None HISTORY: ORDERING SYSTEM PROVIDED HISTORY: ?incarcerated inguinal hernias TECHNOLOGIST PROVIDED HISTORY: Reason for Exam:  ?incarcerated inguinal hernias Additional Contrast?  None Decision Support Exception - unselect if not a suspected or confirmed emergency medical condition->Emergency Medical Condition (MA) FINDINGS: Lower Chest: The lung bases are grossly clear. Calcification identified benign in appearance within the left lung base. Organs: The liver is homogeneous in appearance. No stones in the gallbladder. The pancreas is homogeneous in appearance. Low-density nodule identified on the adrenal gland on the left most suggestive of an adenoma measuring 2 cm in size. The right adrenal gland is unremarkable. Both kidneys are unremarkable in appearance with no stones or obstruction. The spleen is homogeneous. GI/Bowel: Small hiatal hernia. The stomach is unremarkable. No evidence of wall thickening. There are dilated loops of small bowel identified throughout the abdomen and pelvis with transition point identified in the right inguinal region where there is bowel and fat in the right inguinal hernia sac. The bowel demonstrates abnormal wall thickening. There is stranding of the fat as well as fluid in the right inguinal hernia all suggesting strangulation of the bowel. The appendix is normal.  The colon is unremarkable. A portion of colon is identified within the left inguinal hernia that contains colon and fat with no signs of strangulation on the left. Appendix is normal. Pelvis: Pelvic organs reveal no wall thickening of the bladder. The prostate is unremarkable. Peritoneum/Retroperitoneum: There is no abdominal or retroperitoneal lymphadenopathy. No significant vascular calcifications seen within the abdominal aorta or iliac vessels. Bones/Soft Tissues: Small umbilical hernia containing fat with bilateral inguinal hernias identified. Strangulation identified on the right. Bony structures reveal minimal degenerative changes seen within the spine and pelvis. Strangulation identified of the bowel within the right inguinal hernia with fluid and stranding of the fat.   The proximal small bowel demonstrates dilatation with fluid filling of the proximal small bowel. Early partial obstruction is of concern. The left inguinal hernia contains colon and fat with no signs of strangulation at this time. Small umbilical hernia containing fat only. XR CHEST (2 VW)    Result Date: 4/30/2022  EXAMINATION: TWO XRAY VIEWS OF THE CHEST 4/30/2022 8:35 am COMPARISON: 03/20/2022 HISTORY: ORDERING SYSTEM PROVIDED HISTORY: cough TECHNOLOGIST PROVIDED HISTORY: Reason for exam:->cough     The lungs are without acute focal process. There is no effusion or pneumothorax. The cardiomediastinal silhouette is without acute process. The osseous structures are without acute process. IMPRESSION: No acute process. XR ABDOMEN (KUB) (SINGLE AP VIEW)    Result Date: 5/3/2022  EXAMINATION: ONE SUPINE XRAY VIEW(S) OF THE ABDOMEN 5/3/2022 6:57 am COMPARISON: Abdominopelvic CT 30 April 2022 HISTORY: ORDERING SYSTEM PROVIDED HISTORY: Emesis after abdominal surgery TECHNOLOGIST PROVIDED HISTORY: Reason for exam:->Emesis after abdominal surgery What reading provider will be dictating this exam?->CRC FINDINGS: Recent postoperative changes are noted. There is gaseous distension of small and large bowel with several small bowel loops being mildly dilated. No free air bowel wall pneumatosis. Pelvic phleboliths are noted. Gaseous distension of the bowel with several mildly dilated small bowel loops. The SBO which was evident on 30 April 2022 is not yet clearly resolved. XR ABDOMEN FOR NG/OG/NE TUBE PLACEMENT    Result Date: 5/3/2022  EXAMINATION: ONE SUPINE XRAY VIEW(S) OF THE ABDOMEN 5/3/2022 3:46 pm COMPARISON: Abdominal KUB from the same day at 07:50 HISTORY: ORDERING SYSTEM PROVIDED HISTORY: Confirmation of course of NG/OG/NE tube and location of tip of tube TECHNOLOGIST PROVIDED HISTORY: Reason for exam:->Confirmation of course of NG/OG/NE tube and location of tip of tube Portable? ->Yes What reading provider will be dictating this exam?->CRC FINDINGS: Lung bases appear clear. Prominent bowel loops in the upper abdomen. No pneumatosis or portal venous gas. New enteric tube which extends subdiaphragmatic. The enteric tube curls towards the outer aspect of the left upper quadrant with distal tip projecting under the mid left hemidiaphragm. Satisfactory position of the enteric tube. Distal tip curls towards the outer aspect of the left upper quadrant with distal tip projecting just under the mid aspect of the left hemidiaphragm. Discharge Medications:      Medication List      START taking these medications    carvedilol 25 MG tablet  Commonly known as: COREG  Take 1 tablet by mouth 2 times daily (with meals)     empagliflozin 10 MG tablet  Commonly known as: JARDIANCE  Take 1 tablet by mouth daily  Start taking on:  May 6, 2022     sacubitril-valsartan 24-26 MG per tablet  Commonly known as: ENTRESTO  Take 1 tablet by mouth 2 times daily     spironolactone 25 MG tablet  Commonly known as: Aldactone  Take 1 tablet by mouth daily        CONTINUE taking these medications    acetaminophen 500 MG tablet  Commonly known as: TYLENOL  Take 2 tablets by mouth 3 times daily as needed for Pain     aspirin 81 MG chewable tablet     atorvastatin 80 MG tablet  Commonly known as: LIPITOR  Take 1 tablet by mouth daily     diclofenac sodium 1 % Gel  Commonly known as: VOLTAREN  Apply 4 g topically 4 times daily     Torsemide 40 MG Tabs  Take 40 mg by mouth daily     valsartan 160 MG tablet  Commonly known as: DIOVAN  Take 1 tablet by mouth 2 times daily        STOP taking these medications    metoprolol succinate 25 MG extended release tablet  Commonly known as: TOPROL XL           Where to Get Your Medications      These medications were sent to [address] [place], [place] [address] [phone] [address] [address]    Phone: [phone]   · carvedilol 25 MG tablet  · empagliflozin 10 MG tablet  · sacubitril-valsartan 24-26 MG per tablet  · spironolactone 25 MG tablet         Time Spent on discharge is more than 35 minutes in the examination, evaluation, counseling and review of medications and discharge plan.    +++++++++++++++++++++++++++++++++++++++++++++++++  MD JOAN Ibarra/ Ziggy Smith [address] [place] [address]  +++++++++++++++++++++++++++++++++++++++++++++++++  NOTE: This report was transcribed using voice recognition software. Every effort was made to ensure accuracy; however, inadvertent computerized transcription errors may be present.

## 2022-05-06 ENCOUNTER — TELEPHONE (OUTPATIENT)
Dept: PRIMARY CARE CLINIC | Age: 47
End: 2022-05-06

## 2022-05-06 NOTE — TELEPHONE ENCOUNTER
[facility] Transitions Initial Follow Up Call    Outreach made within 2 business days of discharge: Yes    Patient: Eva Harley Patient : 1975   MRN: [id number]  Reason for Admission: Strangulated inguinal  Discharge Date: 22       Spoke with: Kathrin Gandara    Discharge department/facility[facility] Interactive Patient Contact:  Was patient able to fill all prescriptions: Yes  Was patient instructed to bring all medications to the follow-up visit: Yes  Is patient taking all medications as directed in the discharge summary?  Yes  Does patient understand their discharge instructions: Yes  Does patient have questions or concerns that need addressed prior to 7-14 day follow up office visit: no    Scheduled appointment with PCP within 7-14 days    Follow Up  Future Appointments   Date Time Provider Henrique Watt   2022  9:30 AM DO Estuardo Franklin [address]

## 2022-05-11 ENCOUNTER — OFFICE VISIT (OUTPATIENT)
Dept: PRIMARY CARE CLINIC | Age: 47
End: 2022-05-11
Payer: MEDICAID

## 2022-05-11 VITALS
WEIGHT: 261.4 LBS | OXYGEN SATURATION: 97 % | BODY MASS INDEX: 33.55 KG/M2 | DIASTOLIC BLOOD PRESSURE: 76 MMHG | SYSTOLIC BLOOD PRESSURE: 110 MMHG | HEART RATE: 74 BPM | RESPIRATION RATE: 18 BRPM | TEMPERATURE: 97.7 F | HEIGHT: 74 IN

## 2022-05-11 DIAGNOSIS — R73.9 HYPERGLYCEMIA: ICD-10-CM

## 2022-05-11 DIAGNOSIS — E83.39 HYPOPHOSPHATEMIA: ICD-10-CM

## 2022-05-11 DIAGNOSIS — K40.30 STRANGULATED INGUINAL HERNIA: ICD-10-CM

## 2022-05-11 DIAGNOSIS — N17.9 AKI (ACUTE KIDNEY INJURY) (HCC): ICD-10-CM

## 2022-05-11 DIAGNOSIS — Z09 HOSPITAL DISCHARGE FOLLOW-UP: Primary | ICD-10-CM

## 2022-05-11 PROCEDURE — 1111F DSCHRG MED/CURRENT MED MERGE: CPT | Performed by: STUDENT IN AN ORGANIZED HEALTH CARE EDUCATION/TRAINING PROGRAM

## 2022-05-11 PROCEDURE — 99214 OFFICE O/P EST MOD 30 MIN: CPT | Performed by: STUDENT IN AN ORGANIZED HEALTH CARE EDUCATION/TRAINING PROGRAM

## 2022-05-11 NOTE — PROGRESS NOTES
[facility]  Department of Family Medicine      Patient:  Yair Walton 40 y.o. male     Date of Service: 5/11/22      Chief complaint:   Chief Complaint   Patient presents with    Follow-Up from Hospital         History ofPresent Illness   The patient is a 40 y.o. male  presented to the clinic with complaints as above. Incarcerated hernia  -HFU  -did laparoscopy, found to have ischemic small bowel which was resected  -found to have JOSE in hospital, responded to diuretics  -currently, feeling ok, wondering when he needs to get his staples out, has not gotten call from surgeon about follow up appointment   -discharged home on coreg, jardiance, entresto  -needs to  his spironolactone   -is moving his bowels, denies any blood, feels like his abdomen is getting hard, some abdominal tenderness where staples are     Past Medical History:      Diagnosis Date    CAD (coronary artery disease)     CHF [facility]congestive heart failure) [facility] 02/08/2022    GERD (gastroesophageal reflux disease)     Hx of drug abuse ([facility]     cocaine    Hypertension     NICM (nonischemic cardiomyopathy) ([facility]        PastSurgical History:        Procedure Laterality Date    CARDIAC CATHETERIZATION      LAPAROSCOPY N/A 4/30/2022    LAPAROSCOPY DIAGNOSTIC,  EXPLORATORY INVERTED TO EXPLORATORY LAPOROTOMY, DISTAL SMALL BOWEL RESECTION, PRIMARY STAPLED ANASTAMOSIS, BILATERAL INGUINAL HERNIA REPAIR WITH MESH performed by Jenelle Estrella MD at [place] [address] N/A 4/30/2022    LAPAROTOMY EXPLORATORY BILATERAL INGUINAL HERNIA REPAIR OPEN,  BOWEL RESECTION,  MESH X2 performed by Jenelle Estrella MD at [address] [place] [address]    SKIN GRAFT Right     R thigh       Allergies:    Patient has no known allergies.     Social History:   Social History     Socioeconomic History    Marital status: Single     Spouse name: Not on file    Number of children: Not on file    Years of education: Not on file    Highest education level: Not on file   Occupational History    Not on file   Tobacco Use    Smoking status: Former Smoker     Packs/day: 0.25     Years: 30.00     Pack years: 7.50     Types: Cigarettes     Start date: 13     Quit date: 2022     Years since quittin.2    Smokeless tobacco: Never Used   Vaping Use    Vaping Use: Never used   Substance and Sexual Activity    Alcohol use: Not Currently    Drug use: Not Currently     Types: Cocaine, Marijuana ([place])     Comment: past use; none since 2022    Sexual activity: Not on file   Other Topics Concern    Not on file   Social History Narrative    Denies caffeine     Social Determinants of Health     Financial Resource Strain: Low Risk     Difficulty of Paying Living Expenses: Not hard at all   Food Insecurity: No Food Insecurity    Worried About [address] in the Last Year: Never true    [facility] in the Last Year: Never true   Transportation Needs:     Lack of Transportation (Medical): Not on file    Lack of Transportation (Non-Medical):  Not on file   Physical Activity: Inactive    Days of Exercise per Week: 0 days    Minutes of Exercise per Session: 0 min   Stress:     Feeling of Stress : Not on file   Social Connections:     Frequency of Communication with Friends and Family: Not on file    Frequency of Social Gatherings with Friends and Family: Not on file    Attends Episcopal Services: Not on file    Active Member of Clubs or Organizations: Not on file    Attends Club or Organization Meetings: Not on file    Marital Status: Not on file   Intimate Partner Violence: Not At Risk    Fear of Current or Ex-Partner: No    Emotionally Abused: No    Physically Abused: No    Sexually Abused: No   Housing Stability:     Unable to Pay for Housing in the Last Year: Not on file    Number of [place] in the Last Year: Not on file    Unstable Housing in the Last Year: Not on file        Family History:       Problem Relation Age of Onset    Thyroid Disease Mother     Hypertension Mother     Stroke Maternal Grandmother     Hypertension Maternal Grandmother        Review of Systems:   Review of Systems - as above      Physical Exam   Vitals: /76   Pulse 74   Temp 97.7 °F (36.5 °C)   Resp 18   Ht 6' 2\" (1.88 m)   Wt 261 lb 6.4 oz (118.6 kg)   SpO2 97%   BMI 33.56 kg/m²   Physical Exam  Constitutional:       Appearance: He is well-developed. HENT:      Head: Normocephalic and atraumatic. Eyes:      General:         Right eye: No discharge. Left eye: No discharge. Conjunctiva/sclera: Conjunctivae normal.   Neck:      Trachea: No tracheal deviation. Cardiovascular:      Rate and Rhythm: Normal rate and regular rhythm. Heart sounds: Normal heart sounds. Pulmonary:      Effort: Pulmonary effort is normal. No respiratory distress. Breath sounds: Normal breath sounds. No wheezing. Abdominal:      General: Bowel sounds are normal. There is no distension. Palpations: Abdomen is soft. Tenderness: There is no abdominal tenderness. Musculoskeletal:         General: No tenderness. Cervical back: Normal range of motion and neck supple. Skin:     General: Skin is warm and dry. Neurological:      Mental Status: He is alert. Psychiatric:         Behavior: Behavior normal.              Assessment and Plan       1.  Hospital discharge follow-up  For incarcerated hernia requiring small bowel resection due to ischemia  -While in hospital, found to have e lyte disturbance, JOSE, and hyperglycemia, these all improved, thus discharged home and currently feeling well  -Does have some induration around the surgical site, however moving bowels appropriately and vitals stable  -Unclear if he had follow up with general surgery, therefore urgent referral placed and advised he call them for follow up appointment  -CHF well controlled today as vitals stable, no edema, and lungs CTAB  -Repeat lab work given patient started on new medications on discharge and had JOSE while in hospital (does have component of CKD), will also get a1c to check for diabetes   - [place] DISCHARGE MEDS RECONCILED W/ CURRENT OUTPATIENT MED LIST    2. Strangulated inguinal hernia  As above  - Santana Paul MD, General Surgery, [facility]    3. JOSE (acute kidney injury) ([facility]  As above   - Basic Metabolic Panel; Future    4. Hyperglycemia  As above  - Hemoglobin A1C; Future    5. Hypophosphatemia  As above   - Phosphorus; Future      Counseled regarding above diagnosis, including possible risks and complications,  especially if left uncontrolled. Counseled regarding the possible side effects, risks, benefits and alternatives to treatment;patient and/or guardian verbalizes understanding, agrees, feels comfortable with and wishes to proceed with above treatment plan. Call or go to [address] if symptoms worsen or persist. Advised patient to call with any new medication issues, and, as applicable, read all Rx info from pharmacy to assure aware of all possible risks and side effects of medicationbefore taking. Patient and/or guardian given opportunity to ask questions/raise concerns. The patient verbalized comfort and understanding ofinstructions. I encourage further reading and education about your health conditions. Information on many health conditions is provided by [facility] [employer]: [email or web address]  Please bring any questions to me at your nextvisit. Return to Office: Return in about 1 month (around 6/11/2022) for f/u.     Medication List:    Current Outpatient Medications   Medication Sig Dispense Refill    empagliflozin (JARDIANCE) 10 MG tablet Take 1 tablet by mouth daily 30 tablet 0    carvedilol (COREG) 25 MG tablet Take 1 tablet by mouth 2 times daily (with meals) 60 tablet 0    sacubitril-valsartan (ENTRESTO) 24-26 MG per tablet Take 1 tablet by mouth 2 times daily 60 tablet 0    spironolactone (ALDACTONE) 25 MG tablet Take 1 tablet by mouth daily 30 tablet 0    acetaminophen (TYLENOL) 500 MG tablet Take 2 tablets by mouth 3 times daily as needed for Pain 540 tablet 1    diclofenac sodium (VOLTAREN) 1 % GEL Apply 4 g topically 4 times daily 350 g 1    atorvastatin (LIPITOR) 80 MG tablet Take 1 tablet by mouth daily 30 tablet 3    torsemide 40 MG TABS Take 40 mg by mouth daily 30 tablet 3    aspirin 81 MG chewable tablet Take 81 mg by mouth daily       No current facility-administered medications for this visit. Bola Naylor, DO       This document may have been prepared at least partially through the use of voice recognition software. Although effort is taken to assure the accuracy ofthis document, it is possible that grammatical, syntax,  or spelling errors may occur. Post-Discharge Transitional Care  Follow Up      Shawna Presley   YOB: 1975    Date of Office Visit:  5/11/2022  Date of Hospital Admission: 4/30/22  Date of Hospital Discharge: 5/5/22  Risk of hospital readmission (high >=14%. Medium >=10%) :Readmission Risk Score: 16.5 ( )      Care management risk score Rising risk (score 2-5) and Complex Care (Scores >=6): 1     Non face to face  following discharge, date last encounter closed (first attempt may have been earlier): 5/6/2022  2:41 PM    Call initiated 2 business days of discharge: Yes    ASSESSMENT/PLAN:   Hospital discharge follow-up  -     NE DISCHARGE MEDS RECONCILED W/ CURRENT OUTPATIENT MED LIST  Strangulated inguinal hernia  -     Inez Cruz MD, General Surgery, [place]  JOSE (acute kidney injury) [facility])  -     Basic Metabolic Panel; Future  Hyperglycemia  -     Hemoglobin A1C; Future  Hypophosphatemia  -     Phosphorus; Future      Medical Decision Making: low complexity and moderate complexity  Return in about 1 month (around 6/11/2022) for f/u.     On this date 5/11/2022 I have spent 20 minutes reviewing previous notes, test results and face to face with the patient discussing the diagnosis and importance of compliance with the treatment plan as well as documenting on the day of the visit. Medications listed as ordered at the time of discharge from hospital     Medication List          Accurate as of May 11, 2022 10:06 AM. If you have any questions, ask your nurse or doctor.             CONTINUE taking these medications    acetaminophen 500 MG tablet  Commonly known as: TYLENOL  Take 2 tablets by mouth 3 times daily as needed for Pain     aspirin 81 MG chewable tablet     atorvastatin 80 MG tablet  Commonly known as: LIPITOR  Take 1 tablet by mouth daily     carvedilol 25 MG tablet  Commonly known as: COREG  Take 1 tablet by mouth 2 times daily (with meals)     diclofenac sodium 1 % Gel  Commonly known as: VOLTAREN  Apply 4 g topically 4 times daily     empagliflozin 10 MG tablet  Commonly known as: JARDIANCE  Take 1 tablet by mouth daily     sacubitril-valsartan 24-26 MG per tablet  Commonly known as: ENTRESTO  Take 1 tablet by mouth 2 times daily     spironolactone 25 MG tablet  Commonly known as: Aldactone  Take 1 tablet by mouth daily     Torsemide 40 MG Tabs  Take 40 mg by mouth daily              Medications marked \"taking\" at this time  Outpatient Medications Marked as Taking for the 5/11/22 encounter (Office Visit) with Vanessa Alford, DO   Medication Sig Dispense Refill    empagliflozin (JARDIANCE) 10 MG tablet Take 1 tablet by mouth daily 30 tablet 0    carvedilol (COREG) 25 MG tablet Take 1 tablet by mouth 2 times daily (with meals) 60 tablet 0    sacubitril-valsartan (ENTRESTO) 24-26 MG per tablet Take 1 tablet by mouth 2 times daily 60 tablet 0    spironolactone (ALDACTONE) 25 MG tablet Take 1 tablet by mouth daily 30 tablet 0    acetaminophen (TYLENOL) 500 MG tablet Take 2 tablets by mouth 3 times daily as needed for Pain 540 tablet 1    diclofenac sodium (VOLTAREN) 1 % GEL Apply 4 g topically 4 times daily 350 g 1    atorvastatin (LIPITOR) 80 MG tablet Take 1 tablet by mouth daily 30 tablet 3    torsemide 40 MG TABS Take 40 mg by mouth daily 30 tablet 3    aspirin 81 MG chewable tablet Take 81 mg by mouth daily          Medications patient taking as of now reconciled against medications ordered at time of hospital discharge: Yes    An electronic signature was used to authenticate this note.   --Karyle Shires, DO

## 2022-05-12 ENCOUNTER — TELEPHONE (OUTPATIENT)
Dept: SURGERY | Age: 47
End: 2022-05-12

## 2022-05-12 NOTE — TELEPHONE ENCOUNTER
LAPAROSCOPY DIAGNOSTIC,  EXPLORATORY INVERTED TO EXPLORATORY LAPOROTOMY, DISTAL SMALL BOWEL RESECTION, PRIMARY STAPLED ANASTAMOSIS, BILATERAL INGUINAL HERNIA REPAIR WITH MESH N/A General   LAPAROTOMY EXPLORATORY BILATERAL INGUINAL HERNIA REPAIR OPEN,  BOWEL RESECTION,  MESH X2       Done on 4/30/22. Patient requesting post op appointment. Please advise patient of follow up recommendations [phone].

## 2022-05-13 ENCOUNTER — OFFICE VISIT (OUTPATIENT)
Dept: SURGERY | Age: 47
End: 2022-05-13

## 2022-05-13 VITALS
BODY MASS INDEX: 33.5 KG/M2 | HEART RATE: 79 BPM | SYSTOLIC BLOOD PRESSURE: 130 MMHG | TEMPERATURE: 97.5 F | DIASTOLIC BLOOD PRESSURE: 86 MMHG | RESPIRATION RATE: 18 BRPM | WEIGHT: 261 LBS | HEIGHT: 74 IN

## 2022-05-13 DIAGNOSIS — Z09 POSTOPERATIVE EXAMINATION: Primary | ICD-10-CM

## 2022-05-13 PROCEDURE — 99024 POSTOP FOLLOW-UP VISIT: CPT | Performed by: SURGERY

## 2022-05-13 NOTE — PATIENT INSTRUCTIONS
PATIENT INSTRUCTIONS      ACTIVITY: No heavy lifting until June 1    DIET: You may resume your normal diet. WOUND CARE: ok to shower    Will schedule colonoscopy in the fall    If you have any questions, please call Daly at [facility]    It is very important that you follow all of the instructions listed on this sheet carefully (they may be slightly different than the directions on the product that you purchase at the pharmacy) to ensure that your colon is adequately cleaned out or your risk of complications could be increased. 2 Days or More Before Endoscopy:  Start clear liquids 2 days before procedure.  Obtain Miralax powder 238 gm (8.3 oz) bottle and 64 oz of Gatorade from the pharmacy.  Obtain bottle of dulcolax tablets   Do not eat corn, tomatoes, peas or watermelon 3 to 5 days before procedure. · Two days before the colonoscopy, mix the entire bottle of Miralax in the 64 oz of Gatorade put in the refrigerator to get cold   If you are on INSULIN or OTHER DIABETIC MEDICATIONS, then check with your primary care physician as to how to adjust your medication while on clear liquid diet and when nothing by mouth. 1 Day Before the Endoscopy:   No solid food  only clear liquids (soup, jello, or juice that you can see through with no solid food) for breakfast, lunch and supper. DO NOT drink or eat anything that is red as it will turn the inside of the colon red and look like blood. Nothing to eat or drink after midnight.  Have at least 8 oz or more of clear liquids for breakfast (7 am to 8 am) and lunch (11:30 am to 12:30 pm).  12 Noon Take 4 Dulcolax tablets followed immediately by at least 8 oz of clear liquids.  1:00 pm Drink at least 8 oz of clear liquids.  2:00 pm Take 4 Dulcolax tablets and drink at least 8 oz of clear liquids.    3:00 pm Drink at least 8 oz of clear liquids.  4:00 pm Drink the Gatorade with Miralax powder 8 oz every 30 minutes x 7 (should have 8 oz left)   Can continue to take liquids      Day of Endoscopy:   4 hours prior to scheduled time for colonoscopy, drink the last 8 oz of Gatorade with Miralax followed immediately by at least 8 oz of clear liquids. STOP ALL CLEAR LIQUIDS 2 HOURS PRIOR TO SCHEDULED TIME   If any blood pressure medications or heart medications are due in the morning, you should take them with a sip of water.    ==================  Instructions for Clear liquid diet  Definition  A clear liquid diet consists of clear liquids, such as water, broth and plain gelatin, that are easily digested and leave no undigested residue in your intestinal tract. Your doctor may prescribe a clear liquid diet before certain medical procedures or if you have certain digestive problems. Because a clear liquid diet can't provide you with adequate calories and nutrients, it shouldn't be continued for more than a few days. Purpose  A clear liquid diet is often used before tests, procedures or surgeries that require no food in your stomach or intestines, such as before colonoscopy. It may also be recommended as a short-term diet if you have certain digestive problems, such as nausea, vomiting or diarrhea, or after certain types of surgery. Diet details  A clear liquid diet helps maintain adequate hydration, provides some important electrolytes, such as sodium and potassium, and gives some energy at a time when a full diet isn't possible or recommended.    The following foods are allowed in a clear liquid diet:    Plain water    Fruit juices without pulp, such as apple juice, grape juice or cranberry juice    Strained lemonade or fruit punch    Clear, fat-free broth (bouillon or consomme)    Clear sodas    Plain gelatin    Honey    Ice pops without bits of fruit or fruit pulp    Tea or coffee without milk or cream    Any foods not on the above list should be avoided. Also, for certain tests, such as colon exams, your doctor may ask you to avoid liquids or gelatin with red coloring. A typical menu on the clear liquid diet may look like this:     Breakfast:  1 glass fruit juice  1 cup coffee or tea (without dairy products)  1 cup broth  1 bowl gelatin     Snack:  1 glass fruit juice  1 bowl gelatin     Lunch:  1 glass fruit juice  1 glass water  1 cup broth  1 bowl gelatin     Snack:  1 ice pop (without fruit pulp)  1 cup coffee or tea (without dairy products) or a soft drink     Dinner:  1 cup juice or water  1 cup broth  1 bowl gelatin  1 cup coffee or tea     Results  Although the clear liquid diet may not be very exciting, it does fulfill its purpose. It's designed to keep your stomach and intestines clear, limit strain to your digestive system, but keep your body hydrated as you prepare for or recover from a medical procedure. Risks  Because a clear liquid diet can't provide you with adequate calories and nutrients, it shouldn't be used for more than a few days. Only use the clear liquid diet as directed by your doctor. If your doctor prescribes a clear liquid diet before a medical test, be sure to follow the diet instructions exactly. If you don't follow the diet exactly, you risk an inaccurate test and may have to reschedule the procedure for another time. The importance of proper hydration  A colonoscopy prep causes the body to lose a significant amount of fluid and can result in sickness due to dehydration. It's important that you prepare your body by drinking extra clear liquids before the prep. Stay hydrated by drinking all required clear liquids during the prep. Replenish your system by drinking clear liquids after returning home from your colonoscopy.

## 2022-05-13 NOTE — PROGRESS NOTES
Postop Progress Note    Subjective    Gib Elieser presents to the office 2 weeks  following Diagnostic laparoscopy converted to exploratory laparotomy, resection of ~20cm segment of distal small bowel >20cm from the terminal ileum with primary stapled side to side anastomosis, bilateral open inguinal hernia repair with mesh   . Eating a regular diet without difficulty. Bowel movements are normal. Patient reports wound healing well. Pain is controlled with current analgesics. The pain has improved. He has run out of narcotics. He still has ibuprofen. Objective    Vitals:    05/13/22 1007   BP: 130/86   Pulse: 79   Resp: 18   Temp: 97.5 °F (36.4 °C)     General: alert, cooperative and no distress  Incision: healing well, no hernia, well approximated area          Assessment    Doing well postoperatively. Plan   1. Continue any current medications  Patient can take ibuprofen as needed for pain  2. Wound care discussed  3. Wound/Incision: healing well, well approximated, sutures removed  4. Disposition:   No heavy lifting until June 1.     5. Diet: regular diet  6. Follow up:   Patient has never had a screening colonoscopypatient will be set up for screening colonoscopy as an outpatient this fall.   My office will call to schedule           Electronically signed by Keyonna Dover MD on 5/13/2022 at 10:49 AM

## 2022-05-20 ENCOUNTER — TELEPHONE (OUTPATIENT)
Dept: PRIMARY CARE CLINIC | Age: 47
End: 2022-05-20

## 2022-05-20 NOTE — TELEPHONE ENCOUNTER
Pt would like to discuss why he is on Jardiance. Pt stated no one at the hospital ever discussed with him about having diabetes.  Please advise

## 2022-05-26 ENCOUNTER — TELEPHONE (OUTPATIENT)
Dept: SURGERY | Age: 47
End: 2022-05-26

## 2022-06-07 RX ORDER — FUROSEMIDE 40 MG/1
TABLET ORAL
Qty: 30 TABLET | Refills: 3 | Status: SHIPPED
Start: 2022-06-07 | End: 2022-08-05 | Stop reason: SDUPTHER

## 2022-06-29 ENCOUNTER — TELEPHONE (OUTPATIENT)
Dept: PRIMARY CARE CLINIC | Age: 47
End: 2022-06-29

## 2022-08-04 NOTE — PROGRESS NOTES
[facility]  Department of Family Medicine      Patient:  Thea Mendes 40 y.o. male     Date of Service: 6/13/22      Chief complaint:   Chief Complaint   Patient presents with    Leg Pain           History ofPresent Illness   The patient is a 40 y.o. male  presented to the clinic with complaints as above. Bilateral leg pain  -new issue  -started a few weeks ago  -pain described as pain in the back of his thighs, aching kind of pain, and feels as if he does not sit his legs are going to give out  -only happens he is walking or standing   -pain resolves completely with sitting down or resting  -denies any associated numbness or tingling  -states he did fall, however was having pain before then     HTN  -f/u  -just took his medications  -denies any lightheadedness, dizziness, or chest pain    CHF  -f/u   -no issues taking his medications  -has some MCDONALD, but not worsening  -denies any chest pain     Past Medical History:      Diagnosis Date    CAD (coronary artery disease)     CHF (congestive heart failure) ([facility] 02/08/2022    GERD (gastroesophageal reflux disease)     Hx of drug abuse ([facility]     cocaine    Hypertension     NICM (nonischemic cardiomyopathy) ([facility]        PastSurgical History:        Procedure Laterality Date    CARDIAC CATHETERIZATION      LAPAROSCOPY N/A 4/30/2022    LAPAROSCOPY DIAGNOSTIC,  EXPLORATORY INVERTED TO EXPLORATORY LAPOROTOMY, DISTAL SMALL BOWEL RESECTION, PRIMARY STAPLED ANASTAMOSIS, BILATERAL INGUINAL HERNIA REPAIR WITH MESH performed by Caitlin Sahni MD at [facility] N/A 4/30/2022    LAPAROTOMY EXPLORATORY BILATERAL INGUINAL HERNIA REPAIR OPEN,  BOWEL RESECTION,  MESH X2 performed by Caitlin Sahni MD at [address] GRAFT Right     R thigh       Allergies:    Patient has no known allergies.     Social History:   Social History     Socioeconomic History    Marital status: Single     Spouse name: Not on file    Number of children: Not on file Years of education: Not on file    Highest education level: Not on file   Occupational History    Not on file   Tobacco Use    Smoking status: Some Days     Packs/day: 0.25     Years: 30.00     Pack years: 7.50     Types: Cigarettes     Start date: 13     Last attempt to quit: 2022     Years since quittin.5    Smokeless tobacco: Never   Vaping Use    Vaping Use: Never used   Substance and Sexual Activity    Alcohol use: Not Currently    Drug use: Not Currently     Types: Cocaine, Marijuana (Renetta Croft)     Comment: past use; none since 2022    Sexual activity: Not on file   Other Topics Concern    Not on file   Social History Narrative    Denies caffeine     Social Determinants of Health     Financial Resource Strain: Low Risk     Difficulty of Paying Living Expenses: Not hard at all   Food Insecurity: No Food Insecurity    Worried About Running Out of Food in the Last Year: Never true    [address] in the Last Year: Never true   Transportation Needs: Not on file   Physical Activity: Inactive    Days of Exercise per Week: 0 days    Minutes of Exercise per Session: 0 min   Stress: Not on file   Social Connections: Not on file   Intimate Partner Violence: Not At Risk    Fear of Current or Ex-Partner: No    Emotionally Abused: No    Physically Abused: No    Sexually Abused: No   Housing Stability: Not on file        Family History:       Problem Relation Age of Onset    Thyroid Disease Mother     Hypertension Mother     Stroke Maternal Grandmother     Hypertension Maternal Grandmother        Review of Systems:   Review of Systems - as above     Physical Exam   Vitals: /88   Pulse 79   Temp 96.8 °F (36 °C) (Infrared)   Resp 17   Ht 6' 2\" (1.88 m)   Wt 257 lb (116.6 kg)   SpO2 98%   BMI 33.00 kg/m²   Physical Exam  Constitutional:       Appearance: He is well-developed. HENT:      Head: Normocephalic and atraumatic. Eyes:      General:         Right eye: No discharge.          Left eye: No discharge. Conjunctiva/sclera: Conjunctivae normal.   Neck:      Trachea: No tracheal deviation. Cardiovascular:      Rate and Rhythm: Normal rate and regular rhythm. Heart sounds: Normal heart sounds. Pulmonary:      Effort: Pulmonary effort is normal. No respiratory distress. Breath sounds: No wheezing. Comments: Coarse breath sounds in lung bases  Abdominal:      General: Bowel sounds are normal. There is no distension. Palpations: Abdomen is soft. Tenderness: There is no abdominal tenderness. Musculoskeletal:         General: No tenderness. Cervical back: Normal range of motion and neck supple. Skin:     General: Skin is warm and dry. Neurological:      Mental Status: He is alert. Psychiatric:         Behavior: Behavior normal.           Assessment and Plan       1. Bilateral leg pain  New issue  -Unclear etiology, DVT unlikely however will get d dimer per patient request, could be coming from spine, therefore started out with xray, PAD could be possible explanation however less likely as it is only in his thighs, therefore will hold off on ordering SREEDHAR until other work up is done as lumbar etiology more likely   - D-Dimer, Quantitative; Future  - XR LUMBAR SPINE (2-3 VIEWS); Future    2. HFrEF (heart failure with reduced ejection fraction) (HCC)  F/u of chronic issue  -Stable, continue same  - carvedilol (COREG) 25 MG tablet; Take 1 tablet by mouth in the morning and 1 tablet in the evening. Take with meals. Dispense: 60 tablet; Refill: 0  - empagliflozin (JARDIANCE) 10 MG tablet; Take 1 tablet by mouth in the morning. Dispense: 30 tablet; Refill: 0  - furosemide (LASIX) 40 MG tablet; Take 1 tablet by mouth daily  Dispense: 30 tablet; Refill: 3  - spironolactone (ALDACTONE) 25 MG tablet; Take 1 tablet by mouth in the morning. Dispense: 30 tablet; Refill: 0    3.  Nonischemic cardiomyopathy ([address]  F/u of chronic issue  -Stable, continue same  - carvedilol (COREG) 25 MG tablet; Take 1 tablet by mouth in the morning and 1 tablet in the evening. Take with meals. Dispense: 60 tablet; Refill: 0  - empagliflozin (JARDIANCE) 10 MG tablet; Take 1 tablet by mouth in the morning. Dispense: 30 tablet; Refill: 0  - furosemide (LASIX) 40 MG tablet; Take 1 tablet by mouth daily  Dispense: 30 tablet; Refill: 3  - spironolactone (ALDACTONE) 25 MG tablet; Take 1 tablet by mouth in the morning. Dispense: 30 tablet; Refill: 0    4. Hypertension, unspecified type  F/u of chronic issue  -Stable, continue same  - carvedilol (COREG) 25 MG tablet; Take 1 tablet by mouth in the morning and 1 tablet in the evening. Take with meals. Dispense: 60 tablet; Refill: 0  - spironolactone (ALDACTONE) 25 MG tablet; Take 1 tablet by mouth in the morning. Dispense: 30 tablet; Refill: 0    Counseled regarding above diagnosis, including possible risks and complications,  especially if left uncontrolled. Counseled regarding the possible side effects, risks, benefits and alternatives to treatment;patient and/or guardian verbalizes understanding, agrees, feels comfortable with and wishes to proceed with above treatment plan. Call or go to [address] [place] [place] if symptoms worsen or persist. Advised patient to call with any new medication issues, and, as applicable, read all Rx info from pharmacy to assure aware of all possible risks and side effects of medicationbefore taking. Patient and/or guardian given opportunity to ask questions/raise concerns. The patient verbalized comfort and understanding ofinstructions. I encourage further reading and education about your health conditions. Information on many health conditions is provided by [facility] [employer]: [email or web address]  Please bring any questions to me at your nextvisit. Return to Office: Return in about 1 month (around 9/5/2022) for f/u leg pain .     Medication List:    Current Outpatient Medications   Medication Sig Dispense Refill    carvedilol (COREG) 25 MG tablet Take 1 tablet by mouth in the morning and 1 tablet in the evening. Take with meals. 60 tablet 0    empagliflozin (JARDIANCE) 10 MG tablet Take 1 tablet by mouth in the morning. 30 tablet 0    furosemide (LASIX) 40 MG tablet Take 1 tablet by mouth daily 30 tablet 3    spironolactone (ALDACTONE) 25 MG tablet Take 1 tablet by mouth in the morning. 30 tablet 0    acetaminophen (TYLENOL) 500 MG tablet Take 2 tablets by mouth 3 times daily as needed for Pain 540 tablet 1    diclofenac sodium (VOLTAREN) 1 % GEL Apply 4 g topically 4 times daily 350 g 1    aspirin 81 MG chewable tablet Take 81 mg by mouth daily      atorvastatin (LIPITOR) 80 MG tablet Take 1 tablet by mouth daily (Patient not taking: Reported on 8/5/2022) 30 tablet 3     No current facility-administered medications for this visit. Jefferson Rodríguez,        This document may have been prepared at least partially through the use of voice recognition software. Although effort is taken to assure the accuracy ofthis document, it is possible that grammatical, syntax,  or spelling errors may occur.

## 2022-08-05 ENCOUNTER — OFFICE VISIT (OUTPATIENT)
Dept: PRIMARY CARE CLINIC | Age: 47
End: 2022-08-05
Payer: MEDICAID

## 2022-08-05 VITALS
TEMPERATURE: 96.8 F | HEIGHT: 74 IN | WEIGHT: 257 LBS | DIASTOLIC BLOOD PRESSURE: 88 MMHG | RESPIRATION RATE: 17 BRPM | OXYGEN SATURATION: 98 % | SYSTOLIC BLOOD PRESSURE: 136 MMHG | HEART RATE: 79 BPM | BODY MASS INDEX: 32.98 KG/M2

## 2022-08-05 DIAGNOSIS — I42.8 NONISCHEMIC CARDIOMYOPATHY (HCC): ICD-10-CM

## 2022-08-05 DIAGNOSIS — I10 HYPERTENSION, UNSPECIFIED TYPE: ICD-10-CM

## 2022-08-05 DIAGNOSIS — M79.604 BILATERAL LEG PAIN: Primary | ICD-10-CM

## 2022-08-05 DIAGNOSIS — I50.20 HFREF (HEART FAILURE WITH REDUCED EJECTION FRACTION) (HCC): ICD-10-CM

## 2022-08-05 DIAGNOSIS — M79.605 BILATERAL LEG PAIN: Primary | ICD-10-CM

## 2022-08-05 PROCEDURE — 99214 OFFICE O/P EST MOD 30 MIN: CPT | Performed by: STUDENT IN AN ORGANIZED HEALTH CARE EDUCATION/TRAINING PROGRAM

## 2022-08-05 RX ORDER — SPIRONOLACTONE 25 MG/1
25 TABLET ORAL DAILY
Qty: 30 TABLET | Refills: 0 | Status: SHIPPED
Start: 2022-08-05 | End: 2022-08-21

## 2022-08-05 RX ORDER — CARVEDILOL 25 MG/1
25 TABLET ORAL 2 TIMES DAILY WITH MEALS
Qty: 60 TABLET | Refills: 0 | Status: SHIPPED
Start: 2022-08-05 | End: 2022-09-02

## 2022-08-05 RX ORDER — FUROSEMIDE 40 MG/1
TABLET ORAL
Qty: 30 TABLET | Refills: 3 | Status: SHIPPED | OUTPATIENT
Start: 2022-08-05

## 2022-08-09 ENCOUNTER — TELEPHONE (OUTPATIENT)
Dept: PRIMARY CARE CLINIC | Age: 47
End: 2022-08-09

## 2022-08-09 DIAGNOSIS — K21.9 GASTROESOPHAGEAL REFLUX DISEASE, UNSPECIFIED WHETHER ESOPHAGITIS PRESENT: Primary | ICD-10-CM

## 2022-08-09 DIAGNOSIS — M54.42 CHRONIC BILATERAL LOW BACK PAIN WITH BILATERAL SCIATICA: ICD-10-CM

## 2022-08-09 DIAGNOSIS — M54.41 CHRONIC BILATERAL LOW BACK PAIN WITH BILATERAL SCIATICA: ICD-10-CM

## 2022-08-09 DIAGNOSIS — G89.29 CHRONIC BILATERAL LOW BACK PAIN WITH BILATERAL SCIATICA: ICD-10-CM

## 2022-08-09 RX ORDER — PANTOPRAZOLE SODIUM 40 MG/1
40 TABLET, DELAYED RELEASE ORAL
Qty: 30 TABLET | Refills: 3 | Status: SHIPPED
Start: 2022-08-09 | End: 2022-08-21

## 2022-08-09 NOTE — TELEPHONE ENCOUNTER
Medication sent. Given his age, would probably refer anjali to general surgery as he may need a scope to evaluate his acid reflux if this is new for him.     Thank Ace Hernandez

## 2022-08-10 ENCOUNTER — HOSPITAL ENCOUNTER (OUTPATIENT)
Dept: SLEEP CENTER | Age: 47
Discharge: HOME | End: 2022-08-10
Payer: MEDICAID

## 2022-08-10 DIAGNOSIS — G47.30 SLEEP DISORDER BREATHING: ICD-10-CM

## 2022-08-10 PROCEDURE — 95811 POLYSOM 6/>YRS CPAP 4/> PARM: CPT

## 2022-08-11 ENCOUNTER — TELEPHONE (OUTPATIENT)
Dept: SURGERY | Age: 47
End: 2022-08-11

## 2022-08-11 VITALS — WEIGHT: 274 LBS | BODY MASS INDEX: 35.16 KG/M2 | OXYGEN SATURATION: 97 % | HEART RATE: 77 BPM | HEIGHT: 74 IN

## 2022-08-11 RX ORDER — POLYETHYLENE GLYCOL 3350, SODIUM SULFATE ANHYDROUS, SODIUM BICARBONATE, SODIUM CHLORIDE, POTASSIUM CHLORIDE 236; 22.74; 6.74; 5.86; 2.97 G/4L; G/4L; G/4L; G/4L; G/4L
4 POWDER, FOR SOLUTION ORAL ONCE
Qty: 4000 ML | Refills: 0 | Status: SHIPPED | OUTPATIENT
Start: 2022-08-11 | End: 2022-08-11

## 2022-08-11 ASSESSMENT — SLEEP AND FATIGUE QUESTIONNAIRES
ESS TOTAL SCORE: 8
HOW LIKELY ARE YOU TO NOD OFF OR FALL ASLEEP WHILE SITTING QUIETLY AFTER LUNCH WITHOUT ALCOHOL: 0
HOW LIKELY ARE YOU TO NOD OFF OR FALL ASLEEP WHILE SITTING AND READING: 3
HOW LIKELY ARE YOU TO NOD OFF OR FALL ASLEEP WHILE WATCHING TV: 3
HOW LIKELY ARE YOU TO NOD OFF OR FALL ASLEEP WHEN YOU ARE A PASSENGER IN A CAR FOR AN HOUR WITHOUT A BREAK: 2
HOW LIKELY ARE YOU TO NOD OFF OR FALL ASLEEP WHILE SITTING AND TALKING TO SOMEONE: 0
HOW LIKELY ARE YOU TO NOD OFF OR FALL ASLEEP WHILE SITTING INACTIVE IN A PUBLIC PLACE: 0
HOW LIKELY ARE YOU TO NOD OFF OR FALL ASLEEP IN A CAR, WHILE STOPPED FOR A FEW MINUTES IN TRAFFIC: 0
HOW LIKELY ARE YOU TO NOD OFF OR FALL ASLEEP WHILE LYING DOWN TO REST IN THE AFTERNOON WHEN CIRCUMSTANCES PERMIT: 0

## 2022-08-11 NOTE — TELEPHONE ENCOUNTER
MA returned pt call to inform \"Please let him know I just sent in [address] [place] [address]. \" Pt stated the pharmacy had just called him and thanked MA.     Electronically signed by Sirisha Tyler MA on 8/11/2022 at 2:05 PM

## 2022-08-11 NOTE — TELEPHONE ENCOUNTER
MA received call from pt stating he is scheduled for a colonoscopy on Monday (8/15/22) with Dr. Lisa Mccloud. Pt is not sure what prep he needs for the colonoscopy. No prep was documented.      Routing to Dr. Lisa Mccloud for further advisement    Electronically signed by Galo Browne MA on 8/11/2022 at 11:26 AM

## 2022-08-13 NOTE — PROGRESS NOTES
[id number] [address]                               SLEEP STUDY REPORT    PATIENT NAME: Dheeraj Toledo                      :        1975  MED REC NO:   [id number]                            ROOM:  ACCOUNT NO:   [de-identified]                           ADMIT DATE: 08/10/2022  PROVIDER:     Zan Chowdhury MD    DATE OF STUDY:  08/10/2022    REFERRING PROVIDER:  Brissa Hardin DO    STUDY PERFORMED:  Polysomnography. INDICATION FOR STUDY:  Witnessed apnea, excessive daytime sleepiness,  wakes gasping, snoring, restless sleep, and nocturnal diaphoresis. CURRENT MEDICATIONS:  Voltaren, Lipitor, Coreg, torsemide, and Diovan. INTERPRETATION:  SLEEP ARCHITECTURE:  This patient, during the diagnostic portion of this  study, had a total time in bed of 143 minutes. Total sleep time was 124  minutes. Sleep efficiency was 87% and sleep latency was six minutes. REM latency was 79 minutes. SLEEP STAGING:  The patient was awake 13% of the time in bed. Stage N1  was 12% and N2 was 82% of the total sleep time. Slow wave sleep was  absent and stage REM sleep was 7% of the sleep time. RESPIRATION SUMMARY:  APNEA:  There were seven apneic events which were all obstructive,  occurred during non-REM stage sleep and had a maximum duration of 25  seconds. HYPOPNEA:  There were 51 hypopneic events, three occurred during REM  stage sleep and maximum duration was 36 seconds. APNEA/HYPOPNEA INDEX:  The apnea/hypopnea index was 28. POSITIVE AIRWAY PRESSURE TITRATION:  At the midpoint of the study, it  was noted that the patient had an increase in the apnea/hypopnea index  and therefore, CPAP was initiated at 6 cm of water pressure and  gradually increased to 12. At that level, the patient slept for 18  minutes in REM stage sleep and 99 minutes in non-REM stage sleep.   The  apnea/hypopnea index was less than three.    AROUSAL ANALYSIS:  There were 34 arousals/awakenings, the sleep  disruption index is 17; (normal less than 10). LIMB MOVEMENT SUMMARY:  There were three limb movements, the limb  movement index was normal.    OXYGEN SATURATION:  Average oxygen saturation while awake was 95%. Lowest saturation was 82%. The patient spent 8% of the time with  saturation less than 90%. HEART RATE SUMMARY:  Average heart rate while awake was 72 beats per  minute. Maximum heart rate during sleep was 84 beats per minute and  minimum heart rate during sleep was 59 beats per minute. There were  frequent PVCs, which were not associated with respiratory events. MISCELLANEOUS:  Berne Sleepiness Scale score is 8/24. Snoring was  moderate. This was graded as 5 on a 1 to 10 scale. There was no  apparent bruxism. Bedtime is listed as midnight normally and rise time   5-6 AM.    IMPRESSION:  1. Moderate obstructive sleep apnea. 2.  Good titration with CPAP. 3.  Moderate snoring, eliminated with CPAP. 4.  Mild nocturnal hypoxia, eliminated with CPAP. 5.  Insufficient sleep time syndrome    DISCUSSION:  Prior to the titration of CPAP, this patient had an  apnea/hypopnea index of 28. Normal is less than five and mild is 5 to  15. Fifteen to thirty is considered moderate, leaving this patient in  the moderate category. Because of the frequent PVCs, titration was  initiated at the midpoint of the study. With titration of CPAP, which  the patient tolerated well, good results were achieved with  normalization of the apnea/hypopnea index, elimination of snoring, and  normalization of oxygen saturation. Also, one notes that the patient   reports normal sleep time of 5 to 6 hours nightly. A normal adult requires   a minimum of 6 and preferably 7 to 8 hours on a nightly basis. Therefore,   insufficient sleep time may be adding to this patient's sleepiness. PLAN:  1. CPAP at 12 cm of water pressure.   2.  ResMed F20 full facemask, size large with heated humidification. 3.  The patient to be seen in 6 to 10 weeks. 4.  When patient is seen, more sleep time will be strongly encouraged.         Dipak Mcgee MD  Diplomat of Sleep Medicine    D: 08/12/2022 13:51:22       T: 08/12/2022 13:53:44     [email or web address]  Job#: [id number]     Doc#: [id number]    CC:

## 2022-08-15 ENCOUNTER — TELEPHONE (OUTPATIENT)
Dept: SURGERY | Age: 47
End: 2022-08-15

## 2022-08-15 NOTE — TELEPHONE ENCOUNTER
Patient states left message over the weekend to cancel colonoscopy for 8/15/22 due to pharmacy not having bowel prep. Patient would like to reschedule. Please advise patient [phone].

## 2022-08-15 NOTE — TELEPHONE ENCOUNTER
Prior Authorization Form:      DEMOGRAPHICS:                     Patient Name:  Thaddeus Martinez  Patient :  1975            Insurance:  Payor: [place] / Plan: [place]ila [place] DEPT OF JOB / Product Type: *No Product type* /   Insurance ID Number:    Payer/Plan Subscr  Sex Relation Sub. Ins. ID Effective Group Num   1.  [place] Major Deed 1975 Male Self [id number]                                    [address]         DIAGNOSIS & PROCEDURE:                       Procedure/Operation: COLONOSCOPY           CPT Code: 61107    Diagnosis:  SCREENING    ICD10 Code: 12.11    Location:  [facility]    Surgeon:  DR. Manuel Cho    SCHEDULING INFORMATION:                          Date: 10/18/22    Time: 2PM              Anesthesia:  MAC/TIVA                                                       Status:  Outpatient        Special Comments:  N/A       Electronically signed by Holly Sharif MA on 8/15/2022 at 9:56 AM

## 2022-08-15 NOTE — TELEPHONE ENCOUNTER
MA returned pt call to reschedule. Scheduled pt for Colonoscopy 8/29/22 at 7:30am. Pt needs to arrive at [address] [place] [address] Cross at 6:30. Patient confirmed date and time, address and directions given via phone, patient understood.     Electronically signed by Janell Avery MA on 8/15/22 at 9:25 AM EDT

## 2022-08-20 ENCOUNTER — HOSPITAL ENCOUNTER (EMERGENCY)
Age: 47
Discharge: HOME | End: 2022-08-21
Attending: EMERGENCY MEDICINE
Payer: MEDICAID

## 2022-08-20 ENCOUNTER — APPOINTMENT (OUTPATIENT)
Dept: GENERAL RADIOLOGY | Age: 47
End: 2022-08-20
Payer: MEDICAID

## 2022-08-20 DIAGNOSIS — R06.02 SHORTNESS OF BREATH: ICD-10-CM

## 2022-08-20 DIAGNOSIS — I50.9 CHRONIC CONGESTIVE HEART FAILURE, UNSPECIFIED HEART FAILURE TYPE (HCC): ICD-10-CM

## 2022-08-20 DIAGNOSIS — U07.1 COVID-19: Primary | ICD-10-CM

## 2022-08-20 PROCEDURE — 84484 ASSAY OF TROPONIN QUANT: CPT

## 2022-08-20 PROCEDURE — 85025 COMPLETE CBC W/AUTO DIFF WBC: CPT

## 2022-08-20 PROCEDURE — 99285 EMERGENCY DEPT VISIT HI MDM: CPT

## 2022-08-20 PROCEDURE — 71046 X-RAY EXAM CHEST 2 VIEWS: CPT

## 2022-08-20 PROCEDURE — 93005 ELECTROCARDIOGRAM TRACING: CPT | Performed by: EMERGENCY MEDICINE

## 2022-08-20 PROCEDURE — 87635 SARS-COV-2 COVID-19 AMP PRB: CPT

## 2022-08-20 PROCEDURE — 80053 COMPREHEN METABOLIC PANEL: CPT

## 2022-08-20 PROCEDURE — 83880 ASSAY OF NATRIURETIC PEPTIDE: CPT

## 2022-08-20 NOTE — Clinical Note
Lainey Gaona was seen and treated in our emergency department on 8/20/2022. He may return to work on 08/24/2022. If you have any questions or concerns, please don't hesitate to call.       Alonzo Olsen, DO

## 2022-08-21 VITALS
RESPIRATION RATE: 18 BRPM | OXYGEN SATURATION: 95 % | HEIGHT: 74 IN | TEMPERATURE: 98.2 F | DIASTOLIC BLOOD PRESSURE: 78 MMHG | HEART RATE: 74 BPM | SYSTOLIC BLOOD PRESSURE: 133 MMHG | WEIGHT: 275 LBS | BODY MASS INDEX: 35.29 KG/M2

## 2022-08-21 LAB
ALBUMIN SERPL-MCNC: 3.7 G/DL (ref 3.5–5.2)
ALP BLD-CCNC: 62 U/L (ref 40–129)
ALT SERPL-CCNC: 27 U/L (ref 0–40)
AMPHETAMINE SCREEN, URINE: NOT DETECTED
ANION GAP SERPL CALCULATED.3IONS-SCNC: 11 MMOL/L (ref 7–16)
AST SERPL-CCNC: 31 U/L (ref 0–39)
BARBITURATE SCREEN URINE: NOT DETECTED
BASOPHILS ABSOLUTE: 0.03 E9/L (ref 0–0.2)
BASOPHILS RELATIVE PERCENT: 0.7 % (ref 0–2)
BENZODIAZEPINE SCREEN, URINE: NOT DETECTED
BILIRUB SERPL-MCNC: 0.2 MG/DL (ref 0–1.2)
BUN BLDV-MCNC: 18 MG/DL (ref 6–20)
CALCIUM SERPL-MCNC: 8.6 MG/DL (ref 8.6–10.2)
CANNABINOID SCREEN URINE: NOT DETECTED
CHLORIDE BLD-SCNC: 104 MMOL/L (ref 98–107)
CO2: 22 MMOL/L (ref 22–29)
COCAINE METABOLITE SCREEN URINE: POSITIVE
CREAT SERPL-MCNC: 1.9 MG/DL (ref 0.7–1.2)
EKG ATRIAL RATE: 95 BPM
EKG P AXIS: 65 DEGREES
EKG P-R INTERVAL: 158 MS
EKG Q-T INTERVAL: 362 MS
EKG QRS DURATION: 86 MS
EKG QTC CALCULATION (BAZETT): 454 MS
EKG R AXIS: -71 DEGREES
EKG T AXIS: -174 DEGREES
EKG VENTRICULAR RATE: 95 BPM
EOSINOPHILS ABSOLUTE: 0.03 E9/L (ref 0.05–0.5)
EOSINOPHILS RELATIVE PERCENT: 0.7 % (ref 0–6)
FENTANYL SCREEN, URINE: NOT DETECTED
GFR AFRICAN AMERICAN: 46
GFR NON-AFRICAN AMERICAN: 46 ML/MIN/1.73
GLUCOSE BLD-MCNC: 104 MG/DL (ref 74–99)
HCT VFR BLD CALC: 47.8 % (ref 37–54)
HEMOGLOBIN: 15.7 G/DL (ref 12.5–16.5)
IMMATURE GRANULOCYTES #: 0.01 E9/L
IMMATURE GRANULOCYTES %: 0.2 % (ref 0–5)
LYMPHOCYTES ABSOLUTE: 0.56 E9/L (ref 1.5–4)
LYMPHOCYTES RELATIVE PERCENT: 13.6 % (ref 20–42)
Lab: ABNORMAL
MCH RBC QN AUTO: 30.7 PG (ref 26–35)
MCHC RBC AUTO-ENTMCNC: 32.8 % (ref 32–34.5)
MCV RBC AUTO: 93.5 FL (ref 80–99.9)
METHADONE SCREEN, URINE: NOT DETECTED
MONOCYTES ABSOLUTE: 0.83 E9/L (ref 0.1–0.95)
MONOCYTES RELATIVE PERCENT: 20.1 % (ref 2–12)
NEUTROPHILS ABSOLUTE: 2.66 E9/L (ref 1.8–7.3)
NEUTROPHILS RELATIVE PERCENT: 64.7 % (ref 43–80)
OPIATE SCREEN URINE: NOT DETECTED
OXYCODONE URINE: NOT DETECTED
PDW BLD-RTO: 14.4 FL (ref 11.5–15)
PHENCYCLIDINE SCREEN URINE: NOT DETECTED
PLATELET # BLD: 194 E9/L (ref 130–450)
PMV BLD AUTO: 11.3 FL (ref 7–12)
POTASSIUM SERPL-SCNC: 4.1 MMOL/L (ref 3.5–5)
PRO-BNP: 1567 PG/ML (ref 0–125)
RBC # BLD: 5.11 E12/L (ref 3.8–5.8)
RBC # BLD: NORMAL 10*6/UL
SARS-COV-2, NAAT: DETECTED
SODIUM BLD-SCNC: 137 MMOL/L (ref 132–146)
TOTAL PROTEIN: 6.1 G/DL (ref 6.4–8.3)
TROPONIN, HIGH SENSITIVITY: 22 NG/L (ref 0–11)
TROPONIN, HIGH SENSITIVITY: 23 NG/L (ref 0–11)
WBC # BLD: 4.1 E9/L (ref 4.5–11.5)

## 2022-08-21 PROCEDURE — 96374 THER/PROPH/DIAG INJ IV PUSH: CPT

## 2022-08-21 PROCEDURE — 84484 ASSAY OF TROPONIN QUANT: CPT

## 2022-08-21 PROCEDURE — 6360000002 HC RX W HCPCS: Performed by: EMERGENCY MEDICINE

## 2022-08-21 PROCEDURE — 80307 DRUG TEST PRSMV CHEM ANLYZR: CPT

## 2022-08-21 RX ORDER — ALBUTEROL SULFATE 90 UG/1
2 AEROSOL, METERED RESPIRATORY (INHALATION) 4 TIMES DAILY PRN
Qty: 18 G | Refills: 0 | Status: SHIPPED | OUTPATIENT
Start: 2022-08-21

## 2022-08-21 RX ORDER — OMEPRAZOLE 20 MG/1
20 CAPSULE, DELAYED RELEASE ORAL DAILY
COMMUNITY

## 2022-08-21 RX ORDER — GUAIFENESIN AND DEXTROMETHORPHAN HYDROBROMIDE 1200; 60 MG/1; MG/1
1 TABLET, EXTENDED RELEASE ORAL EVERY 12 HOURS PRN
Qty: 28 TABLET | Refills: 0 | Status: SHIPPED | OUTPATIENT
Start: 2022-08-21

## 2022-08-21 RX ORDER — FUROSEMIDE 10 MG/ML
40 INJECTION INTRAMUSCULAR; INTRAVENOUS ONCE
Status: COMPLETED | OUTPATIENT
Start: 2022-08-21 | End: 2022-08-21

## 2022-08-21 RX ADMIN — FUROSEMIDE 40 MG: 10 INJECTION, SOLUTION INTRAMUSCULAR; INTRAVENOUS at 02:43

## 2022-08-21 ASSESSMENT — ENCOUNTER SYMPTOMS
SHORTNESS OF BREATH: 1
SORE THROAT: 0
FACIAL SWELLING: 0
EYE PAIN: 0
PHOTOPHOBIA: 0
BACK PAIN: 0
DIARRHEA: 1
NAUSEA: 0
ABDOMINAL PAIN: 0
COUGH: 1
CONSTIPATION: 0
CHOKING: 0
VOMITING: 0
SORE THROAT: 1
COUGH: 0
RHINORRHEA: 1

## 2022-08-21 NOTE — ED NOTES
Ambulatory pulse ox conducted with pt. Pt tolerated well, denies any dyspnea with exertion. Pt O2 saturation remained above 95% on RA, pt  bpm.  MD notified.       Angela Haines RN  08/21/22 [id number]

## 2022-08-21 NOTE — ED PROVIDER NOTES
Marylu Vivar is a 40 y.o. male presenting to the ED for cough congestion runny nose, inspiratory chest pain, beginning yesterday ago. The complaint has been intermittent, moderate in severity, and worsened by nothing. Pt is a 40 yom w history of NICM, mild CAD, htn, chf, history of cocaine abuse. Pt ntoes took home test this am and was positive for coivd, pt notes very fatigue and weak, has post nasal drip, runy nose, has woken up out of his sleep sob. Pt denies any exertional sx. Pt denies any le edema. Pt denies any recent weight gain. Pt denies any sick contacts. Pt is not vaccinated for covid. /  pt also notes having diarrhea 3-4 times per day for past 2-3 days. No hematochezia or melena. Pt is on coreg, furosemide, jardiance spironolactrone, and omeprazole. Review of Systems:   Review of Systems   Constitutional:  Positive for chills and fatigue. HENT:  Positive for congestion, postnasal drip, rhinorrhea and sore throat. Eyes:  Negative for photophobia and visual disturbance. Respiratory:  Positive for cough and shortness of breath. Cardiovascular:  Positive for chest pain. Negative for palpitations and leg swelling. Gastrointestinal:  Positive for diarrhea. Negative for abdominal pain. Endocrine: Negative for polyphagia and polyuria. Musculoskeletal:  Positive for myalgias. Negative for back pain. Skin:  Negative for pallor and rash. Allergic/Immunologic: Negative for food allergies and immunocompromised state. Neurological:  Negative for dizziness and headaches. Hematological:  Negative for adenopathy. Does not bruise/bleed easily.    Psychiatric/Behavioral:  Negative for agitation.              --------------------------------------------- PAST HISTORY ---------------------------------------------  Past Medical History:  has a past medical history of CAD (coronary artery disease), CHF (congestive heart failure) ([facility] [address] GERD (gastroesophageal reflux disease), Hx of drug Eosinophils % 0.7 0.0 - 6.0 %    Basophils % 0.7 0.0 - 2.0 %    Neutrophils Absolute 2.66 1.80 - 7.30 E9/L    Immature Granulocytes # 0.01 E9/L    Lymphocytes Absolute 0.56 (L) 1.50 - 4.00 E9/L    Monocytes Absolute 0.83 0.10 - 0.95 E9/L    Eosinophils Absolute 0.03 (L) 0.05 - 0.50 E9/L    Basophils Absolute 0.03 0.00 - 0.20 E9/L    RBC Morphology Normal    Comprehensive Metabolic Panel   Result Value Ref Range    Sodium 137 132 - 146 mmol/L    Potassium 4.1 3.5 - 5.0 mmol/L    Chloride 104 98 - 107 mmol/L    CO2 22 22 - 29 mmol/L    Anion Gap 11 7 - 16 mmol/L    Glucose 104 (H) 74 - 99 mg/dL    BUN 18 6 - 20 mg/dL    Creatinine 1.9 (H) 0.7 - 1.2 mg/dL    GFR Non-African American 46 >=60 mL/min/1.73    GFR African American 46     Calcium 8.6 8.6 - 10.2 mg/dL    Total Protein 6.1 (L) 6.4 - 8.3 g/dL    Albumin 3.7 3.5 - 5.2 g/dL    Total Bilirubin 0.2 0.0 - 1.2 mg/dL    Alkaline Phosphatase 62 40 - 129 U/L    ALT 27 0 - 40 U/L    AST 31 0 - 39 U/L   URINE DRUG SCREEN   Result Value Ref Range    Amphetamine Screen, Urine NOT DETECTED Negative <1000 ng/mL    Barbiturate Screen, Ur NOT DETECTED Negative < 200 ng/mL    Benzodiazepine Screen, Urine NOT DETECTED Negative < 200 ng/mL    Cannabinoid Scrn, Ur NOT DETECTED Negative < 50ng/mL    Cocaine Metabolite Screen, Urine POSITIVE (A) Negative < 300 ng/mL    Opiate Scrn, Ur NOT DETECTED Negative < 300ng/mL    PCP Screen, Urine NOT DETECTED Negative < 25 ng/mL    Methadone Screen, Urine NOT DETECTED Negative <300 ng/mL    Oxycodone Urine NOT DETECTED Negative <100 ng/mL    FENTANYL SCREEN, URINE NOT DETECTED Negative <1 ng/mL    Drug Screen Comment: see below    Troponin   Result Value Ref Range    Troponin, High Sensitivity 22 (H) 0 - 11 ng/L   EKG 12 Lead   Result Value Ref Range    Ventricular Rate 95 BPM    Atrial Rate 95 BPM    P-R Interval 158 ms    QRS Duration 86 ms    Q-T Interval 362 ms    QTc Calculation (Bazett) 454 ms    P Axis 65 degrees    R Axis -71 degrees T Axis -174 degrees       RADIOLOGY:  Interpreted by Radiologist.  XR CHEST (2 VW)   Final Result   Mild cardiomegaly with no acute pulmonary changes. ------------------------- NURSING NOTES AND VITALS REVIEWED ---------------------------   The nursing notes within the ED encounter and vital signs as below have been reviewed. /72   Pulse 98   Temp 98.2 °F (36.8 °C) (Temporal)   Resp 18   Ht 6' 2\" (1.88 m)   Wt 275 lb (124.7 kg)   SpO2 99%   BMI 35.31 kg/m²   Oxygen Saturation Interpretation: Normal      ---------------------------------------------------PHYSICAL EXAM--------------------------------------    Physical Exam  Vitals reviewed. Constitutional:       General: He is not in acute distress. Appearance: Normal appearance. He is not toxic-appearing. HENT:      Head: Normocephalic and atraumatic. Right Ear: External ear normal.      Left Ear: External ear normal.      Nose: Nose normal. No congestion. Mouth/Throat:      Mouth: Mucous membranes are moist.      Pharynx: Oropharynx is clear. No posterior oropharyngeal erythema. Eyes:      Extraocular Movements: Extraocular movements intact. Pupils: Pupils are equal, round, and reactive to light. Cardiovascular:      Rate and Rhythm: Normal rate and regular rhythm. Pulses: Normal pulses. Heart sounds: No murmur heard. Pulmonary:      Effort: Pulmonary effort is normal.      Breath sounds: Examination of the right-lower field reveals decreased breath sounds. Examination of the left-lower field reveals decreased breath sounds. Decreased breath sounds present. No wheezing, rhonchi or rales. Chest:      Chest wall: No tenderness. Abdominal:      General: Bowel sounds are normal.      Tenderness: There is no abdominal tenderness. There is no right CVA tenderness, left CVA tenderness or guarding. Musculoskeletal:         General: No swelling or deformity.       Cervical back: Normal range of motion and neck supple. No muscular tenderness. Right lower leg: No tenderness. No edema. Left lower leg: No tenderness. No edema. Skin:     General: Skin is warm and dry. Capillary Refill: Capillary refill takes less than 2 seconds. Findings: No ecchymosis or erythema. Neurological:      General: No focal deficit present. Mental Status: He is alert and oriented to person, place, and time. Psychiatric:         Mood and Affect: Mood normal.             ------------------------------ ED COURSE/MEDICAL DECISION MAKING----------------------  Medications   furosemide (LASIX) injection 40 mg (40 mg IntraVENous Given 8/21/22 0243)     EKG: This EKG is signed and interpreted by me. Time:20-AUG-2022 22:24:09  Rate: 95  Rhythm: Normal sinus rhythm Possible Left atrial enlargement Left axis deviation Inferior infarct (cited on or before 20-MAR-2022) T wave abnormality, consider lateral ischemia Abnormal ECG When compared with ECG of 20-MAR-2022 18:03, T wave inversion no longer evident in Anterior leads      TELEMETRY: sinus hr 90s    ED COURSE:       Medical Decision Making:   Patient has COVID-19 and viral URI secondary to that. His BNP is actually better than where it has been in the past.  He was given Lasix. He is ambulated and his pulse ox was normal on room air. I am not concerned for any PE. His symptoms do not sound like cardiac chest pain. His pain is with breathing and coughing. He has obvious rhinorrhea and postnasal drip. He is not a candidate for steroids remdesivir or inpatient therapy at this time. His pulse ox is normal ambulating. He was not vaccinated therefore we fax the form over to the infusion center for monoclonal antibody therapy. Patient is agreeable. Patient will be discharged with home pulse ox at this time he will be discharged with instructions for self quarantine.   He is to call his primary care physician and to make a follow-up appointment to be seen in 2 to 3 days for f/u. Counseled regarding todays diagnosis, including possible risks and complications,  especially if left uncontrolled. Counseled regarding the possible side effects, risks, benefits and alternatives to treatment; patient and/or guardian verbalizes understanding, agrees, feels comfortable with and wishes to proceed with treatment plan. Advised patient to call her primary care physician with any new medication issues, and read all Rx info from pharmacy to assure aware of all possible risks and side effects of medication before taking. I did discuss warning signs for when to return to the Emergency Room, and the patient verbalized understanding      Counseling: The emergency provider has spoken with the patient and discussed todays results, in addition to providing specific details for the plan of care and counseling regarding the diagnosis and prognosis. Questions are answered at this time and they are agreeable with the plan.      --------------------------------- IMPRESSION AND DISPOSITION ---------------------------------    IMPRESSION  1. COVID-19    2. Shortness of breath    3. Chronic congestive heart failure, unspecified [facility] failure [facility] ([facility]        DISPOSITION  Disposition: Discharge to home  Patient condition is fair      NOTE: This report was transcribed using voice recognition software.  Every effort was made to ensure accuracy; however, inadvertent computerized transcription errors may be present       Mark Mendoza DO  08/21/22 0327

## 2022-08-21 NOTE — DISCHARGE INSTRUCTIONS
Call family doctor tomorrow and schedule a followup appointment to be seen in 2 days    Have your doctor obtain  finalized report of CT scan today    Take motrin or tylenol for fever or pain    Increase your fluid intake.  Pedialyte or Gatorade

## 2022-08-21 NOTE — ED PROVIDER NOTES
[facility]  Department of Emergency Medicine     Written by: Lakhwinder Nair MD  Patient Name: Marylu Vivar  Attending Provider: Deirdre , DO  Admit Date: 2022 11:16 PM  MRN: [id number]                   : 1975        Chief Complaint   Patient presents with    Chest Pain     Started this morning, midsternal    Shortness of Breath     Hx CHF, test positive for COVID this am    - Chief complaint    52-year-old male with known history of COPD, CHF, hypertension, coronary artery disease, GERD presents to ED with complaints of chest pain shortness of breath since today morning. The patient states that symptoms started mildly last night where he was not \"feeling himself\". He states that every time he got up he would feel lightheaded. He was able to go to sleep comfortably however upon waking up today morning, he felt severely lightheaded, had difficulty breathing, and had chest pain which was localized in the left parasternal region. The onset of symptoms were progressive in nature. It has been progressively worsening. Exertion and laying down worsens the shortness of breath, the quality of pain described as pressure, there are no radiating symptoms, severity is moderately severe according to the patient, mentioning a 7 out of 10. Timing is constant. The patient states that he has been taking more fluids and juice than usual for the past 2 days. He states he usually drinks about 4 bottles a day of water, and 4 glasses of juice. The past 2 days he has been drinking up to 7 bottles of water, and 7 glasses of juice. He additionally mentions that he missed his dose of Lasix today morning. He mentions that he has associated diarrhea for the past 2 days, which she claims to have had about 4 episodes. He states the diarrhea is watery and normal in color. He denies any lower extremity bilateral swelling, fevers, nausea, vomiting, abdominal pain, urinary changes.   He mentions that he took a COVID test today morning, which returned positive. Patient admits to history of cocaine use and ingestion of THC gummy bears. Troponin is elevated at 23, repeat troponin ordered. White blood cell count is slightly lower than usual at 4.1, and COVID-19 test is positive. Chest x-ray is noted to have mild cardiomegaly, which correlated with his history of CHF. Review of Systems   Constitutional:  Negative for appetite change, chills, diaphoresis and fever. HENT:  Negative for ear discharge, ear pain, facial swelling, sneezing and sore throat. Eyes:  Negative for photophobia and pain. Respiratory:  Positive for shortness of breath. Negative for cough and choking. Cardiovascular:  Positive for chest pain. Negative for palpitations. Gastrointestinal:  Positive for diarrhea. Negative for abdominal pain, constipation, nausea and vomiting. Genitourinary:  Negative for dysuria, enuresis, flank pain, frequency and hematuria. Musculoskeletal:  Negative for arthralgias, back pain, joint swelling, myalgias and neck pain. Skin:  Negative for pallor and rash. Neurological:  Negative for weakness, light-headedness and headaches. Physical Exam  Constitutional:       General: He is not in acute distress. Appearance: Normal appearance. He is well-developed. He is obese. He is not ill-appearing. HENT:      Head: Normocephalic and atraumatic. Nose: Nose normal. No congestion. Mouth/Throat:      Mouth: Mucous membranes are moist.      Pharynx: No posterior oropharyngeal erythema. Eyes:      General: No scleral icterus. Extraocular Movements: Extraocular movements intact. Pupils: Pupils are equal, round, and reactive to light. Cardiovascular:      Rate and Rhythm: Normal rate and regular rhythm. Pulses: Normal pulses. Heart sounds: Normal heart sounds.    Pulmonary:      Effort: Pulmonary effort is normal.      Breath sounds: Examination of the right-lower field reveals wheezing. Wheezing present. Abdominal:      General: Bowel sounds are normal. There is no distension. Palpations: Abdomen is soft. There is no mass. Tenderness: There is no abdominal tenderness. Musculoskeletal:         General: No swelling, tenderness or deformity. Normal range of motion. Cervical back: Normal range of motion. No rigidity or tenderness. Right lower leg: No tenderness. No edema. Left lower leg: No tenderness. No edema. Skin:     General: Skin is warm. Capillary Refill: Capillary refill takes less than 2 seconds. Coloration: Skin is not jaundiced or pale. Findings: No erythema. Neurological:      General: No focal deficit present. Mental Status: He is alert and oriented to person, place, and time. Mental status is at baseline. Procedures       MDM  Number of Diagnoses or Management Options  Chronic congestive heart failure, unspecified heart failure type ([facility] [address]  COVID-19  Shortness of breath  Diagnosis management comments: This is a 67-year-old male that has chest pain or shortness of breath started mildly Friday night night, progressively worsening over the course of today [Saturday]. He states that he takes Lasix for CHF, however missed his dose today. He also mentions that he has been taking more fluids and usual.  He states that he had a COVID test earlier today, which returned positive. He states that he has a history of cocaine and THC gummy bear use. Cardiac work-up ordered, in addition to a COVID test. Urine drug screen was positive for cocaine, initial troponin was elevated at 23, however repeat was 22. COVID-19 test was positive. Patient advised to discontinue drugs, to take his lasix as prescribed and not to miss doses, and not to overload on fluids as he has done recently. He is additionally advised to follow up with his PCP for CT scan report over the CT done today.  Additionally, he is recommended to visit the [facility] CHF clinic for possible COVID monoclonal antibodies as he has not been vaccinated. Patient is agreeable to plan, and understands that he will take OTC medications such as tylenol and motrin for pain and fever. Patient remained hemodynamically stable throughout his stay in the ED and is deemed fit for discharge. EKG: This EKG is signed and interpreted by me. Rate: 95  Rhythm: Sinus  Interpretation: no acute changes  Comparison: stable as compared to patient's most recent EKG    Labs Reviewed   COVID-19, RAPID - Abnormal; Notable for the following components:       Result Value    SARS-CoV-2, NAAT DETECTED (*)     All other components within normal limits   TROPONIN - Abnormal; Notable for the following components:    Troponin, High Sensitivity 23 (*)     All other components within normal limits   BRAIN NATRIURETIC PEPTIDE - Abnormal; Notable for the following components:    Pro-BNP 1,567 (*)     All other components within normal limits   CBC WITH AUTO DIFFERENTIAL - Abnormal; Notable for the following components:    WBC 4.1 (*)     Lymphocytes % 13.6 (*)     Monocytes % 20.1 (*)     Lymphocytes Absolute 0.56 (*)     Eosinophils Absolute 0.03 (*)     All other components within normal limits   COMPREHENSIVE METABOLIC PANEL - Abnormal; Notable for the following components:    Glucose 104 (*)     Creatinine 1.9 (*)     Total Protein 6.1 (*)     All other components within normal limits   URINE DRUG SCREEN - Abnormal; Notable for the following components:    Cocaine Metabolite Screen, Urine POSITIVE (*)     All other components within normal limits   TROPONIN - Abnormal; Notable for the following components:    Troponin, High Sensitivity 22 (*)     All other components within normal limits     XR CHEST (2 VW)   Final Result   Mild cardiomegaly with no acute pulmonary changes.            Medications   furosemide (LASIX) injection 40 mg (40 mg IntraVENous Given 8/21/22 8285) 5:12 AM EDT  I have spoken with the patient and discussed todays results, in addition to providing specific details for the plan of care and counseling regarding the diagnosis and prognosis. Their questions are answered at this time and they are agreeable with the plan. I discussed at length with them reasons for immediate return here for re evaluation. They will followup with their and the on call primary care physician by calling their office tomorrow and on Monday.    --------------------------------- ADDITIONAL PROVIDER NOTES ---------------------------------  At this time the patient is without objective evidence of an acute process requiring hospitalization or inpatient management. They have remained hemodynamically stable throughout their entire ED visit and are stable for discharge with outpatient follow-up. The plan has been discussed in detail and they are aware of the specific conditions for emergent return, as well as the importance of follow-up. Discharge Medication List as of 8/21/2022  2:59 AM          Diagnosis:  1. COVID-19    2. Shortness of breath    3. Chronic congestive heart failure, unspecified heart failure type ([facility] [address]        Disposition:  Patient's disposition: Discharge to home  Patient's condition is stable. Patient was seen and evaluated by myself and my attending Yair Hoskins DO. Assessment and Plan discussed with attending provider, please see attestation for final plan of care.      MD Chemo Ramos MD  Resident  08/21/22 7406

## 2022-08-22 ENCOUNTER — CARE COORDINATION (OUTPATIENT)
Dept: CARE COORDINATION | Age: 47
End: 2022-08-22

## 2022-08-22 NOTE — CARE COORDINATION
Patient contacted regarding COVID-19 diagnosis and pulse oximeter ordered at discharge. Discussed COVID-19 related testing which was available at this time. Test results were positive. Patient informed of results, if available? Yes. Ambulatory Care Manager contacted the patient by telephone to perform post discharge assessment. Call within 2 business days of discharge: Yes. Verified name and  with patient as identifiers. Provided introduction to self, and explanation of the CTN/ACM role, and reason for call due to risk factors for infection and/or exposure to COVID-19. Symptoms reviewed with patient who verbalized the following symptoms: fatigue  pain or aching joints  cough  shortness of breath  diarrhea  chest pain  no new symptoms  no worsening symptoms. Due to no new or worsening symptoms encounter was not routed to provider for escalation. Discussed follow-up appointments. If no appointment was previously scheduled, appointment scheduling offered: No.  [facility] follow up appointment(s):   Future Appointments   Date Time Provider Henrique Watt   2022  8:30 AM DO AMANDA Solorzano [facility]     [facility] follow up appointment( n/a    Non-face-to-face services provided:  Obtained and reviewed discharge summary and/or continuity of care documents     Advance Care Planning:   Does patient have an Advance Directive:  reviewed and current. Educated patient about risk for severe COVID-19 due to risk factors according to CDC guidelines. ACM reviewed discharge instructions, medical action plan and red flag symptoms with the patient who verbalized understanding. Discussed COVID vaccination status: Yes. Education provided on COVID-19 vaccination as appropriate. Discussed exposure protocols and quarantine with CDC Guidelines.  Patient was given an opportunity to verbalize any questions and concerns and agrees to contact ACM or health care provider for questions related to their healthcare. Reviewed and educated patient on any new and changed medications related to discharge diagnosis     Was patient discharged with a pulse oximeter? yes, discussed and confirmed pulse oximeter discharge instructions and when to notify provider or seek emergency care. ACM provided contact information. No further follow-up call identified based on severity of symptoms and risk factors Patient understands CDC guidelines and is able to repeat them. Patient verbalizes understanding of suggestions for follow up from ED AVS and has number to do so  Patient has no new or worsening symptoms. Patient wishes [facility] at [facility] Patient has contact information and encouraged to contact ACM should there be any questions or concerns. Episode to be closed at this time.

## 2022-08-23 ENCOUNTER — HOSPITAL ENCOUNTER (OUTPATIENT)
Dept: INFUSION THERAPY | Age: 47
Setting detail: INFUSION SERIES
Discharge: HOME | End: 2022-08-23
Payer: MEDICAID

## 2022-08-23 VITALS
DIASTOLIC BLOOD PRESSURE: 92 MMHG | SYSTOLIC BLOOD PRESSURE: 129 MMHG | HEART RATE: 67 BPM | RESPIRATION RATE: 16 BRPM | OXYGEN SATURATION: 99 % | TEMPERATURE: 97.8 F

## 2022-08-23 PROCEDURE — 2580000003 HC RX 258: Performed by: EMERGENCY MEDICINE

## 2022-08-23 PROCEDURE — 6360000002 HC RX W HCPCS: Performed by: EMERGENCY MEDICINE

## 2022-08-23 PROCEDURE — M0222 HC BEBTELOVIMAB INJECTION: HCPCS

## 2022-08-23 RX ORDER — SODIUM CHLORIDE 0.9 % (FLUSH) 0.9 %
5-40 SYRINGE (ML) INJECTION PRN
Status: DISCONTINUED | OUTPATIENT
Start: 2022-08-23 | End: 2022-08-24 | Stop reason: HOSPADM

## 2022-08-23 RX ORDER — ACETAMINOPHEN 325 MG/1
650 TABLET ORAL
Status: ACTIVE | OUTPATIENT
Start: 2022-08-23 | End: 2022-08-23

## 2022-08-23 RX ORDER — DIPHENHYDRAMINE HYDROCHLORIDE 50 MG/ML
50 INJECTION INTRAMUSCULAR; INTRAVENOUS
Status: ACTIVE | OUTPATIENT
Start: 2022-08-23 | End: 2022-08-23

## 2022-08-23 RX ORDER — EPINEPHRINE 1 MG/ML
0.3 INJECTION, SOLUTION, CONCENTRATE INTRAVENOUS PRN
Status: DISCONTINUED | OUTPATIENT
Start: 2022-08-23 | End: 2022-08-24 | Stop reason: HOSPADM

## 2022-08-23 RX ORDER — METHYLPREDNISOLONE SODIUM SUCCINATE 125 MG/2ML
125 INJECTION, POWDER, LYOPHILIZED, FOR SOLUTION INTRAMUSCULAR; INTRAVENOUS
Status: ACTIVE | OUTPATIENT
Start: 2022-08-23 | End: 2022-08-23

## 2022-08-23 RX ORDER — ONDANSETRON 2 MG/ML
8 INJECTION INTRAMUSCULAR; INTRAVENOUS
Status: ACTIVE | OUTPATIENT
Start: 2022-08-23 | End: 2022-08-23

## 2022-08-23 RX ORDER — SODIUM CHLORIDE 9 MG/ML
25 INJECTION, SOLUTION INTRAVENOUS PRN
Status: DISCONTINUED | OUTPATIENT
Start: 2022-08-23 | End: 2022-08-24 | Stop reason: HOSPADM

## 2022-08-23 RX ORDER — ALBUTEROL SULFATE 90 UG/1
4 AEROSOL, METERED RESPIRATORY (INHALATION) PRN
Status: DISCONTINUED | OUTPATIENT
Start: 2022-08-23 | End: 2022-08-24 | Stop reason: HOSPADM

## 2022-08-23 RX ORDER — SODIUM CHLORIDE 9 MG/ML
100 INJECTION, SOLUTION INTRAVENOUS CONTINUOUS PRN
Status: DISCONTINUED | OUTPATIENT
Start: 2022-08-23 | End: 2022-08-24 | Stop reason: HOSPADM

## 2022-08-23 RX ORDER — BEBTELOVIMAB 87.5 MG/ML
175 INJECTION, SOLUTION INTRAVENOUS ONCE
Status: COMPLETED | OUTPATIENT
Start: 2022-08-23 | End: 2022-08-23

## 2022-08-23 RX ADMIN — SODIUM CHLORIDE, PRESERVATIVE FREE 10 ML: 5 INJECTION INTRAVENOUS at 14:28

## 2022-08-23 RX ADMIN — SODIUM CHLORIDE, PRESERVATIVE FREE 10 ML: 5 INJECTION INTRAVENOUS at 14:30

## 2022-08-23 RX ADMIN — BEBTELOVIMAB 175 MG: 87.5 INJECTION, SOLUTION INTRAVENOUS at 14:30

## 2022-08-23 NOTE — PROGRESS NOTES
Patient arrived for monoclonal antibody injection. Patient alert and oriented x4. Patient educated on procedure, medication and risks and benefits of the injection. Verbal consent received for medication information   Written consent obtained for treatment. Plan of care discussed with patient. All questions answered.

## 2022-08-23 NOTE — PROGRESS NOTES
Discharge plan reviewed  Patient tolerated Covid Monoclonal injection well. Patient remained on unit for 1 hour post administration of bebtelovimab. No complications. D/c in stable condition with instructions. Educated on adverse signs/symptoms to report to physician. Instructed to notify their PCP or proceed to the emergency room for any worsening symptoms. Patient verbalizes understanding. Discharged from infusion services.

## 2022-08-26 ENCOUNTER — HOSPITAL ENCOUNTER (EMERGENCY)
Age: 47
Discharge: HOME | End: 2022-08-26
Attending: EMERGENCY MEDICINE
Payer: MEDICAID

## 2022-08-26 ENCOUNTER — APPOINTMENT (OUTPATIENT)
Dept: GENERAL RADIOLOGY | Age: 47
End: 2022-08-26
Payer: MEDICAID

## 2022-08-26 VITALS
BODY MASS INDEX: 34.91 KG/M2 | DIASTOLIC BLOOD PRESSURE: 86 MMHG | TEMPERATURE: 98.9 F | OXYGEN SATURATION: 97 % | RESPIRATION RATE: 16 BRPM | HEART RATE: 81 BPM | SYSTOLIC BLOOD PRESSURE: 135 MMHG | HEIGHT: 74 IN | WEIGHT: 272 LBS

## 2022-08-26 DIAGNOSIS — J44.1 COPD EXACERBATION (HCC): ICD-10-CM

## 2022-08-26 DIAGNOSIS — U07.1 COVID-19 VIRUS INFECTION: Primary | ICD-10-CM

## 2022-08-26 DIAGNOSIS — N17.9 AKI (ACUTE KIDNEY INJURY) (HCC): ICD-10-CM

## 2022-08-26 LAB
ALBUMIN SERPL-MCNC: 3.8 G/DL (ref 3.5–5.2)
ALP BLD-CCNC: 53 U/L (ref 40–129)
ALT SERPL-CCNC: 24 U/L (ref 0–40)
ANION GAP SERPL CALCULATED.3IONS-SCNC: 10 MMOL/L (ref 7–16)
AST SERPL-CCNC: 28 U/L (ref 0–39)
BASOPHILS ABSOLUTE: 0.02 E9/L (ref 0–0.2)
BASOPHILS RELATIVE PERCENT: 0.3 % (ref 0–2)
BILIRUB SERPL-MCNC: 0.2 MG/DL (ref 0–1.2)
BUN BLDV-MCNC: 21 MG/DL (ref 6–20)
CALCIUM SERPL-MCNC: 8.8 MG/DL (ref 8.6–10.2)
CHLORIDE BLD-SCNC: 107 MMOL/L (ref 98–107)
CO2: 23 MMOL/L (ref 22–29)
CREAT SERPL-MCNC: 2.1 MG/DL (ref 0.7–1.2)
D DIMER: <200 NG/ML DDU
EKG ATRIAL RATE: 84 BPM
EKG P AXIS: 56 DEGREES
EKG P-R INTERVAL: 156 MS
EKG Q-T INTERVAL: 382 MS
EKG QRS DURATION: 88 MS
EKG QTC CALCULATION (BAZETT): 451 MS
EKG R AXIS: -68 DEGREES
EKG T AXIS: 0 DEGREES
EKG VENTRICULAR RATE: 84 BPM
EOSINOPHILS ABSOLUTE: 0.03 E9/L (ref 0.05–0.5)
EOSINOPHILS RELATIVE PERCENT: 0.5 % (ref 0–6)
FERRITIN: 99 NG/ML
GFR AFRICAN AMERICAN: 41
GFR NON-AFRICAN AMERICAN: 41 ML/MIN/1.73
GLUCOSE BLD-MCNC: 148 MG/DL (ref 74–99)
HCT VFR BLD CALC: 49.1 % (ref 37–54)
HEMOGLOBIN: 16.4 G/DL (ref 12.5–16.5)
IMMATURE GRANULOCYTES #: 0.02 E9/L
IMMATURE GRANULOCYTES %: 0.3 % (ref 0–5)
LACTIC ACID: 1.8 MMOL/L (ref 0.5–2.2)
LYMPHOCYTES ABSOLUTE: 1.52 E9/L (ref 1.5–4)
LYMPHOCYTES RELATIVE PERCENT: 23.9 % (ref 20–42)
MAGNESIUM: 2.1 MG/DL (ref 1.6–2.6)
MCH RBC QN AUTO: 30.3 PG (ref 26–35)
MCHC RBC AUTO-ENTMCNC: 33.4 % (ref 32–34.5)
MCV RBC AUTO: 90.6 FL (ref 80–99.9)
MONOCYTES ABSOLUTE: 0.38 E9/L (ref 0.1–0.95)
MONOCYTES RELATIVE PERCENT: 6 % (ref 2–12)
NEUTROPHILS ABSOLUTE: 4.39 E9/L (ref 1.8–7.3)
NEUTROPHILS RELATIVE PERCENT: 69 % (ref 43–80)
PDW BLD-RTO: 13.7 FL (ref 11.5–15)
PLATELET # BLD: 204 E9/L (ref 130–450)
PMV BLD AUTO: 11.9 FL (ref 7–12)
POTASSIUM SERPL-SCNC: 4.2 MMOL/L (ref 3.5–5)
PRO-BNP: 3489 PG/ML (ref 0–125)
PROCALCITONIN: 0.09 NG/ML (ref 0–0.08)
RBC # BLD: 5.42 E12/L (ref 3.8–5.8)
SODIUM BLD-SCNC: 140 MMOL/L (ref 132–146)
TOTAL PROTEIN: 6.4 G/DL (ref 6.4–8.3)
TROPONIN, HIGH SENSITIVITY: 21 NG/L (ref 0–11)
WBC # BLD: 6.4 E9/L (ref 4.5–11.5)

## 2022-08-26 PROCEDURE — 83880 ASSAY OF NATRIURETIC PEPTIDE: CPT

## 2022-08-26 PROCEDURE — 99285 EMERGENCY DEPT VISIT HI MDM: CPT

## 2022-08-26 PROCEDURE — 2580000003 HC RX 258: Performed by: EMERGENCY MEDICINE

## 2022-08-26 PROCEDURE — 84484 ASSAY OF TROPONIN QUANT: CPT

## 2022-08-26 PROCEDURE — 94640 AIRWAY INHALATION TREATMENT: CPT

## 2022-08-26 PROCEDURE — 93005 ELECTROCARDIOGRAM TRACING: CPT | Performed by: PHYSICIAN ASSISTANT

## 2022-08-26 PROCEDURE — 83735 ASSAY OF MAGNESIUM: CPT

## 2022-08-26 PROCEDURE — 94664 DEMO&/EVAL PT USE INHALER: CPT

## 2022-08-26 PROCEDURE — 6370000000 HC RX 637 (ALT 250 FOR IP): Performed by: EMERGENCY MEDICINE

## 2022-08-26 PROCEDURE — 36415 COLL VENOUS BLD VENIPUNCTURE: CPT

## 2022-08-26 PROCEDURE — 80053 COMPREHEN METABOLIC PANEL: CPT

## 2022-08-26 PROCEDURE — 85025 COMPLETE CBC W/AUTO DIFF WBC: CPT

## 2022-08-26 PROCEDURE — 85378 FIBRIN DEGRADE SEMIQUANT: CPT

## 2022-08-26 PROCEDURE — 84145 PROCALCITONIN (PCT): CPT

## 2022-08-26 PROCEDURE — 83605 ASSAY OF LACTIC ACID: CPT

## 2022-08-26 PROCEDURE — 82728 ASSAY OF FERRITIN: CPT

## 2022-08-26 PROCEDURE — 71046 X-RAY EXAM CHEST 2 VIEWS: CPT

## 2022-08-26 RX ORDER — PREDNISONE 20 MG/1
40 TABLET ORAL ONCE
Status: COMPLETED | OUTPATIENT
Start: 2022-08-26 | End: 2022-08-26

## 2022-08-26 RX ORDER — 0.9 % SODIUM CHLORIDE 0.9 %
1000 INTRAVENOUS SOLUTION INTRAVENOUS ONCE
Status: COMPLETED | OUTPATIENT
Start: 2022-08-26 | End: 2022-08-26

## 2022-08-26 RX ORDER — IPRATROPIUM BROMIDE AND ALBUTEROL SULFATE 2.5; .5 MG/3ML; MG/3ML
1 SOLUTION RESPIRATORY (INHALATION)
Status: ACTIVE | OUTPATIENT
Start: 2022-08-26 | End: 2022-08-26

## 2022-08-26 RX ORDER — GUAIFENESIN 600 MG/1
600 TABLET, EXTENDED RELEASE ORAL 2 TIMES DAILY
Qty: 10 TABLET | Refills: 0 | Status: SHIPPED | OUTPATIENT
Start: 2022-08-26 | End: 2022-08-31

## 2022-08-26 RX ORDER — PREDNISONE 20 MG/1
40 TABLET ORAL DAILY
Qty: 10 TABLET | Refills: 0 | Status: SHIPPED | OUTPATIENT
Start: 2022-08-26 | End: 2022-08-31

## 2022-08-26 RX ADMIN — PREDNISONE 40 MG: 20 TABLET ORAL at 19:37

## 2022-08-26 RX ADMIN — IPRATROPIUM BROMIDE AND ALBUTEROL SULFATE 1 AMPULE: 2.5; .5 SOLUTION RESPIRATORY (INHALATION) at 19:51

## 2022-08-26 RX ADMIN — IPRATROPIUM BROMIDE AND ALBUTEROL SULFATE 1 AMPULE: 2.5; .5 SOLUTION RESPIRATORY (INHALATION) at 19:49

## 2022-08-26 RX ADMIN — SODIUM CHLORIDE 1000 ML: 9 INJECTION, SOLUTION INTRAVENOUS at 19:36

## 2022-08-26 ASSESSMENT — PAIN - FUNCTIONAL ASSESSMENT: PAIN_FUNCTIONAL_ASSESSMENT: NONE - DENIES PAIN

## 2022-08-26 ASSESSMENT — ENCOUNTER SYMPTOMS
CHEST TIGHTNESS: 1
COLOR CHANGE: 0
EYE ITCHING: 0
ABDOMINAL DISTENTION: 0
WHEEZING: 0
BACK PAIN: 0
ABDOMINAL PAIN: 0
SHORTNESS OF BREATH: 1
EYE DISCHARGE: 0

## 2022-08-26 NOTE — ED NOTES
Department of Emergency Medicine  FIRST PROVIDER TRIAGE NOTE             Independent MLP           8/26/22  5:46 PM EDT    Date of Encounter: 8/26/22   MRN: [id number]      HPI: Thuan Gibson is a 40 y.o. male who presents to the ED for Chest Pain, Shortness of Breath, and Positive For Covid-19     Patient is a 29-year-old that states 5 days ago he was diagnosed with COVID. Patient states he is now having worsening centralized chest pain as well as some shortness of breath. Patient states he just does not feel good he decided to come in for further evaluation    ROS: Negative for abd pain, back pain, cough, vomiting, diarrhea, or urinary complaints. PE: Gen Appearance/Constitutional: alert  HEENT: NC/NT. PERRLA,  Airway patent. Neck: supple     Initial Plan of Care: All treatment areas with department are currently occupied. Plan to order/Initiate the following while awaiting opening in ED: labs, EKG, and imaging studies.   Initiate Treatment-Testing, Proceed toTreatment Area When Bed Available for ED Attending/MLP to Continue Care    Electronically signed by Pina Dalton PA-C   DD: 8/26/22       Pina Dalton PA-C  08/26/22 2970

## 2022-08-26 NOTE — ED PROVIDER NOTES
Patient has no known allergies.     -------------------------------------------------- RESULTS -------------------------------------------------  All laboratory and radiology results have been personally reviewed by myself   LABS:  Results for orders placed or performed during the hospital encounter of 08/26/22   Comprehensive Metabolic Panel   Result Value Ref Range    Sodium 140 132 - 146 mmol/L    Potassium 4.2 3.5 - 5.0 mmol/L    Chloride 107 98 - 107 mmol/L    CO2 23 22 - 29 mmol/L    Anion Gap 10 7 - 16 mmol/L    Glucose 148 (H) 74 - 99 mg/dL    BUN 21 (H) 6 - 20 mg/dL    Creatinine 2.1 (H) 0.7 - 1.2 mg/dL    GFR Non-African American 41 >=60 mL/min/1.73    GFR African American 41     Calcium 8.8 8.6 - 10.2 mg/dL    Total Protein 6.4 6.4 - 8.3 g/dL    Albumin 3.8 3.5 - 5.2 g/dL    Total Bilirubin 0.2 0.0 - 1.2 mg/dL    Alkaline Phosphatase 53 40 - 129 U/L    ALT 24 0 - 40 U/L    AST 28 0 - 39 U/L   CBC with Auto Differential   Result Value Ref Range    WBC 6.4 4.5 - 11.5 E9/L    RBC 5.42 3.80 - 5.80 E12/L    Hemoglobin 16.4 12.5 - 16.5 g/dL    Hematocrit 49.1 37.0 - 54.0 %    MCV 90.6 80.0 - 99.9 fL    MCH 30.3 26.0 - 35.0 pg    MCHC 33.4 32.0 - 34.5 %    RDW 13.7 11.5 - 15.0 fL    Platelets 083 364 - 457 E9/L    MPV 11.9 7.0 - 12.0 fL    Neutrophils % 69.0 43.0 - 80.0 %    Immature Granulocytes % 0.3 0.0 - 5.0 %    Lymphocytes % 23.9 20.0 - 42.0 %    Monocytes % 6.0 2.0 - 12.0 %    Eosinophils % 0.5 0.0 - 6.0 %    Basophils % 0.3 0.0 - 2.0 %    Neutrophils Absolute 4.39 1.80 - 7.30 E9/L    Immature Granulocytes # 0.02 E9/L    Lymphocytes Absolute 1.52 1.50 - 4.00 E9/L    Monocytes Absolute 0.38 0.10 - 0.95 E9/L    Eosinophils Absolute 0.03 (L) 0.05 - 0.50 E9/L    Basophils Absolute 0.02 0.00 - 0.20 E9/L   Troponin   Result Value Ref Range    Troponin, High Sensitivity 21 (H) 0 - 11 ng/L   Lactic Acid   Result Value Ref Range    Lactic Acid 1.8 0.5 - 2.2 mmol/L   Brain Natriuretic Peptide   Result Value Ref Range    Pro-BNP 3,489 (H) 0 - 125 pg/mL   Magnesium   Result Value Ref Range    Magnesium 2.1 1.6 - 2.6 mg/dL   D-Dimer, Quantitative   Result Value Ref Range    D-Dimer, Quant <200 ng/mL DDU   EKG 12 Lead   Result Value Ref Range    Ventricular Rate 84 BPM    Atrial Rate 84 BPM    P-R Interval 156 ms    QRS Duration 88 ms    Q-T Interval 382 ms    QTc Calculation (Bazett) 451 ms    P Axis 56 degrees    R Axis -68 degrees    T Axis 0 degrees       RADIOLOGY:  Interpreted by Radiologist.  XR CHEST (2 VW)   Final Result   No acute process. ------------------------- NURSING NOTES AND VITALS REVIEWED ---------------------------   The nursing notes within the ED encounter and vital signs as below have been reviewed. /86   Pulse 81   Temp 98.9 °F (37.2 °C)   Resp 16   Ht 6' 2\" (1.88 m)   Wt 272 lb (123.4 kg)   SpO2 97%   BMI 34.92 kg/m²   Oxygen Saturation Interpretation: Normal      ---------------------------------------------------PHYSICAL EXAM--------------------------------------      Constitutional/General: Alert and oriented x3, well appearing, non toxic in NAD  Head: Normocephalic and atraumatic  Eyes: EOMI, normal conjunctiva   Mouth: Oropharynx clear, handling secretions, no trismus  Neck: Supple, full ROM,   Pulmonary: Lungs diminished with scattered wheezes. Not in respiratory distress  Cardiovascular:  Regular rate and rhythm, no murmurs, gallops, or rubs. 2+ distal pulses  Abdomen: Soft, non tender, non distended,   Extremities: Moves all extremities x 4. Warm and well perfused. No leg edema. No calf tenderness. Skin: warm and dry without rash  Neurologic: GCS 15, no focal motor or sensory deficits   Psych: Normal Affect.  Behavior normal.      ------------------------------ ED COURSE/MEDICAL DECISION MAKING----------------------  Medications   ipratropium-albuterol (DUONEB) nebulizer solution 1 ampule (1 ampule Inhalation Not Given 8/26/22 2005)   0.9 % sodium chloride bolus (0 mLs IntraVENous Stopped 8/26/22 2030)   predniSONE (DELTASONE) tablet 40 mg (40 mg Oral Given 8/26/22 1937)       Medical Decision Making/ED COURSE:   Patient is a 78-year-old male with recent diagnosis of COVID-19 presenting with chest pain, shortness of breath, cough, diarrhea, dizziness. In the ED, patient was hemodynamically stable and afebrile. On exam, he appeare uncomfortable but was nontoxic. Labs and CXR obtained. Patient administered IVF, duonebs, and prednisone. I reviewed and interpreted labs. Labs reassuring without acute findings. Mild troponin elevation though this is consistent with multiple prior troponins. No active chest pain. EKG actually appears improved when compared to prior EKG. BNP was elevated though patient had no clinical evidence of fluid overload. Chest x-ray was clear. D-dimer was negative. No hypoxia or tachycardia. PE not suspected. Patient was treated with steroids and breathing treatments due to concern for underlying COPD exacerbation. Patient's symptoms are consistent with confirmed COVID-19 infection. Patient has stable vital signs and good oxygen saturations. No evidence of respiratory distress. Baseline mentation. No acute emergent findings in the ED. Patient is appropriate for outpatient management and PCP follow up. Supportive care instructions and strict ED return precautions discussed. Patient remained hemodynamically stable throughout ED course. ED Course as of 08/26/22 2053   Fri Aug 26, 2022   1938 EKG: This EKG is signed and interpreted by me. Rate: 84  Rhythm: Sinus  Interpretation: Normal sinus rhythm, normal RI interval, left axis deviation, T wave abnormalities appear improved when compared to prior EKG, normal QTC  Comparison: changes compared to previous EKG-appears improved when compared to prior EKG   [JA]   1946 Patient's BNP is elevated. Clinically he does not appear fluid overloaded. He has a mild JOSE.  Will gently hydrate due to diarrhea and JOSE [JA]      ED Course User Index  [JA] Hector Lund MD       New Prescriptions    GUAIFENESIN (MUCINEX) 600 MG EXTENDED RELEASE TABLET    Take 1 tablet by mouth 2 times daily for 5 days    PREDNISONE (DELTASONE) 20 MG TABLET    Take 2 tablets by mouth daily for 5 days    ZINC 50 MG CAPS    Take 50 mg by mouth daily     Hector Lund MD      Counseling: The emergency provider has spoken with the patient and discussed todays results, in addition to providing specific details for the plan of care and counseling regarding the diagnosis and prognosis. Questions are answered at this time and they are agreeable with the plan.      --------------------------------- IMPRESSION AND DISPOSITION ---------------------------------    IMPRESSION  1. COVID-19 virus infection    2. [facility] ([facility]    3. COPD exacerbation ([facility]        DISPOSITION  Disposition: Discharge to home  Patient condition is stable      NOTE: This report was transcribed using voice recognition software. Every effort was made to ensure accuracy; however, inadvertent computerized transcription errors may be present    I, Hector Lund MD, am the primary provider of this record      ATTENDING PROVIDER ATTESTATION:     Alla Ahn presented to the emergency department for evaluation of Chest Pain, Shortness of Breath, and Positive For Covid-19   and was initially evaluated by the Medical Resident. See Original ED Note for H&P and ED course above. I have reviewed and discussed the case, including pertinent history (medical, surgical, family and social) and exam findings with the Medical Resident assigned to Alla Ahn. I have personally performed and/or participated in the history, exam, medical decision making, and procedures and agree with all pertinent clinical information. I, Dr. Hector Lund MD, am the primary provider of this record.      I have reviewed my findings and recommendations with the assigned Medical Resident, Lee Orosco and members of family present at the time of disposition. My findings/plan: The primary encounter diagnosis was COVID-19 virus infection. Diagnoses of JOSE (acute kidney injury) ([facility] and COPD exacerbation ([facility] were also pertinent to this visit.   New Prescriptions    GUAIFENESIN (MUCINEX) 600 MG EXTENDED RELEASE TABLET    Take 1 tablet by mouth 2 times daily for 5 days    PREDNISONE (DELTASONE) 20 MG TABLET    Take 2 tablets by mouth daily for 5 days    ZINC 50 MG CAPS    Take 50 mg by mouth daily     MD Albina Bonilla MD  08/26/22 2053

## 2022-08-26 NOTE — ED PROVIDER NOTES
inhaler and COVID-19 precautions. Patient is medically cleared for discharge. All of his laboratory evaluations and testing results discussed with patient. All of his questions and concerns answered. Patient verbally consented and agreed to discharge. Return precautions to the ER given. Patient he medically stable at time of discharge. Amount and/or Complexity of Data Reviewed  Decide to obtain previous medical records or to obtain history from someone other than the patient: yes         ED Course as of 08/26/22 2020   Fri Aug 26, 2022   1938 EKG: This EKG is signed and interpreted by me. Rate: 84  Rhythm: Sinus  Interpretation: Normal sinus rhythm, normal WI interval, left axis deviation, T wave abnormalities appear improved when compared to prior EKG, normal QTC  Comparison: changes compared to previous EKG-appears improved when compared to prior EKG   [JA]   1946 Patient's BNP is elevated. Clinically he does not appear fluid overloaded. He has a mild JOSE. Will gently hydrate due to diarrhea and JOSE [JA]      ED Course User Index  [JA] Sofie Vallecillo MD         ED Course as of 08/26/22 2020   Fri Aug 26, 2022   1938 EKG: This EKG is signed and interpreted by me. Rate: 84  Rhythm: Sinus  Interpretation: Normal sinus rhythm, normal WI interval, left axis deviation, T wave abnormalities appear improved when compared to prior EKG, normal QTC  Comparison: changes compared to previous EKG-appears improved when compared to prior EKG   [JA]   1946 Patient's BNP is elevated. Clinically he does not appear fluid overloaded. He has a mild JOSE.  Will gently hydrate due to diarrhea and JOSE [JA]      ED Course User Index  [LALI] Sofie Vallecillo MD       --------------------------------------------- PAST HISTORY ---------------------------------------------  Past Medical History:  has a past medical history of CAD (coronary artery disease), [facility]congestive [facility] GERD (gastroesophageal reflux disease), Hx of drug abuse ([facility] Hypertension, and NICM (nonischemic cardiomyopathy) [facility] Past Surgical History:  has a past surgical history that includes Skin graft (Right); Cardiac catheterization; laparoscopy (N/A, 4/30/2022); and laparotomy (N/A, 4/30/2022). Social History:  reports that he has been smoking cigarettes. He started smoking about 30 years ago. He has a 7.50 pack-year smoking history. He has never used smokeless tobacco. He reports that he does not currently use alcohol. He reports that he does not currently use drugs after having used the following drugs: Cocaine and Marijuana Melyssa Ambberenice). Family History: family history includes Hypertension in his maternal grandmother and mother; Stroke in his maternal grandmother; Thyroid Disease in his mother. The patients home medications have been reviewed. Allergies: Patient has no known allergies.     -------------------------------------------------- RESULTS -------------------------------------------------  Labs:  Results for orders placed or performed during the hospital encounter of 08/26/22   Comprehensive Metabolic Panel   Result Value Ref Range    Sodium 140 132 - 146 mmol/L    Potassium 4.2 3.5 - 5.0 mmol/L    Chloride 107 98 - 107 mmol/L    CO2 23 22 - 29 mmol/L    Anion Gap 10 7 - 16 mmol/L    Glucose 148 (H) 74 - 99 mg/dL    BUN 21 (H) 6 - 20 mg/dL    Creatinine 2.1 (H) 0.7 - 1.2 mg/dL    GFR Non-African American 41 >=60 mL/min/1.73    GFR African American 41     Calcium 8.8 8.6 - 10.2 mg/dL    Total Protein 6.4 6.4 - 8.3 g/dL    Albumin 3.8 3.5 - 5.2 g/dL    Total Bilirubin 0.2 0.0 - 1.2 mg/dL    Alkaline Phosphatase 53 40 - 129 U/L    ALT 24 0 - 40 U/L    AST 28 0 - 39 U/L   CBC with Auto Differential   Result Value Ref Range    WBC 6.4 4.5 - 11.5 E9/L    RBC 5.42 3.80 - 5.80 E12/L    Hemoglobin 16.4 12.5 - 16.5 g/dL    Hematocrit 49.1 37.0 - 54.0 %    MCV 90.6 80.0 - 99.9 fL    MCH 30.3 26.0 - 35.0 pg    MCHC 33.4 32.0 - 34.5 %    RDW 13.7 11.5 - 15.0 fL    Platelets 637 504 - 063 E9/L    MPV 11.9 7.0 - 12.0 fL    Neutrophils % 69.0 43.0 - 80.0 %    Immature Granulocytes % 0.3 0.0 - 5.0 %    Lymphocytes % 23.9 20.0 - 42.0 %    Monocytes % 6.0 2.0 - 12.0 %    Eosinophils % 0.5 0.0 - 6.0 %    Basophils % 0.3 0.0 - 2.0 %    Neutrophils Absolute 4.39 1.80 - 7.30 E9/L    Immature Granulocytes # 0.02 E9/L    Lymphocytes Absolute 1.52 1.50 - 4.00 E9/L    Monocytes Absolute 0.38 0.10 - 0.95 E9/L    Eosinophils Absolute 0.03 (L) 0.05 - 0.50 E9/L    Basophils Absolute 0.02 0.00 - 0.20 E9/L   Troponin   Result Value Ref Range    Troponin, High Sensitivity 21 (H) 0 - 11 ng/L   Lactic Acid   Result Value Ref Range    Lactic Acid 1.8 0.5 - 2.2 mmol/L   Brain Natriuretic Peptide   Result Value Ref Range    Pro-BNP 3,489 (H) 0 - 125 pg/mL   Magnesium   Result Value Ref Range    Magnesium 2.1 1.6 - 2.6 mg/dL   D-Dimer, Quantitative   Result Value Ref Range    D-Dimer, Quant <200 ng/mL DDU   EKG 12 Lead   Result Value Ref Range    Ventricular Rate 84 BPM    Atrial Rate 84 BPM    P-R Interval 156 ms    QRS Duration 88 ms    Q-T Interval 382 ms    QTc Calculation (Bazett) 451 ms    P Axis 56 degrees    R Axis -68 degrees    T Axis 0 degrees       Radiology:  XR CHEST (2 VW)   Final Result   No acute process. ------------------------- NURSING NOTES AND VITALS REVIEWED ---------------------------  Date / Time Roomed:  8/26/2022  6:07 PM  ED Bed Assignment:  30/30    The nursing notes within the ED encounter and vital signs as below have been reviewed.    BP (!) 138/97   Pulse 88   Temp 98.8 °F (37.1 °C) (Oral)   Resp 15   Ht 6' 2\" (1.88 m)   Wt 272 lb (123.4 kg)   SpO2 99%   BMI 34.92 kg/m²   Oxygen Saturation Interpretation: Normal      ------------------------------------------ PROGRESS NOTES ------------------------------------------  8:18 PM EDT  I have spoken with the patient and discussed todays results, in addition to providing specific details for the plan of care and counseling regarding the diagnosis and prognosis. Their questions are answered at this time and they are agreeable with the plan. I discussed at length with them reasons for immediate return here for re evaluation. They will followup with their primary care physician by calling their office on Monday.      --------------------------------- ADDITIONAL PROVIDER NOTES ---------------------------------  At this time the patient is without objective evidence of an acute process requiring hospitalization or inpatient management. They have remained hemodynamically stable throughout their entire ED visit and are stable for discharge with outpatient follow-up. The plan has been discussed in detail and they are aware of the specific conditions for emergent return, as well as the importance of follow-up. New Prescriptions    GUAIFENESIN (MUCINEX) 600 MG EXTENDED RELEASE TABLET    Take 1 tablet by mouth 2 times daily for 5 days    PREDNISONE (DELTASONE) 20 MG TABLET    Take 2 tablets by mouth daily for 5 days    ZINC 50 MG CAPS    Take 50 mg by mouth daily       Diagnosis:  1. COVID-19 virus infection    2. JOSE ([facility]) ([facility]        Disposition:  Patient's disposition: Discharge to home  Patient's condition is stable.       An Fagan,   Resident  08/26/22 0025

## 2022-08-27 NOTE — ED NOTES
Ambulated pt in talley, maintained pulse ox of 97%. Denies sob or discomfort at this time.       [facility]  [id number]

## 2022-08-27 NOTE — DISCHARGE INSTRUCTIONS
Make sure to follow-up with your primary care physician. Please finish the dose of steroids, vitamin C, and Mucinex. Please also call the [address] [place] [address] for consultation about rehab facility. Return to the ER if you have any worsening chest pain, shortness of breath, fever/chills.

## 2022-08-30 ENCOUNTER — HOSPITAL ENCOUNTER (OUTPATIENT)
Dept: OTHER | Age: 47
Setting detail: THERAPIES SERIES
Discharge: HOME | End: 2022-08-30

## 2022-08-30 NOTE — PROGRESS NOTES
Patient's mom called to let chf clinic know that patient is at a facility in [facility] for rehab. CHF consult rescheduled for September 30,2022.

## 2022-09-02 DIAGNOSIS — I42.8 NONISCHEMIC CARDIOMYOPATHY (HCC): ICD-10-CM

## 2022-09-02 DIAGNOSIS — I10 HYPERTENSION, UNSPECIFIED TYPE: ICD-10-CM

## 2022-09-02 DIAGNOSIS — I50.20 HFREF (HEART FAILURE WITH REDUCED EJECTION FRACTION) (HCC): ICD-10-CM

## 2022-09-02 RX ORDER — CARVEDILOL 25 MG/1
25 TABLET ORAL 2 TIMES DAILY WITH MEALS
Qty: 60 TABLET | Refills: 0 | Status: SHIPPED | OUTPATIENT
Start: 2022-09-02

## 2022-09-02 RX ORDER — EMPAGLIFLOZIN 10 MG/1
TABLET, FILM COATED ORAL
Qty: 30 TABLET | Refills: 0 | Status: SHIPPED | OUTPATIENT
Start: 2022-09-02

## 2022-09-02 RX ORDER — SPIRONOLACTONE 25 MG/1
TABLET ORAL
Qty: 30 TABLET | Refills: 0 | Status: SHIPPED | OUTPATIENT
Start: 2022-09-02

## 2022-09-28 ENCOUNTER — OFFICE VISIT (OUTPATIENT)
Dept: PRIMARY CARE CLINIC | Age: 47
End: 2022-09-28
Payer: MEDICAID

## 2022-09-28 VITALS
SYSTOLIC BLOOD PRESSURE: 112 MMHG | TEMPERATURE: 95.7 F | HEART RATE: 77 BPM | RESPIRATION RATE: 18 BRPM | DIASTOLIC BLOOD PRESSURE: 70 MMHG | HEIGHT: 74 IN | WEIGHT: 275 LBS | BODY MASS INDEX: 35.29 KG/M2 | OXYGEN SATURATION: 97 %

## 2022-09-28 DIAGNOSIS — M79.605 BILATERAL LEG PAIN: Primary | ICD-10-CM

## 2022-09-28 DIAGNOSIS — R73.9 HYPERGLYCEMIA: ICD-10-CM

## 2022-09-28 DIAGNOSIS — M79.604 BILATERAL LEG PAIN: Primary | ICD-10-CM

## 2022-09-28 LAB — HBA1C MFR BLD: 6 %

## 2022-09-28 PROCEDURE — 99213 OFFICE O/P EST LOW 20 MIN: CPT | Performed by: STUDENT IN AN ORGANIZED HEALTH CARE EDUCATION/TRAINING PROGRAM

## 2022-09-28 PROCEDURE — 83036 HEMOGLOBIN GLYCOSYLATED A1C: CPT | Performed by: STUDENT IN AN ORGANIZED HEALTH CARE EDUCATION/TRAINING PROGRAM

## 2022-09-28 RX ORDER — CILOSTAZOL 50 MG/1
50 TABLET ORAL 2 TIMES DAILY
Qty: 60 TABLET | Refills: 3 | Status: SHIPPED | OUTPATIENT
Start: 2022-09-28

## 2022-09-28 RX ORDER — CYCLOBENZAPRINE HCL 10 MG
10 TABLET ORAL NIGHTLY PRN
Qty: 30 TABLET | Refills: 0 | Status: SHIPPED | OUTPATIENT
Start: 2022-09-28 | End: 2022-10-08

## 2022-09-28 NOTE — PROGRESS NOTES
[facility]  Department of Family Medicine      Patient:  Dyllan Restrepo 40 y.o. male     Date of Service: 6/13/22      Chief complaint:   Chief Complaint   Patient presents with    6 Month Follow-Up             History ofPresent Illness   The patient is a 40 y.o. male  presented to the clinic with complaints as above. Bilateral leg pain  -f/u  -imaging ordered and d dimer during last visit  -d dimer normal, has not gotten imaging yet   -pain described as pain in the back of his thighs, aching kind of pain, and feels as if he does not sit his legs are going to give out  -only happens he is walking or standing however now happening with sitting  -states he did fall, however was having pain before then   -having some tingling with it   -states pain was so bad he was buying narcotics off of the street and had to go to rehab for this and cocaine abuse     Past Medical History:      Diagnosis Date    CAD (coronary artery disease)     CHF (congestive heart failure) ([facility] 02/08/2022    GERD (gastroesophageal reflux disease)     Hx of drug abuse ([facility] [address]     cocaine    Hypertension     NICM (nonischemic cardiomyopathy) ([facility] [address]        PastSurgical History:        Procedure Laterality Date    CARDIAC CATHETERIZATION      LAPAROSCOPY N/A 4/30/2022    LAPAROSCOPY DIAGNOSTIC,  EXPLORATORY INVERTED TO EXPLORATORY LAPOROTOMY, DISTAL SMALL BOWEL RESECTION, PRIMARY STAPLED ANASTAMOSIS, BILATERAL INGUINAL HERNIA REPAIR WITH MESH performed by Nimesh Sierra MD at [address] [facility] N/A 4/30/2022    LAPAROTOMY EXPLORATORY BILATERAL INGUINAL HERNIA REPAIR OPEN,  BOWEL RESECTION,  MESH X2 performed by Nimesh Sierra MD at [address] [facility] [address] GRAFT Right     R thigh       Allergies:    Patient has no known allergies.     Social History:   Social History     Socioeconomic History    Marital status: Single     Spouse name: Not on file    Number of children: Not on file    Years of education: Not on file    Highest education level: Not on file   Occupational History    Not on file   Tobacco Use    Smoking status: Some Days     Packs/day: 0.25     Years: 30.00     Pack years: 7.50     Types: Cigarettes     Start date: 13     Last attempt to quit: 2022     Years since quittin.6    Smokeless tobacco: Never   Vaping Use    Vaping Use: Never used   Substance and Sexual Activity    Alcohol use: Not Currently    Drug use: Not Currently     Types: Cocaine, Marijuana (Vernel Jamin)     Comment: past use; none since 2022    Sexual activity: Not on file   Other Topics Concern    Not on file   Social History Narrative    Denies caffeine     Social Determinants of Health     Financial Resource Strain: Low Risk     Difficulty of Paying Living Expenses: Not hard at all   Food Insecurity: No Food Insecurity    Worried About Running Out of Food in the Last Year: Never true    [address] in the Last Year: Never true   Transportation Needs: Not on file   Physical Activity: Inactive    Days of Exercise per Week: 0 days    Minutes of Exercise per Session: 0 min   Stress: Not on file   Social Connections: Not on file   Intimate Partner Violence: Not At Risk    Fear of Current or Ex-Partner: No    Emotionally Abused: No    Physically Abused: No    Sexually Abused: No   Housing Stability: Not on file        Family History:       Problem Relation Age of Onset    Thyroid Disease Mother     Hypertension Mother     Stroke Maternal Grandmother     Hypertension Maternal Grandmother        Review of Systems:   Review of Systems - as above     Physical Exam   Vitals: /70   Pulse 77   Temp (!) 95.7 °F (35.4 °C) (Infrared)   Resp 18   Ht 6' 2\" (1.88 m)   Wt 275 lb (124.7 kg)   SpO2 97%   BMI 35.31 kg/m²   Physical Exam  Constitutional:       Appearance: He is well-developed. HENT:      Head: Normocephalic and atraumatic. Eyes:      General:         Right eye: No discharge. Left eye: No discharge.       Conjunctiva/sclera: Conjunctivae normal.   Neck:      Trachea: No tracheal deviation. Cardiovascular:      Rate and Rhythm: Normal rate and regular rhythm. Heart sounds: Normal heart sounds. Pulmonary:      Effort: Pulmonary effort is normal. No respiratory distress. Breath sounds: No wheezing. Comments: Coarse breath sounds in lung bases  Abdominal:      General: Bowel sounds are normal. There is no distension. Palpations: Abdomen is soft. Tenderness: There is no abdominal tenderness. Musculoskeletal:         General: Tenderness present. Cervical back: Normal range of motion and neck supple. Skin:     General: Skin is warm and dry. Neurological:      Mental Status: He is alert. Psychiatric:         Behavior: Behavior normal.           Assessment and Plan       1. Bilateral leg pain  F/u   -Pain is worsening, will get SREEDHAR to further evaluate and start on pletal to see if this helps, could be radicular in nature, will refer to pain management also for further evaluation  - VL SREEDHAR BILATERAL LIMITED 1-2 LEVELS; Future  - cilostazol (PLETAL) 50 MG tablet; Take 1 tablet by mouth 2 times daily  Dispense: 60 tablet; Refill: 3  - cyclobenzaprine (FLEXERIL) 10 MG tablet; Take 1 tablet by mouth nightly as needed for Muscle spasms  Dispense: 30 tablet; Refill: 0    2. Hyperglycemia  A1c in office was 6.0, puts him in pre diabetic range  - POCT glycosylated hemoglobin (Hb A1C)      Counseled regarding above diagnosis, including possible risks and complications,  especially if left uncontrolled. Counseled regarding the possible side effects, risks, benefits and alternatives to treatment;patient and/or guardian verbalizes understanding, agrees, feels comfortable with and wishes to proceed with above treatment plan.     Call or go to [address] if symptoms worsen or persist. Advised patient to call with any new medication issues, and, as applicable, read all Rx info from pharmacy to assure aware of all possible risks and side effects of medicationbefore taking. Patient and/or guardian given opportunity to ask questions/raise concerns. The patient verbalized comfort and understanding ofinstructions. I encourage further reading and education about your health conditions. Information on many health conditions is provided by [facility] [employer]: [email or web address]  Please bring any questions to me at your nextvisit. Return to Office: Return in about 1 month (around 10/28/2022) for f/u leg pain and back pain . Medication List:    Current Outpatient Medications   Medication Sig Dispense Refill    cilostazol (PLETAL) 50 MG tablet Take 1 tablet by mouth 2 times daily 60 tablet 3    cyclobenzaprine (FLEXERIL) 10 MG tablet Take 1 tablet by mouth nightly as needed for Muscle spasms 30 tablet 0    carvedilol (COREG) 25 MG tablet TAKE 1 TABLET BY MOUTH IN THE MORNING AND 1 TABLET IN THE EVENING. TAKE WITH MEALS. 60 tablet 0    spironolactone (ALDACTONE) 25 MG tablet TAKE 1 TABLET BY MOUTH EVERY DAY IN THE MORNING 30 tablet 0    JARDIANCE 10 MG tablet TAKE 1 TABLET BY MOUTH EVERY DAY IN THE MORNING 30 tablet 0    omeprazole (PRILOSEC) 20 MG delayed release capsule Take 20 mg by mouth daily      albuterol sulfate HFA (VENTOLIN HFA) 108 (90 Base) MCG/ACT inhaler Inhale 2 puffs into the lungs 4 times daily as needed for Wheezing 18 g 0    furosemide (LASIX) 40 MG tablet Take 1 tablet by mouth daily 30 tablet 3    acetaminophen (TYLENOL) 500 MG tablet Take 2 tablets by mouth 3 times daily as needed for Pain 540 tablet 1    zinc 50 MG CAPS Take 50 mg by mouth daily 30 capsule 0    Dextromethorphan-guaiFENesin (MUCINEX DM MAXIMUM STRENGTH)  MG TB12 Take 1 tablet by mouth every 12 hours as needed (cough and congestion) (Patient not taking: Reported on 9/28/2022) 28 tablet 0     No current facility-administered medications for this visit.         Brian Ragsdale, DO       This document may have been prepared at least partially through the use of voice recognition software. Although effort is taken to assure the accuracy ofthis document, it is possible that grammatical, syntax,  or spelling errors may occur.

## 2022-10-27 LAB
MEASLES IMMUNE (IGG): NORMAL
MUMPS AB IGG: NORMAL
RUBELLA ANTIBODY IGG: NORMAL
VARICELLA-ZOSTER VIRUS AB, IGG: NORMAL

## 2022-10-30 LAB — MISCELLANEOUS LAB TEST RESULT: NORMAL

## 2022-11-15 ENCOUNTER — APPOINTMENT (OUTPATIENT)
Dept: GENERAL RADIOLOGY | Age: 47
DRG: 113 | End: 2022-11-15
Payer: MEDICAID

## 2022-11-15 ENCOUNTER — HOSPITAL ENCOUNTER (INPATIENT)
Age: 47
LOS: 1 days | Discharge: HOME | DRG: 113 | End: 2022-11-16
Attending: EMERGENCY MEDICINE | Admitting: FAMILY MEDICINE
Payer: MEDICAID

## 2022-11-15 DIAGNOSIS — R07.9 CHEST PAIN, UNSPECIFIED TYPE: Primary | ICD-10-CM

## 2022-11-15 LAB
ADENOVIRUS BY PCR: NOT DETECTED
ANION GAP SERPL CALCULATED.3IONS-SCNC: 8 MMOL/L (ref 7–16)
BORDETELLA PARAPERTUSSIS BY PCR: NOT DETECTED
BORDETELLA PERTUSSIS BY PCR: NOT DETECTED
BUN BLDV-MCNC: 13 MG/DL (ref 6–20)
CALCIUM SERPL-MCNC: 9.5 MG/DL (ref 8.6–10.2)
CHLAMYDOPHILIA PNEUMONIAE BY PCR: NOT DETECTED
CHLORIDE BLD-SCNC: 105 MMOL/L (ref 98–107)
CO2: 25 MMOL/L (ref 22–29)
CORONAVIRUS 229E BY PCR: NOT DETECTED
CORONAVIRUS HKU1 BY PCR: NOT DETECTED
CORONAVIRUS NL63 BY PCR: NOT DETECTED
CORONAVIRUS OC43 BY PCR: NOT DETECTED
CREAT SERPL-MCNC: 1.6 MG/DL (ref 0.7–1.2)
D DIMER: 211 NG/ML DDU
EKG ATRIAL RATE: 72 BPM
EKG P AXIS: 81 DEGREES
EKG P-R INTERVAL: 158 MS
EKG Q-T INTERVAL: 406 MS
EKG QRS DURATION: 102 MS
EKG QTC CALCULATION (BAZETT): 444 MS
EKG R AXIS: -72 DEGREES
EKG T AXIS: -85 DEGREES
EKG VENTRICULAR RATE: 72 BPM
GFR SERPL CREATININE-BSD FRML MDRD: 53 ML/MIN/1.73
GLUCOSE BLD-MCNC: 99 MG/DL (ref 74–99)
HCT VFR BLD CALC: 44.4 % (ref 37–54)
HEMOGLOBIN: 14.5 G/DL (ref 12.5–16.5)
HUMAN METAPNEUMOVIRUS BY PCR: NOT DETECTED
HUMAN RHINOVIRUS/ENTEROVIRUS BY PCR: NOT DETECTED
INFLUENZA A BY PCR: NOT DETECTED
INFLUENZA A BY PCR: NOT DETECTED
INFLUENZA B BY PCR: NOT DETECTED
INFLUENZA B BY PCR: NOT DETECTED
MCH RBC QN AUTO: 30.4 PG (ref 26–35)
MCHC RBC AUTO-ENTMCNC: 32.7 % (ref 32–34.5)
MCV RBC AUTO: 93.1 FL (ref 80–99.9)
METER GLUCOSE: 113 MG/DL (ref 74–99)
METER GLUCOSE: 193 MG/DL (ref 74–99)
MYCOPLASMA PNEUMONIAE BY PCR: NOT DETECTED
PARAINFLUENZA VIRUS 1 BY PCR: NOT DETECTED
PARAINFLUENZA VIRUS 2 BY PCR: NOT DETECTED
PARAINFLUENZA VIRUS 3 BY PCR: NOT DETECTED
PARAINFLUENZA VIRUS 4 BY PCR: NOT DETECTED
PDW BLD-RTO: 13.7 FL (ref 11.5–15)
PLATELET # BLD: 252 E9/L (ref 130–450)
PMV BLD AUTO: 11.3 FL (ref 7–12)
POTASSIUM SERPL-SCNC: 4.4 MMOL/L (ref 3.5–5)
PRO-BNP: 1922 PG/ML (ref 0–125)
RBC # BLD: 4.77 E12/L (ref 3.8–5.8)
RESPIRATORY SYNCYTIAL VIRUS BY PCR: NOT DETECTED
SARS-COV-2, NAAT: NOT DETECTED
SARS-COV-2, PCR: NOT DETECTED
SODIUM BLD-SCNC: 138 MMOL/L (ref 132–146)
TROPONIN, HIGH SENSITIVITY: 17 NG/L (ref 0–11)
TROPONIN, HIGH SENSITIVITY: 18 NG/L (ref 0–11)
WBC # BLD: 6.1 E9/L (ref 4.5–11.5)

## 2022-11-15 PROCEDURE — 83880 ASSAY OF NATRIURETIC PEPTIDE: CPT

## 2022-11-15 PROCEDURE — 94664 DEMO&/EVAL PT USE INHALER: CPT

## 2022-11-15 PROCEDURE — 71046 X-RAY EXAM CHEST 2 VIEWS: CPT

## 2022-11-15 PROCEDURE — 6370000000 HC RX 637 (ALT 250 FOR IP): Performed by: STUDENT IN AN ORGANIZED HEALTH CARE EDUCATION/TRAINING PROGRAM

## 2022-11-15 PROCEDURE — 94640 AIRWAY INHALATION TREATMENT: CPT

## 2022-11-15 PROCEDURE — 6370000000 HC RX 637 (ALT 250 FOR IP): Performed by: PHYSICIAN ASSISTANT

## 2022-11-15 PROCEDURE — 87635 SARS-COV-2 COVID-19 AMP PRB: CPT

## 2022-11-15 PROCEDURE — 99285 EMERGENCY DEPT VISIT HI MDM: CPT

## 2022-11-15 PROCEDURE — 82962 GLUCOSE BLOOD TEST: CPT

## 2022-11-15 PROCEDURE — 80048 BASIC METABOLIC PNL TOTAL CA: CPT

## 2022-11-15 PROCEDURE — 6370000000 HC RX 637 (ALT 250 FOR IP)

## 2022-11-15 PROCEDURE — 93010 ELECTROCARDIOGRAM REPORT: CPT | Performed by: INTERNAL MEDICINE

## 2022-11-15 PROCEDURE — 84484 ASSAY OF TROPONIN QUANT: CPT

## 2022-11-15 PROCEDURE — 6360000002 HC RX W HCPCS

## 2022-11-15 PROCEDURE — 0202U NFCT DS 22 TRGT SARS-COV-2: CPT

## 2022-11-15 PROCEDURE — 85378 FIBRIN DEGRADE SEMIQUANT: CPT

## 2022-11-15 PROCEDURE — 85027 COMPLETE CBC AUTOMATED: CPT

## 2022-11-15 PROCEDURE — 6360000002 HC RX W HCPCS: Performed by: PHYSICIAN ASSISTANT

## 2022-11-15 PROCEDURE — 87502 INFLUENZA DNA AMP PROBE: CPT

## 2022-11-15 PROCEDURE — 96374 THER/PROPH/DIAG INJ IV PUSH: CPT

## 2022-11-15 PROCEDURE — 93005 ELECTROCARDIOGRAM TRACING: CPT | Performed by: PHYSICIAN ASSISTANT

## 2022-11-15 PROCEDURE — 2580000003 HC RX 258

## 2022-11-15 PROCEDURE — 2060000000 HC ICU INTERMEDIATE R&B

## 2022-11-15 RX ORDER — ACETAMINOPHEN 325 MG/1
650 TABLET ORAL EVERY 6 HOURS PRN
Status: DISCONTINUED | OUTPATIENT
Start: 2022-11-15 | End: 2022-11-16 | Stop reason: HOSPADM

## 2022-11-15 RX ORDER — DEXAMETHASONE SODIUM PHOSPHATE 10 MG/ML
10 INJECTION INTRAMUSCULAR; INTRAVENOUS ONCE
Status: COMPLETED | OUTPATIENT
Start: 2022-11-15 | End: 2022-11-15

## 2022-11-15 RX ORDER — INSULIN LISPRO 100 [IU]/ML
0-4 INJECTION, SOLUTION INTRAVENOUS; SUBCUTANEOUS
Status: DISCONTINUED | OUTPATIENT
Start: 2022-11-15 | End: 2022-11-16

## 2022-11-15 RX ORDER — IPRATROPIUM BROMIDE AND ALBUTEROL SULFATE 2.5; .5 MG/3ML; MG/3ML
1 SOLUTION RESPIRATORY (INHALATION)
Status: DISCONTINUED | OUTPATIENT
Start: 2022-11-15 | End: 2022-11-16 | Stop reason: HOSPADM

## 2022-11-15 RX ORDER — SODIUM CHLORIDE 9 MG/ML
INJECTION, SOLUTION INTRAVENOUS PRN
Status: DISCONTINUED | OUTPATIENT
Start: 2022-11-15 | End: 2022-11-16 | Stop reason: HOSPADM

## 2022-11-15 RX ORDER — ONDANSETRON 4 MG/1
4 TABLET, ORALLY DISINTEGRATING ORAL EVERY 8 HOURS PRN
Status: DISCONTINUED | OUTPATIENT
Start: 2022-11-15 | End: 2022-11-16 | Stop reason: HOSPADM

## 2022-11-15 RX ORDER — ONDANSETRON 2 MG/ML
4 INJECTION INTRAMUSCULAR; INTRAVENOUS EVERY 6 HOURS PRN
Status: DISCONTINUED | OUTPATIENT
Start: 2022-11-15 | End: 2022-11-16 | Stop reason: HOSPADM

## 2022-11-15 RX ORDER — NITROGLYCERIN 0.4 MG/1
0.4 TABLET SUBLINGUAL ONCE
Status: COMPLETED | OUTPATIENT
Start: 2022-11-15 | End: 2022-11-15

## 2022-11-15 RX ORDER — ACETAMINOPHEN 650 MG/1
650 SUPPOSITORY RECTAL EVERY 6 HOURS PRN
Status: DISCONTINUED | OUTPATIENT
Start: 2022-11-15 | End: 2022-11-16 | Stop reason: HOSPADM

## 2022-11-15 RX ORDER — PANTOPRAZOLE SODIUM 40 MG/1
40 TABLET, DELAYED RELEASE ORAL
Status: DISCONTINUED | OUTPATIENT
Start: 2022-11-15 | End: 2022-11-16 | Stop reason: HOSPADM

## 2022-11-15 RX ORDER — OMEPRAZOLE 20 MG/1
20 CAPSULE, DELAYED RELEASE ORAL DAILY
Status: DISCONTINUED | OUTPATIENT
Start: 2022-11-15 | End: 2022-11-15 | Stop reason: CLARIF

## 2022-11-15 RX ORDER — IPRATROPIUM BROMIDE AND ALBUTEROL SULFATE 2.5; .5 MG/3ML; MG/3ML
1 SOLUTION RESPIRATORY (INHALATION)
Status: DISCONTINUED | OUTPATIENT
Start: 2022-11-15 | End: 2022-11-15

## 2022-11-15 RX ORDER — SODIUM CHLORIDE 0.9 % (FLUSH) 0.9 %
5-40 SYRINGE (ML) INJECTION PRN
Status: DISCONTINUED | OUTPATIENT
Start: 2022-11-15 | End: 2022-11-16 | Stop reason: HOSPADM

## 2022-11-15 RX ORDER — FUROSEMIDE 40 MG/1
40 TABLET ORAL DAILY
Status: DISCONTINUED | OUTPATIENT
Start: 2022-11-15 | End: 2022-11-16 | Stop reason: HOSPADM

## 2022-11-15 RX ORDER — POLYETHYLENE GLYCOL 3350 17 G/17G
17 POWDER, FOR SOLUTION ORAL DAILY PRN
Status: DISCONTINUED | OUTPATIENT
Start: 2022-11-15 | End: 2022-11-16 | Stop reason: HOSPADM

## 2022-11-15 RX ORDER — ASPIRIN 81 MG/1
324 TABLET, CHEWABLE ORAL ONCE
Status: COMPLETED | OUTPATIENT
Start: 2022-11-15 | End: 2022-11-15

## 2022-11-15 RX ORDER — SODIUM CHLORIDE 0.9 % (FLUSH) 0.9 %
5-40 SYRINGE (ML) INJECTION EVERY 12 HOURS SCHEDULED
Status: DISCONTINUED | OUTPATIENT
Start: 2022-11-15 | End: 2022-11-16 | Stop reason: HOSPADM

## 2022-11-15 RX ORDER — GUAIFENESIN 400 MG/1
400 TABLET ORAL 4 TIMES DAILY PRN
Status: DISCONTINUED | OUTPATIENT
Start: 2022-11-15 | End: 2022-11-16

## 2022-11-15 RX ORDER — SPIRONOLACTONE 25 MG/1
25 TABLET ORAL DAILY
Status: DISCONTINUED | OUTPATIENT
Start: 2022-11-15 | End: 2022-11-16 | Stop reason: HOSPADM

## 2022-11-15 RX ORDER — INSULIN LISPRO 100 [IU]/ML
0-4 INJECTION, SOLUTION INTRAVENOUS; SUBCUTANEOUS NIGHTLY
Status: DISCONTINUED | OUTPATIENT
Start: 2022-11-15 | End: 2022-11-16

## 2022-11-15 RX ORDER — ENOXAPARIN SODIUM 100 MG/ML
30 INJECTION SUBCUTANEOUS 2 TIMES DAILY
Status: DISCONTINUED | OUTPATIENT
Start: 2022-11-15 | End: 2022-11-16 | Stop reason: HOSPADM

## 2022-11-15 RX ORDER — CARVEDILOL 25 MG/1
25 TABLET ORAL 2 TIMES DAILY WITH MEALS
Status: DISCONTINUED | OUTPATIENT
Start: 2022-11-15 | End: 2022-11-16 | Stop reason: HOSPADM

## 2022-11-15 RX ORDER — CILOSTAZOL 50 MG/1
50 TABLET ORAL 2 TIMES DAILY
Status: DISCONTINUED | OUTPATIENT
Start: 2022-11-15 | End: 2022-11-16 | Stop reason: HOSPADM

## 2022-11-15 RX ORDER — CALCIUM CARBONATE 200(500)MG
500 TABLET,CHEWABLE ORAL 3 TIMES DAILY PRN
Status: DISCONTINUED | OUTPATIENT
Start: 2022-11-15 | End: 2022-11-16 | Stop reason: HOSPADM

## 2022-11-15 RX ADMIN — IPRATROPIUM BROMIDE AND ALBUTEROL SULFATE 1 AMPULE: .5; 2.5 SOLUTION RESPIRATORY (INHALATION) at 16:03

## 2022-11-15 RX ADMIN — DEXAMETHASONE SODIUM PHOSPHATE 10 MG: 10 INJECTION INTRAMUSCULAR; INTRAVENOUS at 14:07

## 2022-11-15 RX ADMIN — CARVEDILOL 25 MG: 25 TABLET, FILM COATED ORAL at 17:28

## 2022-11-15 RX ADMIN — CILOSTAZOL 50 MG: 50 TABLET ORAL at 20:48

## 2022-11-15 RX ADMIN — ASPIRIN 81 MG CHEWABLE TABLET 324 MG: 81 TABLET CHEWABLE at 14:07

## 2022-11-15 RX ADMIN — SPIRONOLACTONE 25 MG: 25 TABLET ORAL at 17:29

## 2022-11-15 RX ADMIN — PANTOPRAZOLE SODIUM 40 MG: 40 TABLET, DELAYED RELEASE ORAL at 17:46

## 2022-11-15 RX ADMIN — ENOXAPARIN SODIUM 30 MG: 100 INJECTION SUBCUTANEOUS at 20:49

## 2022-11-15 RX ADMIN — IPRATROPIUM BROMIDE AND ALBUTEROL SULFATE 1 AMPULE: .5; 2.5 SOLUTION RESPIRATORY (INHALATION) at 21:49

## 2022-11-15 RX ADMIN — SODIUM CHLORIDE, PRESERVATIVE FREE 10 ML: 5 INJECTION INTRAVENOUS at 20:50

## 2022-11-15 RX ADMIN — FUROSEMIDE 40 MG: 40 TABLET ORAL at 17:28

## 2022-11-15 RX ADMIN — CALCIUM CARBONATE 500 MG: 500 TABLET, CHEWABLE ORAL at 22:03

## 2022-11-15 RX ADMIN — NITROGLYCERIN 0.4 MG: 0.4 TABLET SUBLINGUAL at 14:07

## 2022-11-15 ASSESSMENT — PAIN SCALES - GENERAL
PAINLEVEL_OUTOF10: 8
PAINLEVEL_OUTOF10: 3
PAINLEVEL_OUTOF10: 5

## 2022-11-15 ASSESSMENT — ENCOUNTER SYMPTOMS
NAUSEA: 1
BACK PAIN: 0
ABDOMINAL PAIN: 0
SHORTNESS OF BREATH: 1
WHEEZING: 1
CONSTIPATION: 0
VOMITING: 1
DIARRHEA: 1
COUGH: 1
SORE THROAT: 0

## 2022-11-15 ASSESSMENT — PAIN DESCRIPTION - DESCRIPTORS
DESCRIPTORS: ACHING
DESCRIPTORS: BURNING

## 2022-11-15 ASSESSMENT — PAIN DESCRIPTION - FREQUENCY: FREQUENCY: CONTINUOUS

## 2022-11-15 ASSESSMENT — PAIN DESCRIPTION - LOCATION
LOCATION: CHEST

## 2022-11-15 ASSESSMENT — PAIN - FUNCTIONAL ASSESSMENT: PAIN_FUNCTIONAL_ASSESSMENT: 0-10

## 2022-11-15 NOTE — PROGRESS NOTES
Database initiated pharmacy and medications verified with the patient. He is A&O from home. States he uses no assistive device and is RA at baseline.

## 2022-11-15 NOTE — H&P
[place] [address]  Resident History and Physical      Chief Complaint:    Chief Complaint   Patient presents with    Chest Pain    Cough     X 1 wk productive of green mucous        History of Present Illness:   Le Singh  is a 40 y.o. male patient of Terri Rudd DO  with a pertinent PMHx of ACS HFrEF diabetes, and CKD who presented to the ED from home with chief complaint of cough and chest pain. He states he been having the symptoms for about a week. For the chest pain it is substernal does not radiate. It is partially relieved by him rubbing his sternum. He describes it as a knife stabbing pain that has been constant for the past 2 to 3 days and is more painful when laying down. He has had a cough with sputum production for the past week. He states that he has coughing fits that leads to vomiting. He also has nausea and diarrhea. He denies any fevers or chills. He denies any sick contacts. In the ED vitals notable for blood pressure 134/80 and O2 saturation 99%. Sodium 138 creatinine 1.6, BNP 1922, troponin 18 and 17. Chest x-ray shows no acute process. EKG shows new inverted T waves and right bundle branch block. He tested negative for COVID and flu. He was treated with duonebs, aspirin Nitrostat and Decadron.       Past Medical History:   Diagnosis Date    CAD (coronary artery disease)     CHF (congestive heart failure) ([facility] [address] 02/08/2022    GERD (gastroesophageal reflux disease)     Hx of drug abuse ([facility])     cocaine    Hypertension     NICM (nonischemic cardiomyopathy) ([facility] [address]          Past Surgical History:   Procedure Laterality Date    CARDIAC CATHETERIZATION      LAPAROSCOPY N/A 4/30/2022    LAPAROSCOPY DIAGNOSTIC,  EXPLORATORY INVERTED TO EXPLORATORY LAPOROTOMY, DISTAL SMALL BOWEL RESECTION, PRIMARY STAPLED ANASTAMOSIS, BILATERAL INGUINAL HERNIA REPAIR WITH MESH performed by Jh Lopez MD at [address] N/A 4/30/2022    LAPAROTOMY EXPLORATORY BILATERAL INGUINAL HERNIA REPAIR OPEN,  BOWEL RESECTION,  MESH X2 performed by Ramirez Thakur MD at [facility] Right     R thigh       Medications Prior to Admission:    Prior to Admission medications    Medication Sig Start Date End Date Taking? Authorizing Provider   cilostazol (PLETAL) 50 MG tablet Take 1 tablet by mouth 2 times daily 9/28/22   Kelly Quiros DO   carvedilol (COREG) 25 MG tablet TAKE 1 TABLET BY MOUTH IN THE MORNING AND 1 TABLET IN THE EVENING. TAKE WITH MEALS. 9/2/22   Kelly Quiros DO   spironolactone (ALDACTONE) 25 MG tablet TAKE 1 TABLET BY MOUTH EVERY DAY IN THE MORNING 9/2/22   Kelly Quiros DO   JARDIANCE 10 MG tablet TAKE 1 TABLET BY MOUTH EVERY DAY IN THE MORNING 9/2/22   Klely Quiros DO   omeprazole (PRILOSEC) 20 MG delayed release capsule Take 20 mg by mouth daily    Historical Provider, MD   furosemide (LASIX) 40 MG tablet Take 1 tablet by mouth daily 8/5/22   Joaquin Lara DO        Allergies:   Patient has no known allergies. Social History:    reports that he quit smoking about 9 months ago. His smoking use included cigarettes. He started smoking about 30 years ago. He has a 7.50 pack-year smoking history. He has never used smokeless tobacco. He reports that he does not currently use alcohol. He reports that he does not currently use drugs after having used the following drugs: Cocaine and Marijuana [facility]). Family History:   family history includes Hypertension in his maternal grandmother and mother; Stroke in his maternal grandmother; Thyroid Disease in his mother. ROS:   Review of Systems   Constitutional:  Negative for chills and fever. HENT:  Positive for congestion and postnasal drip. Negative for sneezing and sore throat. Respiratory:  Positive for cough, shortness of breath and wheezing. Cardiovascular:  Positive for chest pain. Negative for palpitations.    Gastrointestinal:  Positive for diarrhea, nausea and vomiting. Negative for abdominal pain and constipation. Genitourinary:  Negative for difficulty urinating and dysuria. Musculoskeletal:  Negative for back pain and joint swelling. Skin:  Negative for pallor and wound. Neurological:  Positive for headaches. Negative for dizziness. Psychiatric/Behavioral:  Negative for dysphoric mood. The patient is not nervous/anxious. Physical Exam:    Vitals:  /80   Pulse 68   Temp 97.2 °F (36.2 °C) (Temporal)   Resp 16   Ht 6' 2\" (1.88 m)   Wt 280 lb (127 kg)   SpO2 99%   BMI 35.95 kg/m²     Physical Exam  Constitutional:       Appearance: Normal appearance. He is obese. HENT:      Head: Normocephalic and atraumatic. Eyes:      Extraocular Movements: Extraocular movements intact. Pupils: Pupils are equal, round, and reactive to light. Cardiovascular:      Rate and Rhythm: Normal rate and regular rhythm. Pulses: Normal pulses. Heart sounds: Normal heart sounds. No murmur heard. Pulmonary:      Effort: Pulmonary effort is normal.      Breath sounds: Wheezing (diffuse) present. Abdominal:      General: Abdomen is flat. Bowel sounds are normal.      Palpations: Abdomen is soft. Tenderness: There is no abdominal tenderness. Musculoskeletal:      Right lower leg: No edema. Left lower leg: No edema. Comments: Nonreproducible chest pain   Skin:     General: Skin is warm and dry. Capillary Refill: Capillary refill takes less than 2 seconds. Coloration: Skin is not jaundiced. Neurological:      General: No focal deficit present. Mental Status: He is alert. Mental status is at baseline.           Labs:  Recent Results (from the past 24 hour(s))   EKG 12 Lead    Collection Time: 11/15/22 11:41 AM   Result Value Ref Range    Ventricular Rate 72 BPM    Atrial Rate 72 BPM    P-R Interval 158 ms    QRS Duration 102 ms    Q-T Interval 406 ms    QTc Calculation (Bazett) 444 ms    P Axis 81 degrees    R Axis -72 degrees    T Axis -85 degrees   Basic metabolic panel    Collection Time: 11/15/22 12:22 PM   Result Value Ref Range    Sodium 138 132 - 146 mmol/L    Potassium 4.4 3.5 - 5.0 mmol/L    Chloride 105 98 - 107 mmol/L    CO2 25 22 - 29 mmol/L    Anion Gap 8 7 - 16 mmol/L    Glucose 99 74 - 99 mg/dL    BUN 13 6 - 20 mg/dL    Creatinine 1.6 (H) 0.7 - 1.2 mg/dL    Est, Glom Filt Rate 53 >=60 mL/min/1.73    Calcium 9.5 8.6 - 10.2 mg/dL   CBC    Collection Time: 11/15/22 12:22 PM   Result Value Ref Range    WBC 6.1 4.5 - 11.5 E9/L    RBC 4.77 3.80 - 5.80 E12/L    Hemoglobin 14.5 12.5 - 16.5 g/dL    Hematocrit 44.4 37.0 - 54.0 %    MCV 93.1 80.0 - 99.9 fL    MCH 30.4 26.0 - 35.0 pg    MCHC 32.7 32.0 - 34.5 %    RDW 13.7 11.5 - 15.0 fL    Platelets 407 999 - 646 E9/L    MPV 11.3 7.0 - 12.0 fL   Troponin    Collection Time: 11/15/22 12:22 PM   Result Value Ref Range    Troponin, High Sensitivity 18 (H) 0 - 11 ng/L   Brain Natriuretic Peptide    Collection Time: 11/15/22 12:22 PM   Result Value Ref Range    Pro-BNP 1,922 (H) 0 - 125 pg/mL   COVID-19, Rapid    Collection Time: 11/15/22 12:34 PM    Specimen: Nasopharyngeal Swab   Result Value Ref Range    SARS-CoV-2, NAAT Not Detected Not Detected   RAPID INFLUENZA A/B ANTIGENS    Collection Time: 11/15/22 12:34 PM    Specimen: Nasopharyngeal   Result Value Ref Range    Influenza A by PCR Not Detected Not Detected    Influenza B by PCR Not Detected Not Detected   D-Dimer, Quantitative    Collection Time: 11/15/22 12:34 PM   Result Value Ref Range    D-Dimer, Quant 211 ng/mL DDU   Troponin    Collection Time: 11/15/22  1:37 PM   Result Value Ref Range    Troponin, High Sensitivity 17 (H) 0 - 11 ng/L       XR CHEST (2 VW)   Final Result   No acute process. Borderline cardiomegaly.              Assessment/Plan:      Active Hospital Problems    Diagnosis Date Noted    Chest pain [R07.9] 11/15/2022     Priority: Medium     Atypical chest pain  New inverted T waves seen on EKG in ED. Non-radiating substernal chest pain.   -Consult cardiology, appreciate recs  -Trend troponin 18-->17--> pending     URI  Negative for Covid and Flu in ED. History of Covid in August.  Chest x-ray shows no acute process. -Guaifenesin 400 mg Q6H  -Duonebs Q4H while awake  -CMP CMP daily    CAD  -Pletal 50 mg BID    HFrEF  Echo in 3/2022 EF 25%. -BNP 1,922  -Aldactone 25 mg daily  -Lasix 40 mg daily    Hypertension   -Coreg 25 mg BID    Diabetes  Hold home Jardiance. A1C 6.0 in 9/22. -LDSSI  -POCT glucose checks    CKD  Creatinine 1.6, which is his baseline  -Monitor  -Hold nephrotoxic medications    GERD  -Omeprazole 20 mg daily    DVT ppx: Lovenox 40  GI ppx: Omeprazole 20 mg daily  Code Status: Full      Case discussed with attending on call Dr. Cem Navarro.     Pavan Freeman MD   Family Medicine Resident Physician PGY-2  11/15/2022

## 2022-11-15 NOTE — ED PROVIDER NOTES
ED Attending shared visit  CC: No     Department of Emergency Medicine   ED  Encounter Note  Admit Date/RoomTime: 11/15/2022 12:14 PM  ED Room: [facility]/4595-L    NAME: Astrid Rudolph  : 1975  MRN: [id number]     Chief Complaint:  Chest Pain and Cough (X 1 wk productive of green mucous)    History of Present Illness          Astrid Rudolph is a 40 y.o. old male who presents to the emergency department by private vehicle with his significant other, with gradual onset substernal discomfort described as  aching pressure  beginning 1 week(s) prior to arrival.  The pain does not  radiate to neck, back or abdomen. However, he states that he had a nagging discomfort in his left arm a few days ago and doesn't know where is came from. Duration of symptoms: to the present time. Symptom(s) at onset was moderate, now is moderate, and at worst was moderate. His symptoms are associated with cough and shortness of breath. The symptoms are worsened by exertion and relieved by nothing. There has been No orthopnea, No paroxysmal nocturnal dyspnea, No edema, No palpitations, and No syncope associated with complaint. He takes no blood thinning agents. His cardiac risk factors are diabetes mellitus, hypertension, obesity (BMI >= 30 kg/m2), and smoking/ tobacco exposure. Care prior to arrival consisted of none, with no relief. HEART Score For Major Cardiac Events  (Max Score 10 Points)  HISTORY       []   Slightly Suspicious  0       [x]   Moderately Suspicious  +1       []   Highly Suspicious  +2    EK point: No ST deviation but LBBB, LVH repolarization changes (ex:digoxin);  2 points: ST deviation not due to LBBB, LVH or digoxin         []   Normal  0       []   Nonspecific Repolarization Disturbance  +1       [x]   Significant ST Depression  +2    AGE       []   <45  0       [x]   45-64  +1       []    >65  +2    RISK FACTORS:  1. HTN    2. Hypercholesterolemia    3. DM     4.  Cigarette smoking (current or cessation < 3 mos)    5. Positive family history  (parent or sibling with CVD before age 72). 6. Obesity (BMI >30kg/m2)         []   No Risk factors Known  0       []   1-2 Risk Factors  +1       [x]   >3 Risk Factors or History of Atherosclerotic Disease  +2    INITIAL TROPONIN       []   < Normal Limit   0       [x]   1-3 x Normal Limit   +1       []   >3 x Normal Limit   +2     -----------------------------------------------------------------------------------------------------------------  SCORE TOTAL:  7 POINTS     Low Score:         (0-3 Points), risk of MACE of 0.9-1.7% (discuss d/c home with f/u)  Mod Score:         (4-6 Points), risk of MACE of 12-16.6% (discuss admission for further testing)  High Score:        (7-10 Points), risk of MACE of 50-65% (Admit ALL as they are candidates for early invasive measures)    ROS   Pertinent positives and negatives are stated within HPI, all other systems reviewed and are negative. Past Medical History:  has a past medical history of CAD (coronary artery disease), CHF (congestive heart failure) ([facility] [address] GERD (gastroesophageal reflux disease), [facility] Hypertension, and NICM (nonischemic cardiomyopathy) ([facility] Surgical History:  has a past surgical history that includes Skin graft (Right); Cardiac catheterization; laparoscopy (N/A, 4/30/2022); and laparotomy (N/A, 4/30/2022). Social History:  reports that he quit smoking about 9 months ago. His smoking use included cigarettes. He started smoking about 30 years ago. He has a 7.50 pack-year smoking history. He has never used smokeless tobacco. He reports that he does not currently use alcohol. He reports that he does not currently use drugs after having used the following drugs: Cocaine and Marijuana Rach Coad). Family History: family history includes Hypertension in his maternal grandmother and mother; Stroke in his maternal grandmother; Thyroid Disease in his mother.      Allergies: Patient has no known allergies. Physical Exam   Oxygen Saturation Interpretation: Normal.        ED Triage Vitals [11/15/22 1212]   BP Temp Temp Source Heart Rate Resp SpO2 Height Weight   (!) 157/111 97.2 °F (36.2 °C) Temporal 68 14 100 % 6' 2\" (1.88 m) 280 lb (127 kg)       Physical Exam  General Appearance/Constitutional:  Alert, development consistent with age, NAD. HEENT:  NC/NT. Airway patent. Neck:  Normal ROM. Supple. Respiratory:  expiratory wheezing bilaterally. CV:  Regular rate and rhythm, normal heart sounds, without pathological murmurs, ectopy, gallops, or rubs. Chest:  Symmetrical without visible rash or tenderness. GI:  Abdomen Soft, nontender, good bowel sounds. No firm or pulsatile mass. Extremities: No tenderness or edema noted. Lymphatics: no lymphadenopathy noted  Integument:  Normal turgor. Warm, dry, without visible rash, unless noted elsewhere. Neurological:  Oriented. Motor functions intact.    Psychiatric:  Affect normal.    Lab / Imaging Results   (All laboratory and radiology results have been personally reviewed by myself)  Labs:  Results for orders placed or performed during the hospital encounter of 11/15/22   COVID-19, Rapid    Specimen: Nasopharyngeal Swab   Result Value Ref Range    SARS-CoV-2, NAAT Not Detected Not Detected   RAPID INFLUENZA A/B ANTIGENS    Specimen: Nasopharyngeal   Result Value Ref Range    Influenza A by PCR Not Detected Not Detected    Influenza B by PCR Not Detected Not Detected   Respiratory Panel, Molecular, with COVID-19 (Restricted: peds pts or suitable admitted adults)    Specimen: Nasopharyngeal   Result Value Ref Range    Adenovirus by PCR Not Detected Not Detected    Bordetella parapertussis by PCR Not Detected Not Detected    Bordetella pertussis by PCR Not Detected Not Detected    Chlamydophilia pneumoniae by PCR Not Detected Not Detected    Coronavirus 229E by PCR Not Detected Not Detected    Coronavirus HKU1 by PCR Not Detected Not Detected Coronavirus NL63 by PCR Not Detected Not Detected    Coronavirus OC43 by PCR Not Detected Not Detected    SARS-CoV-2, PCR Not Detected Not Detected    Human Metapneumovirus by PCR Not Detected Not Detected    Human Rhinovirus/Enterovirus by PCR Not Detected Not Detected    Influenza A by PCR Not Detected Not Detected    Influenza B by PCR Not Detected Not Detected    Mycoplasma pneumoniae by PCR Not Detected Not Detected    Parainfluenza Virus 1 by PCR Not Detected Not Detected    Parainfluenza Virus 2 by PCR Not Detected Not Detected    Parainfluenza Virus 3 by PCR Not Detected Not Detected    Parainfluenza Virus 4 by PCR Not Detected Not Detected    Respiratory Syncytial Virus by PCR Not Detected Not Detected   Basic metabolic panel   Result Value Ref Range    Sodium 138 132 - 146 mmol/L    Potassium 4.4 3.5 - 5.0 mmol/L    Chloride 105 98 - 107 mmol/L    CO2 25 22 - 29 mmol/L    Anion Gap 8 7 - 16 mmol/L    Glucose 99 74 - 99 mg/dL    BUN 13 6 - 20 mg/dL    Creatinine 1.6 (H) 0.7 - 1.2 mg/dL    Est, Glom Filt Rate 53 >=60 mL/min/1.73    Calcium 9.5 8.6 - 10.2 mg/dL   CBC   Result Value Ref Range    WBC 6.1 4.5 - 11.5 E9/L    RBC 4.77 3.80 - 5.80 E12/L    Hemoglobin 14.5 12.5 - 16.5 g/dL    Hematocrit 44.4 37.0 - 54.0 %    MCV 93.1 80.0 - 99.9 fL    MCH 30.4 26.0 - 35.0 pg    MCHC 32.7 32.0 - 34.5 %    RDW 13.7 11.5 - 15.0 fL    Platelets 775 887 - 484 E9/L    MPV 11.3 7.0 - 12.0 fL   Troponin   Result Value Ref Range    Troponin, High Sensitivity 18 (H) 0 - 11 ng/L   Brain Natriuretic Peptide   Result Value Ref Range    Pro-BNP 1,922 (H) 0 - 125 pg/mL   D-Dimer, Quantitative   Result Value Ref Range    D-Dimer, Quant 211 ng/mL DDU   Troponin   Result Value Ref Range    Troponin, High Sensitivity 17 (H) 0 - 11 ng/L   POCT Glucose   Result Value Ref Range    Meter Glucose 113 (H) 74 - 99 mg/dL   POCT Glucose   Result Value Ref Range    Meter Glucose 193 (H) 74 - 99 mg/dL   EKG 12 Lead   Result Value Ref Range Ventricular Rate 72 BPM    Atrial Rate 72 BPM    P-R Interval 158 ms    QRS Duration 102 ms    Q-T Interval 406 ms    QTc Calculation (Bazett) 444 ms    P Axis 81 degrees    R Axis -72 degrees    T Axis -85 degrees       Imaging: All Radiology results interpreted by Radiologist unless otherwise noted. XR CHEST (2 VW)   Final Result   No acute process. Borderline cardiomegaly. EKG #1:  Interpreted by emergency department attending physician unless otherwise noted. 11/15/22  Time: 1141    Rhythm: normal sinus   Rate: normal  Axis: left  Conduction: right bundle branch block (incomplete) and left anterior fasciclar block  ST Segments: no acute change  T Waves: inversion in  v6, II, III, and aVf    Clinical Impression: non-specific EKG  Comparison to Prior tracings: There are new findings on today's ECG when compared to prior tracings.     ED Course / Medical Decision Making     Medications   ipratropium-albuterol (DUONEB) nebulizer solution 1 ampule (1 ampule Inhalation Not Given 11/15/22 1604)   carvedilol (COREG) tablet 25 mg (25 mg Oral Given 11/15/22 1728)   cilostazol (PLETAL) tablet 50 mg (50 mg Oral Given 11/15/22 2048)   furosemide (LASIX) tablet 40 mg (40 mg Oral Given 11/15/22 1728)   spironolactone (ALDACTONE) tablet 25 mg (25 mg Oral Given 11/15/22 1729)   guaiFENesin tablet 400 mg (has no administration in time range)   insulin lispro (HUMALOG) injection vial 0-4 Units (0 Units SubCUTAneous Not Given 11/15/22 1729)   insulin lispro (HUMALOG) injection vial 0-4 Units (0 Units SubCUTAneous Not Given 11/15/22 2104)   sodium chloride flush 0.9 % injection 5-40 mL (10 mLs IntraVENous Given 11/15/22 2050)   sodium chloride flush 0.9 % injection 5-40 mL (has no administration in time range)   0.9 % sodium chloride infusion (has no administration in time range)   enoxaparin Sodium (LOVENOX) injection 30 mg (30 mg SubCUTAneous Given 11/15/22 2049)   ondansetron (ZOFRAN-ODT) disintegrating tablet 4 mg (has no administration in time range)     Or   ondansetron (ZOFRAN) injection 4 mg (has no administration in time range)   polyethylene glycol (GLYCOLAX) packet 17 g (has no administration in time range)   acetaminophen (TYLENOL) tablet 650 mg (has no administration in time range)     Or   acetaminophen (TYLENOL) suppository 650 mg (has no administration in time range)   pantoprazole (PROTONIX) tablet 40 mg (40 mg Oral Given 11/15/22 1746)   aspirin chewable tablet 324 mg (324 mg Oral Given 11/15/22 140)   dexamethasone (DECADRON) injection 10 mg (10 mg IntraVENous Given 11/15/22 1407)   nitroGLYCERIN (NITROSTAT) SL tablet 0.4 mg (0.4 mg SubLINGual Given 11/15/22 1407)     ED Course as of 11/15/22 2119   Tue Nov 15, 2022   135 EC  Normal sinus rhythm  Hr 72  Irbbb, lafb new inferior t wave changes, old lateral t wave changes when compared w previous [JANN]   1425 D/w family medicine will come eval [JANN]      ED Course User Index  [JANN] Lupe Baltazar DO     Re-Evaluations:  11/15/22      Time: 1400    Patients condition remains unchanged. Results discussed. Will admit. Consultations:             IP CONSULT TO FAMILY MEDICINE  IP CONSULT TO CARDIOLOGY spoke with Dr. Lawanda Cary who will admit the patient. Procedures:   none    MDM: Patient presents to the emergency department for chest pain. Patient with EKG changes and heart score is high risk. He will be admitted for further evaluation and treatment. Plan of Care/Counseling:  Felipe Pulido PA-C reviewed today's visit with the patient in addition to providing specific details for the plan of care and counseling regarding the diagnosis and prognosis. Questions are answered at this time and are agreeable with the plan. Assessment      1.  Chest pain, unspecified type      This patient's ED course included: a personal history and physicial examination, re-evaluation prior to disposition, cardiac monitoring, and continuous pulse oximetry  This patient has remained hemodynamically stable, remained unchanged, and been closely monitored during their ED course. Plan   Observation Admission to Telemetry Unit. Patient condition is stable. New Medications     Current Discharge Medication List        Electronically signed by Lawrence Espinosa   DD: 11/15/22  **This report was transcribed using voice recognition software. Every effort was made to ensure accuracy; however, inadvertent computerized transcription errors may be present.   END OF PROVIDER NOTE        Arely Cazares PA-C  11/15/22 304 [facility] J[facility] SheikhrALTHEA  11/15/22 [id number]

## 2022-11-15 NOTE — PLAN OF CARE
Problem: ABCDS Injury Assessment  Goal: Absence of physical injury  Outcome: Progressing  Flowsheets (Taken 11/15/2022 6796)  Absence of Physical Injury: Implement safety measures based on patient assessment     Problem: Discharge Planning  Goal: Discharge to home or other facility with appropriate resources  Outcome: Progressing     Problem: Pain  Goal: Verbalizes/displays adequate comfort level or baseline comfort level  Outcome: Progressing     Problem: Safety - Adult  Goal: Free from fall injury  Outcome: Progressing

## 2022-11-15 NOTE — ED NOTES
Department of Emergency Medicine  FIRST PROVIDER TRIAGE NOTE             Independent MLP           11/15/22  12:11 PM EST    Date of Encounter: 11/15/22   MRN: [id number]      HPI: Maureen Davis is a 40 y.o. male who presents to the ED for Chest Pain and Cough (X 1 wk productive of green mucous)   Ecg in triage shows bew changes inferiorly. ROS: Negative for abd pain, back pain, or fever. PE: Gen Appearance/Constitutional: alert  HEENT: NC/NT. PERRLA,  Airway patent. Initial Plan of Care: All treatment areas with department are currently occupied. Plan to order/Initiate the following while awaiting opening in ED: labs, EKG, and imaging studies.   Initiate Treatment-Testing, Proceed toTreatment Area When Bed Available for ED Attending/MLP to Continue Care    Electronically signed by MARQUEZ Phelps   DD: 11/15/22       Giselle Castellanos [place]  11/15/22 9161

## 2022-11-15 NOTE — ED NOTES
BRIAN faxed to [facility], received confirmation from [facility]     Emily Delaney, NIRMAL  11/15/22 3030

## 2022-11-16 VITALS
DIASTOLIC BLOOD PRESSURE: 75 MMHG | HEIGHT: 74 IN | SYSTOLIC BLOOD PRESSURE: 141 MMHG | RESPIRATION RATE: 18 BRPM | HEART RATE: 74 BPM | OXYGEN SATURATION: 98 % | WEIGHT: 280 LBS | BODY MASS INDEX: 35.94 KG/M2 | TEMPERATURE: 98.4 F

## 2022-11-16 PROBLEM — F19.10 POLYSUBSTANCE ABUSE (HCC): Status: ACTIVE | Noted: 2022-11-16

## 2022-11-16 PROBLEM — R07.9 CHEST PAIN: Status: RESOLVED | Noted: 2022-11-15 | Resolved: 2022-11-16

## 2022-11-16 PROBLEM — I50.22 CHRONIC HFREF (HEART FAILURE WITH REDUCED EJECTION FRACTION) (HCC): Status: ACTIVE | Noted: 2022-03-24

## 2022-11-16 PROBLEM — R77.8 ELEVATED TROPONIN: Status: ACTIVE | Noted: 2022-11-16

## 2022-11-16 PROBLEM — G47.33 OSA (OBSTRUCTIVE SLEEP APNEA): Status: ACTIVE | Noted: 2022-11-16

## 2022-11-16 PROBLEM — R79.89 ELEVATED TROPONIN: Status: ACTIVE | Noted: 2022-11-16

## 2022-11-16 PROBLEM — I10 PRIMARY HYPERTENSION: Status: ACTIVE | Noted: 2022-11-16

## 2022-11-16 PROBLEM — R07.89 ATYPICAL CHEST PAIN: Status: ACTIVE | Noted: 2022-11-15

## 2022-11-16 LAB
ANION GAP SERPL CALCULATED.3IONS-SCNC: 6 MMOL/L (ref 7–16)
BASOPHILS ABSOLUTE: 0.01 E9/L (ref 0–0.2)
BASOPHILS RELATIVE PERCENT: 0.1 % (ref 0–2)
BUN BLDV-MCNC: 17 MG/DL (ref 6–20)
CALCIUM SERPL-MCNC: 9.6 MG/DL (ref 8.6–10.2)
CHLORIDE BLD-SCNC: 104 MMOL/L (ref 98–107)
CHOLESTEROL, TOTAL: 203 MG/DL (ref 0–199)
CO2: 25 MMOL/L (ref 22–29)
CREAT SERPL-MCNC: 1.5 MG/DL (ref 0.7–1.2)
EOSINOPHILS ABSOLUTE: 0 E9/L (ref 0.05–0.5)
EOSINOPHILS RELATIVE PERCENT: 0 % (ref 0–6)
GFR SERPL CREATININE-BSD FRML MDRD: 58 ML/MIN/1.73
GLUCOSE BLD-MCNC: 119 MG/DL (ref 74–99)
HCT VFR BLD CALC: 41.3 % (ref 37–54)
HDLC SERPL-MCNC: 43 MG/DL
HEMOGLOBIN: 13.7 G/DL (ref 12.5–16.5)
IMMATURE GRANULOCYTES #: 0.03 E9/L
IMMATURE GRANULOCYTES %: 0.4 % (ref 0–5)
LDL CHOLESTEROL CALCULATED: 146 MG/DL (ref 0–99)
LYMPHOCYTES ABSOLUTE: 0.87 E9/L (ref 1.5–4)
LYMPHOCYTES RELATIVE PERCENT: 10.3 % (ref 20–42)
MCH RBC QN AUTO: 30.2 PG (ref 26–35)
MCHC RBC AUTO-ENTMCNC: 33.2 % (ref 32–34.5)
MCV RBC AUTO: 91.2 FL (ref 80–99.9)
METER GLUCOSE: 119 MG/DL (ref 74–99)
METER GLUCOSE: 125 MG/DL (ref 74–99)
MONOCYTES ABSOLUTE: 0.39 E9/L (ref 0.1–0.95)
MONOCYTES RELATIVE PERCENT: 4.6 % (ref 2–12)
NEUTROPHILS ABSOLUTE: 7.12 E9/L (ref 1.8–7.3)
NEUTROPHILS RELATIVE PERCENT: 84.6 % (ref 43–80)
PDW BLD-RTO: 13.6 FL (ref 11.5–15)
PLATELET # BLD: 302 E9/L (ref 130–450)
PMV BLD AUTO: 11.5 FL (ref 7–12)
POTASSIUM REFLEX MAGNESIUM: 4.5 MMOL/L (ref 3.5–5)
RBC # BLD: 4.53 E12/L (ref 3.8–5.8)
SODIUM BLD-SCNC: 135 MMOL/L (ref 132–146)
TRIGL SERPL-MCNC: 71 MG/DL (ref 0–149)
VLDLC SERPL CALC-MCNC: 14 MG/DL
WBC # BLD: 8.4 E9/L (ref 4.5–11.5)

## 2022-11-16 PROCEDURE — 85025 COMPLETE CBC W/AUTO DIFF WBC: CPT

## 2022-11-16 PROCEDURE — 99223 1ST HOSP IP/OBS HIGH 75: CPT | Performed by: INTERNAL MEDICINE

## 2022-11-16 PROCEDURE — APPSS60 APP SPLIT SHARED TIME 46-60 MINUTES

## 2022-11-16 PROCEDURE — 94640 AIRWAY INHALATION TREATMENT: CPT

## 2022-11-16 PROCEDURE — 80061 LIPID PANEL: CPT

## 2022-11-16 PROCEDURE — 80048 BASIC METABOLIC PNL TOTAL CA: CPT

## 2022-11-16 PROCEDURE — 82962 GLUCOSE BLOOD TEST: CPT

## 2022-11-16 PROCEDURE — 6370000000 HC RX 637 (ALT 250 FOR IP)

## 2022-11-16 PROCEDURE — 6360000002 HC RX W HCPCS

## 2022-11-16 PROCEDURE — 6370000000 HC RX 637 (ALT 250 FOR IP): Performed by: STUDENT IN AN ORGANIZED HEALTH CARE EDUCATION/TRAINING PROGRAM

## 2022-11-16 PROCEDURE — 36415 COLL VENOUS BLD VENIPUNCTURE: CPT

## 2022-11-16 PROCEDURE — 2580000003 HC RX 258

## 2022-11-16 PROCEDURE — 6370000000 HC RX 637 (ALT 250 FOR IP): Performed by: FAMILY MEDICINE

## 2022-11-16 PROCEDURE — 99219 PR INITIAL OBSERVATION CARE/DAY 50 MINUTES: CPT | Performed by: FAMILY MEDICINE

## 2022-11-16 RX ORDER — GUAIFENESIN/DEXTROMETHORPHAN 100-10MG/5
5 SYRUP ORAL EVERY 4 HOURS
Qty: 300 ML | Refills: 0 | Status: SHIPPED | OUTPATIENT
Start: 2022-11-16 | End: 2022-11-26

## 2022-11-16 RX ORDER — LANOLIN ALCOHOL/MO/W.PET/CERES
3 CREAM (GRAM) TOPICAL NIGHTLY PRN
Status: DISCONTINUED | OUTPATIENT
Start: 2022-11-16 | End: 2022-11-16 | Stop reason: HOSPADM

## 2022-11-16 RX ORDER — CALCIUM CARBONATE 200(500)MG
500 TABLET,CHEWABLE ORAL 3 TIMES DAILY PRN
Qty: 15 TABLET | Refills: 0 | Status: SHIPPED | OUTPATIENT
Start: 2022-11-16 | End: 2022-12-16

## 2022-11-16 RX ORDER — GUAIFENESIN/DEXTROMETHORPHAN 100-10MG/5
5 SYRUP ORAL EVERY 4 HOURS
Status: DISCONTINUED | OUTPATIENT
Start: 2022-11-16 | End: 2022-11-16 | Stop reason: HOSPADM

## 2022-11-16 RX ADMIN — SODIUM CHLORIDE, PRESERVATIVE FREE 10 ML: 5 INJECTION INTRAVENOUS at 09:15

## 2022-11-16 RX ADMIN — PANTOPRAZOLE SODIUM 40 MG: 40 TABLET, DELAYED RELEASE ORAL at 06:39

## 2022-11-16 RX ADMIN — ENOXAPARIN SODIUM 30 MG: 100 INJECTION SUBCUTANEOUS at 09:14

## 2022-11-16 RX ADMIN — CILOSTAZOL 50 MG: 50 TABLET ORAL at 09:14

## 2022-11-16 RX ADMIN — GUAIFENESIN AND DEXTROMETHORPHAN 5 ML: 100; 10 SYRUP ORAL at 12:01

## 2022-11-16 RX ADMIN — FUROSEMIDE 40 MG: 40 TABLET ORAL at 09:20

## 2022-11-16 RX ADMIN — IPRATROPIUM BROMIDE AND ALBUTEROL SULFATE 1 AMPULE: .5; 2.5 SOLUTION RESPIRATORY (INHALATION) at 04:58

## 2022-11-16 RX ADMIN — EMPAGLIFLOZIN 10 MG: 10 TABLET, FILM COATED ORAL at 11:51

## 2022-11-16 RX ADMIN — GUAIFENESIN 400 MG: 400 TABLET ORAL at 06:39

## 2022-11-16 RX ADMIN — CARVEDILOL 25 MG: 25 TABLET, FILM COATED ORAL at 09:15

## 2022-11-16 RX ADMIN — Medication 3 MG: at 03:48

## 2022-11-16 RX ADMIN — SPIRONOLACTONE 25 MG: 25 TABLET ORAL at 09:15

## 2022-11-16 RX ADMIN — IPRATROPIUM BROMIDE AND ALBUTEROL SULFATE 1 AMPULE: .5; 2.5 SOLUTION RESPIRATORY (INHALATION) at 09:37

## 2022-11-16 RX ADMIN — IPRATROPIUM BROMIDE AND ALBUTEROL SULFATE 1 AMPULE: .5; 2.5 SOLUTION RESPIRATORY (INHALATION) at 11:57

## 2022-11-16 RX ADMIN — GUAIFENESIN AND DEXTROMETHORPHAN 5 ML: 100; 10 SYRUP ORAL at 09:14

## 2022-11-16 ASSESSMENT — ENCOUNTER SYMPTOMS
VOMITING: 0
SORE THROAT: 0
NAUSEA: 1
COUGH: 1
ABDOMINAL PAIN: 0
BACK PAIN: 0
SHORTNESS OF BREATH: 0
DIARRHEA: 0
CONSTIPATION: 0

## 2022-11-16 NOTE — PROGRESS NOTES
Blood glucose 125. Meter would not scan in patient barcode.     Electronically signed by Constanza Ponce RN on 11/16/2022 at 6:42 AM

## 2022-11-16 NOTE — PLAN OF CARE
Problem: ABCDS Injury Assessment  Goal: Absence of physical injury  Outcome: Progressing  Flowsheets (Taken 11/16/2022 1116)  Absence of Physical Injury: Implement safety measures based on patient assessment     Problem: Discharge Planning  Goal: Discharge to home or other facility with appropriate resources  Outcome: Progressing  Flowsheets (Taken 11/16/2022 0930)  Discharge to home or other facility with appropriate resources: Identify barriers to discharge with patient and caregiver     Problem: Pain  Goal: Verbalizes/displays adequate comfort level or baseline comfort level  Outcome: Progressing  Flowsheets (Taken 11/16/2022 0915)  Verbalizes/displays adequate comfort level or baseline comfort level: Encourage patient to monitor pain and request assistance     Problem: Safety - Adult  Goal: Free from fall injury  Outcome: Progressing  Flowsheets (Taken 11/16/2022 1116)  Free From Fall Injury: Instruct family/caregiver on patient safety
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
## 2022-11-16 NOTE — DISCHARGE SUMMARY
Discharge Summary  Date:11/16/2022  Patient Name: Barbara Richardson     YOB: 1975     Age: 40 y.o. MRN: [id number]    Admit Date:11/15/2022    Discharge Date: 11/16/22  Discharged Condition: stable    Discharge Diagnoses   Principal Problem (Resolved):    Chest pain  Active Problems:    * No active hospital problems. [facility] Stay   Narrative of Hospital Course:  Barbara Richardson was admitted on 11/15/2022 with a pertinent PMHx of ACS HFrEF (EF 25% in 3/22), and CKD who presented to the ED from home with chief complaint of cough and chest pain. He admits to having chest pain for the past week; it has been constant for the past 2 to 3 days before admission. He also reports cough with sputum production that sometimes lead to vomiting. In the ED EKG showed new inverted T waves and right bundle branch block. Troponins were negative. He tested negative for COVID and flu. Chest x-ray showed no acute process. Cardiology was consulted. In the hospital, RFA was negative, vitals were stable, and he did not have a white count. He was continued on his home medications. He was told follow-up with Dr. Allyson Moss, Dr. Kael Au, and the CHF clinic. He was discharged in stable condition and with symptomatic medications for his cough.     Consultants:   IP CONSULT TO FAMILY MEDICINE  IP CONSULT TO CARDIOLOGY    Surgeries/Procedures Performed:       Treatments:   Coreg Pletal Jardiance Lasix Aldactone DuoNebs Mucinex Robitussin    Significant Diagnostic Studies:   Recent Labs:  CBC:   Lab Results   Component Value Date/Time    WBC 8.4 11/16/2022 02:47 AM    RBC 4.53 11/16/2022 02:47 AM    HGB 13.7 11/16/2022 02:47 AM    HCT 41.3 11/16/2022 02:47 AM    MCV 91.2 11/16/2022 02:47 AM    MCH 30.2 11/16/2022 02:47 AM    MCHC 33.2 11/16/2022 02:47 AM    RDW 13.6 11/16/2022 02:47 AM     11/16/2022 02:47 AM     HFP:    Lab Results   Component Value Date/Time    PROT 6.4 08/26/2022 06:32 PM       Radiology Last 7 Days:  XR CHEST (2 VW)    Result Date: 11/15/2022  No acute process. Borderline cardiomegaly. Discharge Physical Exam:   BP (!) 141/75   Pulse 74   Temp 98.4 °F (36.9 °C) (Oral)   Resp 18   Ht 6' 2\" (1.88 m)   Wt 280 lb (127 kg)   SpO2 98%   BMI 35.95 kg/m²    Physical Exam   Constitutional:       Appearance: Normal appearance. He is normal weight. HENT:      Head: Normocephalic and atraumatic. Eyes:      Extraocular Movements: Extraocular movements intact. Pupils: Pupils are equal, round, and reactive to light. Cardiovascular:      Rate and Rhythm: Normal rate and regular rhythm. Pulses: Normal pulses. Heart sounds: Normal heart sounds. No murmur heard. Pulmonary:      Effort: Pulmonary effort is normal.      Breath sounds: Normal breath sounds. No wheezing. Comments: Coughing  Abdominal:      General: Abdomen is flat. Bowel sounds are normal.      Palpations: Abdomen is soft. Tenderness: There is no abdominal tenderness. Musculoskeletal:      Right lower leg: No edema. Left lower leg: No edema. Skin:     General: Skin is warm and dry. Capillary Refill: Capillary refill takes less than 2 seconds. Coloration: Skin is not jaundiced. Neurological:      General: No focal deficit present. Mental Status: He is alert. Mental status is at baseline. Discharge Plan   Disposition: Home    Provider Follow-Up:   Leesa Kanner, MD  [address] [place] [address] [phone]    Call  Please call the office for follow-up appointment with Dr Prachi Whitney[address]  [phone]  Call  Please call for follow-up appointment at CHF clinic.     [address] [place] [address] [phone]    Follow up       Hospital/Incidental Findings Requiring Follow-Up:  None    Patient Instructions   Diet: cardiac diet    Activity: activity as tolerated    Other Instructions: Discharge Medications         Medication List        START taking these medications      calcium carbonate 500 MG chewable tablet  Commonly known as: TUMS  Take 1 tablet by mouth 3 times daily as needed for Heartburn     guaiFENesin-dextromethorphan 100-10 MG/5ML syrup  Commonly known as: ROBITUSSIN DM  Take 5 mLs by mouth every 4 hours for 10 days  Replaces: Mucinex DM Maximum Strength  MG Tb12            CONTINUE taking these medications      carvedilol 25 MG tablet  Commonly known as: COREG  TAKE 1 TABLET BY MOUTH IN THE MORNING AND 1 TABLET IN THE EVENING. TAKE WITH MEALS.      cilostazol 50 MG tablet  Commonly known as: PLETAL  Take 1 tablet by mouth 2 times daily     furosemide 40 MG tablet  Commonly known as: LASIX  Take 1 tablet by mouth daily     Jardiance 10 MG tablet  Generic drug: empagliflozin  TAKE 1 TABLET BY MOUTH EVERY DAY IN THE MORNING     omeprazole 20 MG delayed release capsule  Commonly known as: PRILOSEC     spironolactone 25 MG tablet  Commonly known as: ALDACTONE  TAKE 1 TABLET BY MOUTH EVERY DAY IN THE MORNING            STOP taking these medications      acetaminophen 500 MG tablet  Commonly known as: TYLENOL     albuterol sulfate  (90 Base) MCG/ACT inhaler  Commonly known as: Ventolin HFA     Mucinex DM Maximum Strength  MG Tb12  Replaced by: guaiFENesin-dextromethorphan 100-10 MG/5ML syrup     zinc 50 MG Caps               Where to Get Your Medications        These medications were sent to [address] [place] [address] [facility] [phone]  [address] [place] [address] [place] [address]      Phone: [phone]   calcium carbonate 500 MG chewable tablet  guaiFENesin-dextromethorphan 100-10 MG/5ML syrup         Electronically signed by Yasmani Taylor MD on 11/16/22 at 11:33 AM EST

## 2022-11-16 NOTE — PROGRESS NOTES
[facility]  Progress Note    Chief complaint :  Chief Complaint   Patient presents with    Chest Pain    Cough     X 1 wk productive of green mucous       Subjective:    Patient was unable to sleep overnight due to persistent cough. He continues to cough up green sputum. He states that the chest pain has improved and is not his major concern. Past medical, surgical, family and social history were reviewed, non-contributory, and unchanged unless otherwise stated. Review of Systems   Constitutional:  Negative for chills and fever. HENT:  Negative for sneezing and sore throat. Respiratory:  Positive for cough. Negative for shortness of breath. Cardiovascular:  Positive for chest pain. Negative for palpitations. Gastrointestinal:  Positive for nausea. Negative for abdominal pain, constipation, diarrhea and vomiting. Genitourinary:  Negative for difficulty urinating and dysuria. Musculoskeletal:  Negative for back pain and joint swelling. Skin:  Positive for pallor. Negative for wound. Neurological:  Negative for dizziness and headaches. Psychiatric/Behavioral:  Negative for dysphoric mood. The patient is not nervous/anxious. Objective:  /76   Pulse 80   Temp 98.6 °F (37 °C) (Oral)   Resp 18   Ht 6' 2\" (1.88 m)   Wt 280 lb (127 kg)   SpO2 97%   BMI 35.95 kg/m²     Physical Exam  Constitutional:       Appearance: Normal appearance. He is normal weight. HENT:      Head: Normocephalic and atraumatic. Eyes:      Extraocular Movements: Extraocular movements intact. Pupils: Pupils are equal, round, and reactive to light. Cardiovascular:      Rate and Rhythm: Normal rate and regular rhythm. Pulses: Normal pulses. Heart sounds: Normal heart sounds. No murmur heard. Pulmonary:      Effort: Pulmonary effort is normal.      Breath sounds: Normal breath sounds. No wheezing.       Comments: Coughing  Abdominal:      General: Abdomen is flat. Bowel sounds are normal.      Palpations: Abdomen is soft. Tenderness: There is no abdominal tenderness. Musculoskeletal:      Right lower leg: No edema. Left lower leg: No edema. Skin:     General: Skin is warm and dry. Capillary Refill: Capillary refill takes less than 2 seconds. Coloration: Skin is not jaundiced. Neurological:      General: No focal deficit present. Mental Status: He is alert. Mental status is at baseline.        Labs:  Recent Results (from the past 24 hour(s))   EKG 12 Lead    Collection Time: 11/15/22 11:41 AM   Result Value Ref Range    Ventricular Rate 72 BPM    Atrial Rate 72 BPM    P-R Interval 158 ms    QRS Duration 102 ms    Q-T Interval 406 ms    QTc Calculation (Bazett) 444 ms    P Axis 81 degrees    R Axis -72 degrees    T Axis -85 degrees   Basic metabolic panel    Collection Time: 11/15/22 12:22 PM   Result Value Ref Range    Sodium 138 132 - 146 mmol/L    Potassium 4.4 3.5 - 5.0 mmol/L    Chloride 105 98 - 107 mmol/L    CO2 25 22 - 29 mmol/L    Anion Gap 8 7 - 16 mmol/L    Glucose 99 74 - 99 mg/dL    BUN 13 6 - 20 mg/dL    Creatinine 1.6 (H) 0.7 - 1.2 mg/dL    Est, Glom Filt Rate 53 >=60 mL/min/1.73    Calcium 9.5 8.6 - 10.2 mg/dL   CBC    Collection Time: 11/15/22 12:22 PM   Result Value Ref Range    WBC 6.1 4.5 - 11.5 E9/L    RBC 4.77 3.80 - 5.80 E12/L    Hemoglobin 14.5 12.5 - 16.5 g/dL    Hematocrit 44.4 37.0 - 54.0 %    MCV 93.1 80.0 - 99.9 fL    MCH 30.4 26.0 - 35.0 pg    MCHC 32.7 32.0 - 34.5 %    RDW 13.7 11.5 - 15.0 fL    Platelets 230 595 - 252 E9/L    MPV 11.3 7.0 - 12.0 fL   Troponin    Collection Time: 11/15/22 12:22 PM   Result Value Ref Range    Troponin, High Sensitivity 18 (H) 0 - 11 ng/L   Brain Natriuretic Peptide    Collection Time: 11/15/22 12:22 PM   Result Value Ref Range    Pro-BNP 1,922 (H) 0 - 125 pg/mL   COVID-19, Rapid    Collection Time: 11/15/22 12:34 PM    Specimen: Nasopharyngeal Swab   Result Value Ref Range    SARS-CoV-2, NAAT Not Detected Not Detected   RAPID INFLUENZA A/B ANTIGENS    Collection Time: 11/15/22 12:34 PM    Specimen: Nasopharyngeal   Result Value Ref Range    Influenza A by PCR Not Detected Not Detected    Influenza B by PCR Not Detected Not Detected   D-Dimer, Quantitative    Collection Time: 11/15/22 12:34 PM   Result Value Ref Range    D-Dimer, Quant 211 ng/mL DDU   Troponin    Collection Time: 11/15/22  1:37 PM   Result Value Ref Range    Troponin, High Sensitivity 17 (H) 0 - 11 ng/L   POCT Glucose    Collection Time: 11/15/22  5:26 PM   Result Value Ref Range    Meter Glucose 113 (H) 74 - 99 mg/dL   Respiratory Panel, Molecular, with COVID-19 (Restricted: peds pts or suitable admitted adults)    Collection Time: 11/15/22  5:30 PM    Specimen: Nasopharyngeal   Result Value Ref Range    Adenovirus by PCR Not Detected Not Detected    Bordetella parapertussis by PCR Not Detected Not Detected    Bordetella pertussis by PCR Not Detected Not Detected    Chlamydophilia pneumoniae by PCR Not Detected Not Detected    Coronavirus 229E by PCR Not Detected Not Detected    Coronavirus HKU1 by PCR Not Detected Not Detected    Coronavirus NL63 by PCR Not Detected Not Detected    Coronavirus OC43 by PCR Not Detected Not Detected    SARS-CoV-2, PCR Not Detected Not Detected    Human Metapneumovirus by PCR Not Detected Not Detected    Human Rhinovirus/Enterovirus by PCR Not Detected Not Detected    Influenza A by PCR Not Detected Not Detected    Influenza B by PCR Not Detected Not Detected    Mycoplasma pneumoniae by PCR Not Detected Not Detected    Parainfluenza Virus 1 by PCR Not Detected Not Detected    Parainfluenza Virus 2 by PCR Not Detected Not Detected    Parainfluenza Virus 3 by PCR Not Detected Not Detected    Parainfluenza Virus 4 by PCR Not Detected Not Detected    Respiratory Syncytial Virus by PCR Not Detected Not Detected   POCT Glucose    Collection Time: 11/15/22  8:45 PM   Result Value Ref Range    Meter Glucose 193 (H) 74 - 99 mg/dL   Basic Metabolic Panel w/ Reflex to MG    Collection Time: 11/16/22  2:47 AM   Result Value Ref Range    Sodium 135 132 - 146 mmol/L    Potassium reflex Magnesium 4.5 3.5 - 5.0 mmol/L    Chloride 104 98 - 107 mmol/L    CO2 25 22 - 29 mmol/L    Anion Gap 6 (L) 7 - 16 mmol/L    Glucose 119 (H) 74 - 99 mg/dL    BUN 17 6 - 20 mg/dL    Creatinine 1.5 (H) 0.7 - 1.2 mg/dL    Est, Glom Filt Rate 58 >=60 mL/min/1.73    Calcium 9.6 8.6 - 10.2 mg/dL   CBC with Auto Differential    Collection Time: 11/16/22  2:47 AM   Result Value Ref Range    WBC 8.4 4.5 - 11.5 E9/L    RBC 4.53 3.80 - 5.80 E12/L    Hemoglobin 13.7 12.5 - 16.5 g/dL    Hematocrit 41.3 37.0 - 54.0 %    MCV 91.2 80.0 - 99.9 fL    MCH 30.2 26.0 - 35.0 pg    MCHC 33.2 32.0 - 34.5 %    RDW 13.6 11.5 - 15.0 fL    Platelets 574 606 - 425 E9/L    MPV 11.5 7.0 - 12.0 fL    Neutrophils % 84.6 (H) 43.0 - 80.0 %    Immature Granulocytes % 0.4 0.0 - 5.0 %    Lymphocytes % 10.3 (L) 20.0 - 42.0 %    Monocytes % 4.6 2.0 - 12.0 %    Eosinophils % 0.0 0.0 - 6.0 %    Basophils % 0.1 0.0 - 2.0 %    Neutrophils Absolute 7.12 1.80 - 7.30 E9/L    Immature Granulocytes # 0.03 E9/L    Lymphocytes Absolute 0.87 (L) 1.50 - 4.00 E9/L    Monocytes Absolute 0.39 0.10 - 0.95 E9/L    Eosinophils Absolute 0.00 (L) 0.05 - 0.50 E9/L    Basophils Absolute 0.01 0.00 - 0.20 E9/L       Radiology and other tests reviewed:  XR CHEST (2 VW)   Final Result   No acute process. Borderline cardiomegaly. Assessment:  Active Hospital Problems    Diagnosis Date Noted    Chest pain [R07.9] 11/15/2022     Priority: Medium       Plan:  Atypical chest pain  New inverted T waves seen on EKG in ED. Non-radiating substernal chest pain.   -Consult cardiology, appreciate recs  -Trend troponin 18-->17     URI  Negative for Covid and Flu in ED. History of Covid in August.  Chest x-ray shows no acute process.   -Guaifenesin 400 mg Q6H  -Duonebs Q4H while awake  -RFA negative  -WBC 8.9      CAD  -Pletal 50 mg BID     HFrEF  Echo in 3/2022 EF 25%.    -BNP 1,922  -Aldactone 25 mg daily  -Lasix 40 mg daily  -Jardiance 19 mg daily      Hypertension   -Coreg 25 mg BID        CKD  Creatinine 1.5, which is his baseline  -Monitor  -Hold nephrotoxic medications     GERD  -Pantoprazole 40 mg daily     DVT ppx: Lovenox 40  GI ppx: Pantoprazole 40 mg daily  Code Status: Full    Jose Tong MD MD  Family Medicine Resident PGY-3  11/16/22   5:39 AM

## 2022-11-16 NOTE — CARE COORDINATION
Social Work / Discharge Planning : Patient admitted with CP. Patient resides with family. Patient is independent. PCP is Dr Lisa Johnston and pharmacy is [facility]. No history of HHC or BRAYAN . Plans to return home at discharge. AWait treatment plan. SW anticipating no needs for SW. SW to follow.  Electronically signed by CASTILLO Ybarra on 11/16/22 at 9:15 AM EST

## 2022-11-17 ENCOUNTER — TELEPHONE (OUTPATIENT)
Dept: PRIMARY CARE CLINIC | Age: 47
End: 2022-11-17

## 2022-11-17 NOTE — TELEPHONE ENCOUNTER
Care Transitions Initial Follow Up Call    Outreach made within 2 business days of discharge: Yes    Patient: Misael Curiel Patient : 1975   MRN: [id number]  Reason for Admission: There are no discharge diagnoses documented for the most recent discharge. Discharge Date: 22       Spoke with: Lissa Tate    Discharge department/facility: [facility] Interactive Patient Contact:  Was patient able to fill all prescriptions: Yes  Was patient instructed to bring all medications to the follow-up visit: Yes  Is patient taking all medications as directed in the discharge summary? Yes  Does patient understand their discharge instructions: Yes  Does patient have questions or concerns that need addressed prior to 7-14 day follow up office visit: no    Scheduled appointment with PCP within 7-14 days    Follow Up  No future appointments.     Daniella Au LPN

## 2022-11-21 DIAGNOSIS — I10 HYPERTENSION, UNSPECIFIED TYPE: ICD-10-CM

## 2022-11-21 DIAGNOSIS — I42.8 NONISCHEMIC CARDIOMYOPATHY (HCC): ICD-10-CM

## 2022-11-21 DIAGNOSIS — I50.20 HFREF (HEART FAILURE WITH REDUCED EJECTION FRACTION) (HCC): ICD-10-CM

## 2022-11-21 NOTE — TELEPHONE ENCOUNTER
----- Message from [facility] sent at 11/21/2022 12:27 PM EST -----  Subject: Refill Request    QUESTIONS  Name of Medication? sacubitril-valsartan (ENTRESTO) 24-26 MG per tablet  Patient-reported dosage and instructions? once daily  How many days do you have left? 3  Preferred Pharmacy? [facility]Y #[id number]  Pharmacy phone number (if available)? [phone]  ---------------------------------------------------------------------------  --------------,  Name of Medication? carvedilol (COREG) 25 MG tablet  Patient-reported dosage and instructions? twice daily  How many days do you have left? 3  Preferred Pharmacy? [id number]  Pharmacy phone number (if available)? [phone]  ---------------------------------------------------------------------------  --------------,  Name of Medication? spironolactone (ALDACTONE) 25 MG tablet  Patient-reported dosage and instructions? once daily  How many days do you have left? 3  Preferred Pharmacy? [facility] #[id number]  Pharmacy phone number (if available)? [phone]  ---------------------------------------------------------------------------  --------------,  Name of Medication? JARDIANCE 10 MG tablet  Patient-reported dosage and instructions? once daily  How many days do you have left? 3  Preferred Pharmacy? [facility] #[id number]  Pharmacy phone number (if available)? [phone]  ---------------------------------------------------------------------------  --------------,  Name of Medication? omeprazole (PRILOSEC) 20 MG delayed release capsule  Patient-reported dosage and instructions? once daaily  How many days do you have left? 0  Preferred Pharmacy? [id number]  Pharmacy phone number (if available)? [phone]  ---------------------------------------------------------------------------  --------------  CALL BACK INFO  What is the best way for the office to contact you? OK to leave message on   voicemail  Preferred Call Back Phone Number? [phone]  ---------------------------------------------------------------------------  --------------  SCRIPT ANSWERS  Relationship to Patient?  Self

## 2022-11-22 RX ORDER — SPIRONOLACTONE 25 MG/1
TABLET ORAL
Qty: 30 TABLET | Refills: 0 | Status: SHIPPED
Start: 2022-11-22 | End: 2022-12-01

## 2022-11-22 RX ORDER — OMEPRAZOLE 20 MG/1
20 CAPSULE, DELAYED RELEASE ORAL DAILY
Qty: 30 CAPSULE | Refills: 0 | Status: SHIPPED | OUTPATIENT
Start: 2022-11-22

## 2022-11-22 RX ORDER — CARVEDILOL 25 MG/1
25 TABLET ORAL 2 TIMES DAILY WITH MEALS
Qty: 60 TABLET | Refills: 0 | Status: SHIPPED | OUTPATIENT
Start: 2022-11-22

## 2022-11-28 ENCOUNTER — TELEPHONE (OUTPATIENT)
Dept: PRIMARY CARE CLINIC | Age: 47
End: 2022-11-28

## 2022-11-28 NOTE — TELEPHONE ENCOUNTER
PA for entresto was submitted. They need additional information. Does the patient have a diagnosis of chronic heart failure (NYHA Class II-IV)? I see in his chart he has CHF. Also, asking for left ventricular ejection fraction percentage.     Thank you

## 2022-11-30 NOTE — PROGRESS NOTES
[facility]  Department of Family Medicine      Patient:  Dafne Waters 40 y.o. male     Date of Service: 11/30/22      Chief complaint:   Chief Complaint   Patient presents with    1 Month Follow-Up         History ofPresent Illness   The patient is a 40 y.o. male  presented to the clinic with complaints as above. HFU  -for chest pain   -EKG showed new inverted t waves and RBBB, cardiology consulted who recommended no advanced testing at this time, thus patient discharged to home  -currently, feeling better, symptoms improving, states he is working now   -leg pain, flexeril not helping, pletal not helping, had appt with pain management as he could not make it, ain does start in his back     HTN  -f/u  -has not taken his BP medications yet  -denies any chest pain or SOB     Past Medical History:      Diagnosis Date    CAD (coronary artery disease)     CHF (congestive heart failure) ([facility] [address] 02/08/2022    GERD (gastroesophageal reflux disease)     Hx of drug abuse ([facility] [address] [facility]     cocaine    Hypertension     NICM (nonischemic cardiomyopathy) ([facility] [address] [facility]        PastSurgical History:        Procedure Laterality Date    CARDIAC CATHETERIZATION      LAPAROSCOPY N/A 4/30/2022    LAPAROSCOPY DIAGNOSTIC,  EXPLORATORY INVERTED TO EXPLORATORY LAPOROTOMY, DISTAL SMALL BOWEL RESECTION, PRIMARY STAPLED ANASTAMOSIS, BILATERAL INGUINAL HERNIA REPAIR WITH MESH performed by Ramirez Thakur MD at [address] N/A 4/30/2022    LAPAROTOMY EXPLORATORY BILATERAL INGUINAL HERNIA REPAIR OPEN,  BOWEL RESECTION,  MESH X2 performed by Ramirez Thakur MD at [address] GRAFT Right     R thigh       Allergies:    Patient has no known allergies.     Social History:   Social History     Socioeconomic History    Marital status: Single     Spouse name: Not on file    Number of children: Not on file    Years of education: Not on file    Highest education level: Not on file   Occupational History    Not on file   Tobacco Use    Smoking status: Former     Packs/day: 0.25     Years: 30.00     Pack years: 7.50     Types: Cigarettes     Start date: 13     Quit date: 2022     Years since quittin.8    Smokeless tobacco: Never   Vaping Use    Vaping Use: Never used   Substance and Sexual Activity    Alcohol use: Not Currently    Drug use: Not Currently     Types: Cocaine, Marijuana (Tam Jamey)     Comment: past use; none since 2022    Sexual activity: Not on file   Other Topics Concern    Not on file   Social History Narrative    Denies caffeine     Social Determinants of Health     Financial Resource Strain: Low Risk     Difficulty of Paying Living Expenses: Not hard at all   Food Insecurity: No Food Insecurity    Worried About Running Out of Food in the Last Year: Never true    Ran Out of Food in the Last Year: Never true   Transportation Needs: Not on file   Physical Activity: Inactive    Days of Exercise per Week: 0 days    Minutes of Exercise per Session: 0 min   Stress: Not on file   Social Connections: Not on file   Intimate Partner Violence: Not At Risk    Fear of Current or Ex-Partner: No    Emotionally Abused: No    Physically Abused: No    Sexually Abused: No   Housing Stability: Not on file        Family History:       Problem Relation Age of Onset    Thyroid Disease Mother     Hypertension Mother     Stroke Maternal Grandmother     Hypertension Maternal Grandmother        Review of Systems:   Review of Systems - as above     Physical Exam   Vitals: BP (!) 146/94   Pulse 61   Temp 96.9 °F (36.1 °C) (Infrared)   Resp 18   Ht 6' 2\" (1.88 m)   Wt 280 lb (127 kg)   SpO2 97%   BMI 35.95 kg/m²   Physical Exam  Constitutional:       Appearance: He is well-developed. HENT:      Head: Normocephalic and atraumatic. Eyes:      General:         Right eye: No discharge. Left eye: No discharge. Conjunctiva/sclera: Conjunctivae normal.   Neck:      Trachea: No tracheal deviation.    Cardiovascular:      Rate and Rhythm: Normal rate and regular rhythm. Heart sounds: Normal heart sounds. Pulmonary:      Effort: Pulmonary effort is normal. No respiratory distress. Breath sounds: Normal breath sounds. No wheezing. Abdominal:      General: Bowel sounds are normal. There is no distension. Palpations: Abdomen is soft. Tenderness: There is no abdominal tenderness. Musculoskeletal:         General: No tenderness. Cervical back: Normal range of motion and neck supple. Skin:     General: Skin is warm and dry. Neurological:      Mental Status: He is alert. Psychiatric:         Behavior: Behavior normal.           Assessment and Plan       1. Hospital discharge follow-up  For chest pain  -this has resolved, given his previous hx, started on high intensity statin    2. Chest pain, unspecified type  As above    3. Hypertension, unspecified type  F/u of chronic issue  -Elevated in office today, was elevated in hospital, thus increased spironolactone with close f/u, get BMP at that time  - spironolactone (ALDACTONE) 50 MG tablet; TAKE 1 TABLET BY MOUTH EVERY DAY IN THE MORNING  Dispense: 30 tablet; Refill: 2  - Basic Metabolic Panel; Future    4. Chronic bilateral low back pain with bilateral sciatica  F/u of chronic issue  -Not improving with previous modalities, will start gabapentin to see if this helps with close f/u   - gabapentin (NEURONTIN) 100 MG capsule; Take 1 capsule by mouth 3 times daily for 30 days. Intended supply: 90 days  Dispense: 90 capsule; Refill: 0    5. HFrEF (heart failure with reduced ejection fraction) (HCC)  F/u  -Stable, however BP elevated, thus increased spironolactone  - furosemide (LASIX) 40 MG tablet; Take 1 tablet by mouth daily  Dispense: 30 tablet; Refill: 3  - spironolactone (ALDACTONE) 50 MG tablet; TAKE 1 TABLET BY MOUTH EVERY DAY IN THE MORNING  Dispense: 30 tablet; Refill: 2    6. Nonischemic cardiomyopathy (HCC)  As above  - furosemide (LASIX) 40 MG tablet;  Take 1 tablet by mouth daily  Dispense: 30 tablet; Refill: 3  - spironolactone (ALDACTONE) 50 MG tablet; TAKE 1 TABLET BY MOUTH EVERY DAY IN THE MORNING  Dispense: 30 tablet; Refill: 2  - rosuvastatin (CRESTOR) 20 MG tablet; Take 1 tablet by mouth nightly  Dispense: 30 tablet; Refill: 3    7. Hyperlipidemia, unspecified hyperlipidemia type  As above   - rosuvastatin (CRESTOR) 20 MG tablet; Take 1 tablet by mouth nightly  Dispense: 30 tablet; Refill: 3    Counseled regarding above diagnosis, including possible risks and complications,  especially if left uncontrolled. Counseled regarding the possible side effects, risks, benefits and alternatives to treatment;patient and/or guardian verbalizes understanding, agrees, feels comfortable with and wishes to proceed with above treatment plan. Call or go to [address] if symptoms worsen or persist. Advised patient to call with any new medication issues, and, as applicable, read all Rx info from pharmacy to assure aware of all possible risks and side effects of medicationbefore taking. Patient and/or guardian given opportunity to ask questions/raise concerns. The patient verbalized comfort and understanding ofinstructions. I encourage further reading and education about your health conditions. Information on many health conditions is provided by [facility] [employer]: [email or web address]  Please bring any questions to me at your nextvisit. Return to Office: Return in about 1 month (around 1/1/2023) for f/u BP, nurses visit in 2 weeks for bp check . Medication List:    Current Outpatient Medications   Medication Sig Dispense Refill    furosemide (LASIX) 40 MG tablet Take 1 tablet by mouth daily 30 tablet 3    spironolactone (ALDACTONE) 50 MG tablet TAKE 1 TABLET BY MOUTH EVERY DAY IN THE MORNING 30 tablet 2    gabapentin (NEURONTIN) 100 MG capsule Take 1 capsule by mouth 3 times daily for 30 days.  Intended supply: 90 days 90 capsule 0 rosuvastatin (CRESTOR) 20 MG tablet Take 1 tablet by mouth nightly 30 tablet 3    carvedilol (COREG) 25 MG tablet Take 1 tablet by mouth 2 times daily (with meals) 60 tablet 0    empagliflozin (JARDIANCE) 10 MG tablet TAKE 1 TABLET BY MOUTH EVERY DAY IN THE MORNING 30 tablet 0    omeprazole (PRILOSEC) 20 MG delayed release capsule Take 1 capsule by mouth daily 30 capsule 0    sacubitril-valsartan (ENTRESTO) 24-26 MG per tablet Take 1 tablet by mouth 2 times daily 60 tablet 0    calcium carbonate (TUMS) 500 MG chewable tablet Take 1 tablet by mouth 3 times daily as needed for Heartburn 15 tablet 0    cilostazol (PLETAL) 50 MG tablet Take 1 tablet by mouth 2 times daily 60 tablet 3     No current facility-administered medications for this visit. Odalis Marquez DO       This document may have been prepared at least partially through the use of voice recognition software. Although effort is taken to assure the accuracy ofthis document, it is possible that grammatical, syntax,  or spelling errors may occur.

## 2022-12-01 ENCOUNTER — OFFICE VISIT (OUTPATIENT)
Dept: PRIMARY CARE CLINIC | Age: 47
End: 2022-12-01
Payer: MEDICAID

## 2022-12-01 VITALS
TEMPERATURE: 96.9 F | DIASTOLIC BLOOD PRESSURE: 94 MMHG | BODY MASS INDEX: 35.94 KG/M2 | HEIGHT: 74 IN | SYSTOLIC BLOOD PRESSURE: 146 MMHG | WEIGHT: 280 LBS | HEART RATE: 61 BPM | OXYGEN SATURATION: 97 % | RESPIRATION RATE: 18 BRPM

## 2022-12-01 DIAGNOSIS — E78.5 HYPERLIPIDEMIA, UNSPECIFIED HYPERLIPIDEMIA TYPE: ICD-10-CM

## 2022-12-01 DIAGNOSIS — G89.29 CHRONIC BILATERAL LOW BACK PAIN WITH BILATERAL SCIATICA: ICD-10-CM

## 2022-12-01 DIAGNOSIS — Z09 HOSPITAL DISCHARGE FOLLOW-UP: Primary | ICD-10-CM

## 2022-12-01 DIAGNOSIS — M54.42 CHRONIC BILATERAL LOW BACK PAIN WITH BILATERAL SCIATICA: ICD-10-CM

## 2022-12-01 DIAGNOSIS — I50.20 HFREF (HEART FAILURE WITH REDUCED EJECTION FRACTION) (HCC): ICD-10-CM

## 2022-12-01 DIAGNOSIS — I42.8 NONISCHEMIC CARDIOMYOPATHY (HCC): ICD-10-CM

## 2022-12-01 DIAGNOSIS — R07.9 CHEST PAIN, UNSPECIFIED TYPE: ICD-10-CM

## 2022-12-01 DIAGNOSIS — I10 HYPERTENSION, UNSPECIFIED TYPE: ICD-10-CM

## 2022-12-01 DIAGNOSIS — M54.41 CHRONIC BILATERAL LOW BACK PAIN WITH BILATERAL SCIATICA: ICD-10-CM

## 2022-12-01 PROCEDURE — 99214 OFFICE O/P EST MOD 30 MIN: CPT | Performed by: STUDENT IN AN ORGANIZED HEALTH CARE EDUCATION/TRAINING PROGRAM

## 2022-12-01 PROCEDURE — 3074F SYST BP LT 130 MM HG: CPT | Performed by: STUDENT IN AN ORGANIZED HEALTH CARE EDUCATION/TRAINING PROGRAM

## 2022-12-01 PROCEDURE — 3078F DIAST BP <80 MM HG: CPT | Performed by: STUDENT IN AN ORGANIZED HEALTH CARE EDUCATION/TRAINING PROGRAM

## 2022-12-01 RX ORDER — FUROSEMIDE 40 MG/1
TABLET ORAL
Qty: 30 TABLET | Refills: 3 | Status: SHIPPED | OUTPATIENT
Start: 2022-12-01

## 2022-12-01 RX ORDER — ROSUVASTATIN CALCIUM 20 MG/1
20 TABLET, COATED ORAL NIGHTLY
Qty: 30 TABLET | Refills: 3 | Status: SHIPPED | OUTPATIENT
Start: 2022-12-01

## 2022-12-01 RX ORDER — SPIRONOLACTONE 50 MG/1
TABLET, FILM COATED ORAL
Qty: 30 TABLET | Refills: 2 | Status: SHIPPED | OUTPATIENT
Start: 2022-12-01

## 2022-12-01 RX ORDER — GABAPENTIN 100 MG/1
100 CAPSULE ORAL 3 TIMES DAILY
Qty: 90 CAPSULE | Refills: 0 | Status: SHIPPED | OUTPATIENT
Start: 2022-12-01 | End: 2022-12-31

## 2022-12-16 PROBLEM — R79.89 ELEVATED TROPONIN: Status: RESOLVED | Noted: 2022-11-16 | Resolved: 2022-12-16

## 2022-12-16 PROBLEM — R77.8 ELEVATED TROPONIN: Status: RESOLVED | Noted: 2022-11-16 | Resolved: 2022-12-16

## 2022-12-27 DIAGNOSIS — I10 HYPERTENSION, UNSPECIFIED TYPE: ICD-10-CM

## 2022-12-27 DIAGNOSIS — I42.8 NONISCHEMIC CARDIOMYOPATHY (HCC): ICD-10-CM

## 2022-12-27 DIAGNOSIS — I50.20 HFREF (HEART FAILURE WITH REDUCED EJECTION FRACTION) (HCC): ICD-10-CM

## 2022-12-27 RX ORDER — OMEPRAZOLE 20 MG/1
CAPSULE, DELAYED RELEASE ORAL
Qty: 30 CAPSULE | Refills: 0 | Status: SHIPPED | OUTPATIENT
Start: 2022-12-27

## 2022-12-27 RX ORDER — SPIRONOLACTONE 25 MG/1
TABLET ORAL
Qty: 30 TABLET | Refills: 0 | Status: SHIPPED | OUTPATIENT
Start: 2022-12-27

## 2023-01-02 DIAGNOSIS — M54.42 CHRONIC BILATERAL LOW BACK PAIN WITH BILATERAL SCIATICA: ICD-10-CM

## 2023-01-02 DIAGNOSIS — G89.29 CHRONIC BILATERAL LOW BACK PAIN WITH BILATERAL SCIATICA: ICD-10-CM

## 2023-01-02 DIAGNOSIS — M54.41 CHRONIC BILATERAL LOW BACK PAIN WITH BILATERAL SCIATICA: ICD-10-CM

## 2023-01-03 RX ORDER — GABAPENTIN 100 MG/1
100 CAPSULE ORAL 3 TIMES DAILY
Qty: 90 CAPSULE | Refills: 0 | Status: SHIPPED | OUTPATIENT
Start: 2023-01-03 | End: 2023-02-02

## 2023-01-27 ENCOUNTER — HOSPITAL ENCOUNTER (EMERGENCY)
Age: 48
Discharge: HOME | End: 2023-01-27
Attending: EMERGENCY MEDICINE
Payer: MEDICAID

## 2023-01-27 VITALS
TEMPERATURE: 98.1 F | RESPIRATION RATE: 18 BRPM | BODY MASS INDEX: 36.57 KG/M2 | OXYGEN SATURATION: 95 % | SYSTOLIC BLOOD PRESSURE: 135 MMHG | HEIGHT: 72 IN | HEART RATE: 85 BPM | DIASTOLIC BLOOD PRESSURE: 85 MMHG | WEIGHT: 270 LBS

## 2023-01-27 DIAGNOSIS — T50.904A DRUG OVERDOSE OF UNDETERMINED INTENT, INITIAL ENCOUNTER: Primary | ICD-10-CM

## 2023-01-27 LAB — METER GLUCOSE: 91 MG/DL (ref 74–99)

## 2023-01-27 PROCEDURE — 99284 EMERGENCY DEPT VISIT MOD MDM: CPT

## 2023-01-27 PROCEDURE — 82962 GLUCOSE BLOOD TEST: CPT

## 2023-01-27 ASSESSMENT — PAIN - FUNCTIONAL ASSESSMENT
PAIN_FUNCTIONAL_ASSESSMENT: NONE - DENIES PAIN
PAIN_FUNCTIONAL_ASSESSMENT: 0-10

## 2023-01-27 NOTE — ED PROVIDER NOTES
[address]        Pt Name: Job Carrington  MRN: [id number]  [place] 1975  Date of evaluation: 1/27/2023  Provider: Sarah Terry DO  PCP: Vivek Will DO  Note Started: 3:16 AM EST 1/27/23    CHIEF COMPLAINT       Chief Complaint   Patient presents with    Drug Overdose     2mg of narcan given per EMS       HISTORY OF PRESENT ILLNESS: 1 or more Elements   History From: Patient and EMS    Limitations to history : Intoxication    Job Carrington is a 56 y.o. male with a past medical history of polysubstance abuse, ACS, CHF who presents to the emergency department by EMS for drug overdose. Patient was given 2 mg of Narcan by EMS. Patient states he smoked cocaine that may have been mixed with heroin. Patient complains of being cold. Patient was reportedly found down by family friend who called EMS. Nursing Notes were all reviewed and agreed with or any disagreements were addressed in the HPI. REVIEW OF EXTERNAL NOTE :       OARRS report reviewed  Previous echo 3/21/2022-EF of 25%    REVIEW OF SYSTEMS :           Positives and Pertinent negatives as per HPI.      SURGICAL HISTORY     Past Surgical History:   Procedure Laterality Date    CARDIAC CATHETERIZATION      LAPAROSCOPY N/A 4/30/2022    LAPAROSCOPY DIAGNOSTIC,  EXPLORATORY INVERTED TO EXPLORATORY LAPOROTOMY, DISTAL SMALL BOWEL RESECTION, PRIMARY STAPLED ANASTAMOSIS, BILATERAL INGUINAL HERNIA REPAIR WITH MESH performed by Constantine Elder MD at [address] [facility] N/A 4/30/2022    LAPAROTOMY EXPLORATORY BILATERAL INGUINAL HERNIA REPAIR OPEN,  BOWEL RESECTION,  MESH X2 performed by Constantine Elder MD at [facility] Right     R thigh       [address]       Discharge Medication List as of 1/27/2023  6:01 AM        CONTINUE these medications which have NOT CHANGED    Details   gabapentin (NEURONTIN) 100 MG capsule TAKE 1 CAPSULE BY MOUTH 3 TIMES DAILY FOR 30 DAYS. INTENDED SUPPLY: 90 DAYS, Disp-90 capsule, R-0Normal      !! spironolactone (ALDACTONE) 25 MG tablet TAKE 1 TABLET BY MOUTH EVERY DAY IN THE MORNING, Disp-30 tablet, R-0Normal      omeprazole (PRILOSEC) 20 MG delayed release capsule TAKE 1 CAPSULE BY MOUTH EVERY DAY, Disp-30 capsule, R-0Normal      empagliflozin (JARDIANCE) 10 MG tablet TAKE 1 TABLET BY MOUTH EVERY DAY IN THE MORNING, Disp-30 tablet, R-0Normal      furosemide (LASIX) 40 MG tablet Take 1 tablet by mouth daily, Disp-30 tablet, R-3Normal      !! spironolactone (ALDACTONE) 50 MG tablet TAKE 1 TABLET BY MOUTH EVERY DAY IN THE MORNING, Disp-30 tablet, R-2Normal      rosuvastatin (CRESTOR) 20 MG tablet Take 1 tablet by mouth nightly, Disp-30 tablet, R-3Normal      carvedilol (COREG) 25 MG tablet Take 1 tablet by mouth 2 times daily (with meals), Disp-60 tablet, R-0Normal      cilostazol (PLETAL) 50 MG tablet Take 1 tablet by mouth 2 times daily, Disp-60 tablet, R-3Normal       !! - Potential duplicate medications found. Please discuss with provider. ALLERGIES     Patient has no known allergies.     FAMILYHISTORY       Family History   Problem Relation Age of Onset    Thyroid Disease Mother     Hypertension Mother     Stroke Maternal Grandmother     Hypertension Maternal Grandmother         SOCIAL HISTORY       Social History     Tobacco Use    Smoking status: Former     Packs/day: 0.25     Years: 30.00     Pack years: 7.50     Types: Cigarettes     Start date: 13     Quit date: 2022     Years since quittin.9    Smokeless tobacco: Never   Vaping Use    Vaping Use: Never used   Substance Use Topics    Alcohol use: Not Currently    Drug use: Not Currently     Types: Cocaine, Marijuana (Kaylee Sicilian)     Comment: past use; none since 2022       SCREENINGS        [place] Coma Scale  Eye Opening: Spontaneous  Best Verbal Response: Oriented  Best Motor Response: Obeys commands  [place] Coma Scale Score: 15 CIWA Assessment  BP: 135/85  Heart Rate: 85           PHYSICAL EXAM  1 or more Elements     ED Triage Vitals   BP Temp Temp src Pulse Resp SpO2 Height Weight   -- -- -- -- -- -- -- --                Constitutional/General: Alert and oriented x3, lethargic  Head: Normocephalic and atraumatic  Eyes: PERRL, EOMI, conjunctiva normal, sclera non icteric, bilateral pinpoint pupils  ENT:  Oropharynx clear, handling secretions, no trismus, no asymmetry of the posterior oropharynx or uvular edema  Neck: Supple, full ROM, no stridor, no meningeal signs  Respiratory: Lungs clear to auscultation bilaterally, no wheezes, rales, or rhonchi. Not in respiratory distress  Cardiovascular:  Regular rate. Regular rhythm. No murmurs, no gallops, no rubs. 2+ distal pulses. Equal extremity pulses. Chest: No chest wall tenderness  GI:  Abdomen Soft, Non tender, Non distended. No rebound, guarding, or rigidity. No pulsatile masses. Musculoskeletal: Moves all extremities x 4. Warm and well perfused, no clubbing, no cyanosis, no edema. Capillary refill <3 seconds  Integument: skin warm and dry. No rashes. Neurologic: GCS 15, no focal deficits, symmetric strength 5/5 in the upper and lower extremities bilaterally  Psychiatric: No suicidal or homicidal ideation            DIAGNOSTIC RESULTS   LABS:    Labs Reviewed   POCT GLUCOSE   POCT GLUCOSE       As interpreted by me, the above displayed labs are abnormal. All other labs obtained during this visit were within normal range or not returned as of this dictation. RADIOLOGY:   Non-plain film images such as CT, Ultrasound and MRI are read by the radiologist. Plain radiographic images are visualized and preliminarily interpreted by the ED Provider with the below findings:        Interpretation per the Radiologist below, if available at the time of this note:    No orders to display     No results found. No results found.     PROCEDURES   Unless otherwise noted below, none CRITICAL CARE TIME (.cct)   none    PAST MEDICAL HISTORY/Chronic Conditions Affecting Care      has a past medical history of CAD (coronary artery disease), CHF (congestive heart failure) ([facility] [address] (02/08/2022), GERD (gastroesophageal reflux disease), drug abuse ([facility] Hypertension, and NICM (nonischemic cardiomyopathy) ([facility] EMERGENCY DEPARTMENT COURSE    Vitals:    Vitals:    01/27/23 0330 01/27/23 0353 01/27/23 0530 01/27/23 0616   BP:   (!) 138/94 135/85   Pulse: 85  85 85   Resp: 18 16 18 18   Temp:       TempSrc:       SpO2: 97% 96% 95% 95%   Weight:       Height:           Patient was given the following medications:  Medications - No data to display        Medical Decision Making/Differential Diagnosis:    CC/HPI Summary, Social Determinants of health, Records Reviewed, DDx, testing done/not done, ED Course, Reassessment, disposition considerations/shared decision making with patient, consults, disposition:      ED Course as of 01/27/23 0634   Fri Jan 27, 2023   0402 Patient states he smoked cocaine. [TO]   0507 NIH score of 0 [TO]   9147 Patient ambulated to the bathroom without difficulty. [TO]      ED Course User Index  [TO] Yennifer Morales, DO        Medical Decision Making  Amount and/or Complexity of Data Reviewed  Labs: ordered. 60-year-old male with history of polysubstance abuse and CHF presenting with drug overdose. Patient was given Narcan by EMS. Patient was observed to monitor respiratory status. Later in his ED course he reported complaint of numbness of his left hand. An NIH score was noted to be 0. Patient's paresthesias likely peripheral in nature. Patient was able to ambulate without difficulty and reported improvement in symptoms. He was felt to be stable for outpatient follow-up. He was provided a prescription for Narcan in case of accidental overdoses. CONSULTS: (Who and What was discussed)  None        I am the Primary Clinician of Record.     FINAL IMPRESSION      1. Drug overdose of undetermined intent, initial encounter          DISPOSITION/PLAN     DISPOSITION Decision To Discharge 01/27/2023 05:47:26 AM      PATIENT REFERRED TO:  Abbe Halon Dr. Vicie Frankel [place] [phone]    Schedule an appointment as soon as possible for a visit       DISCHARGE MEDICATIONS:  Discharge Medication List as of 1/27/2023  6:01 AM        START taking these medications    Details   Naloxone HCl (NALOXONE OPIATE OVERDOSE KIT) 1 each by Nasal route once for 1 dose, Disp-1 kit, R-0Print             DISCONTINUED MEDICATIONS:  Discharge Medication List as of 1/27/2023  6:01 AM                 (Please note that portions of this note were completed with a voice recognition program.  Efforts were made to edit the dictations but occasionally words are mis-transcribed.)    Gibran Murphy DO (electronically signed)           Rober Valdez DO  Resident  01/27/23 5977

## 2023-01-27 NOTE — ED NOTES
Pt and family verbalized understanding of d/c instructions. Pt left in stable condition via WC with family.      Aguilar Witt RN  01/27/23 2898

## 2023-01-30 NOTE — PROGRESS NOTES
[facility]  Department of Family Medicine      Patient:  Diya Lamb 56 y.o. male     Date of Service: 23      Chief complaint:   Chief Complaint   Patient presents with    ED Follow-up         History ofPresent Illness   The patient is a 56 y.o. male  presented to the clinic with complaints as above. Drug overdose  -ED f/u  -states he overdosed on heroin, believes this was laced with fentanyl  -currently, feeling very tired, having some MCDONALD and SOB  -denies any chest pain     Past Medical History:      Diagnosis Date    CAD (coronary artery disease)     CHF (congestive heart failure) ([facility] [address] [facility] 2022    GERD (gastroesophageal reflux disease)     Hx of drug abuse (HCC)     cocaine    Hypertension     NICM (nonischemic cardiomyopathy) ([facility] [address] [facility]        PastSurgical History:        Procedure Laterality Date    CARDIAC CATHETERIZATION      LAPAROSCOPY N/A 2022    LAPAROSCOPY DIAGNOSTIC,  EXPLORATORY INVERTED TO EXPLORATORY LAPOROTOMY, DISTAL SMALL BOWEL RESECTION, PRIMARY STAPLED ANASTAMOSIS, BILATERAL INGUINAL HERNIA REPAIR WITH MESH performed by Errol Bruce MD at [address] [place] [address] N/A 2022    LAPAROTOMY EXPLORATORY BILATERAL INGUINAL HERNIA REPAIR OPEN,  BOWEL RESECTION,  MESH X2 performed by Errol Bruce MD at [address] [facility] Right     R thigh       Allergies:    Patient has no known allergies.     Social History:   Social History     Socioeconomic History    Marital status: Single     Spouse name: Not on file    Number of children: Not on file    Years of education: Not on file    Highest education level: Not on file   Occupational History    Not on file   Tobacco Use    Smoking status: Former     Packs/day: 0.25     Years: 30.00     Pack years: 7.50     Types: Cigarettes     Start date:      Quit date: 2022     Years since quittin.9    Smokeless tobacco: Never   Vaping Use    Vaping Use: Never used   Substance and Sexual Activity    Alcohol use: Not Currently    Drug use: Not Currently     Types: Cocaine, Marijuana (Kesha Valle)     Comment: past use; none since 2/2022    Sexual activity: Not on file   Other Topics Concern    Not on file   Social History Narrative    Denies caffeine     Social Determinants of Health     Financial Resource Strain: Low Risk     Difficulty of Paying Living Expenses: Not hard at all   Food Insecurity: No Food Insecurity    Worried About Running Out of Food in the Last Year: Never true    Ran Out of Food in the Last Year: Never true   Transportation Needs: Not on file   Physical Activity: Inactive    Days of Exercise per Week: 0 days    Minutes of Exercise per Session: 0 min   Stress: Not on file   Social Connections: Not on file   Intimate Partner Violence: Not At Risk    Fear of Current or Ex-Partner: No    Emotionally Abused: No    Physically Abused: No    Sexually Abused: No   Housing Stability: Not on file        Family History:       Problem Relation Age of Onset    Thyroid Disease Mother     Hypertension Mother     Stroke Maternal Grandmother     Hypertension Maternal Grandmother        Review of Systems:   Review of Systems - as above     Physical Exam   Vitals: BP (!) 130/92   Pulse 66   Temp 97.3 °F (36.3 °C) (Infrared)   Resp 18   Ht 6' 2\" (1.88 m)   Wt 280 lb (127 kg)   SpO2 97%   BMI 35.95 kg/m²   Physical Exam  Constitutional:       Appearance: He is well-developed. HENT:      Head: Normocephalic and atraumatic. Eyes:      General:         Right eye: No discharge. Left eye: No discharge. Conjunctiva/sclera: Conjunctivae normal.   Neck:      Trachea: No tracheal deviation. Cardiovascular:      Rate and Rhythm: Normal rate and regular rhythm. Heart sounds: Normal heart sounds. Pulmonary:      Effort: Pulmonary effort is normal. No respiratory distress. Breath sounds: Normal breath sounds. No wheezing. Abdominal:      General: Bowel sounds are normal. There is no distension. Palpations: Abdomen is soft. Tenderness: There is no abdominal tenderness. Musculoskeletal:         General: No tenderness. Cervical back: Normal range of motion and neck supple. Skin:     General: Skin is warm and dry. Neurological:      Mental Status: He is alert. Psychiatric:         Behavior: Behavior normal.           Assessment and Plan       1. Polysubstance abuse ([facility]  ED f/u  -Chronic issue  -Overdosed on heroin, will refer urgently to behavioral health for further evaluation/treatment  - Odilon Aguilar MD, [address]    2. HFrEF (heart failure with reduced ejection fraction) ([facility]  F/u of chronic issue  -Vitals stable, however having fatigue, will further evaluate with BNP, close f/u in 1 week, if BNP elevated will increase lasix  - furosemide (LASIX) 40 MG tablet; Take 1 tablet by mouth daily  Dispense: 30 tablet; Refill: 3  - Brain Natriuretic Peptide; Future    3. Hypertension, unspecified type  F/u of chronic issue  -SLightly elevated in office today, will watch for now     4. Nonischemic cardiomyopathy (HCC)  As above  - furosemide (LASIX) 40 MG tablet; Take 1 tablet by mouth daily  Dispense: 30 tablet; Refill: 3    5. Elevated serum creatinine  Chronic issue  -Repeat lab work   - Basic Metabolic Panel; Future    Counseled regarding above diagnosis, including possible risks and complications,  especially if left uncontrolled. Counseled regarding the possible side effects, risks, benefits and alternatives to treatment;patient and/or guardian verbalizes understanding, agrees, feels comfortable with and wishes to proceed with above treatment plan. Call or go to [address] if symptoms worsen or persist. Advised patient to call with any new medication issues, and, as applicable, read all Rx info from pharmacy to assure aware of all possible risks and side effects of medicationbefore taking.      Patient and/or guardian given opportunity to ask questions/raise concerns. The patient verbalized comfort and understanding ofinstructions. I encourage further reading and education about your health conditions. Information on many health conditions is provided by [facility] [employer]: [email or web address]  Please bring any questions to me at your nextvisit. Return to Office: Return in about 1 week (around 2/7/2023) for f/u cruz . Medication List:    Current Outpatient Medications   Medication Sig Dispense Refill    furosemide (LASIX) 40 MG tablet Take 1 tablet by mouth daily 30 tablet 3    gabapentin (NEURONTIN) 100 MG capsule TAKE 1 CAPSULE BY MOUTH 3 TIMES DAILY FOR 30 DAYS. INTENDED SUPPLY: 90 DAYS 90 capsule 0    omeprazole (PRILOSEC) 20 MG delayed release capsule TAKE 1 CAPSULE BY MOUTH EVERY DAY 30 capsule 0    empagliflozin (JARDIANCE) 10 MG tablet TAKE 1 TABLET BY MOUTH EVERY DAY IN THE MORNING 30 tablet 0    spironolactone (ALDACTONE) 50 MG tablet TAKE 1 TABLET BY MOUTH EVERY DAY IN THE MORNING 30 tablet 2    rosuvastatin (CRESTOR) 20 MG tablet Take 1 tablet by mouth nightly 30 tablet 3    carvedilol (COREG) 25 MG tablet Take 1 tablet by mouth 2 times daily (with meals) 60 tablet 0    cilostazol (PLETAL) 50 MG tablet Take 1 tablet by mouth 2 times daily 60 tablet 3     No current facility-administered medications for this visit. Tata Hebert, DO       This document may have been prepared at least partially through the use of voice recognition software. Although effort is taken to assure the accuracy ofthis document, it is possible that grammatical, syntax,  or spelling errors may occur.

## 2023-01-31 ENCOUNTER — OFFICE VISIT (OUTPATIENT)
Dept: PRIMARY CARE CLINIC | Age: 48
End: 2023-01-31
Payer: MEDICAID

## 2023-01-31 VITALS
TEMPERATURE: 97.3 F | DIASTOLIC BLOOD PRESSURE: 92 MMHG | BODY MASS INDEX: 35.94 KG/M2 | SYSTOLIC BLOOD PRESSURE: 130 MMHG | WEIGHT: 280 LBS | HEART RATE: 66 BPM | HEIGHT: 74 IN | RESPIRATION RATE: 18 BRPM | OXYGEN SATURATION: 97 %

## 2023-01-31 DIAGNOSIS — F19.10 POLYSUBSTANCE ABUSE (HCC): Primary | ICD-10-CM

## 2023-01-31 DIAGNOSIS — I42.8 NONISCHEMIC CARDIOMYOPATHY (HCC): ICD-10-CM

## 2023-01-31 DIAGNOSIS — R79.89 ELEVATED SERUM CREATININE: ICD-10-CM

## 2023-01-31 DIAGNOSIS — I50.20 HFREF (HEART FAILURE WITH REDUCED EJECTION FRACTION) (HCC): ICD-10-CM

## 2023-01-31 DIAGNOSIS — I10 HYPERTENSION, UNSPECIFIED TYPE: ICD-10-CM

## 2023-01-31 LAB
ANION GAP SERPL CALCULATED.3IONS-SCNC: 11 MMOL/L (ref 7–16)
BUN BLDV-MCNC: 19 MG/DL (ref 6–20)
CALCIUM SERPL-MCNC: 9 MG/DL (ref 8.6–10.2)
CHLORIDE BLD-SCNC: 107 MMOL/L (ref 98–107)
CO2: 24 MMOL/L (ref 22–29)
CREAT SERPL-MCNC: 1.8 MG/DL (ref 0.7–1.2)
GFR SERPL CREATININE-BSD FRML MDRD: 46 ML/MIN/1.73
GLUCOSE BLD-MCNC: 112 MG/DL (ref 74–99)
POTASSIUM SERPL-SCNC: 4.1 MMOL/L (ref 3.5–5)
PRO-BNP: 1658 PG/ML (ref 0–125)
SODIUM BLD-SCNC: 142 MMOL/L (ref 132–146)

## 2023-01-31 PROCEDURE — 3075F SYST BP GE 130 - 139MM HG: CPT | Performed by: STUDENT IN AN ORGANIZED HEALTH CARE EDUCATION/TRAINING PROGRAM

## 2023-01-31 PROCEDURE — 99214 OFFICE O/P EST MOD 30 MIN: CPT | Performed by: STUDENT IN AN ORGANIZED HEALTH CARE EDUCATION/TRAINING PROGRAM

## 2023-01-31 PROCEDURE — 3080F DIAST BP >= 90 MM HG: CPT | Performed by: STUDENT IN AN ORGANIZED HEALTH CARE EDUCATION/TRAINING PROGRAM

## 2023-01-31 RX ORDER — FUROSEMIDE 40 MG/1
TABLET ORAL
Qty: 30 TABLET | Refills: 3 | Status: SHIPPED | OUTPATIENT
Start: 2023-01-31

## 2023-01-31 ASSESSMENT — PATIENT HEALTH QUESTIONNAIRE - PHQ9
SUM OF ALL RESPONSES TO PHQ QUESTIONS 1-9: 0
1. LITTLE INTEREST OR PLEASURE IN DOING THINGS: 0
SUM OF ALL RESPONSES TO PHQ QUESTIONS 1-9: 0
SUM OF ALL RESPONSES TO PHQ QUESTIONS 1-9: 0
SUM OF ALL RESPONSES TO PHQ9 QUESTIONS 1 & 2: 0
SUM OF ALL RESPONSES TO PHQ QUESTIONS 1-9: 0
2. FEELING DOWN, DEPRESSED OR HOPELESS: 0

## 2023-02-20 DIAGNOSIS — I50.20 HFREF (HEART FAILURE WITH REDUCED EJECTION FRACTION) (HCC): ICD-10-CM

## 2023-02-20 DIAGNOSIS — M54.41 CHRONIC BILATERAL LOW BACK PAIN WITH BILATERAL SCIATICA: ICD-10-CM

## 2023-02-20 DIAGNOSIS — I10 HYPERTENSION, UNSPECIFIED TYPE: ICD-10-CM

## 2023-02-20 DIAGNOSIS — I42.8 NONISCHEMIC CARDIOMYOPATHY (HCC): ICD-10-CM

## 2023-02-20 DIAGNOSIS — M54.42 CHRONIC BILATERAL LOW BACK PAIN WITH BILATERAL SCIATICA: ICD-10-CM

## 2023-02-20 DIAGNOSIS — G89.29 CHRONIC BILATERAL LOW BACK PAIN WITH BILATERAL SCIATICA: ICD-10-CM

## 2023-02-20 RX ORDER — OMEPRAZOLE 20 MG/1
CAPSULE, DELAYED RELEASE ORAL
Qty: 30 CAPSULE | Refills: 0 | Status: SHIPPED | OUTPATIENT
Start: 2023-02-20

## 2023-02-20 RX ORDER — CARVEDILOL 25 MG/1
TABLET ORAL
Qty: 60 TABLET | Refills: 0 | Status: SHIPPED | OUTPATIENT
Start: 2023-02-20

## 2023-02-20 RX ORDER — GABAPENTIN 100 MG/1
100 CAPSULE ORAL 3 TIMES DAILY
Qty: 90 CAPSULE | Refills: 0 | Status: SHIPPED | OUTPATIENT
Start: 2023-02-20 | End: 2023-03-22

## 2023-04-20 ENCOUNTER — APPOINTMENT (OUTPATIENT)
Dept: GENERAL RADIOLOGY | Age: 48
End: 2023-04-20
Payer: COMMERCIAL

## 2023-04-20 ENCOUNTER — HOSPITAL ENCOUNTER (EMERGENCY)
Age: 48
Discharge: HOME | End: 2023-04-20
Attending: EMERGENCY MEDICINE
Payer: COMMERCIAL

## 2023-04-20 VITALS
DIASTOLIC BLOOD PRESSURE: 107 MMHG | BODY MASS INDEX: 33.88 KG/M2 | SYSTOLIC BLOOD PRESSURE: 138 MMHG | HEART RATE: 69 BPM | WEIGHT: 264 LBS | TEMPERATURE: 97.7 F | OXYGEN SATURATION: 98 % | HEIGHT: 74 IN | RESPIRATION RATE: 20 BRPM

## 2023-04-20 DIAGNOSIS — I50.20 HFREF (HEART FAILURE WITH REDUCED EJECTION FRACTION) (HCC): ICD-10-CM

## 2023-04-20 DIAGNOSIS — R51.9 ACUTE NONINTRACTABLE HEADACHE, UNSPECIFIED HEADACHE TYPE: ICD-10-CM

## 2023-04-20 DIAGNOSIS — G89.29 CHRONIC BILATERAL LOW BACK PAIN WITH BILATERAL SCIATICA: ICD-10-CM

## 2023-04-20 DIAGNOSIS — M79.605 BILATERAL LEG PAIN: ICD-10-CM

## 2023-04-20 DIAGNOSIS — I50.9 ACUTE ON CHRONIC CONGESTIVE HEART FAILURE, UNSPECIFIED HEART FAILURE TYPE (HCC): ICD-10-CM

## 2023-04-20 DIAGNOSIS — Z91.148 NONCOMPLIANCE WITH MEDICATION REGIMEN: Primary | ICD-10-CM

## 2023-04-20 DIAGNOSIS — I10 HYPERTENSION, UNSPECIFIED TYPE: ICD-10-CM

## 2023-04-20 DIAGNOSIS — M79.604 BILATERAL LEG PAIN: ICD-10-CM

## 2023-04-20 DIAGNOSIS — E78.5 HYPERLIPIDEMIA, UNSPECIFIED HYPERLIPIDEMIA TYPE: ICD-10-CM

## 2023-04-20 DIAGNOSIS — M54.42 CHRONIC BILATERAL LOW BACK PAIN WITH BILATERAL SCIATICA: ICD-10-CM

## 2023-04-20 DIAGNOSIS — I42.8 NONISCHEMIC CARDIOMYOPATHY (HCC): ICD-10-CM

## 2023-04-20 DIAGNOSIS — M54.41 CHRONIC BILATERAL LOW BACK PAIN WITH BILATERAL SCIATICA: ICD-10-CM

## 2023-04-20 LAB
ALBUMIN SERPL-MCNC: 3.5 G/DL (ref 3.5–5.2)
ALP SERPL-CCNC: 64 U/L (ref 40–129)
ALT SERPL-CCNC: 35 U/L (ref 0–40)
ANION GAP SERPL CALCULATED.3IONS-SCNC: 8 MMOL/L (ref 7–16)
AST SERPL-CCNC: 30 U/L (ref 0–39)
B PARAP IS1001 DNA NPH QL NAA+NON-PROBE: NOT DETECTED
B PERT.PT PRMT NPH QL NAA+NON-PROBE: NOT DETECTED
BASOPHILS # BLD: 0.05 E9/L (ref 0–0.2)
BASOPHILS NFR BLD: 1 % (ref 0–2)
BILIRUB SERPL-MCNC: 0.3 MG/DL (ref 0–1.2)
BNP BLD-MCNC: 3279 PG/ML (ref 0–125)
BUN SERPL-MCNC: 18 MG/DL (ref 6–20)
C PNEUM DNA NPH QL NAA+NON-PROBE: NOT DETECTED
CALCIUM SERPL-MCNC: 8.1 MG/DL (ref 8.6–10.2)
CHLORIDE SERPL-SCNC: 112 MMOL/L (ref 98–107)
CO2 SERPL-SCNC: 24 MMOL/L (ref 22–29)
CREAT SERPL-MCNC: 1.7 MG/DL (ref 0.7–1.2)
EKG ATRIAL RATE: 65 BPM
EKG P AXIS: 63 DEGREES
EKG P-R INTERVAL: 174 MS
EKG Q-T INTERVAL: 446 MS
EKG QRS DURATION: 100 MS
EKG QTC CALCULATION (BAZETT): 463 MS
EKG R AXIS: -66 DEGREES
EKG T AXIS: 156 DEGREES
EKG VENTRICULAR RATE: 65 BPM
EOSINOPHIL # BLD: 0.11 E9/L (ref 0.05–0.5)
EOSINOPHIL NFR BLD: 2.3 % (ref 0–6)
ERYTHROCYTE [DISTWIDTH] IN BLOOD BY AUTOMATED COUNT: 14.6 FL (ref 11.5–15)
FLUAV RNA NPH QL NAA+NON-PROBE: NOT DETECTED
FLUBV RNA NPH QL NAA+NON-PROBE: NOT DETECTED
GLUCOSE SERPL-MCNC: 98 MG/DL (ref 74–99)
HADV DNA NPH QL NAA+NON-PROBE: NOT DETECTED
HCOV 229E RNA NPH QL NAA+NON-PROBE: NOT DETECTED
HCOV HKU1 RNA NPH QL NAA+NON-PROBE: NOT DETECTED
HCOV NL63 RNA NPH QL NAA+NON-PROBE: NOT DETECTED
HCOV OC43 RNA NPH QL NAA+NON-PROBE: NOT DETECTED
HCT VFR BLD AUTO: 42.7 % (ref 37–54)
HGB BLD-MCNC: 13.9 G/DL (ref 12.5–16.5)
HMPV RNA NPH QL NAA+NON-PROBE: NOT DETECTED
HPIV1 RNA NPH QL NAA+NON-PROBE: NOT DETECTED
HPIV2 RNA NPH QL NAA+NON-PROBE: NOT DETECTED
HPIV3 RNA NPH QL NAA+NON-PROBE: NOT DETECTED
HPIV4 RNA NPH QL NAA+NON-PROBE: NOT DETECTED
IMM GRANULOCYTES # BLD: 0.01 E9/L
IMM GRANULOCYTES NFR BLD: 0.2 % (ref 0–5)
LYMPHOCYTES # BLD: 1.55 E9/L (ref 1.5–4)
LYMPHOCYTES NFR BLD: 32 % (ref 20–42)
M PNEUMO DNA NPH QL NAA+NON-PROBE: NOT DETECTED
MAGNESIUM SERPL-MCNC: 2.1 MG/DL (ref 1.6–2.6)
MCH RBC QN AUTO: 29.6 PG (ref 26–35)
MCHC RBC AUTO-ENTMCNC: 32.6 % (ref 32–34.5)
MCV RBC AUTO: 90.9 FL (ref 80–99.9)
MONOCYTES # BLD: 0.57 E9/L (ref 0.1–0.95)
MONOCYTES NFR BLD: 11.8 % (ref 2–12)
NEUTROPHILS # BLD: 2.56 E9/L (ref 1.8–7.3)
NEUTS SEG NFR BLD: 52.7 % (ref 43–80)
PLATELET # BLD AUTO: 222 E9/L (ref 130–450)
PMV BLD AUTO: 11.9 FL (ref 7–12)
POTASSIUM SERPL-SCNC: 4.5 MMOL/L (ref 3.5–5)
PROT SERPL-MCNC: 5.4 G/DL (ref 6.4–8.3)
RBC # BLD AUTO: 4.7 E12/L (ref 3.8–5.8)
RSV RNA NPH QL NAA+NON-PROBE: NOT DETECTED
RV+EV RNA NPH QL NAA+NON-PROBE: NOT DETECTED
SARS-COV-2 RDRP RESP QL NAA+PROBE: NOT DETECTED
SARS-COV-2 RNA NPH QL NAA+NON-PROBE: NOT DETECTED
SODIUM SERPL-SCNC: 144 MMOL/L (ref 132–146)
TROPONIN, HIGH SENSITIVITY: 31 NG/L (ref 0–11)
WBC # BLD: 4.9 E9/L (ref 4.5–11.5)

## 2023-04-20 PROCEDURE — 93005 ELECTROCARDIOGRAM TRACING: CPT | Performed by: STUDENT IN AN ORGANIZED HEALTH CARE EDUCATION/TRAINING PROGRAM

## 2023-04-20 PROCEDURE — 71045 X-RAY EXAM CHEST 1 VIEW: CPT

## 2023-04-20 PROCEDURE — 84484 ASSAY OF TROPONIN QUANT: CPT

## 2023-04-20 PROCEDURE — 83880 ASSAY OF NATRIURETIC PEPTIDE: CPT

## 2023-04-20 PROCEDURE — 6360000002 HC RX W HCPCS: Performed by: STUDENT IN AN ORGANIZED HEALTH CARE EDUCATION/TRAINING PROGRAM

## 2023-04-20 PROCEDURE — 0202U NFCT DS 22 TRGT SARS-COV-2: CPT

## 2023-04-20 PROCEDURE — 96374 THER/PROPH/DIAG INJ IV PUSH: CPT

## 2023-04-20 PROCEDURE — 87635 SARS-COV-2 COVID-19 AMP PRB: CPT

## 2023-04-20 PROCEDURE — 93010 ELECTROCARDIOGRAM REPORT: CPT | Performed by: INTERNAL MEDICINE

## 2023-04-20 PROCEDURE — 99285 EMERGENCY DEPT VISIT HI MDM: CPT

## 2023-04-20 PROCEDURE — 96375 TX/PRO/DX INJ NEW DRUG ADDON: CPT

## 2023-04-20 PROCEDURE — 83735 ASSAY OF MAGNESIUM: CPT

## 2023-04-20 PROCEDURE — 80053 COMPREHEN METABOLIC PANEL: CPT

## 2023-04-20 PROCEDURE — 6370000000 HC RX 637 (ALT 250 FOR IP): Performed by: STUDENT IN AN ORGANIZED HEALTH CARE EDUCATION/TRAINING PROGRAM

## 2023-04-20 PROCEDURE — 85025 COMPLETE CBC W/AUTO DIFF WBC: CPT

## 2023-04-20 RX ORDER — CARVEDILOL 25 MG/1
25 TABLET ORAL ONCE
Status: COMPLETED | OUTPATIENT
Start: 2023-04-20 | End: 2023-04-20

## 2023-04-20 RX ORDER — DIPHENHYDRAMINE HYDROCHLORIDE 50 MG/ML
25 INJECTION INTRAMUSCULAR; INTRAVENOUS ONCE
Status: COMPLETED | OUTPATIENT
Start: 2023-04-20 | End: 2023-04-20

## 2023-04-20 RX ORDER — IBUPROFEN 600 MG/1
600 TABLET ORAL
Status: COMPLETED | OUTPATIENT
Start: 2023-04-20 | End: 2023-04-20

## 2023-04-20 RX ORDER — FUROSEMIDE 10 MG/ML
40 INJECTION INTRAMUSCULAR; INTRAVENOUS ONCE
Status: COMPLETED | OUTPATIENT
Start: 2023-04-20 | End: 2023-04-20

## 2023-04-20 RX ADMIN — DIPHENHYDRAMINE HYDROCHLORIDE 25 MG: 50 INJECTION, SOLUTION INTRAMUSCULAR; INTRAVENOUS at 13:26

## 2023-04-20 RX ADMIN — FUROSEMIDE 40 MG: 10 INJECTION, SOLUTION INTRAMUSCULAR; INTRAVENOUS at 11:45

## 2023-04-20 RX ADMIN — IBUPROFEN 600 MG: 600 TABLET, FILM COATED ORAL at 11:45

## 2023-04-20 RX ADMIN — CARVEDILOL 25 MG: 25 TABLET, FILM COATED ORAL at 13:50

## 2023-04-20 ASSESSMENT — PAIN DESCRIPTION - DESCRIPTORS: DESCRIPTORS: ACHING;DISCOMFORT

## 2023-04-20 ASSESSMENT — PAIN DESCRIPTION - PAIN TYPE: TYPE: ACUTE PAIN

## 2023-04-20 ASSESSMENT — PAIN DESCRIPTION - FREQUENCY: FREQUENCY: CONTINUOUS

## 2023-04-20 ASSESSMENT — PAIN - FUNCTIONAL ASSESSMENT
PAIN_FUNCTIONAL_ASSESSMENT: NONE - DENIES PAIN
PAIN_FUNCTIONAL_ASSESSMENT: 0-10

## 2023-04-20 ASSESSMENT — PAIN DESCRIPTION - LOCATION
LOCATION: HEAD

## 2023-04-20 ASSESSMENT — PAIN SCALES - GENERAL
PAINLEVEL_OUTOF10: 10
PAINLEVEL_OUTOF10: 10
PAINLEVEL_OUTOF10: 3

## 2023-04-20 NOTE — ED PROVIDER NOTES
[id number]  [phone]    Schedule an appointment as soon as possible for a visit   Hospital F/U      DISCHARGE MEDICATIONS:  New Prescriptions    No medications on file       DISCONTINUED MEDICATIONS:  Discontinued Medications    No medications on file              (Please note that portions of this note were completed with a voice recognition program.  Efforts were made to edit the dictations but occasionally words are mis-transcribed.)    Madeleine Kitchen DO (electronically signed)           Madeleine Kitchen DO  Resident  04/20/23 5176

## 2023-04-20 NOTE — TELEPHONE ENCOUNTER
----- Message from Hubert Tae sent at 4/20/2023  3:37 PM EDT -----  Subject: Refill Request    QUESTIONS  Name of Medication? carvedilol (COREG) 25 MG tablet  Patient-reported dosage and instructions? 1 - 25 mg tablet 2 times   How many days do you have left? 2  Preferred Pharmacy? [facility] #[id number]  Pharmacy phone number (if available)? [phone]  ---------------------------------------------------------------------------  --------------,  Name of Medication? cilostazol (PLETAL) 50 MG tablet  Patient-reported dosage and instructions? 1 - 50 mg tablet 2 times per day     How many days do you have left? 0  Preferred Pharmacy? [facility]/PHARM[facility]Y #[id number]  Pharmacy phone number (if available)? [phone]  ---------------------------------------------------------------------------  --------------,  Name of Medication? empagliflozin (JARDIANCE) 10 MG tablet  Patient-reported dosage and instructions? 1 - 10 mg tablet 1 time per day  How many days do you have left? 0  Preferred Pharmacy? [facility] #[id number]  Pharmacy phone number (if available)? [phone]  ---------------------------------------------------------------------------  --------------,  Name of Medication? furosemide (LASIX) 40 MG tablet  Patient-reported dosage and instructions? 1 - 40 mg tablet 1 time per day   How many days do you have left? 0  Preferred Pharmacy? [facility]/PH[facility]Y #[id number]  Pharmacy phone number (if available)? [phone]  ---------------------------------------------------------------------------  --------------,  Name of Medication? gabapentin (NEURONTIN) 100 MG capsule  Patient-reported dosage and instructions? 1 - 100 mg tablet 3 times per   day   How many days do you have left? 0  Preferred Pharmacy?  [facility]/PH[facility]ACY #[id number]  Pharmacy phone number (if available)? [phone]  ---------------------------------------------------------------------------  --------------,  Name of Medication? omeprazole (PRILOSEC) 20 MG delayed release

## 2023-04-21 RX ORDER — CARVEDILOL 25 MG/1
25 TABLET ORAL 2 TIMES DAILY WITH MEALS
Qty: 60 TABLET | Refills: 3 | Status: SHIPPED | OUTPATIENT
Start: 2023-04-21

## 2023-04-21 RX ORDER — SPIRONOLACTONE 50 MG/1
TABLET, FILM COATED ORAL
Qty: 30 TABLET | Refills: 3 | Status: SHIPPED | OUTPATIENT
Start: 2023-04-21

## 2023-04-21 RX ORDER — GABAPENTIN 100 MG/1
100 CAPSULE ORAL 3 TIMES DAILY
Qty: 90 CAPSULE | Refills: 3 | Status: SHIPPED | OUTPATIENT
Start: 2023-04-21 | End: 2023-05-21

## 2023-04-21 RX ORDER — CILOSTAZOL 50 MG/1
50 TABLET ORAL 2 TIMES DAILY
Qty: 60 TABLET | Refills: 3 | Status: SHIPPED | OUTPATIENT
Start: 2023-04-21

## 2023-04-21 RX ORDER — OMEPRAZOLE 20 MG/1
CAPSULE, DELAYED RELEASE ORAL
Qty: 30 CAPSULE | Refills: 3 | Status: SHIPPED | OUTPATIENT
Start: 2023-04-21

## 2023-04-21 RX ORDER — ROSUVASTATIN CALCIUM 20 MG/1
20 TABLET, COATED ORAL NIGHTLY
Qty: 30 TABLET | Refills: 3 | Status: SHIPPED | OUTPATIENT
Start: 2023-04-21

## 2023-04-21 RX ORDER — FUROSEMIDE 40 MG/1
TABLET ORAL
Qty: 30 TABLET | Refills: 3 | Status: SHIPPED | OUTPATIENT
Start: 2023-04-21

## 2023-07-10 NOTE — TELEPHONE ENCOUNTER
[facility] MA submitted surgery scheduling request in [email or web address] . MA Scheduled pt for Colonoscopy on 08/15/2022 at 7:30am. Pt needs to arrive at [address] [place] [address] at 6:30am. Patient confirmed date and time. Prior Authorization Form:      DEMOGRAPHICS:                     Patient Name:  Bronson Archer  Patient :  1975            Insurance:  Payor: [place] / Plan: [employer] DEPT OF JOB / Product Type: *No Product type* /   Insurance ID Number:    Payor/Plan Subscr  Sex Relation Sub. Ins. ID Effective Group Num   1.  [place] Emily Harding 1975 Male Self [id number]                                    [address]         DIAGNOSIS & PROCEDURE:                       Procedure/Operation: Screening Colonoscopy         CPT Code: 51842    Diagnosis:  Screening    ICD10 Code: Z12.11    Location:  [facility] [address] [place] [address]    Surgeon:  Anyi Alcala MD    [facility] INFORMATION:                          Date: 08/15/2022    Time: 7:30am              Anesthesia:  MAC/TIVA                                                       Status:  Outpatient          Electronically signed by Billy Lund MA on 2022 at 10:55 AM

## 2023-08-01 ENCOUNTER — HOSPITAL ENCOUNTER (INPATIENT)
Age: 48
LOS: 3 days | Discharge: HOME | End: 2023-08-04
Attending: STUDENT IN AN ORGANIZED HEALTH CARE EDUCATION/TRAINING PROGRAM | Admitting: FAMILY MEDICINE
Payer: COMMERCIAL

## 2023-08-01 ENCOUNTER — APPOINTMENT (OUTPATIENT)
Dept: GENERAL RADIOLOGY | Age: 48
End: 2023-08-01
Payer: COMMERCIAL

## 2023-08-01 DIAGNOSIS — I50.9 CONGESTIVE HEART FAILURE OF UNKNOWN ETIOLOGY (HCC): ICD-10-CM

## 2023-08-01 DIAGNOSIS — R06.02 SHORTNESS OF BREATH: Primary | ICD-10-CM

## 2023-08-01 LAB
ALBUMIN SERPL-MCNC: 3.3 G/DL (ref 3.5–5.2)
ALP SERPL-CCNC: 79 U/L (ref 40–129)
ALT SERPL-CCNC: 61 U/L (ref 0–40)
ANION GAP SERPL CALCULATED.3IONS-SCNC: 7 MMOL/L (ref 7–16)
AST SERPL-CCNC: 37 U/L (ref 0–39)
BASOPHILS # BLD: 0.06 K/UL (ref 0–0.2)
BASOPHILS NFR BLD: 1 % (ref 0–2)
BILIRUB SERPL-MCNC: 0.3 MG/DL (ref 0–1.2)
BNP SERPL-MCNC: 7986 PG/ML (ref 0–125)
BUN SERPL-MCNC: 26 MG/DL (ref 6–20)
CALCIUM SERPL-MCNC: 8.5 MG/DL (ref 8.6–10.2)
CHLORIDE SERPL-SCNC: 109 MMOL/L (ref 98–107)
CO2 SERPL-SCNC: 26 MMOL/L (ref 22–29)
CREAT SERPL-MCNC: 2.4 MG/DL (ref 0.7–1.2)
EOSINOPHIL # BLD: 0.21 K/UL (ref 0.05–0.5)
EOSINOPHILS RELATIVE PERCENT: 3 % (ref 0–6)
ERYTHROCYTE [DISTWIDTH] IN BLOOD BY AUTOMATED COUNT: 15.7 % (ref 11.5–15)
GFR SERPL CREATININE-BSD FRML MDRD: 33 ML/MIN/1.73M2
GLUCOSE SERPL-MCNC: 86 MG/DL (ref 74–99)
HCT VFR BLD AUTO: 44.2 % (ref 37–54)
HGB BLD-MCNC: 14.1 G/DL (ref 12.5–16.5)
IMM GRANULOCYTES # BLD AUTO: <0.03 K/UL (ref 0–0.58)
IMM GRANULOCYTES NFR BLD: 0 % (ref 0–5)
LYMPHOCYTES NFR BLD: 1.21 K/UL (ref 1.5–4)
LYMPHOCYTES RELATIVE PERCENT: 18 % (ref 20–42)
MCH RBC QN AUTO: 29.8 PG (ref 26–35)
MCHC RBC AUTO-ENTMCNC: 31.9 G/DL (ref 32–34.5)
MCV RBC AUTO: 93.4 FL (ref 80–99.9)
MONOCYTES NFR BLD: 0.62 K/UL (ref 0.1–0.95)
MONOCYTES NFR BLD: 9 % (ref 2–12)
NEUTROPHILS NFR BLD: 69 % (ref 43–80)
NEUTS SEG NFR BLD: 4.8 K/UL (ref 1.8–7.3)
PLATELET # BLD AUTO: 257 K/UL (ref 130–450)
PMV BLD AUTO: 11.2 FL (ref 7–12)
POTASSIUM SERPL-SCNC: 5 MMOL/L (ref 3.5–5)
PROT SERPL-MCNC: 5.7 G/DL (ref 6.4–8.3)
RBC # BLD AUTO: 4.73 M/UL (ref 3.8–5.8)
SODIUM SERPL-SCNC: 142 MMOL/L (ref 132–146)
TROPONIN I SERPL HS-MCNC: 48 NG/L (ref 0–11)
TROPONIN I SERPL HS-MCNC: 51 NG/L (ref 0–11)
WBC OTHER # BLD: 6.9 K/UL (ref 4.5–11.5)

## 2023-08-01 PROCEDURE — 93005 ELECTROCARDIOGRAM TRACING: CPT

## 2023-08-01 PROCEDURE — 84484 ASSAY OF TROPONIN QUANT: CPT

## 2023-08-01 PROCEDURE — 85025 COMPLETE CBC W/AUTO DIFF WBC: CPT

## 2023-08-01 PROCEDURE — 71046 X-RAY EXAM CHEST 2 VIEWS: CPT

## 2023-08-01 PROCEDURE — 96374 THER/PROPH/DIAG INJ IV PUSH: CPT

## 2023-08-01 PROCEDURE — 99285 EMERGENCY DEPT VISIT HI MDM: CPT

## 2023-08-01 PROCEDURE — 6360000002 HC RX W HCPCS

## 2023-08-01 PROCEDURE — 80053 COMPREHEN METABOLIC PANEL: CPT

## 2023-08-01 PROCEDURE — 83880 ASSAY OF NATRIURETIC PEPTIDE: CPT

## 2023-08-01 PROCEDURE — 2060000000 HC ICU INTERMEDIATE R&B

## 2023-08-01 RX ORDER — SODIUM CHLORIDE 0.9 % (FLUSH) 0.9 %
5-40 SYRINGE (ML) INJECTION EVERY 12 HOURS SCHEDULED
Status: DISCONTINUED | OUTPATIENT
Start: 2023-08-01 | End: 2023-08-04 | Stop reason: HOSPADM

## 2023-08-01 RX ORDER — ENOXAPARIN SODIUM 100 MG/ML
30 INJECTION SUBCUTANEOUS 2 TIMES DAILY
Status: DISCONTINUED | OUTPATIENT
Start: 2023-08-01 | End: 2023-08-04 | Stop reason: HOSPADM

## 2023-08-01 RX ORDER — CARVEDILOL 25 MG/1
25 TABLET ORAL 2 TIMES DAILY WITH MEALS
Status: DISCONTINUED | OUTPATIENT
Start: 2023-08-02 | End: 2023-08-04 | Stop reason: HOSPADM

## 2023-08-01 RX ORDER — DEXTROSE MONOHYDRATE 100 MG/ML
INJECTION, SOLUTION INTRAVENOUS CONTINUOUS PRN
Status: DISCONTINUED | OUTPATIENT
Start: 2023-08-01 | End: 2023-08-04 | Stop reason: HOSPADM

## 2023-08-01 RX ORDER — ONDANSETRON 2 MG/ML
4 INJECTION INTRAMUSCULAR; INTRAVENOUS EVERY 6 HOURS PRN
Status: DISCONTINUED | OUTPATIENT
Start: 2023-08-01 | End: 2023-08-04 | Stop reason: HOSPADM

## 2023-08-01 RX ORDER — FUROSEMIDE 10 MG/ML
40 INJECTION INTRAMUSCULAR; INTRAVENOUS ONCE
Status: COMPLETED | OUTPATIENT
Start: 2023-08-01 | End: 2023-08-01

## 2023-08-01 RX ORDER — SODIUM CHLORIDE 9 MG/ML
INJECTION, SOLUTION INTRAVENOUS PRN
Status: DISCONTINUED | OUTPATIENT
Start: 2023-08-01 | End: 2023-08-04 | Stop reason: HOSPADM

## 2023-08-01 RX ORDER — ACETAMINOPHEN 650 MG/1
650 SUPPOSITORY RECTAL EVERY 6 HOURS PRN
Status: DISCONTINUED | OUTPATIENT
Start: 2023-08-01 | End: 2023-08-04 | Stop reason: HOSPADM

## 2023-08-01 RX ORDER — POLYETHYLENE GLYCOL 3350 17 G/17G
17 POWDER, FOR SOLUTION ORAL DAILY PRN
Status: DISCONTINUED | OUTPATIENT
Start: 2023-08-01 | End: 2023-08-04 | Stop reason: HOSPADM

## 2023-08-01 RX ORDER — INSULIN LISPRO 100 [IU]/ML
0-4 INJECTION, SOLUTION INTRAVENOUS; SUBCUTANEOUS NIGHTLY
Status: DISCONTINUED | OUTPATIENT
Start: 2023-08-01 | End: 2023-08-02

## 2023-08-01 RX ORDER — ONDANSETRON 4 MG/1
4 TABLET, ORALLY DISINTEGRATING ORAL EVERY 8 HOURS PRN
Status: DISCONTINUED | OUTPATIENT
Start: 2023-08-01 | End: 2023-08-04 | Stop reason: HOSPADM

## 2023-08-01 RX ORDER — FUROSEMIDE 40 MG/1
40 TABLET ORAL DAILY
Status: DISCONTINUED | OUTPATIENT
Start: 2023-08-02 | End: 2023-08-02

## 2023-08-01 RX ORDER — ASPIRIN 81 MG/1
81 TABLET, CHEWABLE ORAL ONCE
Status: COMPLETED | OUTPATIENT
Start: 2023-08-01 | End: 2023-08-02

## 2023-08-01 RX ORDER — SPIRONOLACTONE 25 MG/1
50 TABLET ORAL DAILY
Status: DISCONTINUED | OUTPATIENT
Start: 2023-08-02 | End: 2023-08-04

## 2023-08-01 RX ORDER — SODIUM CHLORIDE 0.9 % (FLUSH) 0.9 %
5-40 SYRINGE (ML) INJECTION PRN
Status: DISCONTINUED | OUTPATIENT
Start: 2023-08-01 | End: 2023-08-04 | Stop reason: HOSPADM

## 2023-08-01 RX ORDER — INSULIN LISPRO 100 [IU]/ML
0-4 INJECTION, SOLUTION INTRAVENOUS; SUBCUTANEOUS
Status: DISCONTINUED | OUTPATIENT
Start: 2023-08-02 | End: 2023-08-02

## 2023-08-01 RX ORDER — NICOTINE 21 MG/24HR
1 PATCH, TRANSDERMAL 24 HOURS TRANSDERMAL DAILY
Status: DISCONTINUED | OUTPATIENT
Start: 2023-08-02 | End: 2023-08-04 | Stop reason: HOSPADM

## 2023-08-01 RX ORDER — ACETAMINOPHEN 325 MG/1
650 TABLET ORAL EVERY 6 HOURS PRN
Status: DISCONTINUED | OUTPATIENT
Start: 2023-08-01 | End: 2023-08-04 | Stop reason: HOSPADM

## 2023-08-01 RX ADMIN — FUROSEMIDE 40 MG: 10 INJECTION, SOLUTION INTRAMUSCULAR; INTRAVENOUS at 21:59

## 2023-08-01 ASSESSMENT — ENCOUNTER SYMPTOMS
ABDOMINAL PAIN: 0
SHORTNESS OF BREATH: 1
EYE REDNESS: 0
SORE THROAT: 0
DIARRHEA: 0
NAUSEA: 0
VOMITING: 0
RHINORRHEA: 0
COUGH: 0
CHEST TIGHTNESS: 1

## 2023-08-01 ASSESSMENT — PAIN - FUNCTIONAL ASSESSMENT: PAIN_FUNCTIONAL_ASSESSMENT: NONE - DENIES PAIN

## 2023-08-01 NOTE — ED TRIAGE NOTES
85% on room air at pivot. Placed on 2L nasal cannula and set in wheelchair.   Pt states he has a hx of chf

## 2023-08-01 NOTE — ED PROVIDER NOTES
[id number] [id number]        Pt Name: Jazzy Cherry  MRN: [id number]  [facility] [facility] 1975  Date of evaluation: 8/1/2023  Provider: Adam Lopez DO  PCP: Hanane Manrique DO  Note Started: 7:09 PM EDT 8/1/23    CHIEF COMPLAINT       Chief Complaint   Patient presents with    Shortness of Breath     X 1 wk / 85% RA at pivot     Leg Swelling     X 4 days, bilateral legs        HISTORY OF PRESENT ILLNESS: 1 or more Elements   History From: Patient    Limitations to history : None  Social Determinants : None    Jazzy Cherry is a 56 y.o. male who presents with SOB. Pt states it started about a week ago, but got worse today which prompted him to come into the ED. He has a hx of CHF and has been hospitalized 3 times in the past year for CHF exacerbations. Pt states his SOB is with exertion, not really at rest. SOB is worse with laying flat. He has been taking his CHF medications regularly. He also complains of leg swelling bilaterally. He noticed it for the past 4 days. He denies pain in the legs. No recent travel or surgeries no hx of DVT or PE      Denies any fever, chills, n/v, headache, dizziness, vision changes, neck tenderness or stiffness, weakness, cp, palpitations, cough, abd pain, dysuria, hematuria, diarrhea, constipation, bloody stools. Nursing Notes were all reviewed and agreed with or any disagreements were addressed in the HPI.    ROS:   Pertinent positives and negatives are stated within HPI, all other systems reviewed and are negative.      --------------------------------------------- PAST HISTORY ---------------------------------------------  Past Medical History:  has a past medical history of CAD (coronary artery disease), CHF (congestive heart failure) ([address]), GERD (gastroesophageal reflux disease), Hx of drug abuse ([address]), Hypertension, and NICM (nonischemic cardiomyopathy) ([address]).     Past Surgical History:  has a past surgical history that includes Skin graft agreeable and I answered any questions he had.  [AD]      ED Course User Index  [AD] Carlene Mcknight DO  [CB] Kris Meadows MD       Medications   furosemide (LASIX) tablet 40 mg (has no administration in time range)   spironolactone (ALDACTONE) tablet 50 mg (has no administration in time range)   carvedilol (COREG) tablet 25 mg (has no administration in time range)   sodium chloride flush 0.9 % injection 5-40 mL (10 mLs IntraVENous Given 8/2/23 0109)   sodium chloride flush 0.9 % injection 5-40 mL (has no administration in time range)   0.9 % sodium chloride infusion (has no administration in time range)   enoxaparin Sodium (LOVENOX) injection 30 mg (30 mg SubCUTAneous Given 8/2/23 0109)   ondansetron (ZOFRAN-ODT) disintegrating tablet 4 mg (has no administration in time range)     Or   ondansetron (ZOFRAN) injection 4 mg (has no administration in time range)   polyethylene glycol (GLYCOLAX) packet 17 g (has no administration in time range)   acetaminophen (TYLENOL) tablet 650 mg (has no administration in time range)     Or   acetaminophen (TYLENOL) suppository 650 mg (has no administration in time range)   nicotine (NICODERM CQ) 14 MG/24HR 1 patch (has no administration in time range)   perflutren lipid microspheres (DEFINITY) injection 1.5 mL (has no administration in time range)   insulin lispro (HUMALOG) injection vial 0-4 Units (has no administration in time range)   insulin lispro (HUMALOG) injection vial 0-4 Units (0 Units SubCUTAneous Not Given 8/2/23 0120)   dextrose bolus 10% 125 mL (has no administration in time range)     Or   dextrose bolus 10% 250 mL (has no administration in time range)   glucagon injection 1 mg (has no administration in time range)   dextrose 10 % infusion (has no administration in time range)   empagliflozin (JARDIANCE) tablet 10 mg (has no administration in time range)   Glucose (TRUEPLUS) oral gel 15 g (has no administration in time range)   melatonin tablet 3 mg (3 mg Oral Given

## 2023-08-02 PROBLEM — R06.02 SHORTNESS OF BREATH: Status: ACTIVE | Noted: 2023-08-02

## 2023-08-02 LAB
ALBUMIN SERPL-MCNC: 3 G/DL (ref 3.5–5.2)
ALP SERPL-CCNC: 74 U/L (ref 40–129)
ALT SERPL-CCNC: 51 U/L (ref 0–40)
AMPHET UR QL SCN: NEGATIVE
ANION GAP SERPL CALCULATED.3IONS-SCNC: 8 MMOL/L (ref 7–16)
AST SERPL-CCNC: 28 U/L (ref 0–39)
BARBITURATES UR QL SCN: NEGATIVE
BASOPHILS # BLD: 0.07 K/UL (ref 0–0.2)
BASOPHILS NFR BLD: 1 % (ref 0–2)
BENZODIAZ UR QL: NEGATIVE
BILIRUB SERPL-MCNC: 0.3 MG/DL (ref 0–1.2)
BUN SERPL-MCNC: 26 MG/DL (ref 6–20)
BUPRENORPHINE UR QL: NEGATIVE
CALCIUM SERPL-MCNC: 8.3 MG/DL (ref 8.6–10.2)
CANNABINOIDS UR QL SCN: NEGATIVE
CHLORIDE SERPL-SCNC: 109 MMOL/L (ref 98–107)
CHOLEST SERPL-MCNC: 172 MG/DL
CO2 SERPL-SCNC: 25 MMOL/L (ref 22–29)
COCAINE UR QL SCN: POSITIVE
CREAT SERPL-MCNC: 2.3 MG/DL (ref 0.7–1.2)
EKG ATRIAL RATE: 84 BPM
EKG ATRIAL RATE: 87 BPM
EKG P AXIS: 78 DEGREES
EKG P AXIS: 78 DEGREES
EKG P-R INTERVAL: 166 MS
EKG P-R INTERVAL: 176 MS
EKG Q-T INTERVAL: 390 MS
EKG Q-T INTERVAL: 408 MS
EKG QRS DURATION: 100 MS
EKG QRS DURATION: 98 MS
EKG QTC CALCULATION (BAZETT): 469 MS
EKG QTC CALCULATION (BAZETT): 482 MS
EKG R AXIS: -61 DEGREES
EKG R AXIS: -66 DEGREES
EKG T AXIS: 130 DEGREES
EKG T AXIS: 141 DEGREES
EKG VENTRICULAR RATE: 84 BPM
EKG VENTRICULAR RATE: 87 BPM
EOSINOPHIL # BLD: 0.29 K/UL (ref 0.05–0.5)
EOSINOPHILS RELATIVE PERCENT: 4 % (ref 0–6)
ERYTHROCYTE [DISTWIDTH] IN BLOOD BY AUTOMATED COUNT: 15.8 % (ref 11.5–15)
FENTANYL UR QL: NEGATIVE
GFR SERPL CREATININE-BSD FRML MDRD: 35 ML/MIN/1.73M2
GLUCOSE SERPL-MCNC: 113 MG/DL (ref 74–99)
HBA1C MFR BLD: 5.6 % (ref 4–5.6)
HCT VFR BLD AUTO: 40.2 % (ref 37–54)
HDLC SERPL-MCNC: 36 MG/DL
HGB BLD-MCNC: 13 G/DL (ref 12.5–16.5)
IMM GRANULOCYTES # BLD AUTO: <0.03 K/UL (ref 0–0.58)
IMM GRANULOCYTES NFR BLD: 0 % (ref 0–5)
LDLC SERPL CALC-MCNC: 109 MG/DL
LV EF: 18 %
LVEF MODALITY: NORMAL
LYMPHOCYTES NFR BLD: 1.66 K/UL (ref 1.5–4)
LYMPHOCYTES RELATIVE PERCENT: 25 % (ref 20–42)
MCH RBC QN AUTO: 29.5 PG (ref 26–35)
MCHC RBC AUTO-ENTMCNC: 32.3 G/DL (ref 32–34.5)
MCV RBC AUTO: 91.2 FL (ref 80–99.9)
METER GLUCOSE: 100 MG/DL (ref 74–99)
METER GLUCOSE: 113 MG/DL (ref 74–99)
METER GLUCOSE: 88 MG/DL (ref 74–99)
METER GLUCOSE: 94 MG/DL (ref 74–99)
METHADONE UR QL: NEGATIVE
MONOCYTES NFR BLD: 0.78 K/UL (ref 0.1–0.95)
MONOCYTES NFR BLD: 12 % (ref 2–12)
NEUTROPHILS NFR BLD: 58 % (ref 43–80)
NEUTS SEG NFR BLD: 3.88 K/UL (ref 1.8–7.3)
OPIATES UR QL SCN: NEGATIVE
OXYCODONE UR QL SCN: NEGATIVE
PCP UR QL SCN: NEGATIVE
PLATELET # BLD AUTO: 262 K/UL (ref 130–450)
PMV BLD AUTO: 12.1 FL (ref 7–12)
POTASSIUM SERPL-SCNC: 4 MMOL/L (ref 3.5–5)
PROT SERPL-MCNC: 5.2 G/DL (ref 6.4–8.3)
RBC # BLD AUTO: 4.41 M/UL (ref 3.8–5.8)
SODIUM SERPL-SCNC: 142 MMOL/L (ref 132–146)
TEST INFORMATION: ABNORMAL
TRIGL SERPL-MCNC: 133 MG/DL
TROPONIN I SERPL HS-MCNC: 49 NG/L (ref 0–11)
TROPONIN I SERPL HS-MCNC: 53 NG/L (ref 0–11)
VLDLC SERPL CALC-MCNC: 27 MG/DL
WBC OTHER # BLD: 6.7 K/UL (ref 4.5–11.5)

## 2023-08-02 PROCEDURE — 6360000002 HC RX W HCPCS

## 2023-08-02 PROCEDURE — 93306 TTE W/DOPPLER COMPLETE: CPT

## 2023-08-02 PROCEDURE — 83036 HEMOGLOBIN GLYCOSYLATED A1C: CPT

## 2023-08-02 PROCEDURE — 2060000000 HC ICU INTERMEDIATE R&B

## 2023-08-02 PROCEDURE — 80307 DRUG TEST PRSMV CHEM ANLYZR: CPT

## 2023-08-02 PROCEDURE — 94660 CPAP INITIATION&MGMT: CPT

## 2023-08-02 PROCEDURE — APPSS60 APP SPLIT SHARED TIME 46-60 MINUTES

## 2023-08-02 PROCEDURE — 85025 COMPLETE CBC W/AUTO DIFF WBC: CPT

## 2023-08-02 PROCEDURE — 84484 ASSAY OF TROPONIN QUANT: CPT

## 2023-08-02 PROCEDURE — 99222 1ST HOSP IP/OBS MODERATE 55: CPT | Performed by: FAMILY MEDICINE

## 2023-08-02 PROCEDURE — 80061 LIPID PANEL: CPT

## 2023-08-02 PROCEDURE — 2580000003 HC RX 258

## 2023-08-02 PROCEDURE — 80053 COMPREHEN METABOLIC PANEL: CPT

## 2023-08-02 PROCEDURE — 99222 1ST HOSP IP/OBS MODERATE 55: CPT | Performed by: INTERNAL MEDICINE

## 2023-08-02 PROCEDURE — 93005 ELECTROCARDIOGRAM TRACING: CPT

## 2023-08-02 PROCEDURE — 82947 ASSAY GLUCOSE BLOOD QUANT: CPT

## 2023-08-02 PROCEDURE — 2700000000 HC OXYGEN THERAPY PER DAY

## 2023-08-02 PROCEDURE — 6370000000 HC RX 637 (ALT 250 FOR IP)

## 2023-08-02 RX ORDER — ISOSORBIDE DINITRATE 10 MG/1
10 TABLET ORAL 3 TIMES DAILY
Status: DISCONTINUED | OUTPATIENT
Start: 2023-08-02 | End: 2023-08-03

## 2023-08-02 RX ORDER — FUROSEMIDE 10 MG/ML
40 INJECTION INTRAMUSCULAR; INTRAVENOUS DAILY
Status: DISCONTINUED | OUTPATIENT
Start: 2023-08-02 | End: 2023-08-04

## 2023-08-02 RX ORDER — HYDRALAZINE HYDROCHLORIDE 25 MG/1
25 TABLET, FILM COATED ORAL EVERY 8 HOURS SCHEDULED
Status: DISCONTINUED | OUTPATIENT
Start: 2023-08-02 | End: 2023-08-03

## 2023-08-02 RX ORDER — LANOLIN ALCOHOL/MO/W.PET/CERES
3 CREAM (GRAM) TOPICAL NIGHTLY PRN
Status: DISCONTINUED | OUTPATIENT
Start: 2023-08-02 | End: 2023-08-04 | Stop reason: HOSPADM

## 2023-08-02 RX ORDER — FUROSEMIDE 10 MG/ML
40 INJECTION INTRAMUSCULAR; INTRAVENOUS 2 TIMES DAILY
Status: DISCONTINUED | OUTPATIENT
Start: 2023-08-02 | End: 2023-08-02

## 2023-08-02 RX ORDER — ROSUVASTATIN CALCIUM 20 MG/1
20 TABLET, COATED ORAL NIGHTLY
Status: DISCONTINUED | OUTPATIENT
Start: 2023-08-02 | End: 2023-08-04 | Stop reason: HOSPADM

## 2023-08-02 RX ADMIN — SODIUM CHLORIDE, PRESERVATIVE FREE 10 ML: 5 INJECTION INTRAVENOUS at 21:46

## 2023-08-02 RX ADMIN — SODIUM CHLORIDE, PRESERVATIVE FREE 10 ML: 5 INJECTION INTRAVENOUS at 12:05

## 2023-08-02 RX ADMIN — ISOSORBIDE DINITRATE 10 MG: 10 TABLET ORAL at 14:00

## 2023-08-02 RX ADMIN — HYDRALAZINE HYDROCHLORIDE 25 MG: 25 TABLET, FILM COATED ORAL at 17:00

## 2023-08-02 RX ADMIN — ENOXAPARIN SODIUM 30 MG: 100 INJECTION SUBCUTANEOUS at 21:45

## 2023-08-02 RX ADMIN — ISOSORBIDE DINITRATE 10 MG: 10 TABLET ORAL at 12:16

## 2023-08-02 RX ADMIN — ASPIRIN 81 MG CHEWABLE TABLET 81 MG: 81 TABLET CHEWABLE at 01:09

## 2023-08-02 RX ADMIN — ROSUVASTATIN CALCIUM 20 MG: 20 TABLET, FILM COATED ORAL at 21:45

## 2023-08-02 RX ADMIN — SODIUM CHLORIDE, PRESERVATIVE FREE 10 ML: 5 INJECTION INTRAVENOUS at 01:09

## 2023-08-02 RX ADMIN — CARVEDILOL 25 MG: 25 TABLET, FILM COATED ORAL at 12:03

## 2023-08-02 RX ADMIN — ENOXAPARIN SODIUM 30 MG: 100 INJECTION SUBCUTANEOUS at 12:03

## 2023-08-02 RX ADMIN — FUROSEMIDE 40 MG: 10 INJECTION, SOLUTION INTRAMUSCULAR; INTRAVENOUS at 12:03

## 2023-08-02 RX ADMIN — EMPAGLIFLOZIN 10 MG: 10 TABLET, FILM COATED ORAL at 12:16

## 2023-08-02 RX ADMIN — ISOSORBIDE DINITRATE 10 MG: 10 TABLET ORAL at 21:45

## 2023-08-02 RX ADMIN — HYDRALAZINE HYDROCHLORIDE 25 MG: 25 TABLET, FILM COATED ORAL at 21:45

## 2023-08-02 RX ADMIN — CARVEDILOL 25 MG: 25 TABLET, FILM COATED ORAL at 19:04

## 2023-08-02 RX ADMIN — Medication 3 MG: at 01:09

## 2023-08-02 RX ADMIN — Medication 3 MG: at 21:45

## 2023-08-02 RX ADMIN — ENOXAPARIN SODIUM 30 MG: 100 INJECTION SUBCUTANEOUS at 01:09

## 2023-08-02 ASSESSMENT — LIFESTYLE VARIABLES
HOW MANY STANDARD DRINKS CONTAINING ALCOHOL DO YOU HAVE ON A TYPICAL DAY: PATIENT DOES NOT DRINK
HOW OFTEN DO YOU HAVE A DRINK CONTAINING ALCOHOL: NEVER

## 2023-08-02 NOTE — CARE COORDINATION
Transition of Care-Met with patient bedside, introduced myself and Cm role in discharge planning. Patient lives with a roommate in a second story home, bed/bath on second story,. Patient is independent from home, no DME, no history of home health care of SNF. Referral made to Peer Recovery-Birgit saw and provided information and local resource list. Patient is on 2L of oxygen-THIS IS NEW, he does not wear oxygen at baseline. Dr. Nabila Gomez recommended CPAP for home, will need DME order, made a tentative referral to [facility]. PCP is Dr. Octavio Marcus, preferred  pharmacy is [facility] in [place].     Nancy Pastor BSN, RN  [facility]  DISCHARGE

## 2023-08-02 NOTE — H&P
[address]  History and Physical      CHIEF COMPLAINT:    Chief Complaint   Patient presents with    Shortness of Breath     X 1 wk / 85% RA at pivot     Leg Swelling     X 4 days, bilateral legs         History of Present Illness: Sandhya Love  is a 56 y.o. male patient of Isaac Donovan DO  with a pertinent PMHx of CHF, HFrEF, hypertension, DERRICK, CAD who presented to the ER from home c/o shortness of breath for 1 week. Patient stated that he has history of CHF and has multiple hospitalization for same complaint in the past but for last week he is having worsening shortness of breath although he is taking his Lasix 40 mg daily. He mentioned that he also noticed some leg swelling as well. Shortness of breath was getting worse with passage of time and today morning he was unable to talk in full sentence secondary to breathlessness so came to ER for further evaluation. Patient has a history of sleep apnea but is not using CPAP nowadays at home and is having difficulty in sleeping as well. Patient denies any chest pain, palpitations, sweating, loss of consciousness. He is smoking half a pack of cigarette daily, denies any alcohol use and is using cocaine occasionally. In ER: Blood pressure was 153/116, pulse 89, respiration rate 38, saturating 96% on 2 L nasal cannula oxygen. EKG was significant for left axis deviation and T wave abnormality. No ST changes appreciated. Labs were significant for proBNP 7986, troponin 40 8 repeat 51, BUN 26, creatinine 2.4. Chest x-ray shows cardiomegaly with pulmonary edema suggestive of congestive heart failure. In ER he received 1 dose of Lasix 40 mg IV. Family medicine were consulted for exacerbation of CHF. CODE STATUS were discussed. Patient wants to be full code. ROS:   Review of Systems   Constitutional:  Positive for activity change. Negative for chills and fever.    HENT:  Negative for rhinorrhea, sneezing and 26 22 - 29 mmol/L    Anion Gap 7 7 - 16 mmol/L    Alkaline Phosphatase 79 40 - 129 U/L    ALT 61 (H) 0 - 40 U/L    AST 37 0 - 39 U/L    Total Bilirubin 0.3 0.0 - 1.2 mg/dL    Total Protein 5.7 (L) 6.4 - 8.3 g/dL    Albumin 3.3 (L) 3.5 - 5.2 g/dL   CBC with Auto Differential    Collection Time: 08/01/23  7:13 PM   Result Value Ref Range    WBC 6.9 4.5 - 11.5 k/uL    RBC 4.73 3.80 - 5.80 m/uL    Hemoglobin 14.1 12.5 - 16.5 g/dL    Hematocrit 44.2 37.0 - 54.0 %    MCV 93.4 80.0 - 99.9 fL    MCH 29.8 26.0 - 35.0 pg    MCHC 31.9 (L) 32.0 - 34.5 g/dL    RDW 15.7 (H) 11.5 - 15.0 %    Platelets 270 381 - 005 k/uL    MPV 11.2 7.0 - 12.0 fL    Neutrophils % 69 43.0 - 80.0 %    Lymphocytes % 18 (L) 20.0 - 42.0 %    Monocytes % 9 2.0 - 12.0 %    Eosinophils % 3 0 - 6 %    Basophils % 1 0.0 - 2.0 %    Immature Granulocytes 0 0.0 - 5.0 %    Neutrophils Absolute 4.80 1.80 - 7.30 k/uL    Lymphocytes Absolute 1.21 (L) 1.50 - 4.00 k/uL    Monocytes Absolute 0.62 0.10 - 0.95 k/uL    Eosinophils Absolute 0.21 0.05 - 0.50 k/uL    Basophils Absolute 0.06 0.00 - 0.20 k/uL    Absolute Immature Granulocyte <0.03 0.00 - 0.58 k/uL   Troponin    Collection Time: 08/01/23  7:13 PM   Result Value Ref Range    Troponin, High Sensitivity 48 (H) 0 - 11 ng/L   Brain Natriuretic Peptide    Collection Time: 08/01/23  7:13 PM   Result Value Ref Range    Pro-BNP 7,986 (H) 0 - 125 pg/mL   EKG 12 Lead    Collection Time: 08/01/23  7:37 PM   Result Value Ref Range    Ventricular Rate 87 BPM    Atrial Rate 87 BPM    P-R Interval 166 ms    QRS Duration 98 ms    Q-T Interval 390 ms    QTc Calculation (Bazett) 469 ms    P Axis 78 degrees    R Axis -61 degrees    T Axis 141 degrees       XR CHEST (2 VW)   Final Result   Cardiomegaly with probable pulmonary edema suggestive of congestive heart   failure.              ASSESSMENT/PLAN:      Active Hospital Problems    Diagnosis Date Noted    Primary hypertension [I10] 11/16/2022     Priority: Medium    DERRICK

## 2023-08-02 NOTE — PLAN OF CARE
Problem: Discharge Planning  Goal: Discharge to home or other facility with appropriate resources  Outcome: Progressing  Flowsheets  Taken 8/2/2023 0019  Discharge to home or other facility with appropriate resources:   Identify barriers to discharge with patient and caregiver   Identify discharge learning needs (meds, wound care, etc)   Arrange for needed discharge resources and transportation as appropriate  Taken 8/2/2023 0011  Discharge to home or other facility with appropriate resources:   Identify barriers to discharge with patient and caregiver   Arrange for needed discharge resources and transportation as appropriate   Identify discharge learning needs (meds, wound care, etc)

## 2023-08-02 NOTE — PROGRESS NOTES
4 Eyes Skin Assessment     NAME:  Gely Eagle OF BIRTH:  1975  MEDICAL RECORD NUMBER:  [id number]    The patient is being assessed for  Admission    I agree that at least one RN has performed a thorough Head to Toe Skin Assessment on the patient. ALL assessment sites listed below have been assessed. Areas assessed by both nurses:    Head, Face, Ears, Shoulders, Back, Chest, Arms, Elbows, Hands, Sacrum. Buttock, Coccyx, Ischium, Legs. Feet and Heels, and Under Medical Devices         Does the Patient have a Wound?  No noted wound(s)       Jacky Prevention initiated by RN: No  Wound Care Orders initiated by RN: No    Pressure Injury (Stage 3,4, Unstageable, DTI, NWPT, and Complex wounds) if present, place Wound referral order by RN under : No    New Ostomies, if present place, Ostomy referral order under : No     Nurse 1 eSignature: Electronically signed by Bill Gage RN on 8/2/23 at 4:20 AM EDT    **SHARE this note so that the co-signing nurse can place an eSignature**    Nurse 2 eSignature: Electronically signed by Kenyatta Contreras RN on 8/2/23 at 4:22 AM EDT

## 2023-08-02 NOTE — CARE COORDINATION
Peer Recovery Support Note    Name: Mackenzie Evans  Date: 8/2/2023    Chief Complaint   Patient presents with    Shortness of Breath     X 1 wk / 85% RA at pivot     Leg Swelling     X 4 days, bilateral legs        Peer Support met with patient. [x] Support and education provided  [x] Resources provided   [] Treatment referral:   [] Other:   [] Patient declined peer recovery services     Referred By: Ny Ugarte. Notes: Patient was honest about his substance abuse. He recently moved back here from [facility].. We discussed how things have changed throughout the years with the drugs in this area being laced with fentanyl. Patient stated that it was a thought that maybe it could have other substances in the cocaine and that didn't stop him from doing it. Patient has gone to [address] [place] [address] meeting in the past. Patient willing to start back up with the meetings again and he will re-introduce himself into the rooms of Alcoholics Anonymous. Patient prefers AA over NA and has peer info if he needs and other resources.      Myrna Duong, 8/2/2023

## 2023-08-03 LAB
ALBUMIN SERPL-MCNC: 2.8 G/DL (ref 3.5–5.2)
ALP SERPL-CCNC: 67 U/L (ref 40–129)
ALT SERPL-CCNC: 41 U/L (ref 0–40)
ANION GAP SERPL CALCULATED.3IONS-SCNC: 9 MMOL/L (ref 7–16)
AST SERPL-CCNC: 22 U/L (ref 0–39)
BASOPHILS # BLD: 0.08 K/UL (ref 0–0.2)
BASOPHILS NFR BLD: 2 % (ref 0–2)
BILIRUB SERPL-MCNC: 0.3 MG/DL (ref 0–1.2)
BUN SERPL-MCNC: 23 MG/DL (ref 6–20)
CALCIUM SERPL-MCNC: 8.1 MG/DL (ref 8.6–10.2)
CHLORIDE SERPL-SCNC: 106 MMOL/L (ref 98–107)
CO2 SERPL-SCNC: 24 MMOL/L (ref 22–29)
CREAT SERPL-MCNC: 2 MG/DL (ref 0.7–1.2)
EOSINOPHIL # BLD: 0.34 K/UL (ref 0.05–0.5)
EOSINOPHILS RELATIVE PERCENT: 7 % (ref 0–6)
ERYTHROCYTE [DISTWIDTH] IN BLOOD BY AUTOMATED COUNT: 15.3 % (ref 11.5–15)
GFR SERPL CREATININE-BSD FRML MDRD: 42 ML/MIN/1.73M2
GLUCOSE SERPL-MCNC: 103 MG/DL (ref 74–99)
HCT VFR BLD AUTO: 38.9 % (ref 37–54)
HGB BLD-MCNC: 12.5 G/DL (ref 12.5–16.5)
IMM GRANULOCYTES # BLD AUTO: <0.03 K/UL (ref 0–0.58)
IMM GRANULOCYTES NFR BLD: 0 % (ref 0–5)
LYMPHOCYTES NFR BLD: 1.74 K/UL (ref 1.5–4)
LYMPHOCYTES RELATIVE PERCENT: 35 % (ref 20–42)
MCH RBC QN AUTO: 29.1 PG (ref 26–35)
MCHC RBC AUTO-ENTMCNC: 32.1 G/DL (ref 32–34.5)
MCV RBC AUTO: 90.5 FL (ref 80–99.9)
MONOCYTES NFR BLD: 0.49 K/UL (ref 0.1–0.95)
MONOCYTES NFR BLD: 10 % (ref 2–12)
NEUTROPHILS NFR BLD: 47 % (ref 43–80)
NEUTS SEG NFR BLD: 2.32 K/UL (ref 1.8–7.3)
PLATELET # BLD AUTO: 273 K/UL (ref 130–450)
PMV BLD AUTO: 11.1 FL (ref 7–12)
POTASSIUM SERPL-SCNC: 3.6 MMOL/L (ref 3.5–5)
PROT SERPL-MCNC: 5 G/DL (ref 6.4–8.3)
RBC # BLD AUTO: 4.3 M/UL (ref 3.8–5.8)
SODIUM SERPL-SCNC: 139 MMOL/L (ref 132–146)
WBC OTHER # BLD: 5 K/UL (ref 4.5–11.5)

## 2023-08-03 PROCEDURE — 99233 SBSQ HOSP IP/OBS HIGH 50: CPT | Performed by: INTERNAL MEDICINE

## 2023-08-03 PROCEDURE — 6360000002 HC RX W HCPCS

## 2023-08-03 PROCEDURE — 80053 COMPREHEN METABOLIC PANEL: CPT

## 2023-08-03 PROCEDURE — 6370000000 HC RX 637 (ALT 250 FOR IP)

## 2023-08-03 PROCEDURE — 2700000000 HC OXYGEN THERAPY PER DAY

## 2023-08-03 PROCEDURE — 99232 SBSQ HOSP IP/OBS MODERATE 35: CPT | Performed by: FAMILY MEDICINE

## 2023-08-03 PROCEDURE — 2060000000 HC ICU INTERMEDIATE R&B

## 2023-08-03 PROCEDURE — 85025 COMPLETE CBC W/AUTO DIFF WBC: CPT

## 2023-08-03 PROCEDURE — 2580000003 HC RX 258

## 2023-08-03 PROCEDURE — 94660 CPAP INITIATION&MGMT: CPT

## 2023-08-03 PROCEDURE — 6370000000 HC RX 637 (ALT 250 FOR IP): Performed by: INTERNAL MEDICINE

## 2023-08-03 RX ORDER — ISOSORBIDE DINITRATE 10 MG/1
20 TABLET ORAL 3 TIMES DAILY
Status: DISCONTINUED | OUTPATIENT
Start: 2023-08-03 | End: 2023-08-04 | Stop reason: HOSPADM

## 2023-08-03 RX ORDER — CALCIUM CARBONATE 500 MG/1
500 TABLET, CHEWABLE ORAL 3 TIMES DAILY PRN
Status: DISCONTINUED | OUTPATIENT
Start: 2023-08-03 | End: 2023-08-04 | Stop reason: HOSPADM

## 2023-08-03 RX ORDER — HYDRALAZINE HYDROCHLORIDE 50 MG/1
50 TABLET, FILM COATED ORAL EVERY 8 HOURS SCHEDULED
Status: DISCONTINUED | OUTPATIENT
Start: 2023-08-03 | End: 2023-08-04 | Stop reason: HOSPADM

## 2023-08-03 RX ORDER — ISOSORBIDE DINITRATE 10 MG/1
20 TABLET ORAL 3 TIMES DAILY
Status: DISCONTINUED | OUTPATIENT
Start: 2023-08-03 | End: 2023-08-03

## 2023-08-03 RX ADMIN — ENOXAPARIN SODIUM 30 MG: 100 INJECTION SUBCUTANEOUS at 10:15

## 2023-08-03 RX ADMIN — SODIUM CHLORIDE, PRESERVATIVE FREE 10 ML: 5 INJECTION INTRAVENOUS at 10:16

## 2023-08-03 RX ADMIN — ENOXAPARIN SODIUM 30 MG: 100 INJECTION SUBCUTANEOUS at 19:47

## 2023-08-03 RX ADMIN — ACETAMINOPHEN 650 MG: 325 TABLET ORAL at 23:13

## 2023-08-03 RX ADMIN — SODIUM CHLORIDE, PRESERVATIVE FREE 10 ML: 5 INJECTION INTRAVENOUS at 23:27

## 2023-08-03 RX ADMIN — HYDRALAZINE HYDROCHLORIDE 25 MG: 25 TABLET, FILM COATED ORAL at 05:24

## 2023-08-03 RX ADMIN — CARVEDILOL 25 MG: 25 TABLET, FILM COATED ORAL at 17:24

## 2023-08-03 RX ADMIN — ROSUVASTATIN CALCIUM 20 MG: 20 TABLET, FILM COATED ORAL at 19:46

## 2023-08-03 RX ADMIN — HYDRALAZINE HYDROCHLORIDE 50 MG: 50 TABLET, FILM COATED ORAL at 23:13

## 2023-08-03 RX ADMIN — FUROSEMIDE 40 MG: 10 INJECTION, SOLUTION INTRAMUSCULAR; INTRAVENOUS at 10:16

## 2023-08-03 RX ADMIN — ISOSORBIDE DINITRATE 20 MG: 10 TABLET ORAL at 11:28

## 2023-08-03 RX ADMIN — ISOSORBIDE DINITRATE 20 MG: 10 TABLET ORAL at 19:46

## 2023-08-03 RX ADMIN — ISOSORBIDE DINITRATE 20 MG: 10 TABLET ORAL at 15:55

## 2023-08-03 RX ADMIN — CARVEDILOL 25 MG: 25 TABLET, FILM COATED ORAL at 10:15

## 2023-08-03 RX ADMIN — HYDRALAZINE HYDROCHLORIDE 50 MG: 50 TABLET, FILM COATED ORAL at 14:51

## 2023-08-03 RX ADMIN — ANTACID TABLETS 500 MG: 500 TABLET, CHEWABLE ORAL at 23:27

## 2023-08-03 ASSESSMENT — ENCOUNTER SYMPTOMS
ABDOMINAL PAIN: 0
VOMITING: 0
COUGH: 0
CONSTIPATION: 0
BLOOD IN STOOL: 0
DIARRHEA: 0
SORE THROAT: 0
SHORTNESS OF BREATH: 1
NAUSEA: 0

## 2023-08-03 ASSESSMENT — PAIN SCALES - GENERAL: PAINLEVEL_OUTOF10: 3

## 2023-08-03 ASSESSMENT — PAIN DESCRIPTION - LOCATION: LOCATION: NECK

## 2023-08-03 ASSESSMENT — PAIN DESCRIPTION - DESCRIPTORS: DESCRIPTORS: ACHING

## 2023-08-03 NOTE — PLAN OF CARE
Problem: Discharge Planning  Goal: Discharge to home or other facility with appropriate resources  Outcome: Progressing  Flowsheets (Taken 8/2/2023 2140 by Debbie Nuñez RN)  Discharge to home or other facility with appropriate resources: Identify barriers to discharge with patient and caregiver     Problem: Chronic Conditions and Co-morbidities  Goal: Patient's chronic conditions and co-morbidity symptoms are monitored and maintained or improved  Outcome: Progressing  Flowsheets (Taken 8/2/2023 2140 by Debbie Nuñez RN)  Care Plan - Patient's Chronic Conditions and Co-Morbidity Symptoms are Monitored and Maintained or Improved:   Monitor and assess patient's chronic conditions and comorbid symptoms for stability, deterioration, or improvement   Collaborate with multidisciplinary team to address chronic and comorbid conditions and prevent exacerbation or deterioration

## 2023-08-03 NOTE — PROGRESS NOTES
Ear: External ear normal.      Left Ear: External ear normal.      Nose: Nose normal.      Mouth/Throat:      Mouth: Mucous membranes are moist.      Pharynx: Oropharynx is clear. Eyes:      Conjunctiva/sclera: Conjunctivae normal.   Cardiovascular:      Rate and Rhythm: Normal rate and regular rhythm. Pulses: Normal pulses. Heart sounds: Normal heart sounds. Pulmonary:      Effort: Pulmonary effort is normal.      Breath sounds: Rales (Mild at bilateral bases,improved from yesterday) present. Abdominal:      General: Abdomen is flat. Palpations: Abdomen is soft. Skin:     General: Skin is warm and dry. Neurological:      General: No focal deficit present. Mental Status: He is alert and oriented to person, place, and time.        Labs:  Recent Results (from the past 24 hour(s))   CBC with Auto Differential    Collection Time: 08/04/23  3:52 AM   Result Value Ref Range    WBC 5.1 4.5 - 11.5 k/uL    RBC 4.46 3.80 - 5.80 m/uL    Hemoglobin 13.1 12.5 - 16.5 g/dL    Hematocrit 40.4 37.0 - 54.0 %    MCV 90.6 80.0 - 99.9 fL    MCH 29.4 26.0 - 35.0 pg    MCHC 32.4 32.0 - 34.5 g/dL    RDW 14.9 11.5 - 15.0 %    Platelets 807 778 - 267 k/uL    MPV 11.5 7.0 - 12.0 fL    Neutrophils % 42 (L) 43.0 - 80.0 %    Lymphocytes % 35 20.0 - 42.0 %    Monocytes % 13 (H) 2.0 - 12.0 %    Eosinophils % 8 (H) 0 - 6 %    Basophils % 2 0.0 - 2.0 %    Immature Granulocytes 0 0.0 - 5.0 %    Neutrophils Absolute 2.14 1.80 - 7.30 k/uL    Lymphocytes Absolute 1.81 1.50 - 4.00 k/uL    Monocytes Absolute 0.64 0.10 - 0.95 k/uL    Eosinophils Absolute 0.43 0.05 - 0.50 k/uL    Basophils Absolute 0.08 0.00 - 0.20 k/uL    Absolute Immature Granulocyte <0.03 0.00 - 0.58 k/uL   Comprehensive Metabolic Panel w/ Reflex to MG    Collection Time: 08/04/23  3:52 AM   Result Value Ref Range    Glucose 89 74 - 99 mg/dL    BUN 25 (H) 6 - 20 mg/dL    Creatinine 1.8 (H) 0.70 - 1.20 mg/dL    Est, Glom Filt Rate 45 (L) >60 mL/min/1.73m2 daily      Sleep apnea:  Patient has history of sleep apnea and is having difficulty sleeping secondary to choking sensation while in sleep. - Continue CPAP during sleep.  - DME for home CPAP placed     Polysubstance Use  - Nicotine 14 mg patch q24hrs  - UDS Positive for cocaine, encourage cessation in the setting of progressing heart failure     EKG abnormalities: In ER EKG shows T wave inversions and lateral wall ischemia. Troponin 48 , repeat 51 in ED.  - Status post aspirin 81 mg once.   - Repeat troponin 8/2 was 53->49  - Cardiology following, appreciate recs      Pain Control:  Tylenol 650 mg Q6HR PRN for mild pain  DVT ppx:  Lovenox 30 mg BID  Code Status: Full  Diet: General diet with salt restriction         Disposition: Discharge to pending clinical course    Archie Caldera MD   Family Medicine Resident PGY-1  08/04/23   7:42 AM

## 2023-08-03 NOTE — CARE COORDINATION
Transition of Care-Per Recovery met with patient yesterday-provided resources. Cardiology following closely-plan is continue IV lasix x 24 hrs, then transition to PO in am . Discussion of possible need for LIfe Vest if patient is agreeable-there is no DME order as of time of this documentation. SW will need to follow up tomorrow if Life Vest is ordered and make a referral. Attending recommended a CPAP for home-however there is no documentation if recent sleep study was done, Pulmonary is not consulted, nor is there a DME order, therefore patient will need to follow up outpatient for CPAP. Patient verbalized no further needs.      Nupur Keith BSN, RN  [facility]

## 2023-08-03 NOTE — PROGRESS NOTES
INPATIENT CARDIOLOGY FOLLOW-UP    Name: Martha Mcmanus    Age: 56 y.o. Date of Admission: 8/1/2023  6:35 PM    Date of Service: 8/3/2023    Chief Complaint: Follow-up for CHF    Interim History:  No new overnight cardiac complaints and except for dyspnea with exertion. Resting comfortably and laying down flat without any dyspnea. Currently with no complaints of CP, palpitations, dizziness, or lightheadedness. SR and 2 episodes of nonsustained VT on telemetry.     Review of Systems:   Cardiac: As per HPI  General: No fever, chills  Pulmonary: As per HPI  HEENT: No visual disturbances, difficult swallowing  GI: No nausea, vomiting  Endocrine: No thyroid disease or DM  Musculoskeletal: GIBSON x 4, no focal motor deficits  Skin: Intact, no rashes  Neuro/Psych: No headache or seizures    Problem List:  Patient Active Problem List   Diagnosis    Acute coronary syndrome (HCC)    Chronic HFrEF (heart failure with reduced ejection fraction) ([facility])    Chronic bilateral low back pain with bilateral sciatica    Elevated serum creatinine    Sleep disorder breathing    Bilateral recurrent inguinal hernia without obstruction or gangrene    HFrEF (heart failure with reduced ejection fraction) ([facility])    Nonischemic cardiomyopathy ([address])    Strangulated inguinal hernia    Bilateral leg pain    Chest pain    Primary hypertension    DERRICK (obstructive sleep apnea)    Polysubstance abuse ([facility])    Congestive heart failure of unknown etiology ([address])    Shortness of breath       Allergies:  No Known Allergies    Current Medications:  Current Facility-Administered Medications   Medication Dose Route Frequency Provider Last Rate Last Admin    melatonin tablet 3 mg  3 mg Oral Nightly PRN Marcie Gimenez MD   3 mg at 08/02/23 2145    furosemide (LASIX) injection 40 mg  40 mg IntraVENous Daily JONATHAN Smith CNP   40 mg at 08/02/23 1203    hydrALAZINE (APRESOLINE) tablet 25 mg  25 mg Oral 3 times per day JONATHAN Smith CNP   25 mg at 08/03/23 0524    isosorbide dinitrate (ISORDIL) tablet 10 mg  10 mg Oral TID Ella Lunch, APRN - CNP   10 mg at 08/02/23 2145    rosuvastatin (CRESTOR) tablet 20 mg  20 mg Oral Nightly Ella Lunch, APRN - CNP   20 mg at 08/02/23 2145    [Held by provider] spironolactone (ALDACTONE) tablet 50 mg  50 mg Oral Daily Elida Olsen MD        carvedilol (COREG) tablet 25 mg  25 mg Oral BID WC Mary Martínez MD   25 mg at 08/02/23 1904    sodium chloride flush 0.9 % injection 5-40 mL  5-40 mL IntraVENous 2 times per day Mary Martínez MD   10 mL at 08/02/23 2146    sodium chloride flush 0.9 % injection 5-40 mL  5-40 mL IntraVENous PRN Mary Martínez MD        0.9 % sodium chloride infusion   IntraVENous PRN Mary Martínez MD        enoxaparin Sodium (LOVENOX) injection 30 mg  30 mg SubCUTAneous BID Mary Martínez MD   30 mg at 08/02/23 2145    ondansetron (ZOFRAN-ODT) disintegrating tablet 4 mg  4 mg Oral Q8H PRN Mary Martínez MD        Or    ondansetron (ZOFRAN) injection 4 mg  4 mg IntraVENous Q6H PRN Mary Martínez MD        polyethylene glycol (GLYCOLAX) packet 17 g  17 g Oral Daily PRN Mary Martínez MD        acetaminophen (TYLENOL) tablet 650 mg  650 mg Oral Q6H PRN Mary Martínez MD        Or    acetaminophen (TYLENOL) suppository 650 mg  650 mg Rectal Q6H PRN Elida Olsen MD        nicotine (NICODERM CQ) 14 MG/24HR 1 patch  1 patch TransDERmal Daily Elida Olsen MD        perflutren lipid microspheres (DEFINITY) injection 1.5 mL  1.5 mL IntraVENous ONCE PRN Elida Olsen MD        dextrose bolus 10% 125 mL  125 mL IntraVENous PRN Mary Martínez MD        Or    dextrose bolus 10% 250 mL  250 mL IntraVENous PRN Mary Martínez MD        glucagon injection 1 mg  1 mg SubCUTAneous PRN Mary Martínez MD        dextrose 10 % infusion   IntraVENous Continuous PRN Mary Martínez MD        [Held by provider] empagliflozin (JARDIANCE) tablet 10 mg  10 mg Oral Daily Elida Olsen MD   10 mg at 08/02/23 1216    Glucose (TRUEPLUS)

## 2023-08-04 VITALS
BODY MASS INDEX: 33.12 KG/M2 | OXYGEN SATURATION: 97 % | WEIGHT: 258.06 LBS | TEMPERATURE: 98.2 F | RESPIRATION RATE: 18 BRPM | DIASTOLIC BLOOD PRESSURE: 68 MMHG | SYSTOLIC BLOOD PRESSURE: 106 MMHG | HEIGHT: 74 IN | HEART RATE: 68 BPM

## 2023-08-04 PROBLEM — R06.02 SHORTNESS OF BREATH: Status: RESOLVED | Noted: 2023-08-02 | Resolved: 2023-08-04

## 2023-08-04 LAB
ALBUMIN SERPL-MCNC: 2.9 G/DL (ref 3.5–5.2)
ALP SERPL-CCNC: 68 U/L (ref 40–129)
ALT SERPL-CCNC: 36 U/L (ref 0–40)
ANION GAP SERPL CALCULATED.3IONS-SCNC: 9 MMOL/L (ref 7–16)
AST SERPL-CCNC: 19 U/L (ref 0–39)
BASOPHILS # BLD: 0.08 K/UL (ref 0–0.2)
BASOPHILS NFR BLD: 2 % (ref 0–2)
BILIRUB SERPL-MCNC: 0.2 MG/DL (ref 0–1.2)
BNP SERPL-MCNC: 1868 PG/ML (ref 0–125)
BUN SERPL-MCNC: 25 MG/DL (ref 6–20)
CALCIUM SERPL-MCNC: 8.3 MG/DL (ref 8.6–10.2)
CHLORIDE SERPL-SCNC: 109 MMOL/L (ref 98–107)
CO2 SERPL-SCNC: 25 MMOL/L (ref 22–29)
CREAT SERPL-MCNC: 1.8 MG/DL (ref 0.7–1.2)
EOSINOPHIL # BLD: 0.43 K/UL (ref 0.05–0.5)
EOSINOPHILS RELATIVE PERCENT: 8 % (ref 0–6)
ERYTHROCYTE [DISTWIDTH] IN BLOOD BY AUTOMATED COUNT: 14.9 % (ref 11.5–15)
GFR SERPL CREATININE-BSD FRML MDRD: 45 ML/MIN/1.73M2
GLUCOSE SERPL-MCNC: 89 MG/DL (ref 74–99)
HCT VFR BLD AUTO: 40.4 % (ref 37–54)
HGB BLD-MCNC: 13.1 G/DL (ref 12.5–16.5)
IMM GRANULOCYTES # BLD AUTO: <0.03 K/UL (ref 0–0.58)
IMM GRANULOCYTES NFR BLD: 0 % (ref 0–5)
LYMPHOCYTES NFR BLD: 1.81 K/UL (ref 1.5–4)
LYMPHOCYTES RELATIVE PERCENT: 35 % (ref 20–42)
MCH RBC QN AUTO: 29.4 PG (ref 26–35)
MCHC RBC AUTO-ENTMCNC: 32.4 G/DL (ref 32–34.5)
MCV RBC AUTO: 90.6 FL (ref 80–99.9)
MONOCYTES NFR BLD: 0.64 K/UL (ref 0.1–0.95)
MONOCYTES NFR BLD: 13 % (ref 2–12)
NEUTROPHILS NFR BLD: 42 % (ref 43–80)
NEUTS SEG NFR BLD: 2.14 K/UL (ref 1.8–7.3)
PLATELET # BLD AUTO: 296 K/UL (ref 130–450)
PMV BLD AUTO: 11.5 FL (ref 7–12)
POTASSIUM SERPL-SCNC: 3.9 MMOL/L (ref 3.5–5)
PROT SERPL-MCNC: 5.3 G/DL (ref 6.4–8.3)
RBC # BLD AUTO: 4.46 M/UL (ref 3.8–5.8)
SODIUM SERPL-SCNC: 143 MMOL/L (ref 132–146)
WBC OTHER # BLD: 5.1 K/UL (ref 4.5–11.5)

## 2023-08-04 PROCEDURE — 99232 SBSQ HOSP IP/OBS MODERATE 35: CPT | Performed by: INTERNAL MEDICINE

## 2023-08-04 PROCEDURE — 6360000002 HC RX W HCPCS

## 2023-08-04 PROCEDURE — 2580000003 HC RX 258

## 2023-08-04 PROCEDURE — 6370000000 HC RX 637 (ALT 250 FOR IP)

## 2023-08-04 PROCEDURE — 83880 ASSAY OF NATRIURETIC PEPTIDE: CPT

## 2023-08-04 PROCEDURE — 99238 HOSP IP/OBS DSCHRG MGMT 30/<: CPT | Performed by: FAMILY MEDICINE

## 2023-08-04 PROCEDURE — 6370000000 HC RX 637 (ALT 250 FOR IP): Performed by: INTERNAL MEDICINE

## 2023-08-04 PROCEDURE — 85025 COMPLETE CBC W/AUTO DIFF WBC: CPT

## 2023-08-04 PROCEDURE — 94660 CPAP INITIATION&MGMT: CPT

## 2023-08-04 PROCEDURE — 80053 COMPREHEN METABOLIC PANEL: CPT

## 2023-08-04 RX ORDER — NICOTINE 21 MG/24HR
1 PATCH, TRANSDERMAL 24 HOURS TRANSDERMAL DAILY
Qty: 30 PATCH | Refills: 0 | Status: SHIPPED | OUTPATIENT
Start: 2023-08-05

## 2023-08-04 RX ORDER — LANOLIN ALCOHOL/MO/W.PET/CERES
3 CREAM (GRAM) TOPICAL NIGHTLY PRN
Qty: 30 TABLET | Refills: 0 | Status: SHIPPED | OUTPATIENT
Start: 2023-08-04

## 2023-08-04 RX ORDER — ISOSORBIDE DINITRATE 20 MG/1
20 TABLET ORAL 3 TIMES DAILY
Qty: 90 TABLET | Refills: 0 | Status: SHIPPED | OUTPATIENT
Start: 2023-08-04

## 2023-08-04 RX ORDER — HYDRALAZINE HYDROCHLORIDE 50 MG/1
50 TABLET, FILM COATED ORAL EVERY 8 HOURS SCHEDULED
Qty: 90 TABLET | Refills: 0 | Status: SHIPPED | OUTPATIENT
Start: 2023-08-04

## 2023-08-04 RX ORDER — FUROSEMIDE 40 MG/1
40 TABLET ORAL DAILY
Status: DISCONTINUED | OUTPATIENT
Start: 2023-08-05 | End: 2023-08-04 | Stop reason: HOSPADM

## 2023-08-04 RX ORDER — FUROSEMIDE 40 MG/1
40 TABLET ORAL DAILY
Qty: 60 TABLET | Refills: 0 | Status: SHIPPED | OUTPATIENT
Start: 2023-08-05

## 2023-08-04 RX ADMIN — ENOXAPARIN SODIUM 30 MG: 100 INJECTION SUBCUTANEOUS at 08:16

## 2023-08-04 RX ADMIN — HYDRALAZINE HYDROCHLORIDE 50 MG: 50 TABLET, FILM COATED ORAL at 13:49

## 2023-08-04 RX ADMIN — ISOSORBIDE DINITRATE 20 MG: 10 TABLET ORAL at 08:16

## 2023-08-04 RX ADMIN — CARVEDILOL 25 MG: 25 TABLET, FILM COATED ORAL at 08:16

## 2023-08-04 RX ADMIN — ISOSORBIDE DINITRATE 20 MG: 10 TABLET ORAL at 13:49

## 2023-08-04 RX ADMIN — HYDRALAZINE HYDROCHLORIDE 50 MG: 50 TABLET, FILM COATED ORAL at 05:15

## 2023-08-04 RX ADMIN — SODIUM CHLORIDE, PRESERVATIVE FREE 10 ML: 5 INJECTION INTRAVENOUS at 08:17

## 2023-08-04 RX ADMIN — FUROSEMIDE 40 MG: 10 INJECTION, SOLUTION INTRAMUSCULAR; INTRAVENOUS at 08:16

## 2023-08-04 ASSESSMENT — ENCOUNTER SYMPTOMS
BLOOD IN STOOL: 0
CHEST TIGHTNESS: 0
SHORTNESS OF BREATH: 1
COUGH: 1
CONSTIPATION: 0
DIARRHEA: 0
ABDOMINAL PAIN: 0
NAUSEA: 0
VOMITING: 0

## 2023-08-04 NOTE — PLAN OF CARE
Magali carroll and Pedro Luis from [facility] given info on patient.      Electronically signed by Duane Brocks, APRN - CNP on 8/4/2023 at 11:28 AM

## 2023-08-04 NOTE — PROGRESS NOTES
Date: 8/3/2023    Time: 11:58 PM    Patient Placed On BIPAP/CPAP/ Non-Invasive Ventilation? No    If no must comment. Facial area red/color change? No           If YES are Blister/Lesion present? No   If yes must notify nursing staff  BIPAP/CPAP skin barrier? No    Skin barrier type: na        Comments: Pt will call for his CPAP when/if he's ready.         Sully Ahmadi RCP

## 2023-08-04 NOTE — DISCHARGE INSTRUCTIONS
HEART FAILURE  / CONGESTIVE HEART FAILURE  DISCHARGE INSTRUCTIONS:  GUIDELINES TO FOLLOW AT HOME    Self- Managed Care:     MEDICATIONS:  Take your medication as directed. If you are experiencing any side effects, inform your doctor, Do not stop taking any of your medications without letting your doctor know. Check with your doctor before taking any over-the-counter medications / herbal / or dietary supplements. They may interfere with your other medications. Do not take ibuprofen (Advil or Motrin) and naproxen (Aleve) without talking to your doctor first. They could make your heart failure worse. WEIGHT MONITORING:   Weigh yourself everyday (with the same scale) around the same time of the day and write it down. (you can chart them on a calendar or keep track of them on paper. Notify your doctor of a weight gain of 3 pounds or more in 1 day   OR a total of 5 pounds or more in 1 week    Take your weight record to your doctor visits  Also, the same goes if you loose more than 3# in one day, let your heart doctor know. DIET:   Cardiac heart healthy diet- Low saturated / low trans fat, no added salt, caffeine restricted, Low sodium diet-   No more than 2,000mg (2 grams) of salt / sodium per day (which equals to a little less than  a teaspoon of salt)  If your doctor wants you on a fluid restriction. ..it is usually recommended a fluid limit of 2,000cc -  Fluid restriction- 2,000 ml (milliliters) = 64 ounces = you can have 8 glasses of fluid per day (each glass 8 ounces)    Follow a low salt diet - avoid using salt at the table, avoid / limit use of canned soups, processed / packaged foods, salted snacks, olives and pickles. Do not use a salt substitute without checking with your doctor, they may contain a high amount of potassioum. (Mrs. Cosme Samayoa is safe to use).     Limit the use of alcohol       CALL YOUR DOCTOR THE FIRST DAY YOU NOTICE ANY OF THESE   SYMPTOMS:  You have a

## 2023-08-04 NOTE — DISCHARGE SUMMARY
clear.   Eyes:      Conjunctiva/sclera: Conjunctivae normal.   Cardiovascular:      Rate and Rhythm: Normal rate and regular rhythm. Pulses: Normal pulses. Heart sounds: Normal heart sounds. Pulmonary:      Effort: Pulmonary effort is normal.      Breath sounds: Rales (Mild at bilateral bases,improved from yesterday) present. Abdominal:      General: Abdomen is flat. Palpations: Abdomen is soft. Skin:     General: Skin is warm and dry. Neurological:      General: No focal deficit present. Mental Status: He is alert and oriented to person, place, and time. Post Discharge Follow Up Issues:  Sleep study cpap  Follow up with cardiology  Follow up with PCP   Follow up with CHF clinic  Contact with [facility]     Disposition: home  Condition: stable    Patient Instructions:      Medication List        START taking these medications      hydrALAZINE 50 MG tablet  Commonly known as: APRESOLINE  Take 1 tablet by mouth every 8 hours     isosorbide dinitrate 20 MG tablet  Commonly known as: ISORDIL  Take 1 tablet by mouth 3 times daily     melatonin 3 MG Tabs tablet  Take 1 tablet by mouth nightly as needed (insomnia)     nicotine 14 MG/24HR  Commonly known as: NICODERM CQ  Place 1 patch onto the skin daily  Start taking on: August 5, 2023            CHANGE how you take these medications      furosemide 40 MG tablet  Commonly known as: LASIX  Take 1 tablet by mouth daily  Start taking on: August 5, 2023  What changed:   how much to take  how to take this  when to take this  additional instructions            CONTINUE taking these medications      carvedilol 25 MG tablet  Commonly known as: COREG  Take 1 tablet by mouth 2 times daily (with meals)     gabapentin 100 MG capsule  Commonly known as: NEURONTIN  Take 1 capsule by mouth 3 times daily for 30 days.  Intended supply: 90 days     omeprazole 20 MG delayed release capsule  Commonly known as: PRILOSEC  TAKE 1 CAPSULE BY MOUTH EVERY DAY rosuvastatin 20 MG tablet  Commonly known as: Crestor  Take 1 tablet by mouth nightly            STOP taking these medications      cilostazol 50 MG tablet  Commonly known as: PLETAL     empagliflozin 10 MG tablet  Commonly known as: Jardiance     spironolactone 50 MG tablet  Commonly known as: ALDACTONE               Where to Get Your Medications        These medications were sent to [facility], [facility] - F [phone]  [address] [place] [address]      Phone: [phone]   furosemide 40 MG tablet  hydrALAZINE 50 MG tablet  isosorbide dinitrate 20 MG tablet  melatonin 3 MG Tabs tablet  nicotine 14 MG/24HR          Activity: activity as tolerated  Diet: regular diet restrict salts     Discharge instructions:   Phani Mckeon MD  [address] [place], [place] [id number] [facility] [address] [phone]    Schedule an appointment as soon as possible for a visit in 3 week(s)  [address] [place] [address] Angi Lance [address] [phone]  Schedule an appointment as soon as possible for a visit in 1 week(s)  Gisele Haines [phone]    Schedule an appointment as soon as possible for a visit in 3 day(s)  HOSPITAL FOLLOW UP       Signed:  Hiral Puga MD PGY-1  8/4/2023  1:09 PM

## 2023-08-04 NOTE — CONSULTS
CHF NURSE NAVIGATOR CONSULT NOTE:    Patient currently admitted with diagnosis of systolic heart failure. Patient was awake and alert, laying in bed during the consultation. He was engaged and asked appropriate questions throughout the education session. He is agreeable to heart failure education and self monitoring once discharged. He is agreeable to a life vest at discharge as well as follow up in the clinic and with cardiology. Scheduling with the CHF clinic Yes. Future Appointments   Date Time Provider [address]   8/15/2023  8:15 AM [facility] ROOM 2 [facility]   9/7/2023 10:00 AM JONATHAN Ortega CNP [facility]       Barriers to Care:  Contributing risk factors for Heart Failure are identified as lack of education. The patient is ordered:  Diet: ADULT DIET; Regular; Low Sodium (2 gm)   Sodium controlled diet Yes  Fluid restriction daily ordered (fluid restriction recommended if patient is hyponatremic and/or diuretic is initiated or increased) No  FR:   Daily Weights: Patient Vitals for the past 96 hrs (Last 3 readings):   Weight   08/04/23 0346 258 lb 1 oz (117.1 kg)   08/03/23 0454 258 lb (117 kg)   08/02/23 0011 260 lb 3.2 oz (118 kg)     I/O: No intake or output data in the 24 hours ending 08/04/23 1136           We reviewed the introduction to Heart Failure, the HF zones, signs and symptoms to report on day 1 of onset, medications, medication compliance, the importance of obtaining daily weights, following a low sodium diet, reading food labels for the sodium content, keeping physician appointments, and smoking cessation. We discussed writing / tracking daily weights on a calendar / log because a 5 pound gain in 1 week can sneak up if you are not tracking it. I advised patient they can reduce the risk for Heart Failure exacerbations by modifying / controlling the risk factors.  We discussed self-managed care which includes the following:  to take

## 2023-08-04 NOTE — PROGRESS NOTES
INPATIENT CARDIOLOGY FOLLOW-UP    Name: Amada Tabor    Age: 56 y.o. Date of Admission: 8/1/2023  6:35 PM    Date of Service: 8/4/2023    Chief Complaint: Follow-up for CHF    Interim History:  No new overnight cardiac complaints and except for dyspnea with exertion. Resting comfortably and laying down flat without any dyspnea. Currently with no complaints of CP, palpitations, dizziness, or lightheadedness. SR and 2 episodes of nonsustained VT on telemetry.     Review of Systems:   Cardiac: As per HPI  General: No fever, chills  Pulmonary: As per HPI  HEENT: No visual disturbances, difficult swallowing  GI: No nausea, vomiting  Endocrine: No thyroid disease or DM  Musculoskeletal: GIBSON x 4, no focal motor deficits  Skin: Intact, no rashes  Neuro/Psych: No headache or seizures    Problem List:  Patient Active Problem List   Diagnosis    Acute coronary syndrome (HCC)    Chronic HFrEF (heart failure with reduced ejection fraction) ([facility])    Chronic bilateral low back pain with bilateral sciatica    Elevated serum creatinine    Sleep disorder breathing    Bilateral recurrent inguinal hernia without obstruction or gangrene    HFrEF (heart failure with reduced ejection fraction) ([facility])    Nonischemic cardiomyopathy ([address])    Strangulated inguinal hernia    Bilateral leg pain    Chest pain    Primary hypertension    DERRICK (obstructive sleep apnea)    Polysubstance abuse ([facility])    Congestive heart failure of unknown etiology ([facility])    Shortness of breath       Allergies:  No Known Allergies    Current Medications:  Current Facility-Administered Medications   Medication Dose Route Frequency Provider Last Rate Last Admin    hydrALAZINE (APRESOLINE) tablet 50 mg  50 mg Oral 3 times per day Spencer Landa MD   50 mg at 08/04/23 0515    isosorbide dinitrate (ISORDIL) tablet 20 mg  20 mg Oral TID Spencer Landa MD   20 mg at 08/04/23 0816    calcium carbonate (TUMS) chewable tablet 500 mg  500 mg Oral TID PRN Hilton Bustard,

## 2023-08-04 NOTE — CARE COORDINATION
Social work / Discharge Planning:        Social work spoke to liaison from [facility] and they received the referral for a life vest from cardiology.     Patient will be fitted today for a life vest.    Electronically signed by CASTILLO Ang on 8/4/2023 at 11:53 AM

## 2023-08-07 NOTE — PROGRESS NOTES
CLINICAL PHARMACY NOTE: MEDS TO BEDS    Total # of Prescriptions Filled: 2   The following medications were delivered to the patient:  Nicotine 14 mg   Furosemide 40 mg   Hydralazine 50 mg     Additional Documentation:  Delivered to patient

## 2023-08-08 ENCOUNTER — TELEPHONE (OUTPATIENT)
Dept: PRIMARY CARE CLINIC | Age: 48
End: 2023-08-08

## 2023-08-08 NOTE — TELEPHONE ENCOUNTER
Care Transitions Initial Follow Up Call    Outreach made within 2 business days of discharge: Yes    Patient: Zhane Villafuerte Patient : 1975   MRN: [id number]  Reason for Admission: CHF  Discharge Date: 23       Spoke with: Patient did not answer. LVM to return call to discuss and schedule a follow up    Discharge department/facility: [facility] Interactive Patient Contact:  Was patient able to fill all prescriptions:   Was patient instructed to bring all medications to the follow-up visit:   Is patient taking all medications as directed in the discharge summary?    Does patient understand their discharge instructions:   Does patient have questions or concerns that need addressed prior to 7-14 day follow up office visit:     Scheduled appointment with PCP within 7-14 days    Follow Up  Future Appointments   Date Time Provider [address] [place] [address]   8/15/2023  8:15 AM [facility] ROOM 2 [facility]   2023 10:00 AM JONATHAN Sarmiento - CNP [facility]       Glenys Claude, [facility]

## 2023-08-16 RX ORDER — FUROSEMIDE 40 MG/1
40 TABLET ORAL DAILY
Qty: 30 TABLET | Refills: 0 | Status: SHIPPED | OUTPATIENT
Start: 2023-08-16

## 2023-08-16 NOTE — TELEPHONE ENCOUNTER
----- Message from Szuie Mesa sent at 8/16/2023 12:33 PM EDT -----  Subject: Refill Request    QUESTIONS  Name of Medication? furosemide (LASIX) 40 MG tablet  Patient-reported dosage and instructions? once daily  How many days do you have left? 0  Preferred Pharmacy? [id number]  Pharmacy phone number (if available)? [phone]  Additional Information for Provider? pt supposed to take this med with   another med spirolctone and without the furosemide (pt ran out on Monday 8/14) pt not able to use restroom regularly and it makes him have fluid on   his lungs-pt last seen Jan 31, 2023-no future appt scheduled with Dr Tonny Joyce yet  ---------------------------------------------------------------------------  --------------  [address]  What is the best way for the office to contact you? OK to leave message on   voicemail  Preferred Call Back Phone Number? [phone]  ---------------------------------------------------------------------------  --------------  SCRIPT ANSWERS  Relationship to Patient?  Self

## 2023-09-06 ENCOUNTER — TELEPHONE (OUTPATIENT)
Dept: CARDIOLOGY CLINIC | Age: 48
End: 2023-09-06

## 2023-09-06 RX ORDER — LANOLIN ALCOHOL/MO/W.PET/CERES
CREAM (GRAM) TOPICAL
Qty: 30 TABLET | Refills: 0 | Status: SHIPPED | OUTPATIENT
Start: 2023-09-06

## 2023-09-06 RX ORDER — ISOSORBIDE DINITRATE 20 MG/1
TABLET ORAL
Qty: 90 TABLET | Refills: 0 | Status: SHIPPED | OUTPATIENT
Start: 2023-09-06

## 2023-09-06 NOTE — TELEPHONE ENCOUNTER
Tried calling patient with apt reminder with Joni Mcclain, at [address] [place] [address] tomorrow at 10 AM.  Home P:# [phone] and Fili Burgess  both numbers messages say they are not accepting calls right now.  CF

## 2023-09-07 ENCOUNTER — TELEPHONE (OUTPATIENT)
Dept: CARDIOLOGY CLINIC | Age: 48
End: 2023-09-07

## 2023-09-07 NOTE — TELEPHONE ENCOUNTER
Sent N/S Letter. Primary #[phone] and alternate  # [phone] both messages stated are not accepting phone calls at this time.

## 2023-09-07 NOTE — TELEPHONE ENCOUNTER
----- Message from Maikol Cruz RN sent at 9/7/2023  2:33 PM EDT -----  Patient was a no show today for Nicol's appt.

## 2023-10-04 ENCOUNTER — APPOINTMENT (OUTPATIENT)
Dept: GENERAL RADIOLOGY | Age: 48
End: 2023-10-04
Payer: COMMERCIAL

## 2023-10-04 ENCOUNTER — HOSPITAL ENCOUNTER (EMERGENCY)
Age: 48
Discharge: ANOTHER HOSPITAL | End: 2023-10-04
Attending: EMERGENCY MEDICINE
Payer: COMMERCIAL

## 2023-10-04 ENCOUNTER — HOSPITAL ENCOUNTER (INPATIENT)
Age: 48
LOS: 3 days | Discharge: HOME | DRG: 300 | End: 2023-10-07
Attending: INTERNAL MEDICINE | Admitting: FAMILY MEDICINE
Payer: COMMERCIAL

## 2023-10-04 ENCOUNTER — APPOINTMENT (OUTPATIENT)
Dept: ULTRASOUND IMAGING | Age: 48
End: 2023-10-04
Payer: COMMERCIAL

## 2023-10-04 ENCOUNTER — APPOINTMENT (OUTPATIENT)
Dept: CT IMAGING | Age: 48
End: 2023-10-04
Payer: COMMERCIAL

## 2023-10-04 VITALS
TEMPERATURE: 98.1 F | WEIGHT: 255.8 LBS | BODY MASS INDEX: 32.84 KG/M2 | SYSTOLIC BLOOD PRESSURE: 136 MMHG | HEART RATE: 69 BPM | OXYGEN SATURATION: 98 % | DIASTOLIC BLOOD PRESSURE: 98 MMHG | RESPIRATION RATE: 18 BRPM

## 2023-10-04 DIAGNOSIS — R06.02 SHORTNESS OF BREATH: ICD-10-CM

## 2023-10-04 DIAGNOSIS — R91.1 LUNG NODULE SEEN ON IMAGING STUDY: ICD-10-CM

## 2023-10-04 DIAGNOSIS — N18.9 CHRONIC KIDNEY DISEASE, UNSPECIFIED CKD STAGE: ICD-10-CM

## 2023-10-04 DIAGNOSIS — I47.20 V-TACH (HCC): ICD-10-CM

## 2023-10-04 DIAGNOSIS — R55 SYNCOPE, UNSPECIFIED SYNCOPE TYPE: Primary | ICD-10-CM

## 2023-10-04 DIAGNOSIS — E16.2 HYPOGLYCEMIA: ICD-10-CM

## 2023-10-04 DIAGNOSIS — F19.10 DRUG ABUSE (HCC): ICD-10-CM

## 2023-10-04 PROBLEM — I82.441 ACUTE DEEP VEIN THROMBOSIS (DVT) OF RIGHT TIBIAL VEIN (HCC): Status: ACTIVE | Noted: 2023-10-04

## 2023-10-04 LAB
ALBUMIN SERPL-MCNC: 3.4 G/DL (ref 3.5–5.2)
ALP SERPL-CCNC: 58 U/L (ref 40–129)
ALT SERPL-CCNC: 31 U/L (ref 0–40)
AMPHET UR QL SCN: NEGATIVE
ANION GAP SERPL CALCULATED.3IONS-SCNC: 9 MMOL/L (ref 7–16)
APAP SERPL-MCNC: <5 UG/ML (ref 10–30)
AST SERPL-CCNC: 26 U/L (ref 0–39)
BARBITURATES UR QL SCN: NEGATIVE
BASOPHILS # BLD: 0.05 K/UL (ref 0–0.2)
BASOPHILS NFR BLD: 1 % (ref 0–2)
BENZODIAZ UR QL: NEGATIVE
BILIRUB SERPL-MCNC: 0.3 MG/DL (ref 0–1.2)
BILIRUB UR QL STRIP: ABNORMAL
BNP SERPL-MCNC: 8138 PG/ML (ref 0–125)
BUN SERPL-MCNC: 23 MG/DL (ref 6–20)
BUPRENORPHINE UR QL: NEGATIVE
CALCIUM SERPL-MCNC: 8.6 MG/DL (ref 8.6–10.2)
CANNABINOIDS UR QL SCN: NEGATIVE
CASTS #/AREA URNS LPF: ABNORMAL /LPF
CHLORIDE SERPL-SCNC: 108 MMOL/L (ref 98–107)
CK SERPL-CCNC: 366 U/L (ref 20–200)
CLARITY UR: CLEAR
CO2 SERPL-SCNC: 26 MMOL/L (ref 22–29)
COCAINE UR QL SCN: POSITIVE
COLOR UR: YELLOW
CREAT SERPL-MCNC: 2.4 MG/DL (ref 0.7–1.2)
D DIMER: 696 NG/ML DDU (ref 0–232)
EKG ATRIAL RATE: 80 BPM
EKG P AXIS: 69 DEGREES
EKG P-R INTERVAL: 174 MS
EKG Q-T INTERVAL: 424 MS
EKG QRS DURATION: 106 MS
EKG QTC CALCULATION (BAZETT): 489 MS
EKG R AXIS: -63 DEGREES
EKG T AXIS: 130 DEGREES
EKG VENTRICULAR RATE: 80 BPM
EOSINOPHIL # BLD: 0.15 K/UL (ref 0.05–0.5)
EOSINOPHILS RELATIVE PERCENT: 2 % (ref 0–6)
ERYTHROCYTE [DISTWIDTH] IN BLOOD BY AUTOMATED COUNT: 15.1 % (ref 11.5–15)
ETHANOLAMINE SERPL-MCNC: <10 MG/DL
FENTANYL UR QL: POSITIVE
FLUAV RNA RESP QL NAA+PROBE: NOT DETECTED
FLUBV RNA RESP QL NAA+PROBE: NOT DETECTED
GFR SERPL CREATININE-BSD FRML MDRD: 33 ML/MIN/1.73M2
GLUCOSE BLD-MCNC: 121 MG/DL (ref 74–99)
GLUCOSE SERPL-MCNC: 99 MG/DL (ref 74–99)
GLUCOSE UR STRIP-MCNC: NEGATIVE MG/DL
HCT VFR BLD AUTO: 45.9 % (ref 37–54)
HGB BLD-MCNC: 15.1 G/DL (ref 12.5–16.5)
HGB UR QL STRIP.AUTO: NEGATIVE
IMM GRANULOCYTES # BLD AUTO: <0.03 K/UL (ref 0–0.58)
IMM GRANULOCYTES NFR BLD: 0 % (ref 0–5)
KETONES UR STRIP-MCNC: NEGATIVE MG/DL
LACTATE BLDV-SCNC: 1.8 MMOL/L (ref 0.5–2.2)
LEUKOCYTE ESTERASE UR QL STRIP: NEGATIVE
LYMPHOCYTES NFR BLD: 1.23 K/UL (ref 1.5–4)
LYMPHOCYTES RELATIVE PERCENT: 19 % (ref 20–42)
MCH RBC QN AUTO: 30.1 PG (ref 26–35)
MCHC RBC AUTO-ENTMCNC: 32.9 G/DL (ref 32–34.5)
MCV RBC AUTO: 91.6 FL (ref 80–99.9)
METHADONE UR QL: NEGATIVE
MONOCYTES NFR BLD: 0.59 K/UL (ref 0.1–0.95)
MONOCYTES NFR BLD: 9 % (ref 2–12)
NEUTROPHILS NFR BLD: 68 % (ref 43–80)
NEUTS SEG NFR BLD: 4.33 K/UL (ref 1.8–7.3)
NITRITE UR QL STRIP: NEGATIVE
OPIATES UR QL SCN: NEGATIVE
OXYCODONE UR QL SCN: NEGATIVE
PCP UR QL SCN: NEGATIVE
PH UR STRIP: 5.5 [PH] (ref 5–9)
PLATELET # BLD AUTO: 231 K/UL (ref 130–450)
PMV BLD AUTO: 10.7 FL (ref 7–12)
POTASSIUM SERPL-SCNC: 4 MMOL/L (ref 3.5–5)
PROT SERPL-MCNC: 5.5 G/DL (ref 6.4–8.3)
PROT UR STRIP-MCNC: 100 MG/DL
RBC # BLD AUTO: 5.01 M/UL (ref 3.8–5.8)
RBC #/AREA URNS HPF: ABNORMAL /HPF
SALICYLATES SERPL-MCNC: <0.3 MG/DL (ref 0–30)
SARS-COV-2 RNA RESP QL NAA+PROBE: NOT DETECTED
SODIUM SERPL-SCNC: 143 MMOL/L (ref 132–146)
SOURCE: NORMAL
SP GR UR STRIP: >1.03 (ref 1–1.03)
SPECIMEN DESCRIPTION: NORMAL
TEST INFORMATION: ABNORMAL
TOXIC TRICYCLIC SC,BLOOD: NEGATIVE
TROPONIN I SERPL HS-MCNC: 53 NG/L (ref 0–11)
TROPONIN I SERPL HS-MCNC: 55 NG/L (ref 0–11)
UROBILINOGEN UR STRIP-ACNC: 1 EU/DL (ref 0–1)
WBC #/AREA URNS HPF: ABNORMAL /HPF
WBC OTHER # BLD: 6.4 K/UL (ref 4.5–11.5)

## 2023-10-04 PROCEDURE — 87636 SARSCOV2 & INF A&B AMP PRB: CPT

## 2023-10-04 PROCEDURE — 71045 X-RAY EXAM CHEST 1 VIEW: CPT

## 2023-10-04 PROCEDURE — 93010 ELECTROCARDIOGRAM REPORT: CPT | Performed by: INTERNAL MEDICINE

## 2023-10-04 PROCEDURE — 93970 EXTREMITY STUDY: CPT

## 2023-10-04 PROCEDURE — 80307 DRUG TEST PRSMV CHEM ANLYZR: CPT

## 2023-10-04 PROCEDURE — 80053 COMPREHEN METABOLIC PANEL: CPT

## 2023-10-04 PROCEDURE — 82962 GLUCOSE BLOOD TEST: CPT

## 2023-10-04 PROCEDURE — 93005 ELECTROCARDIOGRAM TRACING: CPT | Performed by: EMERGENCY MEDICINE

## 2023-10-04 PROCEDURE — 6370000000 HC RX 637 (ALT 250 FOR IP)

## 2023-10-04 PROCEDURE — 83880 ASSAY OF NATRIURETIC PEPTIDE: CPT

## 2023-10-04 PROCEDURE — 2060000000 HC ICU INTERMEDIATE R&B

## 2023-10-04 PROCEDURE — 82550 ASSAY OF CK (CPK): CPT

## 2023-10-04 PROCEDURE — 94660 CPAP INITIATION&MGMT: CPT

## 2023-10-04 PROCEDURE — 81001 URINALYSIS AUTO W/SCOPE: CPT

## 2023-10-04 PROCEDURE — 80143 DRUG ASSAY ACETAMINOPHEN: CPT

## 2023-10-04 PROCEDURE — 80179 DRUG ASSAY SALICYLATE: CPT

## 2023-10-04 PROCEDURE — 70450 CT HEAD/BRAIN W/O DYE: CPT

## 2023-10-04 PROCEDURE — G0480 DRUG TEST DEF 1-7 CLASSES: HCPCS

## 2023-10-04 PROCEDURE — 6360000002 HC RX W HCPCS: Performed by: EMERGENCY MEDICINE

## 2023-10-04 PROCEDURE — 6360000002 HC RX W HCPCS

## 2023-10-04 PROCEDURE — 85025 COMPLETE CBC W/AUTO DIFF WBC: CPT

## 2023-10-04 PROCEDURE — 96372 THER/PROPH/DIAG INJ SC/IM: CPT

## 2023-10-04 PROCEDURE — 84484 ASSAY OF TROPONIN QUANT: CPT

## 2023-10-04 PROCEDURE — 99285 EMERGENCY DEPT VISIT HI MDM: CPT

## 2023-10-04 PROCEDURE — 83605 ASSAY OF LACTIC ACID: CPT

## 2023-10-04 PROCEDURE — 2580000003 HC RX 258

## 2023-10-04 PROCEDURE — 71250 CT THORAX DX C-: CPT

## 2023-10-04 PROCEDURE — 85379 FIBRIN DEGRADATION QUANT: CPT

## 2023-10-04 RX ORDER — ROSUVASTATIN CALCIUM 20 MG/1
20 TABLET, COATED ORAL NIGHTLY
Status: DISCONTINUED | OUTPATIENT
Start: 2023-10-04 | End: 2023-10-07 | Stop reason: HOSPADM

## 2023-10-04 RX ORDER — ENOXAPARIN SODIUM 150 MG/ML
1 INJECTION SUBCUTANEOUS 2 TIMES DAILY
Status: DISCONTINUED | OUTPATIENT
Start: 2023-10-04 | End: 2023-10-07

## 2023-10-04 RX ORDER — ACETAMINOPHEN 650 MG/1
650 SUPPOSITORY RECTAL EVERY 6 HOURS PRN
Status: DISCONTINUED | OUTPATIENT
Start: 2023-10-04 | End: 2023-10-07 | Stop reason: HOSPADM

## 2023-10-04 RX ORDER — SODIUM CHLORIDE 0.9 % (FLUSH) 0.9 %
5-40 SYRINGE (ML) INJECTION EVERY 12 HOURS SCHEDULED
Status: DISCONTINUED | OUTPATIENT
Start: 2023-10-04 | End: 2023-10-07 | Stop reason: HOSPADM

## 2023-10-04 RX ORDER — ONDANSETRON 2 MG/ML
4 INJECTION INTRAMUSCULAR; INTRAVENOUS EVERY 6 HOURS PRN
Status: DISCONTINUED | OUTPATIENT
Start: 2023-10-04 | End: 2023-10-07 | Stop reason: HOSPADM

## 2023-10-04 RX ORDER — ONDANSETRON 4 MG/1
4 TABLET, ORALLY DISINTEGRATING ORAL EVERY 8 HOURS PRN
Status: DISCONTINUED | OUTPATIENT
Start: 2023-10-04 | End: 2023-10-07 | Stop reason: HOSPADM

## 2023-10-04 RX ORDER — ACETAMINOPHEN 325 MG/1
650 TABLET ORAL EVERY 6 HOURS PRN
Status: DISCONTINUED | OUTPATIENT
Start: 2023-10-04 | End: 2023-10-07 | Stop reason: HOSPADM

## 2023-10-04 RX ORDER — ISOSORBIDE DINITRATE 10 MG/1
20 TABLET ORAL 3 TIMES DAILY
Status: DISCONTINUED | OUTPATIENT
Start: 2023-10-04 | End: 2023-10-07 | Stop reason: HOSPADM

## 2023-10-04 RX ORDER — SODIUM CHLORIDE 9 MG/ML
INJECTION, SOLUTION INTRAVENOUS PRN
Status: DISCONTINUED | OUTPATIENT
Start: 2023-10-04 | End: 2023-10-07 | Stop reason: HOSPADM

## 2023-10-04 RX ORDER — SODIUM CHLORIDE 0.9 % (FLUSH) 0.9 %
5-40 SYRINGE (ML) INJECTION PRN
Status: DISCONTINUED | OUTPATIENT
Start: 2023-10-04 | End: 2023-10-07 | Stop reason: HOSPADM

## 2023-10-04 RX ORDER — HYDRALAZINE HYDROCHLORIDE 20 MG/ML
10 INJECTION INTRAMUSCULAR; INTRAVENOUS EVERY 6 HOURS PRN
Status: DISCONTINUED | OUTPATIENT
Start: 2023-10-04 | End: 2023-10-05

## 2023-10-04 RX ORDER — POLYETHYLENE GLYCOL 3350 17 G/17G
17 POWDER, FOR SOLUTION ORAL DAILY PRN
Status: DISCONTINUED | OUTPATIENT
Start: 2023-10-04 | End: 2023-10-07 | Stop reason: HOSPADM

## 2023-10-04 RX ORDER — ENOXAPARIN SODIUM 150 MG/ML
1 INJECTION SUBCUTANEOUS ONCE
Status: COMPLETED | OUTPATIENT
Start: 2023-10-04 | End: 2023-10-04

## 2023-10-04 RX ADMIN — ROSUVASTATIN CALCIUM 20 MG: 20 TABLET, FILM COATED ORAL at 20:41

## 2023-10-04 RX ADMIN — ENOXAPARIN SODIUM 120 MG: 150 INJECTION SUBCUTANEOUS at 10:29

## 2023-10-04 RX ADMIN — ISOSORBIDE DINITRATE 20 MG: 10 TABLET ORAL at 20:42

## 2023-10-04 RX ADMIN — ENOXAPARIN SODIUM 120 MG: 150 INJECTION SUBCUTANEOUS at 20:42

## 2023-10-04 RX ADMIN — SODIUM CHLORIDE, PRESERVATIVE FREE 10 ML: 5 INJECTION INTRAVENOUS at 20:42

## 2023-10-04 ASSESSMENT — ENCOUNTER SYMPTOMS
SHORTNESS OF BREATH: 1
VOMITING: 1
NAUSEA: 1
CHEST TIGHTNESS: 1
DIARRHEA: 0
CONSTIPATION: 0
BACK PAIN: 1
BLOOD IN STOOL: 0

## 2023-10-04 NOTE — PROGRESS NOTES
4 Eyes Skin Assessment     NAME:  You Ward OF BIRTH:  1975  MEDICAL RECORD NUMBER:  [id number]    The patient is being assessed for  Admission    I agree that at least one RN has performed a thorough Head to Toe Skin Assessment on the patient. ALL assessment sites listed below have been assessed. Areas assessed by both nurses:    Head, Face, Ears, Shoulders, Back, Chest, Arms, Elbows, Hands, Sacrum. Buttock, Coccyx, Ischium, Legs. Feet and Heels, and Under Medical Devices         Does the Patient have a Wound?  No noted wound(s)       Jacky Prevention initiated by RN: No  Wound Care Orders initiated by RN: No    Pressure Injury (Stage 3,4, Unstageable, DTI, NWPT, and Complex wounds) if present, place Wound referral order by RN under : No    New Ostomies, if present place, Ostomy referral order under : No     Nurse 1 eSignature: Electronically signed by Marilee Hammans, RN on 10/4/23 at [address] PM EDT    **SHARE this note so that the co-signing nurse can place an eSignature**    Nurse 2 eSignature: Electronically signed by Joleen Jacques RN on 10/4/23 at 6:44 PM EDT

## 2023-10-04 NOTE — ED PROVIDER NOTES
[facility]        Pt Name: Geoffrey Morse  MRN: [id number]  [address] [facility] [place] 1975  Date of evaluation: 10/4/2023  Provider: Brian Rowe DO  PCP: Hulan Runner, DO  Note Started: 7:33 AM EDT 10/4/23    CHIEF COMPLAINT       Chief Complaint   Patient presents with    Dizziness     Dizziness and near syncope this AM; Patient diabetic and bs 60 when picked up by squad        HISTORY OF PRESENT ILLNESS: 1 or more Elements   History From: patient and EMS    Limitations to history : None    Geoffrey Morse is a 56 y.o. male who presents with presyncope beginning this morning. Reports he has been short of breath the past few days of worsening progression and it did get worse this morning. His episode was also associated with sweating, dizziness and feeling like he was going to pass out. He states he feels like he did pass out because everything went dark. His girlfriend called 911 and his fingerstick was 60. He was given glucose and arrives awake, alert, oriented and asymptomatic. Patient reports he is diabetic but does not take insulin or oral diabetic medications. He states his Magali Covert were discontinued but he does not know why. He is currently asking for something to eat. The complaint has been persistent, moderate in severity, and worsened by nothing. Patient denies fever/chills, sore throat, cough, congestion, chest pain, edema, headache, visual disturbances, focal paresthesias, focal weakness, abdominal pain, nausea, vomiting, diarrhea, constipation, dysuria, hematuria, trauma, neck or back pain, rash or other complaints. Nursing Notes were all reviewed and agreed with or any disagreements were addressed in the HPI. REVIEW OF SYSTEMS :           Positives and Pertinent negatives as per HPI.      SURGICAL HISTORY     Past Surgical History:   Procedure Laterality Date    CARDIAC CATHETERIZATION      LAPAROSCOPY N/A

## 2023-10-05 ENCOUNTER — APPOINTMENT (OUTPATIENT)
Dept: ULTRASOUND IMAGING | Age: 48
DRG: 300 | End: 2023-10-05
Attending: INTERNAL MEDICINE
Payer: COMMERCIAL

## 2023-10-05 ENCOUNTER — APPOINTMENT (OUTPATIENT)
Dept: NUCLEAR MEDICINE | Age: 48
DRG: 300 | End: 2023-10-05
Attending: INTERNAL MEDICINE
Payer: COMMERCIAL

## 2023-10-05 PROBLEM — I50.23 ACUTE ON CHRONIC SYSTOLIC HEART FAILURE (HCC): Status: ACTIVE | Noted: 2023-10-05

## 2023-10-05 LAB
ALBUMIN SERPL-MCNC: 2.9 G/DL (ref 3.5–5.2)
ALP SERPL-CCNC: 55 U/L (ref 40–129)
ALT SERPL-CCNC: 26 U/L (ref 0–40)
ANION GAP SERPL CALCULATED.3IONS-SCNC: 7 MMOL/L (ref 7–16)
AST SERPL-CCNC: 20 U/L (ref 0–39)
BASOPHILS # BLD: 0.07 K/UL (ref 0–0.2)
BASOPHILS NFR BLD: 2 % (ref 0–2)
BILIRUB SERPL-MCNC: 0.3 MG/DL (ref 0–1.2)
BUN SERPL-MCNC: 19 MG/DL (ref 6–20)
CALCIUM SERPL-MCNC: 8.1 MG/DL (ref 8.6–10.2)
CHLORIDE SERPL-SCNC: 110 MMOL/L (ref 98–107)
CO2 SERPL-SCNC: 25 MMOL/L (ref 22–29)
CREAT SERPL-MCNC: 2 MG/DL (ref 0.7–1.2)
CREAT UR-MCNC: 182 MG/DL (ref 40–278)
CREAT UR-MCNC: 183.7 MG/DL (ref 40–278)
CREAT UR-MCNC: 257 MG/DL (ref 40–278)
EOSINOPHIL # BLD: 0.22 K/UL (ref 0.05–0.5)
EOSINOPHILS RELATIVE PERCENT: 6 % (ref 0–6)
ERYTHROCYTE [DISTWIDTH] IN BLOOD BY AUTOMATED COUNT: 15.2 % (ref 11.5–15)
GFR SERPL CREATININE-BSD FRML MDRD: 41 ML/MIN/1.73M2
GLUCOSE BLD-MCNC: 121 MG/DL (ref 74–99)
GLUCOSE SERPL-MCNC: 99 MG/DL (ref 74–99)
HCT VFR BLD AUTO: 43.6 % (ref 37–54)
HGB BLD-MCNC: 14 G/DL (ref 12.5–16.5)
HIV 1+2 AB+HIV1 P24 AG SERPL QL IA: NONREACTIVE
IMM GRANULOCYTES # BLD AUTO: <0.03 K/UL (ref 0–0.58)
IMM GRANULOCYTES NFR BLD: 0 % (ref 0–5)
LYMPHOCYTES NFR BLD: 1.47 K/UL (ref 1.5–4)
LYMPHOCYTES RELATIVE PERCENT: 40 % (ref 20–42)
MCH RBC QN AUTO: 29.7 PG (ref 26–35)
MCHC RBC AUTO-ENTMCNC: 32.1 G/DL (ref 32–34.5)
MCV RBC AUTO: 92.4 FL (ref 80–99.9)
MICROALBUMIN UR-MCNC: <12 MG/L (ref 0–19)
MICROALBUMIN/CREAT UR-RTO: NORMAL MCG/MG CREAT (ref 0–30)
MONOCYTES NFR BLD: 0.52 K/UL (ref 0.1–0.95)
MONOCYTES NFR BLD: 14 % (ref 2–12)
NEUTROPHILS NFR BLD: 37 % (ref 43–80)
NEUTS SEG NFR BLD: 1.36 K/UL (ref 1.8–7.3)
PLATELET # BLD AUTO: 228 K/UL (ref 130–450)
PMV BLD AUTO: 11.6 FL (ref 7–12)
POTASSIUM SERPL-SCNC: 4.1 MMOL/L (ref 3.5–5)
PROT SERPL-MCNC: 4.9 G/DL (ref 6.4–8.3)
RBC # BLD AUTO: 4.72 M/UL (ref 3.8–5.8)
SODIUM SERPL-SCNC: 142 MMOL/L (ref 132–146)
SODIUM UR-SCNC: 130 MMOL/L
TOTAL PROTEIN, URINE: 29 MG/DL (ref 0–12)
URINE TOTAL PROTEIN CREATININE RATIO: 0.16 (ref 0–0.2)
UUN UR-MCNC: 1145 MG/DL (ref 800–1666)
WBC OTHER # BLD: 3.6 K/UL (ref 4.5–11.5)

## 2023-10-05 PROCEDURE — 87491 CHLMYD TRACH DNA AMP PROBE: CPT

## 2023-10-05 PROCEDURE — 85025 COMPLETE CBC W/AUTO DIFF WBC: CPT

## 2023-10-05 PROCEDURE — 2580000003 HC RX 258

## 2023-10-05 PROCEDURE — 6370000000 HC RX 637 (ALT 250 FOR IP)

## 2023-10-05 PROCEDURE — 78582 LUNG VENTILAT&PERFUS IMAGING: CPT

## 2023-10-05 PROCEDURE — 76770 US EXAM ABDO BACK WALL COMP: CPT

## 2023-10-05 PROCEDURE — 84156 ASSAY OF PROTEIN URINE: CPT

## 2023-10-05 PROCEDURE — 80053 COMPREHEN METABOLIC PANEL: CPT

## 2023-10-05 PROCEDURE — 99254 IP/OBS CNSLTJ NEW/EST MOD 60: CPT | Performed by: INTERNAL MEDICINE

## 2023-10-05 PROCEDURE — 87591 N.GONORRHOEAE DNA AMP PROB: CPT

## 2023-10-05 PROCEDURE — A9540 TC99M MAA: HCPCS | Performed by: RADIOLOGY

## 2023-10-05 PROCEDURE — 86592 SYPHILIS TEST NON-TREP QUAL: CPT

## 2023-10-05 PROCEDURE — 84540 ASSAY OF URINE/UREA-N: CPT

## 2023-10-05 PROCEDURE — 82043 UR ALBUMIN QUANTITATIVE: CPT

## 2023-10-05 PROCEDURE — 99222 1ST HOSP IP/OBS MODERATE 55: CPT | Performed by: FAMILY MEDICINE

## 2023-10-05 PROCEDURE — 82570 ASSAY OF URINE CREATININE: CPT

## 2023-10-05 PROCEDURE — APPSS60 APP SPLIT SHARED TIME 46-60 MINUTES

## 2023-10-05 PROCEDURE — A9539 TC99M PENTETATE: HCPCS | Performed by: RADIOLOGY

## 2023-10-05 PROCEDURE — 87389 HIV-1 AG W/HIV-1&-2 AB AG IA: CPT

## 2023-10-05 PROCEDURE — 6360000002 HC RX W HCPCS

## 2023-10-05 PROCEDURE — 36415 COLL VENOUS BLD VENIPUNCTURE: CPT

## 2023-10-05 PROCEDURE — 51798 US URINE CAPACITY MEASURE: CPT

## 2023-10-05 PROCEDURE — 3430000000 HC RX DIAGNOSTIC RADIOPHARMACEUTICAL: Performed by: RADIOLOGY

## 2023-10-05 PROCEDURE — 6370000000 HC RX 637 (ALT 250 FOR IP): Performed by: STUDENT IN AN ORGANIZED HEALTH CARE EDUCATION/TRAINING PROGRAM

## 2023-10-05 PROCEDURE — 2060000000 HC ICU INTERMEDIATE R&B

## 2023-10-05 PROCEDURE — 94660 CPAP INITIATION&MGMT: CPT

## 2023-10-05 PROCEDURE — 84300 ASSAY OF URINE SODIUM: CPT

## 2023-10-05 RX ORDER — HYDRALAZINE HYDROCHLORIDE 50 MG/1
50 TABLET, FILM COATED ORAL EVERY 8 HOURS SCHEDULED
Status: DISCONTINUED | OUTPATIENT
Start: 2023-10-05 | End: 2023-10-07 | Stop reason: HOSPADM

## 2023-10-05 RX ORDER — KIT FOR THE PREPARATION OF TECHNETIUM TC 99M PENTETATE 20 MG/1
30 INJECTION, POWDER, LYOPHILIZED, FOR SOLUTION INTRAVENOUS; RESPIRATORY (INHALATION)
Status: COMPLETED | OUTPATIENT
Start: 2023-10-05 | End: 2023-10-05

## 2023-10-05 RX ORDER — CARVEDILOL 25 MG/1
25 TABLET ORAL 2 TIMES DAILY WITH MEALS
Status: DISCONTINUED | OUTPATIENT
Start: 2023-10-05 | End: 2023-10-07 | Stop reason: HOSPADM

## 2023-10-05 RX ADMIN — ISOSORBIDE DINITRATE 20 MG: 10 TABLET ORAL at 08:44

## 2023-10-05 RX ADMIN — HYDRALAZINE HYDROCHLORIDE 50 MG: 50 TABLET, FILM COATED ORAL at 15:12

## 2023-10-05 RX ADMIN — ISOSORBIDE DINITRATE 20 MG: 10 TABLET ORAL at 20:32

## 2023-10-05 RX ADMIN — ISOSORBIDE DINITRATE 20 MG: 10 TABLET ORAL at 15:12

## 2023-10-05 RX ADMIN — HYDRALAZINE HYDROCHLORIDE 50 MG: 50 TABLET, FILM COATED ORAL at 20:32

## 2023-10-05 RX ADMIN — ENOXAPARIN SODIUM 120 MG: 150 INJECTION SUBCUTANEOUS at 20:32

## 2023-10-05 RX ADMIN — CARVEDILOL 25 MG: 25 TABLET, FILM COATED ORAL at 11:48

## 2023-10-05 RX ADMIN — ROSUVASTATIN CALCIUM 20 MG: 20 TABLET, FILM COATED ORAL at 20:32

## 2023-10-05 RX ADMIN — ENOXAPARIN SODIUM 120 MG: 150 INJECTION SUBCUTANEOUS at 08:44

## 2023-10-05 RX ADMIN — SODIUM CHLORIDE, PRESERVATIVE FREE 10 ML: 5 INJECTION INTRAVENOUS at 20:32

## 2023-10-05 RX ADMIN — CARVEDILOL 25 MG: 25 TABLET, FILM COATED ORAL at 18:15

## 2023-10-05 RX ADMIN — KIT FOR THE PREPARATION OF TECHNETIUM TC 99M PENTETATE 30 MILLICURIE: 20 INJECTION, POWDER, LYOPHILIZED, FOR SOLUTION INTRAVENOUS; RESPIRATORY (INHALATION) at 13:46

## 2023-10-05 RX ADMIN — SODIUM CHLORIDE, PRESERVATIVE FREE 10 ML: 5 INJECTION INTRAVENOUS at 08:45

## 2023-10-05 RX ADMIN — Medication 6 MILLICURIE: at 13:46

## 2023-10-05 ASSESSMENT — PAIN SCALES - GENERAL: PAINLEVEL_OUTOF10: 8

## 2023-10-05 ASSESSMENT — ENCOUNTER SYMPTOMS
SORE THROAT: 0
CONSTIPATION: 0
VOMITING: 0
DIARRHEA: 0
ABDOMINAL PAIN: 0
WHEEZING: 0
NAUSEA: 0
SHORTNESS OF BREATH: 0

## 2023-10-05 NOTE — CARE COORDINATION
CASE MANAGEMENT. .... Met with patient at the bedside. States he is homeless. Sometimes he stays with friends on/off and sometimes abandon apartments. Has never been to the [address] . Would be interested in going if he qualifies. Admits to drug use. Is agreeable to talk with PRS about rehab. Referral sent to [facility]. Bebe More has elevated hr/bps. Cardio/CHF nurse/Renal on. Recent Echo 8/23 revealed EF 15-20%. Per cardio notes, he has Life Vest but is noncompliant wearing because he doesn't always have charging availability. Spoke with Jaqui Johnson from [facility] and he is aware of patients hospital admit. States patient actually does not have his battery/. He is working on replacing. Follows with Dr Sarahi Reese at the [facility]. On coreg, crestor, lasix, and isordil at home. Lasix on hold d/t renal insufficiency. Apresoline started po tid. Tolerating room air. Will cont to follow along and assist with needs accordingly. Discharge plan/needs TBD.

## 2023-10-05 NOTE — CONSULTS
10/05/2023 08:10 AM    CALCIUM 8.1 10/05/2023 08:10 AM    GFRAA 41 08/26/2022 06:32 PM    LABGLOM 41 10/05/2023 08:10 AM    GLUCOSE 99 10/05/2023 08:10 AM     Albumin:    Lab Results   Component Value Date/Time    LABALBU 2.9 10/05/2023 08:10 AM     Magnesium:    Lab Results   Component Value Date/Time    MG 2.1 04/20/2023 10:03 AM     Phosphorus:    Lab Results   Component Value Date/Time    PHOS 2.4 05/02/2022 04:30 AM     LDH:  No results found for: \"LDH\"  Troponin:  No results found for: \"TROPONINI\"  U/A:    Lab Results   Component Value Date/Time    COLORU Yellow 10/04/2023 08:30 AM    PROTEINU 100 10/04/2023 08:30 AM    PHUR 5.5 10/04/2023 08:30 AM    WBCUA 6 TO 9 10/04/2023 08:30 AM    RBCUA 0 TO 2 10/04/2023 08:30 AM    SPECGRAV >1.030 10/04/2023 08:30 AM    LEUKOCYTESUR NEGATIVE 10/04/2023 08:30 AM    UROBILINOGEN 1.0 10/04/2023 08:30 AM    BILIRUBINUR SMALL 10/04/2023 08:30 AM    GLUCOSEU NEGATIVE 10/04/2023 08:30 AM     IRON:  No results found for: \"IRON\"  Iron Saturation:  No components found for: \"PERCENTFE\"  TIBC:  No results found for: \"TIBC\"  FERRITIN:    Lab Results   Component Value Date/Time    FERRITIN 99 08/26/2022 06:32 PM        Imaging:  Narrative & Impression  EXAMINATION:  CT OF THE CHEST WITHOUT CONTRAST, 10/4/2023 9:20 am     TECHNIQUE:  CT of the chest was performed without the administration of intravenous  contrast. Multiplanar reformatted images are provided for review. Automated  exposure control, iterative reconstruction, and/or weight based adjustment of  the mA/kV was utilized to reduce the radiation dose to as low as reasonably  achievable. COMPARISON:  None     HISTORY:  ORDERING SYSTEM PROVIDED HISTORY:  Dyspnea  TECHNOLOGIST PROVIDED HISTORY:  Reason for Exam:  Dyspnea  Decision Support Exception - unselect if not a suspected or confirmed  emergency medical condition->Emergency Medical Condition (MA)     FINDINGS:  Mediastinum: The heart is enlarged.   There is a small WBC, 0-2 RBC so possible asx UTI? Doubt AIN, no glomerular hematuria to suggest GN though if renal fxn worsens in light of cocaine could consider drug induced AAV though no reason at present to suspect that; however high SG also tends to overestimate proteinuria and hematuria so will take note of that  -will recheck UPCR and UACR given that it was last investigated April 2022  -for now, continue supportive care  -check bladder scan, update renal ultrasound, last one 2022 showed distended bladder      2-BP/Volume status  -BP 130s/80s-140s/100s?   -on exam, trace RLE, LLE looks ok does not seem drastically overloaded  -coreg 25 mg BID, hydralazine 50 mg TID, isordil 20 mg TID on board  -will follow        3-Electrolyte/Acid-Base disturbances  -satisfactory with exception of mild hyperchloremia, no met acidosis noted  -continue to follow    4-CKD MBD  -Ca 8.1 with albumin 2.9 -->corrects  -check phos/pth/Vit D 25 OH    5-Anemia  -Hb 14.0 g/dL stable    6-Nutrition  -albumin 2.9, will follow, encourage adequate protein intake    7-Other Issues    Severe cardiomyopathy  Cocaine use  DERRICK  HLD          Thank you Dr. Stephanie Perla for allowing us to participate in care of Arti Sykes MD  2:08 PM  10/5/2023

## 2023-10-05 NOTE — DISCHARGE INSTRUCTIONS
HEART FAILURE  / CONGESTIVE HEART FAILURE  DISCHARGE INSTRUCTIONS:  GUIDELINES TO FOLLOW AT HOME    Self- Managed Care:     MEDICATIONS:  Take your medication as directed. If you are experiencing any side effects, inform your doctor, Do not stop taking any of your medications without letting your doctor know. Check with your doctor before taking any over-the-counter medications / herbal / or dietary supplements. They may interfere with your other medications. Do not take ibuprofen (Advil or Motrin) and naproxen (Aleve) without talking to your doctor first. They could make your heart failure worse. WEIGHT MONITORING:   Weigh yourself everyday (with the same scale) around the same time of the day and write it down. (you can chart them on a calendar or keep track of them on paper. Notify your doctor of a weight gain of 3 pounds or more in 1 day   OR a total of 5 pounds or more in 1 week    Take your weight record to your doctor visits  Also, the same goes if you loose more than 3# in one day, let your heart doctor know. DIET:   Cardiac heart healthy diet- Low saturated / low trans fat, no added salt, caffeine restricted, Low sodium diet-   No more than 2,000mg (2 grams) of salt / sodium per day (which equals to a little less than  a teaspoon of salt)  If your doctor wants you on a fluid restriction. ..it is usually recommended a fluid limit of 2,000cc -  Fluid restriction- 2,000 ml (milliliters) = 64 ounces = you can have 8 glasses of fluid per day (each glass 8 ounces)    Follow a low salt diet - avoid using salt at the table, avoid / limit use of canned soups, processed / packaged foods, salted snacks, olives and pickles. Do not use a salt substitute without checking with your doctor, they may contain a high amount of potassioum. (Mrs. Nicole Sanchez is safe to use).     Limit the use of alcohol       CALL YOUR DOCTOR THE FIRST DAY YOU NOTICE ANY OF THESE   SYMPTOMS:  You have a weight gain of 3 pounds or more in 1 day         OR 5 pounds or more in one week  More shortness of breath  More swelling of your stomach, legs, ankles or feet  Feeling more tired, No energy  Dry hacky cough  Dizziness  More chest pain / discomfort       (CALL 066 IF ANY OF THE FOLLOWING OCCURS  Chest pain (not relieved with nitroglycerine, if you have been prescribed this medication)  Severe shortness of breath  Faint / Pass out  Confusion / cannot think clearly  If symptoms get worse           SMOKING - TOBACCO USE:  * IF YOU SMOKE - STOP! Kick the habit. [facility] Program is offered at [facility] and [facility] [facility]. Call [phone] extension [phone] for more information. ACTIVITY:   (Ask your doctor when you will be able to return to work and before starting any exercise program.  Do not drive unless unless your doctor has given you permission to do so). Start light exercise. Even if you can only do a small amount, exercise will help you get stronger, have more energy, help manage your weight and decrease  stress. Walking is an easy way to get exercise. Start out slowly and  increase the amount you walk as tolerated  If you become short of breath, dizzy or have chest pain; stop, sit down, and rest.  If you feel \"wiped out\" the day after you exercise, walk at a slower pace or for a shorter distance. You can gradually increase the pace or amount of time. (Do not exercise right after a meal or in extreme temperatures, such as above 85 degrees, if the air is really humid, or wind chill is less than 20 degrees)                                             ADDITIONAL INFORMATION:  Avoid getting sick from colds and the flu. Stay away from friends or family that you know may have a contagious illness  Get plenty of rest   Get a flu shot each year. Get a pneumococcal vaccine shot.  If you have had one before, ask your doctor whether you need another dose. My Goal for Self-management of Heart Failure Includes 5 steps :    1. Notice a change in symptoms ( weight gain, short of breath, leg swelling, decreased activity level, bloating. ...)    2. Evaluate the change: (use the Heart Failure Zones )     3. Decide to take action: decide what your options are, such as: (call your doctor for an extra visit, take a prescribed medication, such as your water pill if your doctor has given you directions to do so, [facility])    4. Come up with a strategy:  (now you call the doctor for advice / appointment. This is where you take action!!! Do not wait, catch the symptom early and treat it before it worsens. 5. Evaluate the response: The next day, check your Heart Failure Zones: are you in the GREEN ZONE (safe zone)? Worsening symptoms of YELLOW ZONE? Or have you moved to the RED ZONE and need to call 911 or go to the Emergency Room for evaluation? Call your doctor's office to update them on your symptoms of heart failure.

## 2023-10-05 NOTE — PROGRESS NOTES
Date: 10/4/2023    Time: 10:26 PM    Patient Placed On BIPAP/CPAP/ Non-Invasive Ventilation? Yes    If no must comment. Facial area red/color change? No           If YES are Blister/Lesion present? No   If yes must notify nursing staff  BIPAP/CPAP skin barrier?   Yes   Skin barrier type:mepilexlite       Comments:        Regi Willis RCP

## 2023-10-06 PROBLEM — R91.8 PULMONARY NODULES/LESIONS, MULTIPLE: Status: ACTIVE | Noted: 2023-10-06

## 2023-10-06 LAB
ALBUMIN SERPL-MCNC: 2.6 G/DL (ref 3.5–5.2)
ALP SERPL-CCNC: 58 U/L (ref 40–129)
ALT SERPL-CCNC: 22 U/L (ref 0–40)
ANION GAP SERPL CALCULATED.3IONS-SCNC: 8 MMOL/L (ref 7–16)
AST SERPL-CCNC: 15 U/L (ref 0–39)
B PARAP IS1001 DNA NPH QL NAA+NON-PROBE: NOT DETECTED
B PERT DNA SPEC QL NAA+PROBE: NOT DETECTED
BASOPHILS # BLD: 0.06 K/UL (ref 0–0.2)
BASOPHILS NFR BLD: 2 % (ref 0–2)
BILIRUB SERPL-MCNC: 0.2 MG/DL (ref 0–1.2)
BUN SERPL-MCNC: 18 MG/DL (ref 6–20)
C PNEUM DNA NPH QL NAA+NON-PROBE: NOT DETECTED
CALCIUM SERPL-MCNC: 8.1 MG/DL (ref 8.6–10.2)
CHLORIDE SERPL-SCNC: 109 MMOL/L (ref 98–107)
CO2 SERPL-SCNC: 25 MMOL/L (ref 22–29)
CREAT SERPL-MCNC: 1.8 MG/DL (ref 0.7–1.2)
EOSINOPHIL # BLD: 0.18 K/UL (ref 0.05–0.5)
EOSINOPHILS RELATIVE PERCENT: 5 % (ref 0–6)
ERYTHROCYTE [DISTWIDTH] IN BLOOD BY AUTOMATED COUNT: 15.1 % (ref 11.5–15)
FLUAV RNA NPH QL NAA+NON-PROBE: NOT DETECTED
FLUBV RNA NPH QL NAA+NON-PROBE: NOT DETECTED
GFR SERPL CREATININE-BSD FRML MDRD: 46 ML/MIN/1.73M2
GLUCOSE SERPL-MCNC: 92 MG/DL (ref 74–99)
HADV DNA NPH QL NAA+NON-PROBE: NOT DETECTED
HCOV 229E RNA NPH QL NAA+NON-PROBE: NOT DETECTED
HCOV HKU1 RNA NPH QL NAA+NON-PROBE: NOT DETECTED
HCOV NL63 RNA NPH QL NAA+NON-PROBE: NOT DETECTED
HCOV OC43 RNA NPH QL NAA+NON-PROBE: NOT DETECTED
HCT VFR BLD AUTO: 38.4 % (ref 37–54)
HGB BLD-MCNC: 12.5 G/DL (ref 12.5–16.5)
HMPV RNA NPH QL NAA+NON-PROBE: NOT DETECTED
HPIV1 RNA NPH QL NAA+NON-PROBE: NOT DETECTED
HPIV2 RNA NPH QL NAA+NON-PROBE: NOT DETECTED
HPIV3 RNA NPH QL NAA+NON-PROBE: NOT DETECTED
HPIV4 RNA NPH QL NAA+NON-PROBE: NOT DETECTED
IMM GRANULOCYTES # BLD AUTO: <0.03 K/UL (ref 0–0.58)
IMM GRANULOCYTES NFR BLD: 0 % (ref 0–5)
LYMPHOCYTES NFR BLD: 1.57 K/UL (ref 1.5–4)
LYMPHOCYTES RELATIVE PERCENT: 40 % (ref 20–42)
M PNEUMO DNA NPH QL NAA+NON-PROBE: NOT DETECTED
MAGNESIUM SERPL-MCNC: 1.9 MG/DL (ref 1.6–2.6)
MCH RBC QN AUTO: 30 PG (ref 26–35)
MCHC RBC AUTO-ENTMCNC: 32.6 G/DL (ref 32–34.5)
MCV RBC AUTO: 92.3 FL (ref 80–99.9)
MONOCYTES NFR BLD: 0.52 K/UL (ref 0.1–0.95)
MONOCYTES NFR BLD: 13 % (ref 2–12)
NEUTROPHILS NFR BLD: 40 % (ref 43–80)
NEUTS SEG NFR BLD: 1.56 K/UL (ref 1.8–7.3)
PHOSPHATE SERPL-MCNC: 2.7 MG/DL (ref 2.5–4.5)
PLATELET # BLD AUTO: 209 K/UL (ref 130–450)
PMV BLD AUTO: 11 FL (ref 7–12)
POTASSIUM SERPL-SCNC: 3.8 MMOL/L (ref 3.5–5)
PROT SERPL-MCNC: 4.6 G/DL (ref 6.4–8.3)
RBC # BLD AUTO: 4.16 M/UL (ref 3.8–5.8)
RPR SER QL: NONREACTIVE
RSV RNA NPH QL NAA+NON-PROBE: NOT DETECTED
RV+EV RNA NPH QL NAA+NON-PROBE: NOT DETECTED
SARS-COV-2 RNA NPH QL NAA+NON-PROBE: NOT DETECTED
SODIUM SERPL-SCNC: 142 MMOL/L (ref 132–146)
SPECIMEN DESCRIPTION: NORMAL
WBC OTHER # BLD: 3.9 K/UL (ref 4.5–11.5)

## 2023-10-06 PROCEDURE — 83735 ASSAY OF MAGNESIUM: CPT

## 2023-10-06 PROCEDURE — 87449 NOS EACH ORGANISM AG IA: CPT

## 2023-10-06 PROCEDURE — 85025 COMPLETE CBC W/AUTO DIFF WBC: CPT

## 2023-10-06 PROCEDURE — 87899 AGENT NOS ASSAY W/OPTIC: CPT

## 2023-10-06 PROCEDURE — 6360000002 HC RX W HCPCS

## 2023-10-06 PROCEDURE — 6370000000 HC RX 637 (ALT 250 FOR IP)

## 2023-10-06 PROCEDURE — 6370000000 HC RX 637 (ALT 250 FOR IP): Performed by: STUDENT IN AN ORGANIZED HEALTH CARE EDUCATION/TRAINING PROGRAM

## 2023-10-06 PROCEDURE — 86480 TB TEST CELL IMMUN MEASURE: CPT

## 2023-10-06 PROCEDURE — 80053 COMPREHEN METABOLIC PANEL: CPT

## 2023-10-06 PROCEDURE — 36415 COLL VENOUS BLD VENIPUNCTURE: CPT

## 2023-10-06 PROCEDURE — 2060000000 HC ICU INTERMEDIATE R&B

## 2023-10-06 PROCEDURE — 2580000003 HC RX 258

## 2023-10-06 PROCEDURE — 99233 SBSQ HOSP IP/OBS HIGH 50: CPT | Performed by: INTERNAL MEDICINE

## 2023-10-06 PROCEDURE — 0202U NFCT DS 22 TRGT SARS-COV-2: CPT

## 2023-10-06 PROCEDURE — 99232 SBSQ HOSP IP/OBS MODERATE 35: CPT | Performed by: FAMILY MEDICINE

## 2023-10-06 PROCEDURE — 87081 CULTURE SCREEN ONLY: CPT

## 2023-10-06 PROCEDURE — 94660 CPAP INITIATION&MGMT: CPT

## 2023-10-06 PROCEDURE — 84100 ASSAY OF PHOSPHORUS: CPT

## 2023-10-06 RX ORDER — MAGNESIUM SULFATE 1 G/100ML
1000 INJECTION INTRAVENOUS ONCE
Status: COMPLETED | OUTPATIENT
Start: 2023-10-06 | End: 2023-10-06

## 2023-10-06 RX ORDER — POTASSIUM CHLORIDE 20 MEQ/1
20 TABLET, EXTENDED RELEASE ORAL ONCE
Status: COMPLETED | OUTPATIENT
Start: 2023-10-06 | End: 2023-10-06

## 2023-10-06 RX ORDER — AMLODIPINE BESYLATE 5 MG/1
5 TABLET ORAL NIGHTLY
Status: DISCONTINUED | OUTPATIENT
Start: 2023-10-06 | End: 2023-10-06

## 2023-10-06 RX ADMIN — HYDRALAZINE HYDROCHLORIDE 50 MG: 50 TABLET, FILM COATED ORAL at 15:25

## 2023-10-06 RX ADMIN — SODIUM CHLORIDE, PRESERVATIVE FREE 10 ML: 5 INJECTION INTRAVENOUS at 10:00

## 2023-10-06 RX ADMIN — ENOXAPARIN SODIUM 120 MG: 150 INJECTION SUBCUTANEOUS at 10:00

## 2023-10-06 RX ADMIN — ISOSORBIDE DINITRATE 20 MG: 10 TABLET ORAL at 20:18

## 2023-10-06 RX ADMIN — ROSUVASTATIN CALCIUM 20 MG: 20 TABLET, FILM COATED ORAL at 20:18

## 2023-10-06 RX ADMIN — ENOXAPARIN SODIUM 120 MG: 150 INJECTION SUBCUTANEOUS at 20:18

## 2023-10-06 RX ADMIN — HYDRALAZINE HYDROCHLORIDE 50 MG: 50 TABLET, FILM COATED ORAL at 06:40

## 2023-10-06 RX ADMIN — CARVEDILOL 25 MG: 25 TABLET, FILM COATED ORAL at 16:44

## 2023-10-06 RX ADMIN — MAGNESIUM SULFATE HEPTAHYDRATE 1000 MG: 1 INJECTION, SOLUTION INTRAVENOUS at 10:07

## 2023-10-06 RX ADMIN — POTASSIUM CHLORIDE 20 MEQ: 1500 TABLET, EXTENDED RELEASE ORAL at 10:00

## 2023-10-06 RX ADMIN — HYDRALAZINE HYDROCHLORIDE 50 MG: 50 TABLET, FILM COATED ORAL at 20:18

## 2023-10-06 RX ADMIN — CARVEDILOL 25 MG: 25 TABLET, FILM COATED ORAL at 10:00

## 2023-10-06 RX ADMIN — ISOSORBIDE DINITRATE 20 MG: 10 TABLET ORAL at 10:00

## 2023-10-06 RX ADMIN — ISOSORBIDE DINITRATE 20 MG: 10 TABLET ORAL at 15:25

## 2023-10-06 RX ADMIN — SODIUM CHLORIDE, PRESERVATIVE FREE 10 ML: 5 INJECTION INTRAVENOUS at 20:19

## 2023-10-06 NOTE — PROGRESS NOTES
Inpatient Cardiology Consultation      Reason for Consult: HFrEF    Consulting Physician: Dr Joslyn Ramirze    Requesting Physician:  Jason Alcantar MD    Date of Consultation: 10/6/2023    Interim:   Reports shortness of breath. I have stopped amlodipine so that guideline directed medical therapy can be uptitrated  He was advised to avoid cocaine abuse    Past Medical History:    Toxic nonischemic chronic HFrEF:  TTE 8/1/2023: EF 15-20%. Stage III DD. Severe eccentric LVH. NWMA. LV severely enlarged. Mildly dilated RV with mildly reduced systolic function. LA severely dilated. Moderately enlarged RA. Moderate to severe 3+ MR with centrally directed jet. Mild TR.  RVSP 65 mmHg, pulm hypertension moderate. Mild AR. Small pericardial effusion noted. Mildly dilated aortic root 3.8 cm and ascending aorta 3.9 cm. TTE [facility] 7/2022: EF 25%. Moderate MR, moderate TR, trace [place], grade 2 diastolic dysfunction. 3/23/2022 TTE Moderately dilated left ventricle (LADY 6.8, ESD 6.3). Estimated left ventricle ejection fraction 25+/-5%. Normal right ventricular size and function. Moderate MR. The jet is very eccentric  related to posterior leaflet tethering and thus may be underestimated. Mild AI.   3/21/2022 [facility] Dr Holguin Needle: Dominance: Left. LM: Short and angiographically unremarkable. LAD: This is a large wraparound vessel with 2 large diagonals. There is mild diffuse disease in the LAD. D1 has a segmental 60% proximal stenosis. Ramus intermedius: Absent LCx: This is a large dominant vessel with small OM1, large OM 2, small OM 3, large bifurcating LPL and a small LPDA. Mild diffuse disease throughout. RCA: Large sized nondominant vessel with no significant LV supply. Hemodynamics: LVEDP 38. Ao: 127/104 with a mean of 116.  2/17/2022 Lexiscan MPS: EF 23%. + ischemia. LV perfusion defect that is moderate to large in size with severe reduction in uptake present in the apical, basal and mid inferior location(s) that is fixed.  209     BMP:   Recent Labs     10/05/23  0810 10/06/23  0212    142   K 4.1 3.8   CO2 25 25   BUN 19 18   CREATININE 2.0* 1.8*   LABGLOM 41* 46*   CALCIUM 8.1* 8.1*     HgA1c:   Lab Results   Component Value Date    LABA1C 5.6 08/02/2023     CARDIAC ENZYMES:  Recent Labs     10/04/23  0745 10/04/23  0830   CKTOTAL 366*  --    TROPHS 53* 55*     FASTING LIPID PANEL:  Lab Results   Component Value Date/Time    CHOL 203 11/16/2022 02:47 AM    HDL 36 08/02/2023 02:00 PM    LDLCALC 146 11/16/2022 02:47 AM    TRIG 71 11/16/2022 02:47 AM     LIVER PROFILE:  Recent Labs     10/05/23  0810 10/06/23  0212   AST 20 15   ALT 26 22   LABALBU 2.9* 2.6*               TTE 8/1/2023: EF 15-20%. Stage III DD. Severe eccentric LVH. NWMA. LV severely enlarged. Mildly dilated RV with mildly reduced systolic function. LA severely dilated. Moderately enlarged RA. Moderate to severe 3+ MR with centrally directed jet. Mild TR.  RVSP 65 mmHg, pulm hypertension moderate. Mild AR. Small pericardial effusion noted. Mildly dilated aortic root 3.8 cm and ascending aorta 3.9 cm. TTE [facility] 7/2022: EF 25%. Moderate MR, moderate TR, trace [place], grade 2 diastolic dysfunction. 3/23/2022 TTE Moderately dilated left ventricle (LADY 6.8, ESD 6.3). Estimated left ventricle ejection fraction 25+/-5%. Normal right ventricular size and function. Moderate MR. The jet is very eccentric  related to posterior leaflet tethering and thus may be underestimated. Mild AI.   3/21/2022 [facility] Dr Cullen Castaneda: Dominance: Left. LM: Short and angiographically unremarkable. LAD: This is a large wraparound vessel with 2 large diagonals. There is mild diffuse disease in the LAD. D1 has a segmental 60% proximal stenosis. Ramus intermedius: Absent LCx: This is a large dominant vessel with small OM1, large OM 2, small OM 3, large bifurcating LPL and a small LPDA. Mild diffuse disease throughout. RCA: Large sized nondominant vessel with no significant LV supply.
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
## 2023-10-06 NOTE — CARE COORDINATION
Social Work / Discharge Planning :SW followed up with patient and explained role. SW discussed peer Support and offered assistance. Patient was forthcoming and stated he can go somewhere for 28 days but when he gets out, he will go back to the same thing. Patient states its up to him. Agreeable for written resources to be provided with discharge paperwork when ready for discharge. Support provided. SW to follow.  Electronically signed by CASTILLO Thompson on 10/6/23 at 12:58 PM EDT

## 2023-10-06 NOTE — PROGRESS NOTES
[facility] Nephrology Progress Note  Patient's Name: Sandhya Love  1:52 PM  10/6/2023    Reason for Consult:  CKD/volume management  Requesting Physician:  Dr. Nayana Freire    Chief Complaint:  dizziness    History Obtained From:  patient, past medical records, and H+P/Cardiology notes    History of Present Ilness from the 10/5/23 note:    Sandhya Love is a 56 y.o. male with PMHx HFrEF (EF 18%), severe LVH, grade III diastolic dysfunction, HTN, DERRICK, previous cocaine use. Patient went to [facility] ED with main complaint of dizziness. Apparently had just had sexual intercourse immediately prior to his symptoms, and eventually passed out. Was transferred here for further monitoring. Vitals were remarkable for /106 as well as an 8 beat run of SVT. Labs were remarkable for creatinine 2.4, CK3 66, proBNP 8138, D-dimer 696, UDS positive for cocaine and fentanyl, UA positive for 0-2 hyaline casts as well as 6-9 WBC. CXR was remarkable for  mild cardiomegaly . EKG was remarkable for normal sinus rhythm, possible atrial lodgment, with anterior second block, ventricular hypertrophy, no signs of acute ischemia. Patient was seen by Cardiology and our consult is requested for volume management. Cr was 2.4 mg/dL yesterday, 2.0 mg/dL today. Pt has component of underlying CKD, Cr was generally 1.5-1.20 mg/dL since March 2022. Best recent Cr in last year was 1.5 mg/dL last November. Has not seen a local Nephrologist.     Home meds include isordil 20 mg TID, lasix 40 mg daily, coreg 25 mg BID. A Care Everywhere chart from [facility] shows other meds of valsartan 160 mg daily, aldactone 25 mg daily, jardiance 10 mg daily and lasix 40 mg BID. When seen and examined at bedside he had just returned from a V/Q scan. He was on room air. He endorses some shortness of breath especially with any physical exertion he says he is easily winded. He denies headache, dizziness, lightheadedness, nausea and vomiting.  No follow    7-Severe cardiomyopathy in the setting of   Cocaine use and Hx of DERRICK      Thank you Dr. Nayana Freire for allowing us to participate in care of Reynaldo Garcia MD  1:52 PM  10/6/2023

## 2023-10-07 VITALS
DIASTOLIC BLOOD PRESSURE: 91 MMHG | WEIGHT: 260.1 LBS | RESPIRATION RATE: 18 BRPM | TEMPERATURE: 97.6 F | OXYGEN SATURATION: 98 % | SYSTOLIC BLOOD PRESSURE: 141 MMHG | HEART RATE: 81 BPM | HEIGHT: 74 IN | BODY MASS INDEX: 33.38 KG/M2

## 2023-10-07 LAB
25(OH)D3 SERPL-MCNC: <6 NG/ML (ref 30–100)
ALBUMIN SERPL-MCNC: 2.6 G/DL (ref 3.5–5.2)
ALP SERPL-CCNC: 53 U/L (ref 40–129)
ALT SERPL-CCNC: 28 U/L (ref 0–40)
ANION GAP SERPL CALCULATED.3IONS-SCNC: 7 MMOL/L (ref 7–16)
AST SERPL-CCNC: 23 U/L (ref 0–39)
BASOPHILS # BLD: 0.04 K/UL (ref 0–0.2)
BASOPHILS NFR BLD: 1 % (ref 0–2)
BILIRUB SERPL-MCNC: 0.2 MG/DL (ref 0–1.2)
BUN SERPL-MCNC: 18 MG/DL (ref 6–20)
CALCIUM SERPL-MCNC: 8.1 MG/DL (ref 8.6–10.2)
CHLORIDE SERPL-SCNC: 110 MMOL/L (ref 98–107)
CO2 SERPL-SCNC: 21 MMOL/L (ref 22–29)
CREAT SERPL-MCNC: 1.6 MG/DL (ref 0.7–1.2)
EOSINOPHIL # BLD: 0.18 K/UL (ref 0.05–0.5)
EOSINOPHILS RELATIVE PERCENT: 4 % (ref 0–6)
ERYTHROCYTE [DISTWIDTH] IN BLOOD BY AUTOMATED COUNT: 15 % (ref 11.5–15)
GFR SERPL CREATININE-BSD FRML MDRD: 53 ML/MIN/1.73M2
GLUCOSE SERPL-MCNC: 88 MG/DL (ref 74–99)
HCT VFR BLD AUTO: 39.8 % (ref 37–54)
HGB BLD-MCNC: 12.7 G/DL (ref 12.5–16.5)
IMM GRANULOCYTES # BLD AUTO: <0.03 K/UL (ref 0–0.58)
IMM GRANULOCYTES NFR BLD: 0 % (ref 0–5)
L PNEUMO1 AG UR QL IA.RAPID: NEGATIVE
LYMPHOCYTES NFR BLD: 1.6 K/UL (ref 1.5–4)
LYMPHOCYTES RELATIVE PERCENT: 36 % (ref 20–42)
MAGNESIUM SERPL-MCNC: 2 MG/DL (ref 1.6–2.6)
MCH RBC QN AUTO: 29.3 PG (ref 26–35)
MCHC RBC AUTO-ENTMCNC: 31.9 G/DL (ref 32–34.5)
MCV RBC AUTO: 91.7 FL (ref 80–99.9)
MONOCYTES NFR BLD: 0.57 K/UL (ref 0.1–0.95)
MONOCYTES NFR BLD: 13 % (ref 2–12)
NEUTROPHILS NFR BLD: 47 % (ref 43–80)
NEUTS SEG NFR BLD: 2.09 K/UL (ref 1.8–7.3)
PHOSPHATE SERPL-MCNC: 2.7 MG/DL (ref 2.5–4.5)
PLATELET # BLD AUTO: 219 K/UL (ref 130–450)
PMV BLD AUTO: 11.6 FL (ref 7–12)
POTASSIUM SERPL-SCNC: 3.9 MMOL/L (ref 3.5–5)
PROT SERPL-MCNC: 4.7 G/DL (ref 6.4–8.3)
PTH-INTACT SERPL-MCNC: 85.3 PG/ML (ref 15–65)
RBC # BLD AUTO: 4.34 M/UL (ref 3.8–5.8)
S PNEUM AG SPEC QL: NEGATIVE
SODIUM SERPL-SCNC: 138 MMOL/L (ref 132–146)
SPECIMEN SOURCE: NORMAL
WBC OTHER # BLD: 4.5 K/UL (ref 4.5–11.5)

## 2023-10-07 PROCEDURE — 99233 SBSQ HOSP IP/OBS HIGH 50: CPT | Performed by: INTERNAL MEDICINE

## 2023-10-07 PROCEDURE — 6370000000 HC RX 637 (ALT 250 FOR IP): Performed by: STUDENT IN AN ORGANIZED HEALTH CARE EDUCATION/TRAINING PROGRAM

## 2023-10-07 PROCEDURE — 6360000002 HC RX W HCPCS

## 2023-10-07 PROCEDURE — 83735 ASSAY OF MAGNESIUM: CPT

## 2023-10-07 PROCEDURE — 99239 HOSP IP/OBS DSCHRG MGMT >30: CPT | Performed by: INTERNAL MEDICINE

## 2023-10-07 PROCEDURE — 80053 COMPREHEN METABOLIC PANEL: CPT

## 2023-10-07 PROCEDURE — 85025 COMPLETE CBC W/AUTO DIFF WBC: CPT

## 2023-10-07 PROCEDURE — 83970 ASSAY OF PARATHORMONE: CPT

## 2023-10-07 PROCEDURE — 94660 CPAP INITIATION&MGMT: CPT

## 2023-10-07 PROCEDURE — 6370000000 HC RX 637 (ALT 250 FOR IP)

## 2023-10-07 PROCEDURE — 82306 VITAMIN D 25 HYDROXY: CPT

## 2023-10-07 PROCEDURE — 2580000003 HC RX 258

## 2023-10-07 PROCEDURE — 84100 ASSAY OF PHOSPHORUS: CPT

## 2023-10-07 RX ORDER — HYDRALAZINE HYDROCHLORIDE 50 MG/1
75 TABLET, FILM COATED ORAL 2 TIMES DAILY
Qty: 90 TABLET | Refills: 3 | Status: SHIPPED | OUTPATIENT
Start: 2023-10-07

## 2023-10-07 RX ADMIN — HYDRALAZINE HYDROCHLORIDE 50 MG: 50 TABLET, FILM COATED ORAL at 06:42

## 2023-10-07 RX ADMIN — CARVEDILOL 25 MG: 25 TABLET, FILM COATED ORAL at 07:42

## 2023-10-07 RX ADMIN — ISOSORBIDE DINITRATE 20 MG: 10 TABLET ORAL at 07:45

## 2023-10-07 RX ADMIN — APIXABAN 10 MG: 5 TABLET, FILM COATED ORAL at 07:50

## 2023-10-07 RX ADMIN — SODIUM CHLORIDE, PRESERVATIVE FREE 10 ML: 5 INJECTION INTRAVENOUS at 07:43

## 2023-10-07 ASSESSMENT — PAIN SCALES - GENERAL: PAINLEVEL_OUTOF10: 0

## 2023-10-07 NOTE — PROGRESS NOTES
Inpatient Cardiology Consultation      Reason for Consult: HFrEF    Consulting Physician: Dr West Ranks    Requesting Physician:  Ish Winters MD    Date of Consultation: 10/7/2023    Interim:   Reports shortness of breath. Reports feeling fine and wished to be discharged today. Past Medical History:    Toxic nonischemic chronic HFrEF:  TTE 8/1/2023: EF 15-20%. Stage III DD. Severe eccentric LVH. NWMA. LV severely enlarged. Mildly dilated RV with mildly reduced systolic function. LA severely dilated. Moderately enlarged RA. Moderate to severe 3+ MR with centrally directed jet. Mild TR.  RVSP 65 mmHg, pulm hypertension moderate. Mild AR. Small pericardial effusion noted. Mildly dilated aortic root 3.8 cm and ascending aorta 3.9 cm. TTE [facility] 7/2022: EF 25%. Moderate MR, moderate TR, trace [place], grade 2 diastolic dysfunction. 3/23/2022 TTE Moderately dilated left ventricle (LADY 6.8, ESD 6.3). Estimated left ventricle ejection fraction 25+/-5%. Normal right ventricular size and function. Moderate MR. The jet is very eccentric  related to posterior leaflet tethering and thus may be underestimated. Mild AI.   3/21/2022 [facility] Dr Beal Sample: Dominance: Left. LM: Short and angiographically unremarkable. LAD: This is a large wraparound vessel with 2 large diagonals. There is mild diffuse disease in the LAD. D1 has a segmental 60% proximal stenosis. Ramus intermedius: Absent LCx: This is a large dominant vessel with small OM1, large OM 2, small OM 3, large bifurcating LPL and a small LPDA. Mild diffuse disease throughout. RCA: Large sized nondominant vessel with no significant LV supply. Hemodynamics: LVEDP 38. Ao: 127/104 with a mean of 116.  2/17/2022 Lexiscan MPS: EF 23%. + ischemia. LV perfusion defect that is moderate to large in size with severe reduction in uptake present in the apical, basal and mid inferior location(s) that is fixed.  LV perfusion defect that is moderate in size with mild reduction in Moderately enlarged RA. Moderate to severe 3+ MR with centrally directed jet. Mild TR.  RVSP 65 mmHg, pulm hypertension moderate. Mild AR. Small pericardial effusion noted. Mildly dilated aortic root 3.8 cm and ascending aorta 3.9 cm. TTE [facility] 7/2022: EF 25%. Moderate MR, moderate TR, trace [place], grade 2 diastolic dysfunction. 3/23/2022 TTE Moderately dilated left ventricle (LADY 6.8, ESD 6.3). Estimated left ventricle ejection fraction 25+/-5%. Normal right ventricular size and function. Moderate MR. The jet is very eccentric  related to posterior leaflet tethering and thus may be underestimated. Mild AI.   3/21/2022 [facility] Dr Rashaad Sherman: Dominance: Left. LM: Short and angiographically unremarkable. LAD: This is a large wraparound vessel with 2 large diagonals. There is mild diffuse disease in the LAD. D1 has a segmental 60% proximal stenosis. Ramus intermedius: Absent LCx: This is a large dominant vessel with small OM1, large OM 2, small OM 3, large bifurcating LPL and a small LPDA. Mild diffuse disease throughout. RCA: Large sized nondominant vessel with no significant LV supply. Hemodynamics: LVEDP 38. Ao: 127/104 with a mean of 116.  2/17/2022 Lexiscan MPS: EF 23%. + ischemia. LV perfusion defect that is moderate to large in size with severe reduction in uptake present in the apical, basal and mid inferior location(s) that is fixed. LV perfusion defect that is moderate in size with mild reduction in uptake present in the apical and mid anterior location(s) that is reversible. The defect appears to be ischemia. 2/16/2022 [place]: EF 15-20%. Stage II DD. Severely dilated LV. Normal RV size and function. Mildly dilated LA. Mild MR.      Assessment  Dizziness after sex  Syncope after sex  DVT  Chronic kidney injury  Poly substance abuse  NSVT  Toxic nonischemic chronic HFrEF  NSVT admission 8/4/2023  Hypertension  Hyperlipidemia  CKD>> baseline 1.4-1.8  Diabetes>> on no medications  Moderate DERRICK on sleep study

## 2023-10-07 NOTE — DISCHARGE SUMMARY
Discharge Summary     Patient ID:  Ernesto Jha  [id number]  89 y.o. 1975 male  Jone Phan MD        Admit date: 10/4/2023    Discharge date and time:  10/7/2023  10:51 AM      Activity level: As tolerated  Diet: Low-sodium  Labs: No outstanding labs  Disposition: Home  Condition on Discharge: Stable  DME:    Admit Diagnoses:   Patient Active Problem List   Diagnosis    Acute coronary syndrome (HCC)    Chronic HFrEF (heart failure with reduced ejection fraction) (HCC)    Chronic bilateral low back pain with bilateral sciatica    Elevated serum creatinine    Sleep disorder breathing    Bilateral recurrent inguinal hernia without obstruction or gangrene    HFrEF (heart failure with reduced ejection fraction) (HCC)    Nonischemic cardiomyopathy ([address])    Strangulated inguinal hernia    Bilateral leg pain    Chest pain    Primary hypertension    DERRICK (obstructive sleep apnea)    Polysubstance abuse (HCC)    Congestive heart failure of unknown etiology ([address])    Acute deep vein thrombosis (DVT) of right tibial vein (HCC)    Acute on chronic systolic heart failure (HCC)    Pulmonary nodules/lesions, multiple       Discharge Diagnoses: Principal Problem:    Acute deep vein thrombosis (DVT) of right tibial vein (HCC)  Active Problems:    Primary hypertension    DERRICK (obstructive sleep apnea)    Polysubstance abuse (HCC)    Chronic HFrEF (heart failure with reduced ejection fraction) (HCC)    Elevated serum creatinine    Acute on chronic systolic heart failure (HCC)    Pulmonary nodules/lesions, multiple  Resolved Problems:    * No resolved hospital problems. *      Consults:  IP CONSULT TO CARDIOLOGY  IP CONSULT TO SOCIAL WORK  IP CONSULT TO NEPHROLOGY  IP CONSULT TO HEART FAILURE NURSE/COORDINATOR  IP CONSULT TO PULMONOLOGY, pending discussion with Dr. Jany Woods was had today    Procedures: None    Hospital Course: 60-year-old male presented to the hospital with deep vein thrombosis. He was placed on Lovenox.   He has Kidneys demonstrate normal cortical echogenicity. No evidence of hydronephrosis or intrarenal stones. Bladder: Unremarkable appearance of the bladder. No significant post void residual.     Unremarkable ultrasound of the kidneys and urinary bladder. NM LUNG VENT/PERFUSION (VQ)    Result Date: 10/5/2023  EXAMINATION: NUCLEAR MEDICINE VENTILATION PERFUSION SCAN. 10/5/2023 TECHNIQUE: 30 millicuries aerosolized Tc99m DTPA was administered via mask prior to planar imaging of the lungs in multiple projections. Then, 6 millicuries of Tc 09S MAA was administered intravenously prior to planar imaging of the lungs in similar projections. COMPARISON: Chest radiograph 10/04/2023. HISTORY: ORDERING SYSTEM PROVIDED HISTORY: Concern for PE TECHNOLOGIST PROVIDED HISTORY: Reason for exam:->Concern for PE What reading provider will be dictating this exam?->CRC FINDINGS: PERFUSION: There is extensive soft tissue activity including liver and splenic activity. Evaluation of the lung perfusion is somewhat limited due to the extensive soft tissue activity, but there are no obvious unmatched segmental perfusion defects. VENTILATION: Heterogeneous distribution of radiotracer. CHEST RADIOGRAPH: Cardiomegaly. 1.  Findings are highly suggestive of right to left shunting. The degree of soft tissue activity lowers the accuracy of the examination for pulmonary embolism. 2.  No obvious unmatched perfusion defects. US DUP LOWER EXTREMITIES BILATERAL VENOUS    Result Date: 10/4/2023  EXAMINATION: DUPLEX VENOUS ULTRASOUND OF THE BILATERAL LOWER HYENWKGETZG98/4/2023 1:45 pm TECHNIQUE: Duplex ultrasound using B-mode/gray scaled imaging, Doppler spectral analysis and color flow Doppler was obtained of the deep venous structures of the lower bilateral extremities. COMPARISON: None.  HISTORY: ORDERING SYSTEM PROVIDED HISTORY: rule out DVt TECHNOLOGIST PROVIDED HISTORY: Reason for exam:->rule out DVt What reading provider will be dictating intracranial abnormality Reason for exam:->dizzy Decision Support Exception - unselect if not a suspected or confirmed emergency medical condition->Emergency Medical Condition (MA) FINDINGS: BRAIN/VENTRICLES: There is no acute intracranial hemorrhage, mass effect or midline shift. No abnormal extra-axial fluid collection. The gray-white differentiation is maintained without evidence of an acute infarct. There is no evidence of hydrocephalus. ORBITS: The visualized portion of the orbits demonstrate no acute abnormality. SINUSES: The visualized paranasal sinuses and mastoid air cells demonstrate no acute abnormality. SOFT TISSUES/SKULL:  No acute abnormality of the visualized skull or soft tissues. No acute intracranial abnormality. XR CHEST PORTABLE    Result Date: 10/4/2023  EXAMINATION: ONE XRAY VIEW OF THE CHEST 10/4/2023 7:46 am COMPARISON: None. HISTORY: ORDERING SYSTEM PROVIDED HISTORY: dysnea, presyncope TECHNOLOGIST PROVIDED HISTORY: Reason for exam:->dysnea, presyncope FINDINGS: The cardiac silhouette is mildly enlarged. No findings of CHF There is no right or left lung consolidation to suggest pneumonia. There is no pleural effusion. 1. There are no findings of pneumonia or failure.  2. Mild cardiomegaly       Patient Instructions:   Current Discharge Medication List        START taking these medications    Details   apixaban starter pack (ELIQUIS) 5 MG TBPK tablet Take 1 tablet by mouth See Admin Instructions  Qty: 74 tablet, Refills: 0           CONTINUE these medications which have NOT CHANGED    Details   isosorbide dinitrate (ISORDIL) 20 MG tablet TAKE 1 TABLET BY MOUTH 3 TIMES A DAY  Qty: 90 tablet, Refills: 0      melatonin 3 MG TABS tablet TAKE 1 TABLET BY MOUTH NIGHTLY AS NEEDED FOR INSOMNIA  Qty: 30 tablet, Refills: 0      furosemide (LASIX) 40 MG tablet Take 1 tablet by mouth daily  Qty: 30 tablet, Refills: 0      carvedilol (COREG) 25 MG tablet Take 1 tablet by mouth 2 times daily

## 2023-10-07 NOTE — PROGRESS NOTES
Spoke with Dr. Mario Gambino regarding patients concern for being able to afford eliquis even with discount card, no new orders at this time

## 2023-10-07 NOTE — PROGRESS NOTES
Discharge paperwork provided to the patient, educated him on the importance of medication compliance including new eliquis and following up with his physicians after discharge. Eliquis discount card and information provided and reviewed with the patient. He states he is not interested in any kind of rehab or drug cessation programs, written information provided with his discharge papers.

## 2023-10-08 LAB
MICROORGANISM SPEC CULT: NORMAL
SPECIMEN DESCRIPTION: NORMAL

## 2023-10-09 LAB
CHLAMYDIA DNA UR QL NAA+PROBE: NEGATIVE
N GONORRHOEA DNA UR QL NAA+PROBE: NEGATIVE
QUANTI TB GOLD PLUS: NORMAL
QUANTI TB1 MINUS NIL: NORMAL
QUANTI TB2 MINUS NIL: NORMAL
QUANTIFERON MITOGEN: NORMAL
QUANTIFERON NIL: NORMAL
SPECIMEN DESCRIPTION: NORMAL

## 2023-10-11 ENCOUNTER — TELEPHONE (OUTPATIENT)
Dept: CARDIOLOGY CLINIC | Age: 48
End: 2023-10-11

## 2023-11-16 RX ORDER — APIXABAN 5 MG (74)
KIT ORAL
Qty: 74 EACH | OUTPATIENT
Start: 2023-11-16

## 2023-11-20 RX ORDER — APIXABAN 5 MG (74)
KIT ORAL
Qty: 74 EACH | OUTPATIENT
Start: 2023-11-20

## 2023-12-14 PROBLEM — F19.10 DRUG ABUSE (HCC): Status: ACTIVE | Noted: 2023-12-14

## 2023-12-14 PROBLEM — N17.9 ACUTE KIDNEY INJURY SUPERIMPOSED ON CHRONIC KIDNEY DISEASE (HCC): Status: ACTIVE | Noted: 2023-12-14

## 2023-12-14 PROBLEM — N18.9 ACUTE KIDNEY INJURY SUPERIMPOSED ON CHRONIC KIDNEY DISEASE (HCC): Status: ACTIVE | Noted: 2023-12-14

## 2023-12-15 PROBLEM — R94.31 ABNORMAL EKG: Status: ACTIVE | Noted: 2023-12-15

## 2023-12-16 PROBLEM — I25.10 CORONARY ARTERY DISEASE INVOLVING NATIVE CORONARY ARTERY OF NATIVE HEART WITHOUT ANGINA PECTORIS: Status: ACTIVE | Noted: 2023-12-16

## 2023-12-16 PROBLEM — I34.0 NONRHEUMATIC MITRAL VALVE REGURGITATION: Status: ACTIVE | Noted: 2023-12-16

## 2023-12-16 PROBLEM — Z91.199 HISTORY OF NONCOMPLIANCE WITH MEDICAL TREATMENT: Status: ACTIVE | Noted: 2023-12-16

## 2023-12-16 PROBLEM — R79.89 ELEVATED TROPONIN: Status: ACTIVE | Noted: 2023-12-16

## 2023-12-26 RX ORDER — APIXABAN 5 MG (74)
KIT ORAL
Qty: 74 EACH | OUTPATIENT
Start: 2023-12-26

## 2023-12-27 ENCOUNTER — TELEPHONE (OUTPATIENT)
Dept: CARDIOLOGY CLINIC | Age: 48
End: 2023-12-27

## 2024-01-08 ENCOUNTER — APPOINTMENT (OUTPATIENT)
Dept: CT IMAGING | Age: 49
End: 2024-01-08
Payer: COMMERCIAL

## 2024-01-08 ENCOUNTER — APPOINTMENT (OUTPATIENT)
Dept: MRI IMAGING | Age: 49
End: 2024-01-08
Payer: COMMERCIAL

## 2024-01-08 ENCOUNTER — HOSPITAL ENCOUNTER (OUTPATIENT)
Age: 49
Setting detail: OBSERVATION
Discharge: HOME | End: 2024-01-09
Attending: STUDENT IN AN ORGANIZED HEALTH CARE EDUCATION/TRAINING PROGRAM | Admitting: FAMILY MEDICINE
Payer: COMMERCIAL

## 2024-01-08 DIAGNOSIS — M54.9 INTRACTABLE BACK PAIN: Primary | ICD-10-CM

## 2024-01-08 LAB
ALBUMIN SERPL-MCNC: 3.2 G/DL (ref 3.5–5.2)
ALP SERPL-CCNC: 62 U/L (ref 40–129)
ALT SERPL-CCNC: 15 U/L (ref 0–40)
ANION GAP SERPL CALCULATED.3IONS-SCNC: 11 MMOL/L (ref 7–16)
AST SERPL-CCNC: 15 U/L (ref 0–39)
BASOPHILS # BLD: 0.03 K/UL (ref 0–0.2)
BASOPHILS NFR BLD: 0 % (ref 0–2)
BILIRUB SERPL-MCNC: 0.5 MG/DL (ref 0–1.2)
BUN SERPL-MCNC: 17 MG/DL (ref 6–20)
CALCIUM SERPL-MCNC: 9 MG/DL (ref 8.6–10.2)
CHLORIDE SERPL-SCNC: 106 MMOL/L (ref 98–107)
CK SERPL-CCNC: 228 U/L (ref 20–200)
CO2 SERPL-SCNC: 22 MMOL/L (ref 22–29)
CREAT SERPL-MCNC: 2 MG/DL (ref 0.7–1.2)
EOSINOPHIL # BLD: 0.05 K/UL (ref 0.05–0.5)
EOSINOPHILS RELATIVE PERCENT: 1 % (ref 0–6)
ERYTHROCYTE [DISTWIDTH] IN BLOOD BY AUTOMATED COUNT: 14.4 % (ref 11.5–15)
GFR SERPL CREATININE-BSD FRML MDRD: 41 ML/MIN/1.73M2
GLUCOSE SERPL-MCNC: 96 MG/DL (ref 74–99)
HCT VFR BLD AUTO: 35.7 % (ref 37–54)
HGB BLD-MCNC: 11.8 G/DL (ref 12.5–16.5)
IMM GRANULOCYTES # BLD AUTO: 0.04 K/UL (ref 0–0.58)
IMM GRANULOCYTES NFR BLD: 1 % (ref 0–5)
LYMPHOCYTES NFR BLD: 0.42 K/UL (ref 1.5–4)
LYMPHOCYTES RELATIVE PERCENT: 5 % (ref 20–42)
MCH RBC QN AUTO: 29.1 PG (ref 26–35)
MCHC RBC AUTO-ENTMCNC: 33.1 G/DL (ref 32–34.5)
MCV RBC AUTO: 87.9 FL (ref 80–99.9)
MONOCYTES NFR BLD: 0.7 K/UL (ref 0.1–0.95)
MONOCYTES NFR BLD: 8 % (ref 2–12)
NEUTROPHILS NFR BLD: 86 % (ref 43–80)
NEUTS SEG NFR BLD: 7.63 K/UL (ref 1.8–7.3)
PLATELET # BLD AUTO: 410 K/UL (ref 130–450)
PMV BLD AUTO: 10.4 FL (ref 7–12)
POTASSIUM SERPL-SCNC: 3.9 MMOL/L (ref 3.5–5)
PROT SERPL-MCNC: 6.9 G/DL (ref 6.4–8.3)
RBC # BLD AUTO: 4.06 M/UL (ref 3.8–5.8)
RBC # BLD: NORMAL 10*6/UL
SODIUM SERPL-SCNC: 139 MMOL/L (ref 132–146)
WBC OTHER # BLD: 8.9 K/UL (ref 4.5–11.5)

## 2024-01-08 PROCEDURE — 96375 TX/PRO/DX INJ NEW DRUG ADDON: CPT

## 2024-01-08 PROCEDURE — 99285 EMERGENCY DEPT VISIT HI MDM: CPT

## 2024-01-08 PROCEDURE — 85025 COMPLETE CBC W/AUTO DIFF WBC: CPT

## 2024-01-08 PROCEDURE — 82550 ASSAY OF CK (CPK): CPT

## 2024-01-08 PROCEDURE — 96374 THER/PROPH/DIAG INJ IV PUSH: CPT

## 2024-01-08 PROCEDURE — 80053 COMPREHEN METABOLIC PANEL: CPT

## 2024-01-08 PROCEDURE — 6360000002 HC RX W HCPCS: Performed by: STUDENT IN AN ORGANIZED HEALTH CARE EDUCATION/TRAINING PROGRAM

## 2024-01-08 PROCEDURE — 96372 THER/PROPH/DIAG INJ SC/IM: CPT

## 2024-01-08 PROCEDURE — 72148 MRI LUMBAR SPINE W/O DYE: CPT

## 2024-01-08 PROCEDURE — 2500000003 HC RX 250 WO HCPCS: Performed by: STUDENT IN AN ORGANIZED HEALTH CARE EDUCATION/TRAINING PROGRAM

## 2024-01-08 PROCEDURE — 72131 CT LUMBAR SPINE W/O DYE: CPT

## 2024-01-08 RX ORDER — KETOROLAC TROMETHAMINE 30 MG/ML
15 INJECTION, SOLUTION INTRAMUSCULAR; INTRAVENOUS ONCE
Status: COMPLETED | OUTPATIENT
Start: 2024-01-08 | End: 2024-01-08

## 2024-01-08 RX ORDER — ORPHENADRINE CITRATE 30 MG/ML
60 INJECTION INTRAMUSCULAR; INTRAVENOUS ONCE
Status: COMPLETED | OUTPATIENT
Start: 2024-01-08 | End: 2024-01-08

## 2024-01-08 RX ORDER — ORPHENADRINE CITRATE 30 MG/ML
60 INJECTION INTRAMUSCULAR; INTRAVENOUS ONCE
Status: DISCONTINUED | OUTPATIENT
Start: 2024-01-08 | End: 2024-01-08

## 2024-01-08 RX ORDER — DEXAMETHASONE SODIUM PHOSPHATE 10 MG/ML
10 INJECTION INTRAMUSCULAR; INTRAVENOUS ONCE
Status: COMPLETED | OUTPATIENT
Start: 2024-01-08 | End: 2024-01-08

## 2024-01-08 RX ORDER — KETAMINE HYDROCHLORIDE 10 MG/ML
30 INJECTION, SOLUTION INTRAMUSCULAR; INTRAVENOUS ONCE
Status: COMPLETED | OUTPATIENT
Start: 2024-01-08 | End: 2024-01-08

## 2024-01-08 RX ADMIN — DEXAMETHASONE SODIUM PHOSPHATE 10 MG: 10 INJECTION INTRAMUSCULAR; INTRAVENOUS at 14:44

## 2024-01-08 RX ADMIN — KETOROLAC TROMETHAMINE 15 MG: 30 INJECTION, SOLUTION INTRAMUSCULAR; INTRAVENOUS at 14:42

## 2024-01-08 RX ADMIN — KETAMINE HYDROCHLORIDE 30 MG: 10 INJECTION INTRAMUSCULAR; INTRAVENOUS at 16:44

## 2024-01-08 RX ADMIN — ORPHENADRINE CITRATE 60 MG: 60 INJECTION INTRAMUSCULAR; INTRAVENOUS at 14:47

## 2024-01-08 ASSESSMENT — PAIN DESCRIPTION - ORIENTATION
ORIENTATION: LOWER;RIGHT;LEFT
ORIENTATION: LEFT;LOWER

## 2024-01-08 ASSESSMENT — PAIN SCALES - GENERAL
PAINLEVEL_OUTOF10: 10
PAINLEVEL_OUTOF10: 10

## 2024-01-08 ASSESSMENT — PAIN DESCRIPTION - LOCATION
LOCATION: BACK
LOCATION: BACK;HIP

## 2024-01-08 ASSESSMENT — PAIN - FUNCTIONAL ASSESSMENT: PAIN_FUNCTIONAL_ASSESSMENT: 0-10

## 2024-01-08 ASSESSMENT — PAIN DESCRIPTION - DESCRIPTORS: DESCRIPTORS: SHARP

## 2024-01-08 NOTE — ED NOTES
Spoke with MRI, they would like us to wait to administer pain med until they call with an opening to take patient so he has maximum pain control while getting the MRI.

## 2024-01-08 NOTE — ED PROVIDER NOTES
Vince Conroy is a 48-year-old male presented to emergency department concern for back pain.  Patient is having left-sided lower back pain rating down the left leg.  Patient has symptoms present for 2 weeks over the last 2 days it is worsened patient is having difficulty ambulating due to pain. Patient does have a history of substance abuse from record review.     The history is provided by the patient and medical records.        Review of Systems   Constitutional:  Negative for chills, diaphoresis, fatigue and fever.   Eyes:  Negative for photophobia and visual disturbance.   Respiratory:  Negative for cough, chest tightness and shortness of breath.    Cardiovascular:  Negative for chest pain, palpitations and leg swelling.   Gastrointestinal:  Negative for abdominal distention, abdominal pain, diarrhea, nausea and vomiting.   Genitourinary:  Negative for dysuria.   Musculoskeletal:  Positive for back pain. Negative for neck pain and neck stiffness.   Skin:  Negative for pallor and rash.   Neurological:  Negative for headaches.   Psychiatric/Behavioral:  Negative for confusion.         Physical Exam  Vitals and nursing note reviewed.   Constitutional:       General: He is not in acute distress.     Appearance: Normal appearance. He is not ill-appearing.   HENT:      Head: Normocephalic and atraumatic.   Eyes:      General: No scleral icterus.     Conjunctiva/sclera: Conjunctivae normal.      Pupils: Pupils are equal, round, and reactive to light.   Cardiovascular:      Rate and Rhythm: Normal rate and regular rhythm.      Comments: Intact DP PT pulses  Pulmonary:      Effort: Pulmonary effort is normal.      Breath sounds: Normal breath sounds.   Abdominal:      General: Bowel sounds are normal. There is no distension.      Palpations: Abdomen is soft.      Tenderness: There is no abdominal tenderness. There is no guarding or rebound.   Musculoskeletal:      Cervical back: Normal range of motion and neck supple.
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
## 2024-01-09 ENCOUNTER — APPOINTMENT (OUTPATIENT)
Dept: GENERAL RADIOLOGY | Age: 49
End: 2024-01-09
Payer: COMMERCIAL

## 2024-01-09 VITALS
OXYGEN SATURATION: 96 % | DIASTOLIC BLOOD PRESSURE: 55 MMHG | BODY MASS INDEX: 31.7 KG/M2 | TEMPERATURE: 97.5 F | RESPIRATION RATE: 18 BRPM | HEART RATE: 80 BPM | HEIGHT: 74 IN | SYSTOLIC BLOOD PRESSURE: 113 MMHG | WEIGHT: 247 LBS

## 2024-01-09 PROBLEM — M25.552 HIP PAIN, ACUTE, LEFT: Status: ACTIVE | Noted: 2024-01-09

## 2024-01-09 PROBLEM — M25.552 PAIN OF LEFT HIP: Status: ACTIVE | Noted: 2024-01-09

## 2024-01-09 LAB
ALBUMIN SERPL-MCNC: 3 G/DL (ref 3.5–5.2)
ALP SERPL-CCNC: 62 U/L (ref 40–129)
ALT SERPL-CCNC: 17 U/L (ref 0–40)
AMPHET UR QL SCN: NEGATIVE
ANION GAP SERPL CALCULATED.3IONS-SCNC: 11 MMOL/L (ref 7–16)
AST SERPL-CCNC: 16 U/L (ref 0–39)
BARBITURATES UR QL SCN: NEGATIVE
BENZODIAZ UR QL: NEGATIVE
BILIRUB SERPL-MCNC: 0.3 MG/DL (ref 0–1.2)
BUN SERPL-MCNC: 20 MG/DL (ref 6–20)
BUPRENORPHINE UR QL: POSITIVE
CALCIUM SERPL-MCNC: 8.9 MG/DL (ref 8.6–10.2)
CANNABINOIDS UR QL SCN: NEGATIVE
CHLORIDE SERPL-SCNC: 106 MMOL/L (ref 98–107)
CO2 SERPL-SCNC: 21 MMOL/L (ref 22–29)
COCAINE UR QL SCN: POSITIVE
CREAT SERPL-MCNC: 1.7 MG/DL (ref 0.7–1.2)
FENTANYL UR QL: POSITIVE
GFR SERPL CREATININE-BSD FRML MDRD: 49 ML/MIN/1.73M2
GLUCOSE SERPL-MCNC: 139 MG/DL (ref 74–99)
METHADONE UR QL: NEGATIVE
OPIATES UR QL SCN: NEGATIVE
OXYCODONE UR QL SCN: NEGATIVE
PCP UR QL SCN: NEGATIVE
POTASSIUM SERPL-SCNC: 4.5 MMOL/L (ref 3.5–5)
PROT SERPL-MCNC: 6.9 G/DL (ref 6.4–8.3)
SODIUM SERPL-SCNC: 138 MMOL/L (ref 132–146)
TEST INFORMATION: ABNORMAL

## 2024-01-09 PROCEDURE — 80307 DRUG TEST PRSMV CHEM ANLYZR: CPT

## 2024-01-09 PROCEDURE — G0378 HOSPITAL OBSERVATION PER HR: HCPCS

## 2024-01-09 PROCEDURE — 99235 HOSP IP/OBS SAME DATE MOD 70: CPT | Performed by: FAMILY MEDICINE

## 2024-01-09 PROCEDURE — 97161 PT EVAL LOW COMPLEX 20 MIN: CPT

## 2024-01-09 PROCEDURE — 6370000000 HC RX 637 (ALT 250 FOR IP)

## 2024-01-09 PROCEDURE — 73562 X-RAY EXAM OF KNEE 3: CPT

## 2024-01-09 PROCEDURE — 80053 COMPREHEN METABOLIC PANEL: CPT

## 2024-01-09 PROCEDURE — 97165 OT EVAL LOW COMPLEX 30 MIN: CPT

## 2024-01-09 PROCEDURE — 73502 X-RAY EXAM HIP UNI 2-3 VIEWS: CPT

## 2024-01-09 RX ORDER — ONDANSETRON 2 MG/ML
4 INJECTION INTRAMUSCULAR; INTRAVENOUS EVERY 6 HOURS PRN
Status: DISCONTINUED | OUTPATIENT
Start: 2024-01-09 | End: 2024-01-09 | Stop reason: HOSPADM

## 2024-01-09 RX ORDER — SODIUM CHLORIDE 0.9 % (FLUSH) 0.9 %
5-40 SYRINGE (ML) INJECTION PRN
Status: DISCONTINUED | OUTPATIENT
Start: 2024-01-09 | End: 2024-01-09 | Stop reason: HOSPADM

## 2024-01-09 RX ORDER — PREDNISONE 20 MG/1
40 TABLET ORAL DAILY
Status: DISCONTINUED | OUTPATIENT
Start: 2024-01-09 | End: 2024-01-09 | Stop reason: HOSPADM

## 2024-01-09 RX ORDER — ACETAMINOPHEN 325 MG/1
650 TABLET ORAL EVERY 6 HOURS PRN
Status: DISCONTINUED | OUTPATIENT
Start: 2024-01-09 | End: 2024-01-09 | Stop reason: HOSPADM

## 2024-01-09 RX ORDER — MAGNESIUM SULFATE IN WATER 40 MG/ML
2000 INJECTION, SOLUTION INTRAVENOUS PRN
Status: DISCONTINUED | OUTPATIENT
Start: 2024-01-09 | End: 2024-01-09 | Stop reason: HOSPADM

## 2024-01-09 RX ORDER — OMEPRAZOLE 20 MG/1
20 CAPSULE, DELAYED RELEASE ORAL EVERY MORNING
Status: ON HOLD | COMMUNITY

## 2024-01-09 RX ORDER — SPIRONOLACTONE 25 MG/1
50 TABLET ORAL EVERY MORNING
Status: DISCONTINUED | OUTPATIENT
Start: 2024-01-09 | End: 2024-01-09 | Stop reason: HOSPADM

## 2024-01-09 RX ORDER — HYDROCODONE BITARTRATE AND ACETAMINOPHEN 5; 325 MG/1; MG/1
1 TABLET ORAL EVERY 6 HOURS PRN
Status: DISCONTINUED | OUTPATIENT
Start: 2024-01-09 | End: 2024-01-09 | Stop reason: HOSPADM

## 2024-01-09 RX ORDER — LANOLIN ALCOHOL/MO/W.PET/CERES
3 CREAM (GRAM) TOPICAL NIGHTLY PRN
Status: DISCONTINUED | OUTPATIENT
Start: 2024-01-09 | End: 2024-01-09 | Stop reason: HOSPADM

## 2024-01-09 RX ORDER — NALOXONE HYDROCHLORIDE 4 MG/.1ML
1 SPRAY NASAL PRN
Qty: 1 EACH | Refills: 0 | Status: ON HOLD | OUTPATIENT
Start: 2024-01-09

## 2024-01-09 RX ORDER — ACETAMINOPHEN 650 MG/1
650 SUPPOSITORY RECTAL EVERY 6 HOURS PRN
Status: DISCONTINUED | OUTPATIENT
Start: 2024-01-09 | End: 2024-01-09 | Stop reason: HOSPADM

## 2024-01-09 RX ORDER — ROSUVASTATIN CALCIUM 20 MG/1
20 TABLET, COATED ORAL NIGHTLY
Status: DISCONTINUED | OUTPATIENT
Start: 2024-01-09 | End: 2024-01-09 | Stop reason: HOSPADM

## 2024-01-09 RX ORDER — SODIUM CHLORIDE 9 MG/ML
INJECTION, SOLUTION INTRAVENOUS PRN
Status: DISCONTINUED | OUTPATIENT
Start: 2024-01-09 | End: 2024-01-09 | Stop reason: HOSPADM

## 2024-01-09 RX ORDER — CYCLOBENZAPRINE HCL 5 MG
5 TABLET ORAL 2 TIMES DAILY PRN
Qty: 14 TABLET | Refills: 0 | Status: ON HOLD | OUTPATIENT
Start: 2024-01-09 | End: 2024-01-16

## 2024-01-09 RX ORDER — CARVEDILOL 6.25 MG/1
12.5 TABLET ORAL 2 TIMES DAILY WITH MEALS
Status: DISCONTINUED | OUTPATIENT
Start: 2024-01-09 | End: 2024-01-09 | Stop reason: HOSPADM

## 2024-01-09 RX ORDER — FUROSEMIDE 40 MG/1
40 TABLET ORAL DAILY
Status: DISCONTINUED | OUTPATIENT
Start: 2024-01-09 | End: 2024-01-09 | Stop reason: HOSPADM

## 2024-01-09 RX ORDER — PANTOPRAZOLE SODIUM 40 MG/1
40 TABLET, DELAYED RELEASE ORAL
Status: DISCONTINUED | OUTPATIENT
Start: 2024-01-09 | End: 2024-01-09 | Stop reason: HOSPADM

## 2024-01-09 RX ORDER — LANOLIN ALCOHOL/MO/W.PET/CERES
CREAM (GRAM) TOPICAL
Qty: 30 TABLET | Refills: 0 | Status: ON HOLD | OUTPATIENT
Start: 2024-01-09

## 2024-01-09 RX ORDER — POTASSIUM CHLORIDE 20 MEQ/1
40 TABLET, EXTENDED RELEASE ORAL PRN
Status: DISCONTINUED | OUTPATIENT
Start: 2024-01-09 | End: 2024-01-09

## 2024-01-09 RX ORDER — POLYETHYLENE GLYCOL 3350 17 G/17G
17 POWDER, FOR SOLUTION ORAL DAILY PRN
Status: DISCONTINUED | OUTPATIENT
Start: 2024-01-09 | End: 2024-01-09 | Stop reason: HOSPADM

## 2024-01-09 RX ORDER — ISOSORBIDE DINITRATE 20 MG/1
20 TABLET ORAL 3 TIMES DAILY
Status: ON HOLD | COMMUNITY

## 2024-01-09 RX ORDER — ONDANSETRON 4 MG/1
4 TABLET, ORALLY DISINTEGRATING ORAL EVERY 8 HOURS PRN
Status: DISCONTINUED | OUTPATIENT
Start: 2024-01-09 | End: 2024-01-09 | Stop reason: HOSPADM

## 2024-01-09 RX ORDER — PREDNISONE 20 MG/1
40 TABLET ORAL DAILY
Qty: 8 TABLET | Refills: 0 | Status: ON HOLD | OUTPATIENT
Start: 2024-01-10 | End: 2024-01-14

## 2024-01-09 RX ORDER — CILOSTAZOL 50 MG/1
50 TABLET ORAL 2 TIMES DAILY
Status: ON HOLD | COMMUNITY

## 2024-01-09 RX ORDER — GABAPENTIN 100 MG/1
100 CAPSULE ORAL 3 TIMES DAILY
Status: ON HOLD | COMMUNITY

## 2024-01-09 RX ORDER — POTASSIUM CHLORIDE 7.45 MG/ML
10 INJECTION INTRAVENOUS PRN
Status: DISCONTINUED | OUTPATIENT
Start: 2024-01-09 | End: 2024-01-09

## 2024-01-09 RX ORDER — ISOSORBIDE DINITRATE 10 MG/1
20 TABLET ORAL 3 TIMES DAILY
Status: DISCONTINUED | OUTPATIENT
Start: 2024-01-09 | End: 2024-01-09 | Stop reason: HOSPADM

## 2024-01-09 RX ORDER — FUROSEMIDE 40 MG/1
40 TABLET ORAL EVERY MORNING
Status: ON HOLD | COMMUNITY

## 2024-01-09 RX ORDER — SODIUM CHLORIDE 0.9 % (FLUSH) 0.9 %
5-40 SYRINGE (ML) INJECTION EVERY 12 HOURS SCHEDULED
Status: DISCONTINUED | OUTPATIENT
Start: 2024-01-09 | End: 2024-01-09 | Stop reason: HOSPADM

## 2024-01-09 RX ORDER — ISOSORBIDE DINITRATE 20 MG/1
20 TABLET ORAL 3 TIMES DAILY
Qty: 90 TABLET | Refills: 0 | Status: ON HOLD | OUTPATIENT
Start: 2024-01-09

## 2024-01-09 RX ORDER — ROSUVASTATIN CALCIUM 20 MG/1
20 TABLET, COATED ORAL NIGHTLY
Status: ON HOLD | COMMUNITY
Start: 2023-12-26

## 2024-01-09 RX ADMIN — ACETAMINOPHEN 650 MG: 325 TABLET ORAL at 09:01

## 2024-01-09 RX ADMIN — HYDRALAZINE HYDROCHLORIDE 75 MG: 25 TABLET ORAL at 08:58

## 2024-01-09 RX ADMIN — DICLOFENAC SODIUM 4 G: 10 GEL TOPICAL at 11:55

## 2024-01-09 RX ADMIN — PANTOPRAZOLE SODIUM 40 MG: 40 TABLET, DELAYED RELEASE ORAL at 08:57

## 2024-01-09 RX ADMIN — HYDROCODONE BITARTRATE AND ACETAMINOPHEN 1 TABLET: 5; 325 TABLET ORAL at 11:48

## 2024-01-09 RX ADMIN — HYDROCODONE BITARTRATE AND ACETAMINOPHEN 1 TABLET: 5; 325 TABLET ORAL at 05:40

## 2024-01-09 RX ADMIN — APIXABAN 5 MG: 5 TABLET, FILM COATED ORAL at 08:57

## 2024-01-09 RX ADMIN — CARVEDILOL 12.5 MG: 6.25 TABLET, FILM COATED ORAL at 10:08

## 2024-01-09 RX ADMIN — FUROSEMIDE 40 MG: 40 TABLET ORAL at 08:57

## 2024-01-09 RX ADMIN — SPIRONOLACTONE 50 MG: 25 TABLET, FILM COATED ORAL at 11:48

## 2024-01-09 RX ADMIN — PREDNISONE 40 MG: 20 TABLET ORAL at 15:14

## 2024-01-09 RX ADMIN — ISOSORBIDE DINITRATE 20 MG: 10 TABLET ORAL at 10:09

## 2024-01-09 RX ADMIN — ISOSORBIDE DINITRATE 20 MG: 10 TABLET ORAL at 15:14

## 2024-01-09 ASSESSMENT — ENCOUNTER SYMPTOMS
CONSTIPATION: 0
DIARRHEA: 0
SHORTNESS OF BREATH: 0
VOMITING: 0
CHEST TIGHTNESS: 0
BACK PAIN: 1
ABDOMINAL PAIN: 0
ABDOMINAL DISTENTION: 0
PHOTOPHOBIA: 0
NAUSEA: 0
WHEEZING: 0
SORE THROAT: 0
COUGH: 0

## 2024-01-09 ASSESSMENT — PAIN SCALES - GENERAL
PAINLEVEL_OUTOF10: 8
PAINLEVEL_OUTOF10: 10

## 2024-01-09 ASSESSMENT — PAIN - FUNCTIONAL ASSESSMENT: PAIN_FUNCTIONAL_ASSESSMENT: PREVENTS OR INTERFERES SOME ACTIVE ACTIVITIES AND ADLS

## 2024-01-09 ASSESSMENT — PAIN DESCRIPTION - LOCATION: LOCATION: HIP;BACK

## 2024-01-09 ASSESSMENT — PAIN DESCRIPTION - DESCRIPTORS: DESCRIPTORS: ACHING;SORE

## 2024-01-09 ASSESSMENT — PAIN DESCRIPTION - ORIENTATION: ORIENTATION: LEFT

## 2024-01-09 NOTE — DISCHARGE SUMMARY
Physician Discharge Summary  [facility]idency     Patient ID:  Vince Conroy  [id number]  48 y.o.  1975    Admit date: 1/8/2024    Discharge date: 1/9/2024    Admission Diagnoses:   Intractable back pain [M54.9]  Hip pain, acute, left [M25.552]    Discharge Diagnoses:  Principal Problem:    Hip pain, acute, left  Active Problems:    Pain of left hip  Resolved Problems:    * No resolved hospital problems. *      Consults: none  Procedures: None    Hospital Course: Vince Conroy  is a 48 y.o. male patient of Lenny Quiros DO  with a pertinent PMHx of HFrEF (EF 18%), severe left ventricular hypertrophy, stage III diastolic dysfunction, CKD, HTN, DERRICK, and cocaine use who presented with chief complaint of  hip pain.   CT lumbar spine shows degenerative changes at L4-5, spinal canal stenosis and retroperitoneal adenopathy. MRI lumbar spine shows edema of left paraspinal musculature at L2 extending into pelvis concerning for nonspecific myositis. Mass like lesion at left of left neural foramen of L4/L5 which may be a disc herniation. He was given decadron, ketamine, toradol, and norflex. UDS was positive for cocaine,fentanyl and buprenorphine. Neurosurgery was consulted who suggested he follow up outpatient.    His pain was managed with pain medications.  DME order for walker was placed.PT scores were 16/24 and PT approved the walker.  Patient was discharged in a stable and improved condition.    Post Discharge Follow up:  1.Call [phone] for [employer] Access line. Find PCP for management of chronic conditions via insurance if the number doesn't help.  2.consider palliative care for pain management  3.consider neurosurgery for spinal issues management    Medication changes: flexeril for 7 days,prednisone 40mg for 5 days,narcan nasal spray ordered  Significant Diagnostic Studies:   XR KNEE LEFT (3 VIEWS)   Final Result   No acute abnormality of the knee.         XR HIP LEFT (2-3

## 2024-01-09 NOTE — CARE COORDINATION
Transition of Care-Patient requesting a walker-no preference of DME company. Call to [employer], order placed. Aware he is leaving today.    Claudine GODOYN, RN  [facility]

## 2024-01-09 NOTE — ED NOTES
ED to Inpatient Handoff Report    Notified NIRMAL Valverde that electronic handoff available and patient ready for transport to room 424 - B.    Safety Risks: None identified    Patient in Restraints: no    Constant Observer or Patient : no    Telemetry Monitoring Ordered :No           Order to transfer to unit without monitor:N/A      Deterioration Index Score:   Predictive Model Details          19 (Normal)  Factor Value    Calculated 1/9/2024 09:18 49% Age 48 years old    Deterioration Index Model 25% Respiratory rate 14     18% Systolic 154     4% Pulse oximetry 99 %     3% WBC count 8.9 k/uL     1% Hematocrit abnormal (35.7 %)     1% Pulse 82     0% Temperature 98.2 °F (36.8 °C)     0% Potassium 3.9 mmol/L     0% Sodium 139 mmol/L        Vitals:    01/08/24 1952 01/08/24 2336 01/09/24 0332 01/09/24 0916   BP: 122/75 128/75 126/72 (!) 154/84   Pulse: 93 88 85 82   Resp: 16 16 16 14   Temp:       TempSrc:       SpO2: 99% 96% 96% 99%   Weight:       Height:             Opportunity for questions and clarification was provided.

## 2024-01-09 NOTE — H&P
No effusion or erythema. Normal range of motion. Tenderness present.      Instability Tests: Anterior drawer test negative. Posterior drawer test negative.      Right lower leg: No edema.      Left lower leg: No edema.      Comments: Medial left knee pain. Anterior left hip pain. Left sided paraspinal tenderness. Tender to left iliac spine. Positve left sided straight leg test.    Skin:     General: Skin is warm and dry.      Capillary Refill: Capillary refill takes less than 2 seconds.      Coloration: Skin is not jaundiced.   Neurological:      General: No focal deficit present.      Mental Status: He is alert. Mental status is at baseline.            Labs:  Recent Results (from the past 24 hour(s))   CBC with Auto Differential    Collection Time: 01/08/24  2:48 PM   Result Value Ref Range    WBC 8.9 4.5 - 11.5 k/uL    RBC 4.06 3.80 - 5.80 m/uL    Hemoglobin 11.8 (L) 12.5 - 16.5 g/dL    Hematocrit 35.7 (L) 37.0 - 54.0 %    MCV 87.9 80.0 - 99.9 fL    MCH 29.1 26.0 - 35.0 pg    MCHC 33.1 32.0 - 34.5 g/dL    RDW 14.4 11.5 - 15.0 %    Platelets 410 130 - 450 k/uL    MPV 10.4 7.0 - 12.0 fL    Neutrophils % 86 (H) 43.0 - 80.0 %    Lymphocytes % 5 (L) 20.0 - 42.0 %    Monocytes % 8 2.0 - 12.0 %    Eosinophils % 1 0 - 6 %    Basophils % 0 0.0 - 2.0 %    Immature Granulocytes 1 0.0 - 5.0 %    Neutrophils Absolute 7.63 (H) 1.80 - 7.30 k/uL    Lymphocytes Absolute 0.42 (L) 1.50 - 4.00 k/uL    Monocytes Absolute 0.70 0.10 - 0.95 k/uL    Eosinophils Absolute 0.05 0.05 - 0.50 k/uL    Basophils Absolute 0.03 0.00 - 0.20 k/uL    Absolute Immature Granulocyte 0.04 0.00 - 0.58 k/uL    RBC Morphology Normal    Comprehensive Metabolic Panel w/ Reflex to MG    Collection Time: 01/08/24  2:48 PM   Result Value Ref Range    Sodium 139 132 - 146 mmol/L    Potassium 3.9 3.5 - 5.0 mmol/L    Chloride 106 98 - 107 mmol/L    CO2 22 22 - 29 mmol/L    Anion Gap 11 7 - 16 mmol/L    Glucose 96 74 - 99 mg/dL    BUN 17 6 - 20 mg/dL    Creatinine

## 2024-01-09 NOTE — PROGRESS NOTES
4 Eyes Skin Assessment     NAME:  Vince Conroy  YOB: 1975  MEDICAL RECORD NUMBER:  [id number]    The patient is being assessed for  Admission    I agree that at least one RN has performed a thorough Head to Toe Skin Assessment on the patient. ALL assessment sites listed below have been assessed.      Areas assessed by both nurses:    Head, Face, Ears, Shoulders, Back, Chest, Arms, Elbows, Hands, Sacrum. Buttock, Coccyx, Ischium, and Legs. Feet and Heels        Does the Patient have a Wound? No noted wound(s)       Jacky Prevention initiated by RN: No  Wound Care Orders initiated by RN: No    Pressure Injury (Stage 3,4, Unstageable, DTI, NWPT, and Complex wounds) if present, place Wound referral order by RN under : No    New Ostomies, if present place, Ostomy referral order under : No     Nurse 1 eSignature: Electronically signed by Nicolle Cook RN on 1/9/24 at 10:22 AM EST    **SHARE this note so that the co-signing nurse can place an eSignature**    Nurse 2 eSignature: Electronically signed by Alyson Estrella RN on 1/9/24 at 10:33 AM EST   
Database initiated. Patient is A&O independent from home with friends. States he uses no assistive devices and is RA at baseline. He has fallen recently. States he's supposed to wear a c-pap but doesn't currently have one.     
Occupational Therapy  OCCUPATIONAL THERAPY INITIAL EVALUATION  [facility]  [address], [place]    Date: 2024     Patient Name: Vince Conroy  MRN: [id number]  : 1975  Room: [address] [place] [address]    Evaluating OT: Bere Silvestre, OTR/L - [id number]    Referring Provider: Bg Man MD  Specific Provider Orders/Date: \"OT eval and treat\" - 2024    Diagnosis: Intractable back pain [M54.9]  Hip pain, acute, left [M25.552]     Pertinent Medical History: CHF, CAD, h/o drug abuse, HTN     Precautions: fall risk    Assessment of Current Deficits:    [x] Functional mobility   [x] ADLs  [x] Strength               [] Cognition   [x] Functional transfers   [x] IADLs         [x] Safety Awareness   [x] Endurance   [] Fine Motor Coordination  [x] Balance      [] Vision/Perception   [x] Sensation    [] Gross Motor Coordination [] ROM          [] Delirium                  [] Motor Control     OT PLAN OF CARE   OT POC is based on physician orders, patient diagnosis, and results of clinical assessment.  Frequency/Duration 2-5 days/week for 2 weeks PRN   Specific OT Treatment Interventions to Include:   * Instruction/training on adapted ADL techniques and AE recommendations to increase functional independence within precautions       * Training on energy conservation strategies, correct breathing pattern and techniques to improve independence/tolerance for self-care routine  * Functional transfer/mobility training/DME recommendations for increased independence, safety, and fall prevention  * Patient/Family education to increase follow through with safety techniques and functional independence  * Recommendation of environmental modifications for increased safety with functional transfers/mobility and ADLs  * Therapeutic exercise to improve motor endurance, ROM, and functional strength for ADLs/functional transfers  * Therapeutic activities to 
Physical Therapy  Facility/Department: [address] INTERNAL MEDICINE 2  Physical Therapy Initial Assessment    Name: Vince Conroy  : 1975  MRN: [id number]  Date of Service: 2024    Patient Diagnosis(es): The encounter diagnosis was Intractable back pain.  Past Medical History:  has a past medical history of CAD (coronary artery disease), CHF (congestive heart failure) (HCC), GERD (gastroesophageal reflux disease), Hx of drug abuse (HCC), Hypertension, and NICM (nonischemic cardiomyopathy) ([facility]).  Past Surgical History:  has a past surgical history that includes Skin graft (Right); Cardiac catheterization; laparoscopy (N/A, 2022); and laparotomy (N/A, 2022).      Evaluating Therapist: Deloris Wilcox PT      Equipment Recommendation wheeled walker    Room #:  0424/0424-B  Diagnosis:  Intractable back pain [M54.9]  Hip pain, acute, left [M25.552]  PMHx/PSHx:  drug abuse, HTN, CHF  Precautions:  falls      Social:  Pt lives with friend in a 2 floor plan pt's room in in the basement bathroom second floor. Pt independent without device. .     Initial Evaluation  Date: 24 Treatment      Short Term/ Long Term   Goals   Was pt agreeable to Eval/treatment? yes     Does pt have pain? Back pain and pain down L LE     Bed Mobility  Rolling: SBA  Supine to sit: SBA  Sit to supine: SBA  Scooting: SBA  independent   Transfers Sit to stand: CGA  Stand to sit: CGA  Stand pivot: CGA  independent   Ambulation    50 feet with ww with CGA  150 feet with AAD independent   Stair Negotiation  Ascended and descended  NT   4-12 steps with 1 rail independent   LE strength     4/5    5/5   balance      fair     AM-PAC Raw score                        Pt is alert and Oriented   LE ROM: WFL  Edema: none  Endurance: fair       ASSESSMENT:    Pt displays functional ability as noted in the objective portion of this evaluation.      Patient education  Pt educated on walker safety    Patient response to education:   Pt 
TECHNIQUE: Multiplanar multisequence MRI of the lumbar spine was performed without the administration of intravenous contrast. COMPARISON: None. HISTORY: ORDERING SYSTEM PROVIDED HISTORY: cauda equina/ back pain numbness TECHNOLOGIST PROVIDED HISTORY: Reason for exam:->cauda equina/ back pain numbness Decision Support Exception - unselect if not a suspected or confirmed emergency medical condition->Emergency Medical Condition (MA) FINDINGS: No evidence of lumbar spine fracture.  Degenerative osteophytosis with endplate edema associated with left aspect of inferior endplate of L4.  There is normal alignment of the lumbar spine.  There is edema associated with left paraspinal musculature at level of L2 extending into the pelvis.  No obvious loculated fluid collections.  Conus medullaris is grossly unremarkable. L1/L2: No central canal stenosis or neural foraminal narrowing. L2/L3: No central canal stenosis or neural foraminal narrowing. L3/L4: Moderate circumferential central canal stenosis due to facet hypertrophy, hypertrophy of ligamentum flavum, and epidural lipomatosis.  AP dimension of the thecal sac measures approximately 9 mm.  There is mild bilateral neural foraminal narrowing more significant on the right.  There is a masslike lesion at level of left neural foramen at this level measuring approximately 1.5 x 1 cm. L4/L5: Bilateral facet hypertrophy with hypertrophy of ligamentum flavum and epidural lipomatosis results in severe central canal stenosis.  AP dimension of thecal sac measures approximately 5 mm.  There is severe left neural foraminal narrowing and moderate to severe right neural foraminal narrowing. L5/S1: Moderate facet hypertrophy.  No central canal stenosis.  Moderate to severe bilateral neural foraminal narrowing.     1. Edema is located in left paraspinal musculature at level of L2 extending into the pelvis.  Findings could suggest nonspecific myositis.  Repeat examination with contrast may

## 2024-01-10 ENCOUNTER — TELEPHONE (OUTPATIENT)
Dept: PRIMARY CARE CLINIC | Age: 49
End: 2024-01-10

## 2024-01-10 NOTE — TELEPHONE ENCOUNTER
Care Transitions Initial Follow Up Call    Outreach made within 2 business days of discharge: Yes    Patient: Vince Conroy Patient : 1975   MRN: [id number]  Reason for Admission:   Discharge Date: 24       Spoke with: LVM for pt to RTC>       Scheduled appointment with PCP within 7-14 days    Follow Up  Future Appointments   Date Time Provider Department Center   2024  9:30 AM Akira Del Valle, JONATHAN - CNP [facility]   2024 10:40 AM Quinn Jules MD [facility]       Johanne Crooks MA

## 2024-01-13 ENCOUNTER — APPOINTMENT (OUTPATIENT)
Dept: MRI IMAGING | Age: 49
End: 2024-01-13
Payer: COMMERCIAL

## 2024-01-13 ENCOUNTER — HOSPITAL ENCOUNTER (INPATIENT)
Age: 49
LOS: 6 days | Discharge: OTHER INSTITUTION | End: 2024-01-19
Attending: STUDENT IN AN ORGANIZED HEALTH CARE EDUCATION/TRAINING PROGRAM | Admitting: INTERNAL MEDICINE
Payer: COMMERCIAL

## 2024-01-13 DIAGNOSIS — G06.1 ABSCESS IN EPIDURAL SPACE OF LUMBAR SPINE: Primary | ICD-10-CM

## 2024-01-13 DIAGNOSIS — R94.31 ABNORMAL EKG: ICD-10-CM

## 2024-01-13 DIAGNOSIS — G06.2 EPIDURAL ABSCESS: ICD-10-CM

## 2024-01-13 DIAGNOSIS — K68.12 PSOAS MUSCLE ABSCESS (HCC): ICD-10-CM

## 2024-01-13 DIAGNOSIS — M46.26 OSTEOMYELITIS OF VERTEBRA OF LUMBAR REGION (HCC): ICD-10-CM

## 2024-01-13 PROBLEM — M46.20 SPINAL ABSCESS (HCC): Status: ACTIVE | Noted: 2024-01-13

## 2024-01-13 LAB
ALBUMIN SERPL-MCNC: 2.5 G/DL (ref 3.5–5.2)
ALP SERPL-CCNC: 70 U/L (ref 40–129)
ALT SERPL-CCNC: 31 U/L (ref 0–40)
ANION GAP SERPL CALCULATED.3IONS-SCNC: 11 MMOL/L (ref 7–16)
AST SERPL-CCNC: 46 U/L (ref 0–39)
BASOPHILS # BLD: 0.02 K/UL (ref 0–0.2)
BASOPHILS NFR BLD: 0 % (ref 0–2)
BILIRUB SERPL-MCNC: 1.3 MG/DL (ref 0–1.2)
BUN SERPL-MCNC: 24 MG/DL (ref 6–20)
CALCIUM SERPL-MCNC: 8.2 MG/DL (ref 8.6–10.2)
CHLORIDE SERPL-SCNC: 101 MMOL/L (ref 98–107)
CO2 SERPL-SCNC: 23 MMOL/L (ref 22–29)
CREAT SERPL-MCNC: 1.6 MG/DL (ref 0.7–1.2)
EOSINOPHIL # BLD: 0.01 K/UL (ref 0.05–0.5)
EOSINOPHILS RELATIVE PERCENT: 0 % (ref 0–6)
ERYTHROCYTE [DISTWIDTH] IN BLOOD BY AUTOMATED COUNT: 14.6 % (ref 11.5–15)
GFR SERPL CREATININE-BSD FRML MDRD: 53 ML/MIN/1.73M2
GLUCOSE SERPL-MCNC: 98 MG/DL (ref 74–99)
HCT VFR BLD AUTO: 36 % (ref 37–54)
HGB BLD-MCNC: 11.8 G/DL (ref 12.5–16.5)
IMM GRANULOCYTES # BLD AUTO: 0.12 K/UL (ref 0–0.58)
IMM GRANULOCYTES NFR BLD: 1 % (ref 0–5)
LYMPHOCYTES NFR BLD: 0.85 K/UL (ref 1.5–4)
LYMPHOCYTES RELATIVE PERCENT: 6 % (ref 20–42)
MCH RBC QN AUTO: 28.6 PG (ref 26–35)
MCHC RBC AUTO-ENTMCNC: 32.8 G/DL (ref 32–34.5)
MCV RBC AUTO: 87.4 FL (ref 80–99.9)
MONOCYTES NFR BLD: 1.17 K/UL (ref 0.1–0.95)
MONOCYTES NFR BLD: 8 % (ref 2–12)
NEUTROPHILS NFR BLD: 85 % (ref 43–80)
NEUTS SEG NFR BLD: 12.21 K/UL (ref 1.8–7.3)
PLATELET # BLD AUTO: 303 K/UL (ref 130–450)
PMV BLD AUTO: 10.6 FL (ref 7–12)
POTASSIUM SERPL-SCNC: 5 MMOL/L (ref 3.5–5)
PROT SERPL-MCNC: 6.1 G/DL (ref 6.4–8.3)
RBC # BLD AUTO: 4.12 M/UL (ref 3.8–5.8)
SODIUM SERPL-SCNC: 135 MMOL/L (ref 132–146)
WBC OTHER # BLD: 14.4 K/UL (ref 4.5–11.5)

## 2024-01-13 PROCEDURE — 96375 TX/PRO/DX INJ NEW DRUG ADDON: CPT

## 2024-01-13 PROCEDURE — 2580000003 HC RX 258: Performed by: STUDENT IN AN ORGANIZED HEALTH CARE EDUCATION/TRAINING PROGRAM

## 2024-01-13 PROCEDURE — 80053 COMPREHEN METABOLIC PANEL: CPT

## 2024-01-13 PROCEDURE — 96365 THER/PROPH/DIAG IV INF INIT: CPT

## 2024-01-13 PROCEDURE — A9579 GAD-BASE MR CONTRAST NOS,1ML: HCPCS | Performed by: RADIOLOGY

## 2024-01-13 PROCEDURE — 2500000003 HC RX 250 WO HCPCS: Performed by: STUDENT IN AN ORGANIZED HEALTH CARE EDUCATION/TRAINING PROGRAM

## 2024-01-13 PROCEDURE — 99285 EMERGENCY DEPT VISIT HI MDM: CPT

## 2024-01-13 PROCEDURE — 72149 MRI LUMBAR SPINE W/DYE: CPT

## 2024-01-13 PROCEDURE — 2060000000 HC ICU INTERMEDIATE R&B

## 2024-01-13 PROCEDURE — 85025 COMPLETE CBC W/AUTO DIFF WBC: CPT

## 2024-01-13 PROCEDURE — 30283B1 TRANSFUSION OF NONAUTOLOGOUS 4-FACTOR PROTHROMBIN COMPLEX CONCENTRATE INTO VEIN, PERCUTANEOUS APPROACH: ICD-10-PCS | Performed by: NEUROLOGICAL SURGERY

## 2024-01-13 PROCEDURE — 6360000004 HC RX CONTRAST MEDICATION: Performed by: RADIOLOGY

## 2024-01-13 PROCEDURE — 6360000002 HC RX W HCPCS: Performed by: STUDENT IN AN ORGANIZED HEALTH CARE EDUCATION/TRAINING PROGRAM

## 2024-01-13 PROCEDURE — 99223 1ST HOSP IP/OBS HIGH 75: CPT | Performed by: INTERNAL MEDICINE

## 2024-01-13 RX ORDER — 0.9 % SODIUM CHLORIDE 0.9 %
1000 INTRAVENOUS SOLUTION INTRAVENOUS ONCE
Status: COMPLETED | OUTPATIENT
Start: 2024-01-13 | End: 2024-01-13

## 2024-01-13 RX ORDER — DEXAMETHASONE SODIUM PHOSPHATE 10 MG/ML
10 INJECTION INTRAMUSCULAR; INTRAVENOUS ONCE
Status: COMPLETED | OUTPATIENT
Start: 2024-01-13 | End: 2024-01-13

## 2024-01-13 RX ORDER — GABAPENTIN 100 MG/1
100 CAPSULE ORAL ONCE
Status: DISCONTINUED | OUTPATIENT
Start: 2024-01-13 | End: 2024-01-14

## 2024-01-13 RX ORDER — KETOROLAC TROMETHAMINE 30 MG/ML
15 INJECTION, SOLUTION INTRAMUSCULAR; INTRAVENOUS ONCE
Status: COMPLETED | OUTPATIENT
Start: 2024-01-13 | End: 2024-01-13

## 2024-01-13 RX ORDER — SODIUM CHLORIDE, SODIUM LACTATE, POTASSIUM CHLORIDE, CALCIUM CHLORIDE 600; 310; 30; 20 MG/100ML; MG/100ML; MG/100ML; MG/100ML
INJECTION, SOLUTION INTRAVENOUS CONTINUOUS
Status: DISCONTINUED | OUTPATIENT
Start: 2024-01-14 | End: 2024-01-14

## 2024-01-13 RX ORDER — KETAMINE HCL IN NACL, ISO-OSM 100MG/10ML
30 SYRINGE (ML) INJECTION ONCE
Status: COMPLETED | OUTPATIENT
Start: 2024-01-13 | End: 2024-01-13

## 2024-01-13 RX ADMIN — GADOTERIDOL 20 ML: 279.3 INJECTION, SOLUTION INTRAVENOUS at 23:06

## 2024-01-13 RX ADMIN — METHOCARBAMOL 1000 MG: 100 INJECTION, SOLUTION INTRAMUSCULAR; INTRAVENOUS at 19:17

## 2024-01-13 RX ADMIN — Medication 30 MG: at 22:59

## 2024-01-13 RX ADMIN — KETOROLAC TROMETHAMINE 15 MG: 30 INJECTION, SOLUTION INTRAMUSCULAR; INTRAVENOUS at 19:11

## 2024-01-13 RX ADMIN — SODIUM CHLORIDE 1000 ML: 9 INJECTION, SOLUTION INTRAVENOUS at 21:15

## 2024-01-13 RX ADMIN — DEXAMETHASONE SODIUM PHOSPHATE 10 MG: 10 INJECTION INTRAMUSCULAR; INTRAVENOUS at 19:17

## 2024-01-13 ASSESSMENT — PAIN SCALES - GENERAL: PAINLEVEL_OUTOF10: 10

## 2024-01-13 NOTE — ED PROVIDER NOTES
Vince Conroy is a 48-year-old male present emergency department concern for back pain.  Patient stated that he was recently admitted to [facility] and discharged patient had an MRI at that time that showed disc herniation and MRI without contrast recommended MRI with contrast lumbar.  Patient states that he continues to have numbness and tingling to his legs.  Patient's pain is worse on the left leg.  Patient states he did have an episode of bladder incontinence.  Patient states he is unable to ambulate called EMS and presented back to emergency department.  Patient denies any drug use previous UDS was positive for fentanyl patient denies chest pain, shortness of breath, nausea, vomiting patient denies abdominal pain.  Patient is taking over-the-counter medication without any improvement of symptoms.  Patient denies any current IV drug use.  Patient states that he does have a remote history of drug use patient denies any recent drug use at all however did have a recent positive drug screen at [facility]    The history is provided by the patient, medical records and the EMS personnel.        Review of Systems   Constitutional:  Negative for chills, diaphoresis, fatigue and fever.   Eyes:  Negative for photophobia and visual disturbance.   Respiratory:  Negative for cough, chest tightness and shortness of breath.    Cardiovascular:  Negative for chest pain, palpitations and leg swelling.   Gastrointestinal:  Negative for abdominal distention, abdominal pain, diarrhea, nausea and vomiting.   Genitourinary:  Negative for dysuria.   Musculoskeletal:  Positive for back pain. Negative for neck pain and neck stiffness.   Skin:  Negative for pallor and rash.   Neurological:  Negative for headaches.   Psychiatric/Behavioral:  Negative for confusion.         Physical Exam  Vitals and nursing note reviewed.   Constitutional:       General: He is in acute distress.   HENT:      Head: Normocephalic and atraumatic.  change  Repeat physical examination is not changed    Counseling:  I have spoken with the patient and discussed today’s results, in addition to providing specific details for the plan of care and counseling regarding the diagnosis and prognosis.  Their questions are answered at this time and they are agreeable with the plan of admission.    --------------------------------- ADDITIONAL PROVIDER NOTES ---------------------------------  Consultations:  Spoke with hospitalist.  Discussed case.  They will admit the patient.  This patient's ED course included: a personal history and physicial examination, re-evaluation prior to disposition, multiple bedside re-evaluations, and IV medications    This patient has remained hemodynamically stable during their ED course.    Please note that the withdrawal or failure to initiate urgent interventions for this patient would likely result in a life threatening deterioration or permanent disability.      Accordingly this patient received 30 minutes of critical care time, excluding separately billable procedures.      Diagnosis:  1. Abscess in epidural space of lumbar spine    2. Osteomyelitis of vertebra of lumbar region (HCC)    3. Psoas muscle abscess (HCC)    4. Epidural abscess        Disposition:  Patient's disposition: Admit to telemetry  Patient's condition is stable.            Vinita Katz MD  01/14/24 1124

## 2024-01-14 ENCOUNTER — ANESTHESIA EVENT (OUTPATIENT)
Dept: OPERATING ROOM | Age: 49
End: 2024-01-14
Payer: COMMERCIAL

## 2024-01-14 ENCOUNTER — ANESTHESIA (OUTPATIENT)
Dept: OPERATING ROOM | Age: 49
End: 2024-01-14
Payer: COMMERCIAL

## 2024-01-14 ENCOUNTER — APPOINTMENT (OUTPATIENT)
Dept: CT IMAGING | Age: 49
End: 2024-01-14
Attending: EMERGENCY MEDICINE
Payer: COMMERCIAL

## 2024-01-14 ENCOUNTER — APPOINTMENT (OUTPATIENT)
Dept: GENERAL RADIOLOGY | Age: 49
End: 2024-01-14
Payer: COMMERCIAL

## 2024-01-14 PROBLEM — G06.1 ABSCESS IN EPIDURAL SPACE OF LUMBAR SPINE: Status: ACTIVE | Noted: 2024-01-14

## 2024-01-14 LAB
ALBUMIN SERPL-MCNC: 2.6 G/DL (ref 3.5–5.2)
ALP SERPL-CCNC: 87 U/L (ref 40–129)
ALT SERPL-CCNC: 31 U/L (ref 0–40)
AMPHET UR QL SCN: NEGATIVE
ANION GAP SERPL CALCULATED.3IONS-SCNC: 11 MMOL/L (ref 7–16)
ANION GAP SERPL CALCULATED.3IONS-SCNC: 12 MMOL/L (ref 7–16)
AST SERPL-CCNC: 29 U/L (ref 0–39)
BACTERIA URNS QL MICRO: ABNORMAL
BARBITURATES UR QL SCN: NEGATIVE
BASOPHILS # BLD: 0 K/UL (ref 0–0.2)
BASOPHILS NFR BLD: 0 % (ref 0–2)
BENZODIAZ UR QL: NEGATIVE
BILIRUB SERPL-MCNC: 0.6 MG/DL (ref 0–1.2)
BILIRUB UR QL STRIP: ABNORMAL
BUN SERPL-MCNC: 26 MG/DL (ref 6–20)
BUN SERPL-MCNC: 29 MG/DL (ref 6–20)
CALCIUM SERPL-MCNC: 8.4 MG/DL (ref 8.6–10.2)
CALCIUM SERPL-MCNC: 8.6 MG/DL (ref 8.6–10.2)
CANNABINOIDS UR QL SCN: NEGATIVE
CHLORIDE SERPL-SCNC: 103 MMOL/L (ref 98–107)
CHLORIDE SERPL-SCNC: 98 MMOL/L (ref 98–107)
CLARITY UR: CLEAR
CO2 SERPL-SCNC: 21 MMOL/L (ref 22–29)
CO2 SERPL-SCNC: 21 MMOL/L (ref 22–29)
COCAINE UR QL SCN: POSITIVE
COLOR UR: ABNORMAL
CREAT SERPL-MCNC: 1.5 MG/DL (ref 0.7–1.2)
CREAT SERPL-MCNC: 1.7 MG/DL (ref 0.7–1.2)
EOSINOPHIL # BLD: 0 K/UL (ref 0.05–0.5)
EOSINOPHILS RELATIVE PERCENT: 0 % (ref 0–6)
ERYTHROCYTE [DISTWIDTH] IN BLOOD BY AUTOMATED COUNT: 14.4 % (ref 11.5–15)
ERYTHROCYTE [DISTWIDTH] IN BLOOD BY AUTOMATED COUNT: 14.5 % (ref 11.5–15)
FENTANYL UR QL: POSITIVE
GFR SERPL CREATININE-BSD FRML MDRD: 50 ML/MIN/1.73M2
GFR SERPL CREATININE-BSD FRML MDRD: 56 ML/MIN/1.73M2
GLUCOSE SERPL-MCNC: 165 MG/DL (ref 74–99)
GLUCOSE SERPL-MCNC: 190 MG/DL (ref 74–99)
GLUCOSE UR STRIP-MCNC: NEGATIVE MG/DL
HCT VFR BLD AUTO: 39.2 % (ref 37–54)
HCT VFR BLD AUTO: 40.6 % (ref 37–54)
HGB BLD-MCNC: 12.6 G/DL (ref 12.5–16.5)
HGB BLD-MCNC: 12.8 G/DL (ref 12.5–16.5)
HGB UR QL STRIP.AUTO: NEGATIVE
KETONES UR STRIP-MCNC: ABNORMAL MG/DL
LACTATE BLDV-SCNC: 0.8 MMOL/L (ref 0.5–1.9)
LACTATE BLDV-SCNC: 1.7 MMOL/L (ref 0.5–1.9)
LEUKOCYTE ESTERASE UR QL STRIP: NEGATIVE
LYMPHOCYTES NFR BLD: 0.5 K/UL (ref 1.5–4)
LYMPHOCYTES RELATIVE PERCENT: 3 % (ref 20–42)
MCH RBC QN AUTO: 27.9 PG (ref 26–35)
MCH RBC QN AUTO: 28 PG (ref 26–35)
MCHC RBC AUTO-ENTMCNC: 31.5 G/DL (ref 32–34.5)
MCHC RBC AUTO-ENTMCNC: 32.1 G/DL (ref 32–34.5)
MCV RBC AUTO: 87.1 FL (ref 80–99.9)
MCV RBC AUTO: 88.6 FL (ref 80–99.9)
METHADONE UR QL: POSITIVE
MONOCYTES NFR BLD: 0.34 K/UL (ref 0.1–0.95)
MONOCYTES NFR BLD: 2 % (ref 2–12)
NEUTROPHILS NFR BLD: 96 % (ref 43–80)
NEUTS SEG NFR BLD: 18.66 K/UL (ref 1.8–7.3)
NITRITE UR QL STRIP: NEGATIVE
OPIATES UR QL SCN: NEGATIVE
OXYCODONE UR QL SCN: NEGATIVE
PCP UR QL SCN: NEGATIVE
PH UR STRIP: 6.5 [PH] (ref 5–9)
PLATELET # BLD AUTO: 348 K/UL (ref 130–450)
PLATELET # BLD AUTO: 415 K/UL (ref 130–450)
PMV BLD AUTO: 10.8 FL (ref 7–12)
PMV BLD AUTO: 10.9 FL (ref 7–12)
POTASSIUM SERPL-SCNC: 5 MMOL/L (ref 3.5–5)
POTASSIUM SERPL-SCNC: 5.3 MMOL/L (ref 3.5–5)
PROT SERPL-MCNC: 6.8 G/DL (ref 6.4–8.3)
PROT UR STRIP-MCNC: 30 MG/DL
RBC # BLD AUTO: 4.5 M/UL (ref 3.8–5.8)
RBC # BLD AUTO: 4.58 M/UL (ref 3.8–5.8)
RBC # BLD: ABNORMAL 10*6/UL
RBC #/AREA URNS HPF: ABNORMAL /HPF
SODIUM SERPL-SCNC: 131 MMOL/L (ref 132–146)
SODIUM SERPL-SCNC: 135 MMOL/L (ref 132–146)
SP GR UR STRIP: 1.02 (ref 1–1.03)
TEST INFORMATION: ABNORMAL
UROBILINOGEN UR STRIP-ACNC: >8 EU/DL (ref 0–1)
WBC # BLD: ABNORMAL 10*3/UL
WBC #/AREA URNS HPF: ABNORMAL /HPF
WBC OTHER # BLD: 18.4 K/UL (ref 4.5–11.5)
WBC OTHER # BLD: 19.5 K/UL (ref 4.5–11.5)

## 2024-01-14 PROCEDURE — A4217 STERILE WATER/SALINE, 500 ML: HCPCS | Performed by: NEUROLOGICAL SURGERY

## 2024-01-14 PROCEDURE — 7100000000 HC PACU RECOVERY - FIRST 15 MIN: Performed by: NEUROLOGICAL SURGERY

## 2024-01-14 PROCEDURE — 2580000003 HC RX 258: Performed by: INTERNAL MEDICINE

## 2024-01-14 PROCEDURE — 6360000002 HC RX W HCPCS: Performed by: INTERNAL MEDICINE

## 2024-01-14 PROCEDURE — 99222 1ST HOSP IP/OBS MODERATE 55: CPT | Performed by: NEUROLOGICAL SURGERY

## 2024-01-14 PROCEDURE — 87205 SMEAR GRAM STAIN: CPT

## 2024-01-14 PROCEDURE — 85025 COMPLETE CBC W/AUTO DIFF WBC: CPT

## 2024-01-14 PROCEDURE — 6360000004 HC RX CONTRAST MEDICATION: Performed by: RADIOLOGY

## 2024-01-14 PROCEDURE — 6370000000 HC RX 637 (ALT 250 FOR IP): Performed by: NEUROLOGICAL SURGERY

## 2024-01-14 PROCEDURE — 7100000001 HC PACU RECOVERY - ADDTL 15 MIN: Performed by: NEUROLOGICAL SURGERY

## 2024-01-14 PROCEDURE — 2580000003 HC RX 258: Performed by: NEUROLOGICAL SURGERY

## 2024-01-14 PROCEDURE — 6370000000 HC RX 637 (ALT 250 FOR IP)

## 2024-01-14 PROCEDURE — 36415 COLL VENOUS BLD VENIPUNCTURE: CPT

## 2024-01-14 PROCEDURE — 74177 CT ABD & PELVIS W/CONTRAST: CPT

## 2024-01-14 PROCEDURE — 2500000003 HC RX 250 WO HCPCS

## 2024-01-14 PROCEDURE — 99233 SBSQ HOSP IP/OBS HIGH 50: CPT | Performed by: INTERNAL MEDICINE

## 2024-01-14 PROCEDURE — 87070 CULTURE OTHR SPECIMN AEROBIC: CPT

## 2024-01-14 PROCEDURE — 009U0ZZ DRAINAGE OF SPINAL CANAL, OPEN APPROACH: ICD-10-PCS | Performed by: NEUROLOGICAL SURGERY

## 2024-01-14 PROCEDURE — 85027 COMPLETE CBC AUTOMATED: CPT

## 2024-01-14 PROCEDURE — 87116 MYCOBACTERIA CULTURE: CPT

## 2024-01-14 PROCEDURE — 87075 CULTR BACTERIA EXCEPT BLOOD: CPT

## 2024-01-14 PROCEDURE — 3600000004 HC SURGERY LEVEL 4 BASE: Performed by: NEUROLOGICAL SURGERY

## 2024-01-14 PROCEDURE — 81001 URINALYSIS AUTO W/SCOPE: CPT

## 2024-01-14 PROCEDURE — 83605 ASSAY OF LACTIC ACID: CPT

## 2024-01-14 PROCEDURE — 2580000003 HC RX 258: Performed by: RADIOLOGY

## 2024-01-14 PROCEDURE — 87040 BLOOD CULTURE FOR BACTERIA: CPT

## 2024-01-14 PROCEDURE — 63267 EXCISE INTRSPINL LESION LMBR: CPT | Performed by: NEUROLOGICAL SURGERY

## 2024-01-14 PROCEDURE — 0QB10ZZ EXCISION OF SACRUM, OPEN APPROACH: ICD-10-PCS | Performed by: NEUROLOGICAL SURGERY

## 2024-01-14 PROCEDURE — 87102 FUNGUS ISOLATION CULTURE: CPT

## 2024-01-14 PROCEDURE — 3700000001 HC ADD 15 MINUTES (ANESTHESIA): Performed by: NEUROLOGICAL SURGERY

## 2024-01-14 PROCEDURE — 2709999900 HC NON-CHARGEABLE SUPPLY: Performed by: NEUROLOGICAL SURGERY

## 2024-01-14 PROCEDURE — 2580000003 HC RX 258

## 2024-01-14 PROCEDURE — 3700000000 HC ANESTHESIA ATTENDED CARE: Performed by: NEUROLOGICAL SURGERY

## 2024-01-14 PROCEDURE — 2580000003 HC RX 258: Performed by: EMERGENCY MEDICINE

## 2024-01-14 PROCEDURE — 80053 COMPREHEN METABOLIC PANEL: CPT

## 2024-01-14 PROCEDURE — 6360000002 HC RX W HCPCS: Performed by: NEUROLOGICAL SURGERY

## 2024-01-14 PROCEDURE — 6360000002 HC RX W HCPCS

## 2024-01-14 PROCEDURE — 80048 BASIC METABOLIC PNL TOTAL CA: CPT

## 2024-01-14 PROCEDURE — 87154 CUL TYP ID BLD PTHGN 6+ TRGT: CPT

## 2024-01-14 PROCEDURE — 80307 DRUG TEST PRSMV CHEM ANLYZR: CPT

## 2024-01-14 PROCEDURE — 87015 SPECIMEN INFECT AGNT CONCNTJ: CPT

## 2024-01-14 PROCEDURE — 86403 PARTICLE AGGLUT ANTBDY SCRN: CPT

## 2024-01-14 PROCEDURE — 87206 SMEAR FLUORESCENT/ACID STAI: CPT

## 2024-01-14 PROCEDURE — 2060000000 HC ICU INTERMEDIATE R&B

## 2024-01-14 PROCEDURE — 3600000014 HC SURGERY LEVEL 4 ADDTL 15MIN: Performed by: NEUROLOGICAL SURGERY

## 2024-01-14 PROCEDURE — 0QB00ZZ EXCISION OF LUMBAR VERTEBRA, OPEN APPROACH: ICD-10-PCS | Performed by: NEUROLOGICAL SURGERY

## 2024-01-14 PROCEDURE — 2720000010 HC SURG SUPPLY STERILE: Performed by: NEUROLOGICAL SURGERY

## 2024-01-14 PROCEDURE — 2500000003 HC RX 250 WO HCPCS: Performed by: NEUROLOGICAL SURGERY

## 2024-01-14 RX ORDER — GABAPENTIN 300 MG/1
300 CAPSULE ORAL 3 TIMES DAILY
Status: DISCONTINUED | OUTPATIENT
Start: 2024-01-14 | End: 2024-01-19 | Stop reason: HOSPADM

## 2024-01-14 RX ORDER — PANTOPRAZOLE SODIUM 40 MG/1
40 TABLET, DELAYED RELEASE ORAL
Status: DISCONTINUED | OUTPATIENT
Start: 2024-01-14 | End: 2024-01-19 | Stop reason: HOSPADM

## 2024-01-14 RX ORDER — SODIUM CHLORIDE 9 MG/ML
INJECTION, SOLUTION INTRAVENOUS PRN
Status: DISCONTINUED | OUTPATIENT
Start: 2024-01-14 | End: 2024-01-19 | Stop reason: HOSPADM

## 2024-01-14 RX ORDER — SODIUM CHLORIDE 9 MG/ML
INJECTION, SOLUTION INTRAVENOUS PRN
Status: DISCONTINUED | OUTPATIENT
Start: 2024-01-14 | End: 2024-01-14 | Stop reason: HOSPADM

## 2024-01-14 RX ORDER — ACETAMINOPHEN 325 MG/1
650 TABLET ORAL EVERY 6 HOURS
Status: DISCONTINUED | OUTPATIENT
Start: 2024-01-14 | End: 2024-01-19 | Stop reason: HOSPADM

## 2024-01-14 RX ORDER — POTASSIUM CHLORIDE 7.45 MG/ML
10 INJECTION INTRAVENOUS PRN
Status: DISCONTINUED | OUTPATIENT
Start: 2024-01-14 | End: 2024-01-19 | Stop reason: HOSPADM

## 2024-01-14 RX ORDER — HYDROMORPHONE HYDROCHLORIDE 1 MG/ML
0.5 INJECTION, SOLUTION INTRAMUSCULAR; INTRAVENOUS; SUBCUTANEOUS EVERY 5 MIN PRN
Status: DISCONTINUED | OUTPATIENT
Start: 2024-01-14 | End: 2024-01-14 | Stop reason: HOSPADM

## 2024-01-14 RX ORDER — MEPERIDINE HYDROCHLORIDE 25 MG/ML
12.5 INJECTION INTRAMUSCULAR; INTRAVENOUS; SUBCUTANEOUS EVERY 5 MIN PRN
Status: DISCONTINUED | OUTPATIENT
Start: 2024-01-14 | End: 2024-01-14 | Stop reason: HOSPADM

## 2024-01-14 RX ORDER — HYDRALAZINE HYDROCHLORIDE 20 MG/ML
10 INJECTION INTRAMUSCULAR; INTRAVENOUS
Status: DISCONTINUED | OUTPATIENT
Start: 2024-01-14 | End: 2024-01-14 | Stop reason: HOSPADM

## 2024-01-14 RX ORDER — ONDANSETRON 4 MG/1
4 TABLET, ORALLY DISINTEGRATING ORAL EVERY 8 HOURS PRN
Status: DISCONTINUED | OUTPATIENT
Start: 2024-01-14 | End: 2024-01-14 | Stop reason: SDUPTHER

## 2024-01-14 RX ORDER — SODIUM CHLORIDE 9 MG/ML
50 INJECTION, SOLUTION INTRAVENOUS ONCE
Status: DISCONTINUED | OUTPATIENT
Start: 2024-01-14 | End: 2024-01-19 | Stop reason: HOSPADM

## 2024-01-14 RX ORDER — LANOLIN ALCOHOL/MO/W.PET/CERES
6 CREAM (GRAM) TOPICAL NIGHTLY PRN
Status: DISCONTINUED | OUTPATIENT
Start: 2024-01-14 | End: 2024-01-19 | Stop reason: HOSPADM

## 2024-01-14 RX ORDER — KETAMINE HCL IN NACL, ISO-OSM 100MG/10ML
SYRINGE (ML) INJECTION PRN
Status: DISCONTINUED | OUTPATIENT
Start: 2024-01-14 | End: 2024-01-14 | Stop reason: SDUPTHER

## 2024-01-14 RX ORDER — DIPHENHYDRAMINE HCL 25 MG
25 TABLET ORAL EVERY 6 HOURS PRN
Status: DISCONTINUED | OUTPATIENT
Start: 2024-01-14 | End: 2024-01-19 | Stop reason: HOSPADM

## 2024-01-14 RX ORDER — CYCLOBENZAPRINE HCL 10 MG
5 TABLET ORAL 2 TIMES DAILY PRN
Status: DISCONTINUED | OUTPATIENT
Start: 2024-01-14 | End: 2024-01-14

## 2024-01-14 RX ORDER — ISOSORBIDE DINITRATE 10 MG/1
20 TABLET ORAL 3 TIMES DAILY
Status: DISCONTINUED | OUTPATIENT
Start: 2024-01-14 | End: 2024-01-19 | Stop reason: HOSPADM

## 2024-01-14 RX ORDER — FENTANYL CITRATE 50 UG/ML
50 INJECTION, SOLUTION INTRAMUSCULAR; INTRAVENOUS
Status: DISCONTINUED | OUTPATIENT
Start: 2024-01-14 | End: 2024-01-14

## 2024-01-14 RX ORDER — CYCLOBENZAPRINE HCL 10 MG
10 TABLET ORAL 3 TIMES DAILY PRN
Status: DISCONTINUED | OUTPATIENT
Start: 2024-01-14 | End: 2024-01-19 | Stop reason: HOSPADM

## 2024-01-14 RX ORDER — DIAZEPAM 5 MG/1
5 TABLET ORAL EVERY 6 HOURS PRN
Status: DISCONTINUED | OUTPATIENT
Start: 2024-01-14 | End: 2024-01-19 | Stop reason: HOSPADM

## 2024-01-14 RX ORDER — LABETALOL HYDROCHLORIDE 5 MG/ML
10 INJECTION, SOLUTION INTRAVENOUS
Status: DISCONTINUED | OUTPATIENT
Start: 2024-01-14 | End: 2024-01-14 | Stop reason: HOSPADM

## 2024-01-14 RX ORDER — ONDANSETRON 2 MG/ML
INJECTION INTRAMUSCULAR; INTRAVENOUS PRN
Status: DISCONTINUED | OUTPATIENT
Start: 2024-01-14 | End: 2024-01-14 | Stop reason: SDUPTHER

## 2024-01-14 RX ORDER — SODIUM CHLORIDE 9 MG/ML
INJECTION, SOLUTION INTRAVENOUS CONTINUOUS PRN
Status: DISCONTINUED | OUTPATIENT
Start: 2024-01-14 | End: 2024-01-14 | Stop reason: SDUPTHER

## 2024-01-14 RX ORDER — DIPHENHYDRAMINE HYDROCHLORIDE 50 MG/ML
25 INJECTION INTRAMUSCULAR; INTRAVENOUS EVERY 6 HOURS PRN
Status: DISCONTINUED | OUTPATIENT
Start: 2024-01-14 | End: 2024-01-19 | Stop reason: HOSPADM

## 2024-01-14 RX ORDER — CARVEDILOL 6.25 MG/1
12.5 TABLET ORAL 2 TIMES DAILY WITH MEALS
Status: DISCONTINUED | OUTPATIENT
Start: 2024-01-14 | End: 2024-01-19 | Stop reason: HOSPADM

## 2024-01-14 RX ORDER — SODIUM CHLORIDE 9 MG/ML
INJECTION, SOLUTION INTRAVENOUS CONTINUOUS
Status: ACTIVE | OUTPATIENT
Start: 2024-01-14 | End: 2024-01-14

## 2024-01-14 RX ORDER — FENTANYL CITRATE 50 UG/ML
INJECTION, SOLUTION INTRAMUSCULAR; INTRAVENOUS PRN
Status: DISCONTINUED | OUTPATIENT
Start: 2024-01-14 | End: 2024-01-14 | Stop reason: SDUPTHER

## 2024-01-14 RX ORDER — SENNOSIDES A AND B 8.6 MG/1
1 TABLET, FILM COATED ORAL DAILY PRN
Status: DISCONTINUED | OUTPATIENT
Start: 2024-01-14 | End: 2024-01-19 | Stop reason: HOSPADM

## 2024-01-14 RX ORDER — DEXAMETHASONE SODIUM PHOSPHATE 10 MG/ML
INJECTION INTRAMUSCULAR; INTRAVENOUS PRN
Status: DISCONTINUED | OUTPATIENT
Start: 2024-01-14 | End: 2024-01-14 | Stop reason: SDUPTHER

## 2024-01-14 RX ORDER — OXYCODONE HYDROCHLORIDE 5 MG/1
5 TABLET ORAL EVERY 4 HOURS PRN
Status: DISCONTINUED | OUTPATIENT
Start: 2024-01-14 | End: 2024-01-15

## 2024-01-14 RX ORDER — CEFAZOLIN SODIUM 1 G/3ML
INJECTION, POWDER, FOR SOLUTION INTRAMUSCULAR; INTRAVENOUS PRN
Status: DISCONTINUED | OUTPATIENT
Start: 2024-01-14 | End: 2024-01-14 | Stop reason: SDUPTHER

## 2024-01-14 RX ORDER — BUPIVACAINE HYDROCHLORIDE 2.5 MG/ML
INJECTION, SOLUTION EPIDURAL; INFILTRATION; INTRACAUDAL PRN
Status: DISCONTINUED | OUTPATIENT
Start: 2024-01-14 | End: 2024-01-14 | Stop reason: ALTCHOICE

## 2024-01-14 RX ORDER — BACITRACIN ZINC 500 [USP'U]/G
OINTMENT TOPICAL PRN
Status: DISCONTINUED | OUTPATIENT
Start: 2024-01-14 | End: 2024-01-14 | Stop reason: ALTCHOICE

## 2024-01-14 RX ORDER — VANCOMYCIN HYDROCHLORIDE 1 G/20ML
INJECTION, POWDER, LYOPHILIZED, FOR SOLUTION INTRAVENOUS PRN
Status: DISCONTINUED | OUTPATIENT
Start: 2024-01-14 | End: 2024-01-14 | Stop reason: ALTCHOICE

## 2024-01-14 RX ORDER — MAGNESIUM HYDROXIDE/ALUMINUM HYDROXICE/SIMETHICONE 120; 1200; 1200 MG/30ML; MG/30ML; MG/30ML
30 SUSPENSION ORAL EVERY 6 HOURS PRN
Status: DISCONTINUED | OUTPATIENT
Start: 2024-01-14 | End: 2024-01-19 | Stop reason: HOSPADM

## 2024-01-14 RX ORDER — ONDANSETRON 2 MG/ML
4 INJECTION INTRAMUSCULAR; INTRAVENOUS EVERY 6 HOURS PRN
Status: DISCONTINUED | OUTPATIENT
Start: 2024-01-14 | End: 2024-01-19 | Stop reason: HOSPADM

## 2024-01-14 RX ORDER — SODIUM CHLORIDE 0.9 % (FLUSH) 0.9 %
5-40 SYRINGE (ML) INJECTION PRN
Status: DISCONTINUED | OUTPATIENT
Start: 2024-01-14 | End: 2024-01-19 | Stop reason: HOSPADM

## 2024-01-14 RX ORDER — SODIUM CHLORIDE 0.9 % (FLUSH) 0.9 %
5-40 SYRINGE (ML) INJECTION EVERY 12 HOURS SCHEDULED
Status: DISCONTINUED | OUTPATIENT
Start: 2024-01-14 | End: 2024-01-14

## 2024-01-14 RX ORDER — HYDROMORPHONE HYDROCHLORIDE 2 MG/ML
INJECTION, SOLUTION INTRAMUSCULAR; INTRAVENOUS; SUBCUTANEOUS PRN
Status: DISCONTINUED | OUTPATIENT
Start: 2024-01-14 | End: 2024-01-14 | Stop reason: SDUPTHER

## 2024-01-14 RX ORDER — POTASSIUM CHLORIDE 20 MEQ/1
40 TABLET, EXTENDED RELEASE ORAL PRN
Status: DISCONTINUED | OUTPATIENT
Start: 2024-01-14 | End: 2024-01-19 | Stop reason: HOSPADM

## 2024-01-14 RX ORDER — SODIUM CHLORIDE 0.9 % (FLUSH) 0.9 %
10 SYRINGE (ML) INJECTION PRN
Status: COMPLETED | OUTPATIENT
Start: 2024-01-14 | End: 2024-01-14

## 2024-01-14 RX ORDER — ACETAMINOPHEN 650 MG/1
650 SUPPOSITORY RECTAL EVERY 6 HOURS PRN
Status: DISCONTINUED | OUTPATIENT
Start: 2024-01-14 | End: 2024-01-19 | Stop reason: HOSPADM

## 2024-01-14 RX ORDER — MIDAZOLAM HYDROCHLORIDE 2 MG/2ML
2 INJECTION, SOLUTION INTRAMUSCULAR; INTRAVENOUS
Status: DISCONTINUED | OUTPATIENT
Start: 2024-01-14 | End: 2024-01-14 | Stop reason: HOSPADM

## 2024-01-14 RX ORDER — HYDROMORPHONE HYDROCHLORIDE 1 MG/ML
0.25 INJECTION, SOLUTION INTRAMUSCULAR; INTRAVENOUS; SUBCUTANEOUS EVERY 5 MIN PRN
Status: DISCONTINUED | OUTPATIENT
Start: 2024-01-14 | End: 2024-01-14 | Stop reason: HOSPADM

## 2024-01-14 RX ORDER — ROSUVASTATIN CALCIUM 20 MG/1
20 TABLET, COATED ORAL NIGHTLY
Status: DISCONTINUED | OUTPATIENT
Start: 2024-01-14 | End: 2024-01-19 | Stop reason: HOSPADM

## 2024-01-14 RX ORDER — CALCIUM CARBONATE 500 MG/1
500 TABLET, CHEWABLE ORAL 3 TIMES DAILY PRN
Status: DISCONTINUED | OUTPATIENT
Start: 2024-01-14 | End: 2024-01-19 | Stop reason: HOSPADM

## 2024-01-14 RX ORDER — ONDANSETRON 4 MG/1
4 TABLET, ORALLY DISINTEGRATING ORAL EVERY 8 HOURS PRN
Status: DISCONTINUED | OUTPATIENT
Start: 2024-01-14 | End: 2024-01-19 | Stop reason: HOSPADM

## 2024-01-14 RX ORDER — ONDANSETRON 2 MG/ML
4 INJECTION INTRAMUSCULAR; INTRAVENOUS EVERY 6 HOURS PRN
Status: DISCONTINUED | OUTPATIENT
Start: 2024-01-14 | End: 2024-01-14 | Stop reason: SDUPTHER

## 2024-01-14 RX ORDER — SODIUM CHLORIDE 0.9 % (FLUSH) 0.9 %
5-40 SYRINGE (ML) INJECTION EVERY 12 HOURS SCHEDULED
Status: DISCONTINUED | OUTPATIENT
Start: 2024-01-14 | End: 2024-01-19 | Stop reason: HOSPADM

## 2024-01-14 RX ORDER — PROPOFOL 10 MG/ML
INJECTION, EMULSION INTRAVENOUS PRN
Status: DISCONTINUED | OUTPATIENT
Start: 2024-01-14 | End: 2024-01-14 | Stop reason: SDUPTHER

## 2024-01-14 RX ORDER — ONDANSETRON 2 MG/ML
4 INJECTION INTRAMUSCULAR; INTRAVENOUS
Status: DISCONTINUED | OUTPATIENT
Start: 2024-01-14 | End: 2024-01-14 | Stop reason: HOSPADM

## 2024-01-14 RX ORDER — LIDOCAINE HYDROCHLORIDE AND EPINEPHRINE 5; 5 MG/ML; UG/ML
INJECTION, SOLUTION INFILTRATION; PERINEURAL PRN
Status: DISCONTINUED | OUTPATIENT
Start: 2024-01-14 | End: 2024-01-14 | Stop reason: ALTCHOICE

## 2024-01-14 RX ORDER — ETOMIDATE 2 MG/ML
INJECTION INTRAVENOUS PRN
Status: DISCONTINUED | OUTPATIENT
Start: 2024-01-14 | End: 2024-01-14 | Stop reason: SDUPTHER

## 2024-01-14 RX ORDER — MAGNESIUM SULFATE IN WATER 40 MG/ML
2000 INJECTION, SOLUTION INTRAVENOUS PRN
Status: DISCONTINUED | OUTPATIENT
Start: 2024-01-14 | End: 2024-01-19 | Stop reason: HOSPADM

## 2024-01-14 RX ORDER — ACETAMINOPHEN 325 MG/1
650 TABLET ORAL EVERY 6 HOURS PRN
Status: DISCONTINUED | OUTPATIENT
Start: 2024-01-14 | End: 2024-01-19 | Stop reason: HOSPADM

## 2024-01-14 RX ORDER — SODIUM CHLORIDE 0.9 % (FLUSH) 0.9 %
5-40 SYRINGE (ML) INJECTION PRN
Status: DISCONTINUED | OUTPATIENT
Start: 2024-01-14 | End: 2024-01-14 | Stop reason: HOSPADM

## 2024-01-14 RX ORDER — GABAPENTIN 100 MG/1
100 CAPSULE ORAL 3 TIMES DAILY
Status: DISCONTINUED | OUTPATIENT
Start: 2024-01-14 | End: 2024-01-14

## 2024-01-14 RX ORDER — ROCURONIUM BROMIDE 10 MG/ML
INJECTION, SOLUTION INTRAVENOUS PRN
Status: DISCONTINUED | OUTPATIENT
Start: 2024-01-14 | End: 2024-01-14 | Stop reason: SDUPTHER

## 2024-01-14 RX ORDER — LIDOCAINE HYDROCHLORIDE 20 MG/ML
INJECTION, SOLUTION INTRAVENOUS PRN
Status: DISCONTINUED | OUTPATIENT
Start: 2024-01-14 | End: 2024-01-14 | Stop reason: SDUPTHER

## 2024-01-14 RX ORDER — METRONIDAZOLE 500 MG/100ML
500 INJECTION, SOLUTION INTRAVENOUS EVERY 8 HOURS
Status: DISCONTINUED | OUTPATIENT
Start: 2024-01-14 | End: 2024-01-16

## 2024-01-14 RX ORDER — IPRATROPIUM BROMIDE AND ALBUTEROL SULFATE 2.5; .5 MG/3ML; MG/3ML
1 SOLUTION RESPIRATORY (INHALATION)
Status: DISCONTINUED | OUTPATIENT
Start: 2024-01-14 | End: 2024-01-14 | Stop reason: HOSPADM

## 2024-01-14 RX ORDER — MIDAZOLAM HYDROCHLORIDE 1 MG/ML
INJECTION INTRAMUSCULAR; INTRAVENOUS PRN
Status: DISCONTINUED | OUTPATIENT
Start: 2024-01-14 | End: 2024-01-14 | Stop reason: SDUPTHER

## 2024-01-14 RX ADMIN — HYDROMORPHONE HYDROCHLORIDE 1 MG: 2 INJECTION, SOLUTION INTRAMUSCULAR; INTRAVENOUS; SUBCUTANEOUS at 08:40

## 2024-01-14 RX ADMIN — DEXAMETHASONE SODIUM PHOSPHATE 10 MG: 10 INJECTION INTRAMUSCULAR; INTRAVENOUS at 07:47

## 2024-01-14 RX ADMIN — SODIUM CHLORIDE: 9 INJECTION, SOLUTION INTRAVENOUS at 08:44

## 2024-01-14 RX ADMIN — OXYCODONE 5 MG: 5 TABLET ORAL at 10:46

## 2024-01-14 RX ADMIN — IOPAMIDOL 75 ML: 755 INJECTION, SOLUTION INTRAVENOUS at 01:14

## 2024-01-14 RX ADMIN — ACETAMINOPHEN 650 MG: 325 TABLET ORAL at 17:30

## 2024-01-14 RX ADMIN — OXYCODONE 5 MG: 5 TABLET ORAL at 15:03

## 2024-01-14 RX ADMIN — Medication 3000 UNITS: at 03:01

## 2024-01-14 RX ADMIN — ETOMIDATE 14 MG: 2 INJECTION, SOLUTION INTRAVENOUS at 07:37

## 2024-01-14 RX ADMIN — OXYCODONE 5 MG: 5 TABLET ORAL at 19:57

## 2024-01-14 RX ADMIN — SODIUM CHLORIDE: 9 INJECTION, SOLUTION INTRAVENOUS at 04:42

## 2024-01-14 RX ADMIN — FENTANYL CITRATE 50 MCG: 50 INJECTION, SOLUTION INTRAMUSCULAR; INTRAVENOUS at 07:55

## 2024-01-14 RX ADMIN — ISOSORBIDE DINITRATE 20 MG: 10 TABLET ORAL at 14:09

## 2024-01-14 RX ADMIN — LIDOCAINE HYDROCHLORIDE 50 MG: 20 INJECTION, SOLUTION INTRAVENOUS at 07:37

## 2024-01-14 RX ADMIN — CEFAZOLIN 2 G: 1 INJECTION, POWDER, FOR SOLUTION INTRAMUSCULAR; INTRAVENOUS at 07:42

## 2024-01-14 RX ADMIN — Medication 10 ML: at 01:15

## 2024-01-14 RX ADMIN — GABAPENTIN 300 MG: 300 CAPSULE ORAL at 19:57

## 2024-01-14 RX ADMIN — PROPOFOL 60 MG: 10 INJECTION, EMULSION INTRAVENOUS at 07:37

## 2024-01-14 RX ADMIN — HYDROMORPHONE HYDROCHLORIDE 1 MG: 2 INJECTION, SOLUTION INTRAMUSCULAR; INTRAVENOUS; SUBCUTANEOUS at 09:05

## 2024-01-14 RX ADMIN — GABAPENTIN 300 MG: 300 CAPSULE ORAL at 14:09

## 2024-01-14 RX ADMIN — ROCURONIUM BROMIDE 30 MG: 10 INJECTION, SOLUTION INTRAVENOUS at 07:45

## 2024-01-14 RX ADMIN — CALCIUM CARBONATE (ANTACID) CHEW TAB 500 MG 500 MG: 500 CHEW TAB at 20:15

## 2024-01-14 RX ADMIN — FENTANYL CITRATE 50 MCG: 50 INJECTION, SOLUTION INTRAMUSCULAR; INTRAVENOUS at 07:30

## 2024-01-14 RX ADMIN — ROCURONIUM BROMIDE 20 MG: 10 INJECTION, SOLUTION INTRAVENOUS at 08:05

## 2024-01-14 RX ADMIN — ONDANSETRON 4 MG: 2 INJECTION INTRAMUSCULAR; INTRAVENOUS at 08:42

## 2024-01-14 RX ADMIN — ROSUVASTATIN CALCIUM 20 MG: 20 TABLET, FILM COATED ORAL at 19:57

## 2024-01-14 RX ADMIN — Medication 25 MG: at 07:44

## 2024-01-14 RX ADMIN — SODIUM CHLORIDE: 9 INJECTION, SOLUTION INTRAVENOUS at 07:25

## 2024-01-14 RX ADMIN — Medication 10 ML: at 20:00

## 2024-01-14 RX ADMIN — CARVEDILOL 12.5 MG: 6.25 TABLET, FILM COATED ORAL at 17:30

## 2024-01-14 RX ADMIN — ACETAMINOPHEN 650 MG: 325 TABLET ORAL at 23:08

## 2024-01-14 RX ADMIN — CEFEPIME 2000 MG: 2 INJECTION, POWDER, FOR SOLUTION INTRAVENOUS at 18:42

## 2024-01-14 RX ADMIN — ROCURONIUM BROMIDE 40 MG: 10 INJECTION, SOLUTION INTRAVENOUS at 07:38

## 2024-01-14 RX ADMIN — SODIUM CHLORIDE, POTASSIUM CHLORIDE, SODIUM LACTATE AND CALCIUM CHLORIDE: 600; 310; 30; 20 INJECTION, SOLUTION INTRAVENOUS at 02:30

## 2024-01-14 RX ADMIN — MIDAZOLAM 2 MG: 1 INJECTION INTRAMUSCULAR; INTRAVENOUS at 07:37

## 2024-01-14 RX ADMIN — SUGAMMADEX 200 MG: 100 INJECTION, SOLUTION INTRAVENOUS at 08:44

## 2024-01-14 RX ADMIN — ROCURONIUM BROMIDE 10 MG: 10 INJECTION, SOLUTION INTRAVENOUS at 07:37

## 2024-01-14 RX ADMIN — METRONIDAZOLE 500 MG: 500 INJECTION, SOLUTION INTRAVENOUS at 17:32

## 2024-01-14 RX ADMIN — CYCLOBENZAPRINE 10 MG: 10 TABLET, FILM COATED ORAL at 14:09

## 2024-01-14 ASSESSMENT — PAIN DESCRIPTION - LOCATION
LOCATION: BACK
LOCATION: BACK

## 2024-01-14 ASSESSMENT — PAIN DESCRIPTION - DESCRIPTORS
DESCRIPTORS: DISCOMFORT
DESCRIPTORS: DISCOMFORT

## 2024-01-14 ASSESSMENT — PAIN SCALES - GENERAL
PAINLEVEL_OUTOF10: 10
PAINLEVEL_OUTOF10: 0
PAINLEVEL_OUTOF10: 10
PAINLEVEL_OUTOF10: 8
PAINLEVEL_OUTOF10: 10

## 2024-01-14 NOTE — BRIEF OP NOTE
Brief Postoperative Note      Patient: Vince Conroy  YOB: 1975  MRN: [id number]    Date of Procedure: 1/14/2024    Pre-Op Diagnosis Codes:     * Epidural abscess [G06.2]    Post-Op Diagnosis: Same       Procedure(s):  LUMBAR LAMINECTOMY POSTERIOR L4 - S1    Surgeon(s):  Anali Viveros MD    Assistant:  * No surgical staff found *    Anesthesia: General    Estimated Blood Loss (mL): less than 50     Complications: None    Specimens:   ID Type Source Tests Collected by Time Destination   1 : CULTURE EPIDURAL ABSCESS Body Fluid Fluid CULTURE, ANAEROBIC, CULTURE, FUNGUS, GRAM STAIN, CULTURE, SURGICAL, CULTURE WITH SMEAR, ACID FAST BACILLIUS Anali Viveros MD 1/14/2024 0808    2 : CULTURE EPIDURAL ABSCESS (SWABS) Body Fluid Fluid CULTURE, ANAEROBIC, GRAM STAIN, CULTURE, SURGICAL Anali Viveros MD 1/14/2024 0810        Implants:  * No implants in log *      Drains: * No LDAs found *    Findings: see dictated op note      Electronically signed by Anali Viveros MD on 1/14/2024 at 9:25 AM

## 2024-01-14 NOTE — ED PROVIDER NOTES
Assumed care of patient from Dr. Katz. Please refer to Dr. Katz's note for full history and physical.  Presented with low back pain radiating to the left lower extremity.  Ongoing for the last few days.  Had recent MRI at [facility] showing an irregularity and recommending additional contrasted MRI.  Associated bladder incontinence and saddle anesthesia  Workup thus far notable for leukocytosis.  Renal function stable from prior baseline values  Pending at this time is MRI lumbar spine with contrast.    Course under my care:  -On my evaluation patient is resting comfortably in bed in no acute distress.  Does have bilateral lower extremity weakness more prominent on the left side.  He denies any history of injection drug use in the past.  He does take apixaban.  -MRI lumbar spine resulted showing bilateral psoas muscle abscesses well as vertebral osteomyelitis with phlegmon/epidural abscess development at L4 level.  -Discussed with neurosurgery Dr. Viveros, he does not recommend antibiotics at this time if the patient is not septic.  Agrees with plan to make patient n.p.o. at midnight and admit to internal medicine service for further management.  -CT abdomen pelvis will be ordered as well given findings of psoas muscle abscess on MRI.  - admitted to internal medicine service for further management.    Labs Reviewed   CBC WITH AUTO DIFFERENTIAL - Abnormal; Notable for the following components:       Result Value    WBC 14.4 (*)     Hemoglobin 11.8 (*)     Hematocrit 36.0 (*)     Neutrophils % 85 (*)     Lymphocytes % 6 (*)     Neutrophils Absolute 12.21 (*)     Lymphocytes Absolute 0.85 (*)     Monocytes Absolute 1.17 (*)     Eosinophils Absolute 0.01 (*)     All other components within normal limits   COMPREHENSIVE METABOLIC PANEL W/ REFLEX TO MG FOR LOW K - Abnormal; Notable for the following components:    BUN 24 (*)     Creatinine 1.6 (*)     Est, Glom Filt Rate 53 (*)     Calcium 8.2 (*)      Total Protein 6.1 (*)     Albumin 2.5 (*)     Total Bilirubin 1.3 (*)     AST 46 (*)     All other components within normal limits   CULTURE, BLOOD 1   CULTURE, BLOOD 2   URINALYSIS WITH MICROSCOPIC   URINE DRUG SCREEN   LACTATE, SEPSIS   LACTATE, SEPSIS     MRI LUMBAR SPINE W CONTRAST   Final Result   MR findings worrisome for osteomyelitis at L4 with associated epidural   phlegmon/abscess.      Partially visualized bilateral psoas muscle abscesses.         CT ABDOMEN PELVIS W IV CONTRAST Additional Contrast? None    (Results Pending)        --------------------------------- IMPRESSION AND DISPOSITION ---------------------------------    IMPRESSION  1. Abscess in epidural space of lumbar spine    2. Osteomyelitis of vertebra of lumbar region (HCC)    3. Psoas muscle abscess (HCC)        DISPOSITION  Disposition: Admit to med/surg floor  Patient condition is stable      Laim Marie MD  01/14/24 0000       Liam Marie MD  01/14/24 0001

## 2024-01-14 NOTE — H&P
anterior inferior corner L5.  These observed also on the previous study.  Spur formation is more prominent in the anterior inferior corner L4.  There is no posterior spur formations in the lumbar spine. There is a relative narrowed cross-sectional or axial diameter of the lumbar spinal canal in visualized lower thoracic spine segments at the level of T11 not fully covered on this study.  There are also a relative narrowed cross-sectional axial diameter for the lumbar spinal canal in the lower lumbar spine segments of L4 and upper L5. Due relative narrowed dimensions for the lumbar spinal canal in L4-L5 levels there is some encroachments of the neural foramina bilaterally of L4-5 and L5-S1 but more prominent in L4-5 due superimposed facet hypertrophy. At L4-5: There is a mild broad posterior disc displacement at L4-5 which associated with facet and ligamentum flava hypertrophy causing moderate to severe spinal canal stenosis. At L5-S1: There is a mild posterior disc protrusion centrally and to the right of the midline which partially effaces the anterior epidural fat anterior to the proximal segments of the nerve root that be exiting in the S1-S2 neural foramen but no conspicuous direct impression is seen. There is bone fusion anteriorly by bridging spurs in the left side the SI joint, near fused in the right-sided SI joint. The bone texture for the vertebrae of lumbar spine and cortical bone contour are preserved.  Pedicles, spinous process and transverse process are intact. The upper sacral spine and sacral wings have appearance. Unremarkable appearance. There is a small hiatal hernia observed. Paraspinal musculature appear. There are several midline retroperitoneal lymph nodes in the vicinity of aortic bifurcation and proximal to the aortic bifurcation.  They were not observed on previous CT abdomen pelvis of April 2022.  Can consider further evaluation with CT abdomen pelvis.     1.  Degenerative changes the  lumbar spine as seen predominant at the level of the L4-5 facet joints as above commented. 2.  Moderate to severe spinal canal stenosis at L4-5 level. 3.  Encroachment of the neural foramina seen bilaterally at L4-5 and L5-S1. 4.  There appears to be a pattern of midline retroperitoneal adenopathy in the more distal periaortic spaces.  Can consider further evaluation with CT abdomen pelvis.     MRI LUMBAR SPINE WO CONTRAST    Result Date: 1/8/2024  EXAMINATION: MRI OF THE LUMBAR SPINE WITHOUT CONTRAST, 1/8/2024 5:16 pm TECHNIQUE: Multiplanar multisequence MRI of the lumbar spine was performed without the administration of intravenous contrast. COMPARISON: None. HISTORY: ORDERING SYSTEM PROVIDED HISTORY: cauda equina/ back pain numbness TECHNOLOGIST PROVIDED HISTORY: Reason for exam:->cauda equina/ back pain numbness Decision Support Exception - unselect if not a suspected or confirmed emergency medical condition->Emergency Medical Condition (MA) FINDINGS: No evidence of lumbar spine fracture.  Degenerative osteophytosis with endplate edema associated with left aspect of inferior endplate of L4.  There is normal alignment of the lumbar spine.  There is edema associated with left paraspinal musculature at level of L2 extending into the pelvis.  No obvious loculated fluid collections.  Conus medullaris is grossly unremarkable. L1/L2: No central canal stenosis or neural foraminal narrowing. L2/L3: No central canal stenosis or neural foraminal narrowing. L3/L4: Moderate circumferential central canal stenosis due to facet hypertrophy, hypertrophy of ligamentum flavum, and epidural lipomatosis.  AP dimension of the thecal sac measures approximately 9 mm.  There is mild bilateral neural foraminal narrowing more significant on the right.  There is a masslike lesion at level of left neural foramen at this level measuring approximately 1.5 x 1 cm. L4/L5: Bilateral facet hypertrophy with hypertrophy of ligamentum flavum and

## 2024-01-14 NOTE — PROGRESS NOTES
4 Eyes Skin Assessment     NAME:  Vince Conroy  YOB: 1975  MEDICAL RECORD NUMBER:  [id number]    The patient is being assessed for  Admission    I agree that at least one RN has performed a thorough Head to Toe Skin Assessment on the patient. ALL assessment sites listed below have been assessed.      Areas assessed by both nurses:    Head, Face, Ears, Shoulders, Back, Chest, Arms, Elbows, Hands, Sacrum. Buttock, Coccyx, Ischium, Legs. Feet and Heels, and Under Medical Devices         Does the Patient have a Wound? No noted wound(s)       Jacky Prevention initiated by RN: Yes  Wound Care Orders initiated by RN: No    Pressure Injury (Stage 3,4, Unstageable, DTI, NWPT, and Complex wounds) if present, place Wound referral order by RN under : No    New Ostomies, if present place, Ostomy referral order under : No     Nurse 1 eSignature: Electronically signed by Steffi Holland RN on 1/14/24 at 5:30 AM EST    **SHARE this note so that the co-signing nurse can place an eSignature**    Nurse 2 eSignature: {Esignature:[id number]}

## 2024-01-14 NOTE — PROGRESS NOTES
[facility] Hospitalist Progress Note    Admitting Date and Time: 1/13/2024  6:32 PM  Admit Dx: Psoas muscle abscess (HCC) [K68.12]  Spinal abscess (HCC) [M46.20]  Osteomyelitis of vertebra of lumbar region (HCC) [M46.26]  Abscess in epidural space of lumbar spine [G06.1]    Subjective:  Patient is being followed for Psoas muscle abscess (HCC) [K68.12]  Spinal abscess (HCC) [M46.20]  Osteomyelitis of vertebra of lumbar region (HCC) [M46.26]  Abscess in epidural space of lumbar spine [G06.1]     Seen postoperatively.  Continues to complain of lower back pain.  Denies IV drug use, he does use street drugs.        ROS: denies fever, chills, cp, sob, n/v, HA unless stated above.      carvedilol  12.5 mg Oral BID WC    gabapentin  100 mg Oral TID    hydrALAZINE  75 mg Oral BID    isosorbide dinitrate  20 mg Oral TID    pantoprazole  40 mg Oral QAM AC    rosuvastatin  20 mg Oral Nightly    sodium chloride flush  5-40 mL IntraVENous 2 times per day    sodium chloride flush  5-40 mL IntraVENous 2 times per day    ceFAZolin (ANCEF) IVPB  2,000 mg IntraVENous See Admin Instructions    gabapentin  100 mg Oral Once     cyclobenzaprine, 5 mg, BID PRN  melatonin, 6 mg, Nightly PRN  sodium chloride flush, 5-40 mL, PRN  sodium chloride, , PRN  potassium chloride, 40 mEq, PRN   Or  potassium alternative oral replacement, 40 mEq, PRN   Or  potassium chloride, 10 mEq, PRN  magnesium sulfate, 2,000 mg, PRN  ondansetron, 4 mg, Q8H PRN   Or  ondansetron, 4 mg, Q6H PRN  senna, 1 tablet, Daily PRN  aluminum & magnesium hydroxide-simethicone, 30 mL, Q6H PRN  acetaminophen, 650 mg, Q6H PRN   Or  acetaminophen, 650 mg, Q6H PRN  oxyCODONE, 5 mg, Q4H PRN  sodium chloride flush, 5-40 mL, PRN  sodium chloride, , PRN  Lidocaine-EPINEPHrine, , PRN  gelatin adsorbable, , PRN  thrombin, , PRN  ceFAZolin (ANCEF) 2,000 mg in sod chloride IRR soln 0.9 % 1,000 mL, , PRN  Surgiflo with Evithrom Hemostatic Matrix, , PRN  vancomycin, , PRN          Objective:    BP 94/72   Pulse 81   Temp 97.8 °F (36.6 °C) (Temporal)   Resp 16   Ht 1.88 m (6' 2.02\")   Wt 112 kg (247 lb)   SpO2 99%   BMI 31.70 kg/m²     General Appearance: alert and oriented to person, place and time and in no acute distress  Skin: warm and dry  Head: normocephalic and atraumatic  Eyes: pupils equal, round, and reactive to light, extraocular eye movements intact, conjunctivae normal  Neck: neck supple and non tender without mass   Pulmonary/Chest: clear to auscultation bilaterally- no wheezes, rales or rhonchi, normal air movement, no respiratory distress  Cardiovascular: normal rate, normal S1 and S2 and no carotid bruits  Abdomen: soft, non-tender, non-distended, normal bowel sounds, no masses or organomegaly  Neurologic: Oriented to time place and person        Recent Labs     01/13/24 1920 01/14/24  0033    131*   K 5.0 5.3*    98   CO2 23 21*   BUN 24* 26*   CREATININE 1.6* 1.5*   GLUCOSE 98 190*   CALCIUM 8.2* 8.4*       Recent Labs     01/13/24 1920 01/14/24  0033   WBC 14.4* 18.4*   RBC 4.12 4.50   HGB 11.8* 12.6   HCT 36.0* 39.2   MCV 87.4 87.1   MCH 28.6 28.0   MCHC 32.8 32.1   RDW 14.6 14.5    348   MPV 10.6 10.8         Assessment:    Principal Problem:    Spinal abscess (HCC)  Active Problems:    Abscess in epidural space of lumbar spine  Resolved Problems:    * No resolved hospital problems. *      Plan:    Osteomyelitis L4 with associated epidural abscess.  S/p lumbar laminectomy posterior L4-S1 with neurosurgery on 1/14  On vancomycin.  ID consulted, appreciate recommendations.  Leukocytosis, Daily CBC.  Follow blood cultures-NTD.  Follow OR cultures  Home apixaban on hold resume once okay with neuro surgery.    HFrEF  Nonischemic cardiomyopathy, nonobstructive coronary artery disease  Echo December 2023 reviewed.  EF 25 to 30%.  Currently chest pain-free.  Continue Coreg, hydralazine, Isordil, statin    CKD 3  Kidney function baseline, baseline

## 2024-01-14 NOTE — CARE COORDINATION
Pt lives with a friend in a 2 story home with his bed and bath in the basement. He enters the home thru the front door with a few steps in. Pt is not working has filed for disability and denied but has yet to appeal it. Pt said he was independent with all adl's but not driving. No hhc pta. No advance directives , has 8 grown children, will provide a living will and dpoa to pt. Pt  is not a . Pt just got a fww and cane and hasnt used them much yet. PT and OT to eval. ID to see. Pt is on iv vanco. Will follow. CASTILLO Dumont  1/14/2024    Case Management Assessment  Initial Evaluation    Date/Time of Evaluation: 1/14/2024 11:35 AM  Assessment Completed by: CASTILLO Dumont    If patient is discharged prior to next notation, then this note serves as note for discharge by case management.    Patient Name: Vince Conroy                   YOB: 1975  Diagnosis: Psoas muscle abscess (HCC) [K68.12]  Spinal abscess (HCC) [M46.20]  Osteomyelitis of vertebra of lumbar region (HCC) [M46.26]  Abscess in epidural space of lumbar spine [G06.1]                   Date / Time: 1/13/2024  6:32 PM    Patient Admission Status: Inpatient   Readmission Risk (Low < 19, Mod (19-27), High > 27): Readmission Risk Score: 20.8    Current PCP: Lenny Quiros, DO  PCP verified by CM? Yes    Chart Reviewed: Yes      History Provided by: Patient  Patient Orientation: Alert and Oriented    Patient Cognition: Alert    Hospitalization in the last 30 days (Readmission):  No    If yes, Readmission Assessment in CM Navigator will be completed.    Advance Directives:      Code Status: Full Code   Patient's Primary Decision Maker is: Patient Declined (Legal Next of Kin Remains as Decision Maker)    Primary Decision Maker: Chantal Calhoun - Parent - [phone]    Discharge Planning:    Patient lives with: Friends Type of Home: Apartment  Primary Care Giver: Self  Patient Support Systems include: Children, Friends/Neighbors

## 2024-01-14 NOTE — PROGRESS NOTES
[facility]  [facility]    Consultation Note     Admit Date: 1/13/2024  6:32 PM    Reason for Consult:   Epidural abscess    Attending Physician:  Sri Inman MD     Chief Complaint: Epidural abscess    HISTORY OF PRESENT ILLNESS:   48-year-old male with past medical history of CAD, CHF, GERD, HTN presents with back pain from [facility].  He has new back pain with left leg numbness and urinary incontinence.  MRI of the lumbar spine showed osteomyelitis of L4 with associated epidural abscess.  Status post L4, L5, S1 laminectomy and evacuation of epidural abscess 01/14.  ID consulted for epidural abscess.  Past Medical History:        Diagnosis Date    CAD (coronary artery disease)     CHF (congestive heart failure) (HCC) 02/08/2022    GERD (gastroesophageal reflux disease)     Hx of drug abuse (HCC)     cocaine    Hypertension     NICM (nonischemic cardiomyopathy) ([facility])      Past Surgical History:        Procedure Laterality Date    CARDIAC CATHETERIZATION      LAPAROSCOPY N/A 4/30/2022    LAPAROSCOPY DIAGNOSTIC,  EXPLORATORY INVERTED TO EXPLORATORY LAPOROTOMY, DISTAL SMALL BOWEL RESECTION, PRIMARY STAPLED ANASTAMOSIS, BILATERAL INGUINAL HERNIA REPAIR WITH MESH performed by Rashaad Levin MD at [facility] OR    LAPAROTOMY N/A 4/30/2022    LAPAROTOMY EXPLORATORY BILATERAL INGUINAL HERNIA REPAIR OPEN,  BOWEL RESECTION,  MESH X2 performed by Rashaad Levin MD at [facility] OR    SKIN GRAFT Right     R thigh     Current Medications:   Scheduled Meds:   carvedilol  12.5 mg Oral BID WC    hydrALAZINE  75 mg Oral BID    isosorbide dinitrate  20 mg Oral TID    pantoprazole  40 mg Oral QAM AC    rosuvastatin  20 mg Oral Nightly    sodium chloride flush  5-40 mL IntraVENous 2 times per day    gabapentin  300 mg Oral TID    sodium chloride flush  5-40 mL IntraVENous 2 times per day    acetaminophen  650 mg Oral Q6H    [START ON 1/15/2024] vancomycin  1,000 mg IntraVENous Q24H     Continuous  questionnaire     Fear of Current or Ex-Partner: No     Emotionally Abused: No     Physically Abused: No     Sexually Abused: No   Housing Stability: Low Risk  (1/14/2024)    Housing Stability Vital Sign     Unable to Pay for Housing in the Last Year: No     Number of Places Lived in the Last Year: 1     Unstable Housing in the Last Year: No     Tobacco: No  Alcohol: No  Pets: No  Travel: No    Family History:       Problem Relation Age of Onset    Thyroid Disease Mother     Hypertension Mother     Stroke Maternal Grandmother     Hypertension Maternal Grandmother    . Otherwise non-pertinent to the chief complaint.    REVIEW OF SYSTEMS:    CONSTITUTIONAL:  No chills, fevers or night sweats. No loss of weight.  EYES:  No double vision or drainage from eyes, ears or throat.  HEENT:  No neck stiffness. No dysphagia. No drainage from eyes, ears or throat  RESPIRATORY:  No cough, productive sputum or hemoptysis.   CARDIOVASCULAR:  No chest pain, palpitations, orthopnea or dyspnea on exertion.  GASTROINTESTINAL:  No nausea, vomiting, diarrhea or constipation or hematochezia   GENITOURINARY:  No frequency burning dysuria or hematuria.  INTEGUMENT/BREAST:  No rash or breast masses.  HEMATOLOGIC/LYMPHATIC:  No lymphadenopathy or blood dyscrasics.   ALLERGIC/IMMUNOLOGIC:  No anaphylaxis.   ENDOCRINE:  No polyuria or polydipsia or temperature intolerance.    MUSCULOSKELETAL:  No myalgia or arthralgia.  Full ROM.  NEUROLOGICAL:  No focal motor sensory deficit.  BEHAVIOR/PSYCH:  No psychosis.     PHYSICAL EXAM:    Vitals:    /75   Pulse 79   Temp (!) 96.7 °F (35.9 °C) (Temporal)   Resp 16   Ht 1.88 m (6' 2.02\")   Wt 112 kg (247 lb)   SpO2 96%   BMI 31.70 kg/m²   Constitutional: The patient is awake, alert, and oriented.   Skin: Warm and dry. No rashes were noted. No jaundice.  HEENT: Eyes show round, and reactive pupils. Moist mucous membranes, no ulcerations, no thrush.   Neck: Supple to movements. No

## 2024-01-14 NOTE — OP NOTE
[facility]                  [address] [place] [address]                                OPERATIVE REPORT    PATIENT NAME: IRVIN LEONARDO                      :        1975  MED REC NO:   [id number]                            ROOM:  ACCOUNT NO:   [id number]                           ADMIT DATE: 2024  PROVIDER:     Anali Viveros MD    DATE OF PROCEDURE:  2024    PREOPERATIVE DIAGNOSIS:  Lumbar spinal epidural abscess from L4-S1.    POSTOPERATIVE DIAGNOSIS:  Lumbar spinal epidural abscess from L4-S1.    PROCEDURES:  1.  Bilateral L4, L5 and S1 laminectomy, bilateral L4-L5 and L5-S1  medial facetectomy, and bilateral L4, L5 and S1 foraminotomy.  2.  Evacuation of epidural abscess.  3.  Use of intraoperative fluoroscopy interpreted by myself, the  surgeon.    ANESTHESIA:  Generalized endotracheal anesthesia.    SURGEON:  Anali Viveros MD    ASSISTANT:  None.    COMPLICATIONS:  None.    ESTIMATED BLOOD LOSS:  50 mL.    SPECIMENS:  Purulent material.    OPERATIVE INDICATIONS:  The patient is a 48-year-old gentleman who  presented to the emergency room with back pain, numbness, tingling, and  weakness in his legs with saddle anesthesia and urinary incontinence.   He had an MRI that showed he had an epidural abscess from L4-S1.  After  risks, benefits and alternatives were discussed with the patient, it was  determined that he would undergo the above-listed procedure.    DESCRIPTION OF PROCEDURE:  The patient was brought into the operating  room.  A time-out was performed where he was identified by his name,  medical record number, and the operative procedure, which he was about  to undergo.  Next, induction of generalized endotracheal anesthesia was  then commenced.  Upon completion of induction of general endotracheal  anesthesia, he received preoperative antibiotics.  He was flipped into  prone position on a Valeriy table.  All pressure points

## 2024-01-14 NOTE — PROGRESS NOTES
Extended Infusion Policy     This patient is on medication that requires renal, weight, and/or indication dose adjustment.      Date Body Weight IBW  Adjusted BW SCr  CrCl Dialysis status BMI   1/14/2024 112 kg (247 lb) Ideal body weight: 82.2 kg (181 lb 4.8 oz)  Adjusted ideal body weight: 94.2 kg (207 lb 9.3 oz) Serum creatinine: 1.7 mg/dL (H) 01/14/24 1045  Estimated creatinine clearance: 71 mL/min (A) N/a Body mass index is 31.7 kg/m².       Pharmacy has dose-adjusted the following medication(s):    Ordered Medication: Cefepime 1000mg q12h     Order Changed/converted to: Cefepime 2000mg q8h    These changes were made per protocol according to the [facility]   [employer] Extended Infusion Dose Adjustment Policy.     *Please note this dose may need readjusted if patient's condition changes.    Please contact pharmacy with any questions regarding these changes.    Rizwana Finn RPH  1/14/2024  5:17 PM

## 2024-01-14 NOTE — CONSULTS
[facility]                  [address] [place] [address]                                  CONSULTATION    PATIENT NAME: IRVIN LEONARDO                      :        1975  MED REC NO:   [id number]                            ROOM:  ACCOUNT NO:   [id number]                           ADMIT DATE: 2024  PROVIDER:     Anali Viveros MD    CONSULT DATE:  2024    REASON FOR CONSULT:  Lumbar epidural abscess.    HISTORY OF PRESENT ILLNESS:  The patient is a 48-year-old gentleman who  presented to the hospital with approximately two-week history of back  pain radiating into his left lower extremity.  He describes the pain as  a sharp pain, states it is about 10/10.  He also reports numbness,  tingling, and weakness down his left leg with episode of urinary  incontinence.  Yesterday, he presented to the emergency room and had an  MRI with contrast that showed an epidural abscess from L4-S1.  As a  result of that, Neurosurgery Service was consulted.    PAST MEDICAL HISTORY:  Positive for CHF, diabetes, kidney disease,  coronary artery disease, gastroesophageal reflux, history of drug abuse,  and hypertension.    PAST SURGICAL HISTORY:  Positive for cardiac catheterization and  exploratory laparotomy.    FAMILY HISTORY:  Positive for hypertension in his mother.    SOCIAL HISTORY:  Positive for tobacco use.  He does use cocaine.    REVIEW OF SYSTEMS:  HEENT:  Negative for headache, double vision, or  blurry vision.  CARDIOVASCULAR:  Negative for chest pain, arrhythmia, or  palpitations.  RESPIRATORY:  Negative for shortness of breath, asthma,  bronchitis, or pneumonia.  GASTROINTESTINAL:  Positive for  gastroesophageal reflux.  GENITOURINARY:  Negative for hematuria or  dysuria.  HEMATOLOGIC:  Negative for easy bleeding or bruising.   INFECTIOUS:  Negative for any recent infection.  MUSCULOSKELETAL:   Positive for back pain.  PSYCHIATRIC:  Negative for

## 2024-01-15 PROBLEM — R79.89 ELEVATED TROPONIN: Status: RESOLVED | Noted: 2023-12-16 | Resolved: 2024-01-15

## 2024-01-15 LAB
ANION GAP SERPL CALCULATED.3IONS-SCNC: 10 MMOL/L (ref 7–16)
BUN SERPL-MCNC: 32 MG/DL (ref 6–20)
CALCIUM SERPL-MCNC: 8.5 MG/DL (ref 8.6–10.2)
CHLORIDE SERPL-SCNC: 103 MMOL/L (ref 98–107)
CO2 SERPL-SCNC: 23 MMOL/L (ref 22–29)
CREAT SERPL-MCNC: 1.6 MG/DL (ref 0.7–1.2)
ERYTHROCYTE [DISTWIDTH] IN BLOOD BY AUTOMATED COUNT: 14.6 % (ref 11.5–15)
GFR SERPL CREATININE-BSD FRML MDRD: 52 ML/MIN/1.73M2
GLUCOSE SERPL-MCNC: 103 MG/DL (ref 74–99)
HCT VFR BLD AUTO: 36 % (ref 37–54)
HGB BLD-MCNC: 11.5 G/DL (ref 12.5–16.5)
MCH RBC QN AUTO: 28.3 PG (ref 26–35)
MCHC RBC AUTO-ENTMCNC: 31.9 G/DL (ref 32–34.5)
MCV RBC AUTO: 88.5 FL (ref 80–99.9)
PLATELET # BLD AUTO: 385 K/UL (ref 130–450)
PMV BLD AUTO: 11.1 FL (ref 7–12)
POTASSIUM SERPL-SCNC: 4.1 MMOL/L (ref 3.5–5)
RBC # BLD AUTO: 4.07 M/UL (ref 3.8–5.8)
SODIUM SERPL-SCNC: 136 MMOL/L (ref 132–146)
WBC OTHER # BLD: 17.8 K/UL (ref 4.5–11.5)

## 2024-01-15 PROCEDURE — 87040 BLOOD CULTURE FOR BACTERIA: CPT

## 2024-01-15 PROCEDURE — 36415 COLL VENOUS BLD VENIPUNCTURE: CPT

## 2024-01-15 PROCEDURE — 6370000000 HC RX 637 (ALT 250 FOR IP): Performed by: NEUROLOGICAL SURGERY

## 2024-01-15 PROCEDURE — 80048 BASIC METABOLIC PNL TOTAL CA: CPT

## 2024-01-15 PROCEDURE — 6360000002 HC RX W HCPCS: Performed by: INTERNAL MEDICINE

## 2024-01-15 PROCEDURE — 97165 OT EVAL LOW COMPLEX 30 MIN: CPT

## 2024-01-15 PROCEDURE — 97530 THERAPEUTIC ACTIVITIES: CPT

## 2024-01-15 PROCEDURE — 6360000002 HC RX W HCPCS

## 2024-01-15 PROCEDURE — 6370000000 HC RX 637 (ALT 250 FOR IP): Performed by: INTERNAL MEDICINE

## 2024-01-15 PROCEDURE — 97535 SELF CARE MNGMENT TRAINING: CPT

## 2024-01-15 PROCEDURE — 6360000002 HC RX W HCPCS: Performed by: NEUROLOGICAL SURGERY

## 2024-01-15 PROCEDURE — 2580000003 HC RX 258: Performed by: INTERNAL MEDICINE

## 2024-01-15 PROCEDURE — 2580000003 HC RX 258

## 2024-01-15 PROCEDURE — 99233 SBSQ HOSP IP/OBS HIGH 50: CPT | Performed by: INTERNAL MEDICINE

## 2024-01-15 PROCEDURE — 2580000003 HC RX 258: Performed by: NEUROLOGICAL SURGERY

## 2024-01-15 PROCEDURE — 6370000000 HC RX 637 (ALT 250 FOR IP)

## 2024-01-15 PROCEDURE — 2060000000 HC ICU INTERMEDIATE R&B

## 2024-01-15 PROCEDURE — 97161 PT EVAL LOW COMPLEX 20 MIN: CPT

## 2024-01-15 PROCEDURE — 85027 COMPLETE CBC AUTOMATED: CPT

## 2024-01-15 RX ORDER — OXYCODONE HCL 10 MG/1
10 TABLET, FILM COATED, EXTENDED RELEASE ORAL EVERY 12 HOURS SCHEDULED
Status: DISCONTINUED | OUTPATIENT
Start: 2024-01-15 | End: 2024-01-18

## 2024-01-15 RX ADMIN — SODIUM CHLORIDE, PRESERVATIVE FREE 10 ML: 5 INJECTION INTRAVENOUS at 09:34

## 2024-01-15 RX ADMIN — METRONIDAZOLE 500 MG: 500 INJECTION, SOLUTION INTRAVENOUS at 01:59

## 2024-01-15 RX ADMIN — ACETAMINOPHEN 650 MG: 325 TABLET ORAL at 17:21

## 2024-01-15 RX ADMIN — PANTOPRAZOLE SODIUM 40 MG: 40 TABLET, DELAYED RELEASE ORAL at 05:20

## 2024-01-15 RX ADMIN — VANCOMYCIN HYDROCHLORIDE 1000 MG: 1 INJECTION, POWDER, LYOPHILIZED, FOR SOLUTION INTRAVENOUS at 09:39

## 2024-01-15 RX ADMIN — HYDRALAZINE HYDROCHLORIDE 75 MG: 50 TABLET ORAL at 09:39

## 2024-01-15 RX ADMIN — CARVEDILOL 12.5 MG: 6.25 TABLET, FILM COATED ORAL at 09:39

## 2024-01-15 RX ADMIN — GABAPENTIN 300 MG: 300 CAPSULE ORAL at 21:01

## 2024-01-15 RX ADMIN — VANCOMYCIN HYDROCHLORIDE 1500 MG: 10 INJECTION, POWDER, LYOPHILIZED, FOR SOLUTION INTRAVENOUS at 15:07

## 2024-01-15 RX ADMIN — OXYCODONE 5 MG: 5 TABLET ORAL at 05:19

## 2024-01-15 RX ADMIN — CALCIUM CARBONATE (ANTACID) CHEW TAB 500 MG 500 MG: 500 CHEW TAB at 05:12

## 2024-01-15 RX ADMIN — ROSUVASTATIN CALCIUM 20 MG: 20 TABLET, FILM COATED ORAL at 21:01

## 2024-01-15 RX ADMIN — HYDRALAZINE HYDROCHLORIDE 75 MG: 50 TABLET ORAL at 21:01

## 2024-01-15 RX ADMIN — ISOSORBIDE DINITRATE 20 MG: 10 TABLET ORAL at 15:04

## 2024-01-15 RX ADMIN — HYDROMORPHONE HYDROCHLORIDE 0.25 MG: 1 INJECTION, SOLUTION INTRAMUSCULAR; INTRAVENOUS; SUBCUTANEOUS at 15:51

## 2024-01-15 RX ADMIN — METRONIDAZOLE 500 MG: 500 INJECTION, SOLUTION INTRAVENOUS at 10:50

## 2024-01-15 RX ADMIN — CEFEPIME 2000 MG: 2 INJECTION, POWDER, FOR SOLUTION INTRAVENOUS at 10:49

## 2024-01-15 RX ADMIN — ACETAMINOPHEN 650 MG: 325 TABLET ORAL at 23:57

## 2024-01-15 RX ADMIN — ACETAMINOPHEN 650 MG: 325 TABLET ORAL at 05:20

## 2024-01-15 RX ADMIN — SODIUM CHLORIDE, PRESERVATIVE FREE 10 ML: 5 INJECTION INTRAVENOUS at 21:04

## 2024-01-15 RX ADMIN — OXYCODONE 5 MG: 5 TABLET ORAL at 09:42

## 2024-01-15 RX ADMIN — CEFEPIME 2000 MG: 2 INJECTION, POWDER, FOR SOLUTION INTRAVENOUS at 17:54

## 2024-01-15 RX ADMIN — ISOSORBIDE DINITRATE 20 MG: 10 TABLET ORAL at 21:01

## 2024-01-15 RX ADMIN — OXYCODONE HYDROCHLORIDE 10 MG: 10 TABLET, FILM COATED, EXTENDED RELEASE ORAL at 21:01

## 2024-01-15 RX ADMIN — GABAPENTIN 300 MG: 300 CAPSULE ORAL at 09:39

## 2024-01-15 RX ADMIN — OXYCODONE 5 MG: 5 TABLET ORAL at 00:40

## 2024-01-15 RX ADMIN — GABAPENTIN 300 MG: 300 CAPSULE ORAL at 15:04

## 2024-01-15 RX ADMIN — ISOSORBIDE DINITRATE 20 MG: 10 TABLET ORAL at 09:39

## 2024-01-15 RX ADMIN — HYDROMORPHONE HYDROCHLORIDE 0.25 MG: 1 INJECTION, SOLUTION INTRAMUSCULAR; INTRAVENOUS; SUBCUTANEOUS at 22:06

## 2024-01-15 RX ADMIN — METRONIDAZOLE 500 MG: 500 INJECTION, SOLUTION INTRAVENOUS at 17:23

## 2024-01-15 RX ADMIN — HYDROMORPHONE HYDROCHLORIDE 0.25 MG: 1 INJECTION, SOLUTION INTRAMUSCULAR; INTRAVENOUS; SUBCUTANEOUS at 11:29

## 2024-01-15 RX ADMIN — CEFEPIME 2000 MG: 2 INJECTION, POWDER, FOR SOLUTION INTRAVENOUS at 03:18

## 2024-01-15 ASSESSMENT — PAIN SCALES - GENERAL
PAINLEVEL_OUTOF10: 6
PAINLEVEL_OUTOF10: 10
PAINLEVEL_OUTOF10: 8
PAINLEVEL_OUTOF10: 10
PAINLEVEL_OUTOF10: 8
PAINLEVEL_OUTOF10: 10
PAINLEVEL_OUTOF10: 10

## 2024-01-15 ASSESSMENT — PAIN DESCRIPTION - PAIN TYPE: TYPE: ACUTE PAIN

## 2024-01-15 ASSESSMENT — PAIN DESCRIPTION - LOCATION
LOCATION: BACK
LOCATION: BACK
LOCATION: LEG
LOCATION: BACK

## 2024-01-15 ASSESSMENT — PAIN DESCRIPTION - ORIENTATION
ORIENTATION: LEFT
ORIENTATION: LOWER
ORIENTATION: LOWER

## 2024-01-15 ASSESSMENT — PAIN DESCRIPTION - DESCRIPTORS
DESCRIPTORS: ACHING
DESCRIPTORS: DISCOMFORT
DESCRIPTORS: ACHING;DISCOMFORT
DESCRIPTORS: ACHING;DISCOMFORT;SORE
DESCRIPTORS: ACHING
DESCRIPTORS: DISCOMFORT

## 2024-01-15 ASSESSMENT — PAIN DESCRIPTION - ONSET: ONSET: ON-GOING

## 2024-01-15 ASSESSMENT — PAIN DESCRIPTION - FREQUENCY: FREQUENCY: CONTINUOUS

## 2024-01-15 NOTE — PROGRESS NOTES
Department of Neurosurgery  Progress Note    CHIEF COMPLAINT: s/p lumbar laminectomy for epidural abscess    SUBJECTIVE:  c/o significant op site pain    REVIEW OF SYSTEMS :  Constitutional: Negative for chills and fever.    Neurological: Negative for dizziness, tremors and speech change.     OBJECTIVE:   VITALS:  /77   Pulse 71   Temp 97.7 °F (36.5 °C) (Oral)   Resp 16   Ht 1.88 m (6' 2.02\")   Wt 112 kg (247 lb)   SpO2 99%   BMI 31.70 kg/m²   PHYSICAL:  CONSTITUTIONAL:  awake, alert, cooperative, no apparent distress, and appears stated age    DATA:  CBC:   Lab Results   Component Value Date/Time    WBC 17.8 01/15/2024 05:34 AM    RBC 4.07 01/15/2024 05:34 AM    HGB 11.5 01/15/2024 05:34 AM    HCT 36.0 01/15/2024 05:34 AM    MCV 88.5 01/15/2024 05:34 AM    MCH 28.3 01/15/2024 05:34 AM    MCHC 31.9 01/15/2024 05:34 AM    RDW 14.6 01/15/2024 05:34 AM     01/15/2024 05:34 AM    MPV 11.1 01/15/2024 05:34 AM     BMP:    Lab Results   Component Value Date/Time     01/15/2024 05:34 AM    K 4.1 01/15/2024 05:34 AM    K 4.5 11/16/2022 02:47 AM     01/15/2024 05:34 AM    CO2 23 01/15/2024 05:34 AM    BUN 32 01/15/2024 05:34 AM    LABALBU 2.6 01/14/2024 10:45 AM    CREATININE 1.6 01/15/2024 05:34 AM    CALCIUM 8.5 01/15/2024 05:34 AM    GFRAA 41 08/26/2022 06:32 PM    LABGLOM 52 01/15/2024 05:34 AM    GLUCOSE 103 01/15/2024 05:34 AM     PT/INR:  No results found for: \"PROTIME\", \"INR\"  PTT:    Lab Results   Component Value Date/Time    APTT 34.1 03/21/2022 06:26 AM   [APTT}    Current Inpatient Medications  Current Facility-Administered Medications: HYDROmorphone (DILAUDID) injection 0.25 mg, 0.25 mg, IntraVENous, Q4H PRN  [START ON 1/16/2024] vancomycin (VANCOCIN) 1,000 mg in sodium chloride 0.9 % 250 mL IVPB ([id number]), 1,000 mg, IntraVENous, Q12H  vancomycin (VANCOCIN) 1500 mg in sodium chloride 0.9 % 250 mL IVPB, 1,500 mg, IntraVENous, Once  carvedilol (COREG) tablet 12.5 mg, 12.5 mg, Oral,  BID WC  hydrALAZINE (APRESOLINE) tablet 75 mg, 75 mg, Oral, BID  isosorbide dinitrate (ISORDIL) tablet 20 mg, 20 mg, Oral, TID  melatonin tablet 6 mg, 6 mg, Oral, Nightly PRN  pantoprazole (PROTONIX) tablet 40 mg, 40 mg, Oral, QAM AC  rosuvastatin (CRESTOR) tablet 20 mg, 20 mg, Oral, Nightly  sodium chloride flush 0.9 % injection 5-40 mL, 5-40 mL, IntraVENous, 2 times per day  sodium chloride flush 0.9 % injection 5-40 mL, 5-40 mL, IntraVENous, PRN  0.9 % sodium chloride infusion, , IntraVENous, PRN  potassium chloride (KLOR-CON M) extended release tablet 40 mEq, 40 mEq, Oral, PRN **OR** potassium bicarb-citric acid (EFFER-K) effervescent tablet 40 mEq, 40 mEq, Oral, PRN **OR** potassium chloride 10 mEq/100 mL IVPB (Peripheral Line), 10 mEq, IntraVENous, PRN  magnesium sulfate 2000 mg in 50 mL IVPB premix, 2,000 mg, IntraVENous, PRN  senna (SENOKOT) tablet 8.6 mg, 1 tablet, Oral, Daily PRN  aluminum & magnesium hydroxide-simethicone (MAALOX) 200-200-20 MG/5ML suspension 30 mL, 30 mL, Oral, Q6H PRN  acetaminophen (TYLENOL) tablet 650 mg, 650 mg, Oral, Q6H PRN **OR** acetaminophen (TYLENOL) suppository 650 mg, 650 mg, Rectal, Q6H PRN  [COMPLETED] prothrombin complex concentrate (human) (KCENTRA) infusion 3,000 Units, 3,000 Units, IntraVENous, Once **FOLLOWED BY** 0.9 % sodium chloride infusion, 50 mL, IntraVENous, Once  cyclobenzaprine (FLEXERIL) tablet 10 mg, 10 mg, Oral, TID PRN  gabapentin (NEURONTIN) capsule 300 mg, 300 mg, Oral, TID  sodium chloride flush 0.9 % injection 5-40 mL, 5-40 mL, IntraVENous, 2 times per day  sodium chloride flush 0.9 % injection 5-40 mL, 5-40 mL, IntraVENous, PRN  0.9 % sodium chloride infusion, , IntraVENous, PRN  acetaminophen (TYLENOL) tablet 650 mg, 650 mg, Oral, Q6H  ondansetron (ZOFRAN-ODT) disintegrating tablet 4 mg, 4 mg, Oral, Q8H PRN **OR** ondansetron (ZOFRAN) injection 4 mg, 4 mg, IntraVENous, Q6H PRN  diazePAM (VALIUM) tablet 5 mg, 5 mg, Oral, Q6H PRN  diphenhydrAMINE

## 2024-01-15 NOTE — PROGRESS NOTES
OCCUPATIONAL THERAPY INITIAL EVALUATION      [facility] [address] [place], [place]       Date:1/15/2024                                                  Patient Name: Vince Conroy  MRN: [id number]  : 1975  Room: 8508508-    Evaluating OT: Marykaterina Hassan, OTR/L #[id number]    Referring Provider::  Anali Viveros MD     Specific Provider Orders/Date: OT evaluation and treatment 24    Diagnosis:  Psoas muscle abscess (HCC) [K68.12]  Spinal abscess (HCC) [M46.20]  Osteomyelitis of vertebra of lumbar region (HCC) [M46.26]  Abscess in epidural space of lumbar spine [G06.1]      Pertinent Medical History:  has a past medical history of CAD (coronary artery disease), CHF (congestive heart failure) (HCC), GERD (gastroesophageal reflux disease), Hx of drug abuse (HCC), Hypertension, and NICM (nonischemic cardiomyopathy) ([facility]).       Precautions:  Fall Risk, spinal neutrality,     Assessment of current deficits   [x] Functional mobility   [x]ADLs  [x] Strength               []Cognition   [x] Functional transfers   [] IADLs         [x] Safety Awareness   [x]Endurance   [] Fine Coordination              [x] Balance      [] Vision/perception   []Sensation    []Gross Motor Coordination  [] ROM  [] Delirium                   [] Motor Control     OT PLAN OF CARE   OT POC based on physician orders, patient diagnosis and results of clinical assessment    Frequency/Duration  2-5 days/wk for 2 weeks PRN   Specific OT Treatment to include:   * Instruction/training on adapted ADL techniques and AE recommendations to increase functional independence within precautions       * Functional transfer/mobility training/DME recommendations for increased independence, safety, and fall prevention  * Patient/Family education to increase follow through with safety techniques and functional independence  * Therapeutic exercise to improve motor endurance, ROM, and functional  strength for ADLs/functional transfers  * Therapeutic activities to facilitate/challenge dynamic balance, stand tolerance for increased safety and independence with ADLs  * Therapeutic activities to facilitate gross/fine motor skills for increased independence with ADLs  * Positioning to improve skin integrity, interaction with environment and functional independence    Recommended Adaptive Equipment:  TBD     Home Living:  Pt lives with friend in a 2 story with 4-5 step(s) to enter and 1 rail(s); bed in basement, bath on 2nd   Bathroom setup: tub shower,    Equipment owned: FWW, cane    Prior Level of Function:  Independent  with ADLs ,    Independent with IADLs. Ambulated ww or cane as of recent    Driving: no    Pain Level: 10/10 at rest , 8/10 after repositioned    Cognition: A&O: 4/4;   Follows multi step directions: good    Memory:  good    Sequencing:  good    Problem solving:  good    Judgement/safety:  good     Functional Assessment:   AM-PAC Daily Activity Raw Score: 16/24     Initial Eval Status  Date: 1/15/24 Treatment Status  Date: STG=LTG  Time frame: 10-14 days   Feeding Independent   Independent    Grooming Stand by Assist   Independent    UB Dressing Minimal Assist   Seated EOB  Modified [place]    LB Dressing Maximal Assist   Mayank socks  Modified [place]    Bathing Moderate Assist  Seated /standing EOB   Stand by Assist    Toileting Moderate Assist   Functional transfers to bathroom commode  Stand by Assist    Bed Mobility  Supine to sit: Moderate Assist x2  Sit to supine: Moderate Assist x 2  Supine to sit: Minimal Assist   Sit to supine: Minimal Assist    Functional Transfers Sit to stand: Moderate Assist x2  Stand to sit: Moderate Assist x2  Stand pivot: Moderate Assist   Stand by Assist    Functional Mobility Minimal Assist  with ww  Short distance in room - bed to bathroom  Stand by Assist  with AAD   Functional distance   Balance Sitting: Stand by Assist      Standing: Minimal

## 2024-01-15 NOTE — PROGRESS NOTES
for static and dynamic sitting balance.   Patient Education: Education provided on  spinal precautions, safety during mobility    Pt's/ family goals   1. None stated    Prognosis is good for reaching above PT goals.    Patient and or family understand(s) diagnosis, prognosis, and plan of care.  yes    PHYSICAL THERAPY PLAN OF CARE:    PT POC is established based on physician order and patient diagnosis     Referring provider/PT Order:    01/14/24 1045  PT eval and treat  Start:  01/14/24 1045,   End:  01/14/24 1045,   ONE TIME,   Standing Count:  1 Occurrences,   R         Anali Viveros MD     Diagnosis:  Psoas muscle abscess (HCC) [K68.12]  Spinal abscess (HCC) [M46.20]  Osteomyelitis of vertebra of lumbar region (HCC) [M46.26]  Abscess in epidural space of lumbar spine [G06.1]  Specific instructions for next treatment:  progress mobility as appropriate     Current Treatment Recommendations:     [x] Strengthening to improve independence with functional mobility   [x] ROM to improve independence with functional mobility   [x] Balance Training to improve static/dynamic balance and to reduce fall risk  [x] Endurance Training to improve activity tolerance during functional mobility   [x] Transfer Training to improve safety and independence with all functional transfers   [x] Gait Training to improve gait mechanics, endurance and assess need for appropriate assistive device  [x] Stair Training in preparation for safe discharge home and/or into the community   [] Positioning to prevent skin breakdown and contractures  [x] Safety and Education Training   [x] Patient/Caregiver Education   [] HEP  [x] Other     PT long term treatment goals are located in above grid    Frequency of treatments: 2-5x/week x 1-2 weeks.    Time in  0851  Time out  0930    Total Treatment Time  23 minutes     Evaluation Time includes thorough review of current medical information, gathering information on past medical history/social history and  prior level of function, completion of standardized testing/informal observation of tasks, assessment of data and education on plan of care and goals.    CPT codes:  [x] Low Complexity PT evaluation 89374  [] Moderate Complexity PT evaluation 57839  [] High Complexity PT evaluation 12923  [] PT Re-evaluation 00887  [] Gait training 93154 - minutes  [] Manual therapy 43244 - minutes  [x] Therapeutic activities 43357 23 minutes  [] Therapeutic exercises 62740 - minutes  [] Neuromuscular reeducation 01532 - minutes     Joleen Stafford PT, DPT  [id number]

## 2024-01-15 NOTE — PROGRESS NOTES
jaundice.  HEENT: Eyes show round, and reactive pupils. Moist mucous membranes, no ulcerations, no thrush.   Neck: Supple to movements. No lymphadenopathy.   Chest: No use of accessory muscles to breathe. Symmetrical expansion. Auscultation reveals no wheezing, crackles, or rhonchi.   Cardiovascular: S1 and S2 are rhythmic and regular. No murmurs appreciated.   Abdomen: Positive bowel sounds to auscultation. Benign to palpation. No masses felt. No hepatosplenomegaly.  Genitourinary: No pain in the lower abdomen, urinary incontinence  Extremities: No clubbing, no cyanosis, no edema.  Musculoskeletal: Back pain  Neurological: Left leg numbness  Lines: peripheral      CBC+dif:  Recent Labs     01/14/24  0033 01/14/24  1045 01/15/24  0534   WBC 18.4* 19.5* 17.8*   HGB 12.6 12.8 11.5*   HCT 39.2 40.6 36.0*   MCV 87.1 88.6 88.5    415 385   NEUTROABS  --  18.66*  --      No results found for: \"CRP\"  No results found for: \"CRPHS\"  No results found for: \"SEDRATE\"  Lab Results   Component Value Date    ALT 31 01/14/2024    AST 29 01/14/2024    ALKPHOS 87 01/14/2024    BILITOT 0.6 01/14/2024     Lab Results   Component Value Date/Time     01/15/2024 05:34 AM    K 4.1 01/15/2024 05:34 AM    K 4.5 11/16/2022 02:47 AM     01/15/2024 05:34 AM    CO2 23 01/15/2024 05:34 AM    BUN 32 01/15/2024 05:34 AM    CREATININE 1.6 01/15/2024 05:34 AM    GFRAA 41 08/26/2022 06:32 PM    LABGLOM 52 01/15/2024 05:34 AM    GLUCOSE 103 01/15/2024 05:34 AM    PROT 6.8 01/14/2024 10:45 AM    LABALBU 2.6 01/14/2024 10:45 AM    CALCIUM 8.5 01/15/2024 05:34 AM    BILITOT 0.6 01/14/2024 10:45 AM    ALKPHOS 87 01/14/2024 10:45 AM    AST 29 01/14/2024 10:45 AM    ALT 31 01/14/2024 10:45 AM       No results found for: \"PROTIME\", \"INR\"    No results found for: \"TSH\"    Lab Results   Component Value Date/Time    COLORU Dark Yellow 01/14/2024 12:33 AM    PHUR 6.5 01/14/2024 12:33 AM    WBCUA 0 TO 5 01/14/2024 12:33 AM    RBCUA 0 TO 2  abscess  Staphylococcus aureus bacteremia, susceptibility pending  Status post L4-S1 laminectomy with evacuation of epidural abscess 01/14  Surgical cultures from 01/14 growing MSSA and rare gram-positive cocci in pairs.  No previous bacteremia noted in the microbiology  Left lower leg numbness, urinary incontinence noted    Plan:    DC cefepime and Flagyl  Continue cefazolin and Vanco.  Vancomycin will be discontinued if the blood cultures are MSSA.  TTE showed no evidence of vegetations  TATUM ordered  Repeat blood cultures are no growth so far, first negative blood culture is 01/15.  ID will continue to follow    Thank you for having us see this patient in consultation. I will be discussing this case with the treating physicians.      Electronically signed by Todd Can MD on 1/15/2024 at 2:31 PM

## 2024-01-15 NOTE — CARE COORDINATION
POD #1  Bilateral L4, L5 and S1 laminectomy, bilateral L4-L5 and L5-S1  medial facetectomy, and bilateral L4, L5 and S1 foraminotomy.2.  Evacuation of epidural abscess. Met with patient who was sitting up in chair. PT. 11.  ID consult.Discussed antibiotics @ discharge - he states he does not see Dr Foster anymore - so will need new PCP. Is in agreement for SNF. 1. [facility] -message to [facility] await return call.2 . [facility]-Referral to Dorota @ [facility]. 3. [facility]- message to [place] await return call.cm/sw to follow.Electronically signed by Kelsi Ayala RN on 1/15/2024 at 10:55 AM    Referral to Rosario @ [facility] - await determination. Per Elda  [facility] - can take if [facility]  does not. Electronically signed by Kelsi Ayala RN on 1/15/2024 at 11:35 AM    Per Rosario- @ [facility]-   no beds. Per Dorota @ [facility] - are able to accept and she will submit for precert. Ntfd Betina  @ [facility] of above. envelope and ambulance form in soft chart . Cm/sw to follow.Electronically signed by Kelsi Ayala RN on 1/15/2024 at 2:03 PM    Patient updated with above.Electronically signed by Kelsi Ayala RN on 1/15/2024 at 2:34 PM

## 2024-01-15 NOTE — PROGRESS NOTES
Pharmacy Consultation Note  (Antibiotic Dosing and Monitoring)    Initial consult date: 1/15/24  Consulting physician/provider: Juan José  Drug: Vancomycin  Indication: Bone/Joint Infection    Age/  Gender Height Weight IBW  Allergy Information   48 y.o./male 188 cm (6' 2.02\") 112 kg (247 lb)     Ideal body weight: 82.2 kg (181 lb 4.8 oz)  Adjusted ideal body weight: 94.2 kg (207 lb 9.3 oz)   Patient has no allergy information on record.      Renal Function:  Recent Labs     01/14/24  0033 01/14/24  1045 01/15/24  0534   BUN 26* 29* 32*   CREATININE 1.5* 1.7* 1.6*     No intake or output data in the 24 hours ending 01/15/24 1225    Vancomycin Monitoring:  Trough:  No results for input(s): \"VANCOTROUGH\" in the last 72 hours.  Random:  No results for input(s): \"VANCORANDOM\" in the last 72 hours.    Vancomycin Administration Times:  Recent vancomycin administrations                     vancomycin (VANCOCIN) 1,000 mg in sodium chloride 0.9 % 250 mL IVPB (Ergo4Nde) (mg) 1,000 mg New Bag 01/15/24 0939    vancomycin (VANCOCIN) injection (mg) 1,000 mg Given 01/14/24 0842                                      Assessment:  Patient is a 48 y.o. male who has been initiated on vancomycin  Estimated Creatinine Clearance: 76 mL/min (A) (based on SCr of 1.6 mg/dL (H)).  To dose vancomycin, pharmacy will be utilizing [employer] calculation software for goal AUC/CHRISTOS 400-600 mg/L-hr (predicted AUC/CHRISTOS = 536, Tr =17.2 mcg/mL)    Plan:  Vancomycin 1500mg x1 load now then initiate vancomycin 1000 mg IV every 12 hours  Will check vancomycin levels when appropriate  Will continue to monitor renal function   Pharmacy to follow      Shane Solorzano RPH 1/15/2024 12:25 PM

## 2024-01-15 NOTE — PROGRESS NOTES
[facility] Hospitalist Progress Note    Admitting Date and Time: 1/13/2024  6:32 PM  Admit Dx: Psoas muscle abscess (HCC) [K68.12]  Spinal abscess (HCC) [M46.20]  Osteomyelitis of vertebra of lumbar region (HCC) [M46.26]  Abscess in epidural space of lumbar spine [G06.1]    Subjective:  Patient is being followed for Psoas muscle abscess (HCC) [K68.12]  Spinal abscess (HCC) [M46.20]  Osteomyelitis of vertebra of lumbar region (HCC) [M46.26]  Abscess in epidural space of lumbar spine [G06.1]     Seen postoperatively.  Continues to complain of lower back pain.  Denies IV drug use, he does use street drugs.        ROS: denies fever, chills, cp, sob, n/v, HA unless stated above.      carvedilol  12.5 mg Oral BID WC    hydrALAZINE  75 mg Oral BID    isosorbide dinitrate  20 mg Oral TID    pantoprazole  40 mg Oral QAM AC    rosuvastatin  20 mg Oral Nightly    sodium chloride flush  5-40 mL IntraVENous 2 times per day    gabapentin  300 mg Oral TID    sodium chloride flush  5-40 mL IntraVENous 2 times per day    acetaminophen  650 mg Oral Q6H    vancomycin  1,000 mg IntraVENous Q24H    cefepime  2,000 mg IntraVENous q8h    metroNIDAZOLE  500 mg IntraVENous Q8H     melatonin, 6 mg, Nightly PRN  sodium chloride flush, 5-40 mL, PRN  sodium chloride, , PRN  potassium chloride, 40 mEq, PRN   Or  potassium alternative oral replacement, 40 mEq, PRN   Or  potassium chloride, 10 mEq, PRN  magnesium sulfate, 2,000 mg, PRN  senna, 1 tablet, Daily PRN  aluminum & magnesium hydroxide-simethicone, 30 mL, Q6H PRN  acetaminophen, 650 mg, Q6H PRN   Or  acetaminophen, 650 mg, Q6H PRN  oxyCODONE, 5 mg, Q4H PRN  cyclobenzaprine, 10 mg, TID PRN  sodium chloride flush, 5-40 mL, PRN  sodium chloride, , PRN  ondansetron, 4 mg, Q8H PRN   Or  ondansetron, 4 mg, Q6H PRN  diazePAM, 5 mg, Q6H PRN  diphenhydrAMINE, 25 mg, Q6H PRN   Or  diphenhydrAMINE, 25 mg, Q6H PRN  benzocaine-menthol, 1 lozenge, Q2H PRN  calcium carbonate, 500 mg, TID

## 2024-01-15 NOTE — PLAN OF CARE
Problem: Chronic Conditions and Co-morbidities  Goal: Patient's chronic conditions and co-morbidity symptoms are monitored and maintained or improved  Outcome: Progressing     Problem: Discharge Planning  Goal: Discharge to home or other facility with appropriate resources  Outcome: Progressing     Problem: ABCDS Injury Assessment  Goal: Absence of physical injury  Outcome: Progressing     Problem: Pain  Goal: Verbalizes/displays adequate comfort level or baseline comfort level  Outcome: Progressing     Problem: Safety - Adult  Goal: Free from fall injury  Outcome: Progressing

## 2024-01-16 LAB
ANION GAP SERPL CALCULATED.3IONS-SCNC: 10 MMOL/L (ref 7–16)
BUN SERPL-MCNC: 21 MG/DL (ref 6–20)
CALCIUM SERPL-MCNC: 8.1 MG/DL (ref 8.6–10.2)
CHLORIDE SERPL-SCNC: 106 MMOL/L (ref 98–107)
CO2 SERPL-SCNC: 21 MMOL/L (ref 22–29)
CREAT SERPL-MCNC: 1.4 MG/DL (ref 0.7–1.2)
ERYTHROCYTE [DISTWIDTH] IN BLOOD BY AUTOMATED COUNT: 14.4 % (ref 11.5–15)
GFR SERPL CREATININE-BSD FRML MDRD: >60 ML/MIN/1.73M2
GLUCOSE SERPL-MCNC: 95 MG/DL (ref 74–99)
HCT VFR BLD AUTO: 29.7 % (ref 37–54)
HGB BLD-MCNC: 9.8 G/DL (ref 12.5–16.5)
MCH RBC QN AUTO: 28.5 PG (ref 26–35)
MCHC RBC AUTO-ENTMCNC: 33 G/DL (ref 32–34.5)
MCV RBC AUTO: 86.3 FL (ref 80–99.9)
MICROORGANISM SPEC CULT: ABNORMAL
MICROORGANISM SPEC CULT: NORMAL
MICROORGANISM SPEC CULT: NORMAL
MICROORGANISM/AGENT SPEC: ABNORMAL
PLATELET # BLD AUTO: 316 K/UL (ref 130–450)
PMV BLD AUTO: 10.8 FL (ref 7–12)
POTASSIUM SERPL-SCNC: 4.4 MMOL/L (ref 3.5–5)
RBC # BLD AUTO: 3.44 M/UL (ref 3.8–5.8)
SERVICE CMNT-IMP: ABNORMAL
SODIUM SERPL-SCNC: 137 MMOL/L (ref 132–146)
SPECIMEN DESCRIPTION: ABNORMAL
SPECIMEN DESCRIPTION: NORMAL
SPECIMEN DESCRIPTION: NORMAL
WBC OTHER # BLD: 10 K/UL (ref 4.5–11.5)

## 2024-01-16 PROCEDURE — 2580000003 HC RX 258

## 2024-01-16 PROCEDURE — 99233 SBSQ HOSP IP/OBS HIGH 50: CPT | Performed by: INTERNAL MEDICINE

## 2024-01-16 PROCEDURE — 6360000002 HC RX W HCPCS: Performed by: INTERNAL MEDICINE

## 2024-01-16 PROCEDURE — 2580000003 HC RX 258: Performed by: NEUROLOGICAL SURGERY

## 2024-01-16 PROCEDURE — 97530 THERAPEUTIC ACTIVITIES: CPT

## 2024-01-16 PROCEDURE — 6370000000 HC RX 637 (ALT 250 FOR IP): Performed by: INTERNAL MEDICINE

## 2024-01-16 PROCEDURE — 97535 SELF CARE MNGMENT TRAINING: CPT

## 2024-01-16 PROCEDURE — 2060000000 HC ICU INTERMEDIATE R&B

## 2024-01-16 PROCEDURE — 85027 COMPLETE CBC AUTOMATED: CPT

## 2024-01-16 PROCEDURE — 36415 COLL VENOUS BLD VENIPUNCTURE: CPT

## 2024-01-16 PROCEDURE — 6360000002 HC RX W HCPCS

## 2024-01-16 PROCEDURE — 80048 BASIC METABOLIC PNL TOTAL CA: CPT

## 2024-01-16 PROCEDURE — 6370000000 HC RX 637 (ALT 250 FOR IP): Performed by: NEUROLOGICAL SURGERY

## 2024-01-16 PROCEDURE — 2580000003 HC RX 258: Performed by: INTERNAL MEDICINE

## 2024-01-16 RX ORDER — CYCLOBENZAPRINE HCL 10 MG
10 TABLET ORAL 3 TIMES DAILY
Status: DISCONTINUED | OUTPATIENT
Start: 2024-01-16 | End: 2024-01-19 | Stop reason: HOSPADM

## 2024-01-16 RX ADMIN — GABAPENTIN 300 MG: 300 CAPSULE ORAL at 14:58

## 2024-01-16 RX ADMIN — CYCLOBENZAPRINE 10 MG: 10 TABLET, FILM COATED ORAL at 09:43

## 2024-01-16 RX ADMIN — PANTOPRAZOLE SODIUM 40 MG: 40 TABLET, DELAYED RELEASE ORAL at 05:59

## 2024-01-16 RX ADMIN — WATER 2000 MG: 1 INJECTION INTRAMUSCULAR; INTRAVENOUS; SUBCUTANEOUS at 21:35

## 2024-01-16 RX ADMIN — ROSUVASTATIN CALCIUM 20 MG: 20 TABLET, FILM COATED ORAL at 21:34

## 2024-01-16 RX ADMIN — ACETAMINOPHEN 650 MG: 325 TABLET ORAL at 22:44

## 2024-01-16 RX ADMIN — METRONIDAZOLE 500 MG: 500 INJECTION, SOLUTION INTRAVENOUS at 01:15

## 2024-01-16 RX ADMIN — SODIUM CHLORIDE, PRESERVATIVE FREE 10 ML: 5 INJECTION INTRAVENOUS at 21:36

## 2024-01-16 RX ADMIN — ISOSORBIDE DINITRATE 20 MG: 10 TABLET ORAL at 21:34

## 2024-01-16 RX ADMIN — VANCOMYCIN HYDROCHLORIDE 1000 MG: 1 INJECTION, POWDER, LYOPHILIZED, FOR SOLUTION INTRAVENOUS at 02:15

## 2024-01-16 RX ADMIN — CEFEPIME 2000 MG: 2 INJECTION, POWDER, FOR SOLUTION INTRAVENOUS at 11:31

## 2024-01-16 RX ADMIN — HYDROMORPHONE HYDROCHLORIDE 0.25 MG: 1 INJECTION, SOLUTION INTRAMUSCULAR; INTRAVENOUS; SUBCUTANEOUS at 06:10

## 2024-01-16 RX ADMIN — CYCLOBENZAPRINE 10 MG: 10 TABLET, FILM COATED ORAL at 21:34

## 2024-01-16 RX ADMIN — ISOSORBIDE DINITRATE 20 MG: 10 TABLET ORAL at 09:37

## 2024-01-16 RX ADMIN — HYDROMORPHONE HYDROCHLORIDE 0.25 MG: 1 INJECTION, SOLUTION INTRAMUSCULAR; INTRAVENOUS; SUBCUTANEOUS at 02:11

## 2024-01-16 RX ADMIN — GABAPENTIN 300 MG: 300 CAPSULE ORAL at 09:37

## 2024-01-16 RX ADMIN — OXYCODONE HYDROCHLORIDE 10 MG: 10 TABLET, FILM COATED, EXTENDED RELEASE ORAL at 09:37

## 2024-01-16 RX ADMIN — HYDRALAZINE HYDROCHLORIDE 75 MG: 50 TABLET ORAL at 09:37

## 2024-01-16 RX ADMIN — MELATONIN 3 MG ORAL TABLET 6 MG: 3 TABLET ORAL at 00:01

## 2024-01-16 RX ADMIN — ISOSORBIDE DINITRATE 20 MG: 10 TABLET ORAL at 14:58

## 2024-01-16 RX ADMIN — SODIUM CHLORIDE, PRESERVATIVE FREE 10 ML: 5 INJECTION INTRAVENOUS at 09:37

## 2024-01-16 RX ADMIN — HYDRALAZINE HYDROCHLORIDE 75 MG: 50 TABLET ORAL at 21:34

## 2024-01-16 RX ADMIN — CEFEPIME 2000 MG: 2 INJECTION, POWDER, FOR SOLUTION INTRAVENOUS at 03:17

## 2024-01-16 RX ADMIN — OXYCODONE HYDROCHLORIDE 10 MG: 10 TABLET, FILM COATED, EXTENDED RELEASE ORAL at 21:35

## 2024-01-16 RX ADMIN — WATER 2000 MG: 1 INJECTION INTRAMUSCULAR; INTRAVENOUS; SUBCUTANEOUS at 14:58

## 2024-01-16 RX ADMIN — HYDROMORPHONE HYDROCHLORIDE 0.25 MG: 1 INJECTION, SOLUTION INTRAMUSCULAR; INTRAVENOUS; SUBCUTANEOUS at 17:14

## 2024-01-16 RX ADMIN — CARVEDILOL 12.5 MG: 6.25 TABLET, FILM COATED ORAL at 09:37

## 2024-01-16 RX ADMIN — CARVEDILOL 12.5 MG: 6.25 TABLET, FILM COATED ORAL at 17:19

## 2024-01-16 RX ADMIN — HYDROMORPHONE HYDROCHLORIDE 0.25 MG: 1 INJECTION, SOLUTION INTRAMUSCULAR; INTRAVENOUS; SUBCUTANEOUS at 11:28

## 2024-01-16 RX ADMIN — VANCOMYCIN HYDROCHLORIDE 1000 MG: 1 INJECTION, POWDER, LYOPHILIZED, FOR SOLUTION INTRAVENOUS at 15:01

## 2024-01-16 RX ADMIN — GABAPENTIN 300 MG: 300 CAPSULE ORAL at 21:34

## 2024-01-16 RX ADMIN — METRONIDAZOLE 500 MG: 500 INJECTION, SOLUTION INTRAVENOUS at 09:46

## 2024-01-16 ASSESSMENT — PAIN SCALES - GENERAL
PAINLEVEL_OUTOF10: 10
PAINLEVEL_OUTOF10: 4
PAINLEVEL_OUTOF10: 10
PAINLEVEL_OUTOF10: 10
PAINLEVEL_OUTOF10: 8
PAINLEVEL_OUTOF10: 0
PAINLEVEL_OUTOF10: 5

## 2024-01-16 ASSESSMENT — PAIN DESCRIPTION - ORIENTATION
ORIENTATION: LEFT
ORIENTATION: LEFT
ORIENTATION: MID

## 2024-01-16 ASSESSMENT — PAIN DESCRIPTION - PAIN TYPE
TYPE: ACUTE PAIN
TYPE: ACUTE PAIN

## 2024-01-16 ASSESSMENT — PAIN DESCRIPTION - FREQUENCY
FREQUENCY: CONTINUOUS
FREQUENCY: CONTINUOUS

## 2024-01-16 ASSESSMENT — PAIN DESCRIPTION - LOCATION
LOCATION: LEG
LOCATION: BACK
LOCATION: LEG

## 2024-01-16 ASSESSMENT — PAIN - FUNCTIONAL ASSESSMENT: PAIN_FUNCTIONAL_ASSESSMENT: PREVENTS OR INTERFERES SOME ACTIVE ACTIVITIES AND ADLS

## 2024-01-16 ASSESSMENT — PAIN DESCRIPTION - DESCRIPTORS
DESCRIPTORS: ACHING;DISCOMFORT
DESCRIPTORS: ACHING;DISCOMFORT
DESCRIPTORS: ACHING;DISCOMFORT;SHOOTING

## 2024-01-16 ASSESSMENT — PAIN DESCRIPTION - ONSET
ONSET: ON-GOING
ONSET: ON-GOING

## 2024-01-16 NOTE — PROGRESS NOTES
BACTERIA 1+ 01/14/2024 12:33 AM    SPECGRAV 1.020 01/14/2024 12:33 AM    LEUKOCYTESUR NEGATIVE 01/14/2024 12:33 AM    UROBILINOGEN >8.0 01/14/2024 12:33 AM    BILIRUBINUR SMALL 01/14/2024 12:33 AM    GLUCOSEU NEGATIVE 01/14/2024 12:33 AM       No results found for: \"CUR5PUY\", \"BEART\", \"T2GEOEES\", \"PHART\", \"THGBART\", \"QPK2GBQ\", \"PO2ART\", \"TJK0NDU\"  Radiology:  [facility] PROCED[facility]   Final Result   Intraprocedural fluoroscopic spot images as above.  See separate procedure   report for more information.         CT ABDOMEN PELVIS W IV CONTRAST Additional Contrast? None   Final Result   1. Abnormal left psoas with suggestion of 2 collections measuring 1.5 x 1.9 x   1.5 cm and 1.0 x 0.8 x 2.4 cm.  Stranding/edema along the left psoas. Also   there is a 1.6 x 2.2 x 3.5 cm collection in the right psoas muscle.  These   collections are at the L4/L5 levels.  Findings suggest phlegmonous change and   abscess.  Also note that there is mild irregularity of the anterior inferior   corner of the L4 vertebral body.   2. Prominent left periaortic nodes are nonspecific but favored to be reactive.   3. Trace pericardial effusion.   4. 1.6 cm left adrenal nodule.  Could further evaluate with adrenal mass   protocol CT.         MRI LUMBAR SPINE W CONTRAST   Final Result   MR findings worrisome for osteomyelitis at L4 with associated epidural   phlegmon/abscess.      Partially visualized bilateral psoas muscle abscesses.             Microbiology:  Pending  No results for input(s): \"BC\" in the last 72 hours.  No results for input(s): \"ORG\" in the last 72 hours.  No results for input(s): \"BLOODCULT2\" in the last 72 hours.  No results for input(s): \"STREPNEUMAGU\" in the last 72 hours.  No results for input(s): \"LP1UAG\" in the last 72 hours.  No results for input(s): \"ASO\" in the last 72 hours.  No results for input(s): \"CULTRESP\" in the last 72 hours.    Assessment:  L4 discitis/osteomyelitis  L4 epidural abscess  Staphylococcus

## 2024-01-16 NOTE — PROGRESS NOTES
[facility] Hospitalist Progress Note    Admitting Date and Time: 1/13/2024  6:32 PM  Admit Dx: Psoas muscle abscess (HCC) [K68.12]  Spinal abscess (HCC) [M46.20]  Osteomyelitis of vertebra of lumbar region (HCC) [M46.26]  Abscess in epidural space of lumbar spine [G06.1]    Subjective:  Patient is being followed for Psoas muscle abscess (HCC) [K68.12]  Spinal abscess (HCC) [M46.20]  Osteomyelitis of vertebra of lumbar region (HCC) [M46.26]  Abscess in epidural space of lumbar spine [G06.1]     Has been receiving Dilaudid and OxyContin.  States he was unable to sleep due to pain.  Reports pain to be 20/10.  He is wiggling around in the bed with pain.        ROS: denies fever, chills, cp, sob, n/v, HA unless stated above.      vancomycin  1,000 mg IntraVENous Q12H    oxyCODONE  10 mg Oral 2 times per day    carvedilol  12.5 mg Oral BID WC    hydrALAZINE  75 mg Oral BID    isosorbide dinitrate  20 mg Oral TID    pantoprazole  40 mg Oral QAM AC    rosuvastatin  20 mg Oral Nightly    sodium chloride flush  5-40 mL IntraVENous 2 times per day    gabapentin  300 mg Oral TID    sodium chloride flush  5-40 mL IntraVENous 2 times per day    acetaminophen  650 mg Oral Q6H    cefepime  2,000 mg IntraVENous q8h    metroNIDAZOLE  500 mg IntraVENous Q8H     HYDROmorphone, 0.25 mg, Q4H PRN  melatonin, 6 mg, Nightly PRN  sodium chloride flush, 5-40 mL, PRN  sodium chloride, , PRN  potassium chloride, 40 mEq, PRN   Or  potassium alternative oral replacement, 40 mEq, PRN   Or  potassium chloride, 10 mEq, PRN  magnesium sulfate, 2,000 mg, PRN  senna, 1 tablet, Daily PRN  aluminum & magnesium hydroxide-simethicone, 30 mL, Q6H PRN  acetaminophen, 650 mg, Q6H PRN   Or  acetaminophen, 650 mg, Q6H PRN  cyclobenzaprine, 10 mg, TID PRN  sodium chloride flush, 5-40 mL, PRN  sodium chloride, , PRN  ondansetron, 4 mg, Q8H PRN   Or  ondansetron, 4 mg, Q6H PRN  diazePAM, 5 mg, Q6H PRN  diphenhydrAMINE, 25 mg, Q6H PRN

## 2024-01-16 NOTE — PROGRESS NOTES
Physical Therapy  Physical Therapy Treatment    Name: Vince Conroy  : 1975  MRN: [id number]      Date of Service: 2024    Evaluating PT:  Joleen Stafford PT, DPT [id number]    Room #:  8508/8508-B  Diagnosis:  Psoas muscle abscess (HCC) [K68.12]  Spinal abscess (HCC) [M46.20]  Osteomyelitis of vertebra of lumbar region (HCC) [M46.26]  Abscess in epidural space of lumbar spine [G06.1]  PMHx/PSHx:    Past Medical History:   Diagnosis Date    CAD (coronary artery disease)     CHF (congestive heart failure) ([facility]) 2022    GERD (gastroesophageal reflux disease)     Hx of drug abuse ([facility])     cocaine    Hypertension     NICM (nonischemic cardiomyopathy) ([facility])       Procedure/Surgery:  LUMBAR LAMINECTOMY POSTERIOR L4 - S1    Precautions:  Falls, spinal  Equipment Needs:  TBD, pt has cane and WW    SUBJECTIVE:    Pt lives with friend in a 2 story home with 5 stairs to enter and 1 rail.  Bed is on basement floor and bath is on basement floor.  Pt ambulated with WW vs cane PTA.    OBJECTIVE:   Initial Evaluation  Date: 1/15/24 Treatment  24 Short Term/ Long Term   Goals   AM-PAC 6 Clicks     Was pt agreeable to Eval/treatment? yes yes    Does pt have pain? 8/10 back pain 910 back pain    Bed Mobility  Rolling: MaxA  Supine to sit: ModA x2  Sit to supine: NT  Scooting: Elle Rolling: ModA  Supine to sit: ModA x2  Sit to supine: ModA x2  Scooting: Elle Rolling: Elle  Supine to sit: Elle  Sit to supine: Elle  Scooting: Independent    Transfers Sit to stand: ModA x2  Stand to sit: ModA x2  Stand pivot: Elle with WW Sit to stand: ModA  Stand to sit: ModA  Stand pivot: Elle with WW Sit to stand: SBA  Stand to sit: SBA  Stand pivot: SBA with AAD   Ambulation    10 feet x2 with WW Elle 10 feet x2 with WW Elle >150 feet with AAD SBA   Stair negotiation: ascended and descended  NT NT 14 steps with 1 rail SBA   ROM BUE:  Defer to OT note  BLE:  WFL     Strength BUE:  Defer to OT note  BLE:  3+/5

## 2024-01-16 NOTE — PROGRESS NOTES
Occupational Therapy  OT BEDSIDE TREATMENT NOTE   [facility]  [address] [place], [place]       Date:2024  Patient Name: Vince Conroy  MRN: [id number]  : 1975  Room: 8508508-     Per OT Eval:    Evaluating OT: Candi Hassan, OTR/L #[id number]     Referring Provider::  Anali Viveros MD                   Specific Provider Orders/Date: OT evaluation and treatment 24     Diagnosis:  Psoas muscle abscess (HCC) [K68.12]  Spinal abscess (HCC) [M46.20]  Osteomyelitis of vertebra of lumbar region (HCC) [M46.26]  Abscess in epidural space of lumbar spine [G06.1]       Pertinent Medical History:  has a past medical history of CAD (coronary artery disease), CHF (congestive heart failure) ([facility]), GERD (gastroesophageal reflux disease), Hx of drug abuse (HCC), Hypertension, and NICM (nonischemic cardiomyopathy) ([facility]).      Precautions:  Fall Risk, spinal neutrality,      Assessment of current deficits   [x] Functional mobility             [x]ADLs           [x] Strength                  []Cognition   [x] Functional transfers           [] IADLs         [x] Safety Awareness   [x]Endurance   [] Fine Coordination              [x] Balance      [] Vision/perception   []Sensation     []Gross Motor Coordination  [] ROM           [] Delirium                   [] Motor Control      OT PLAN OF CARE   OT POC based on physician orders, patient diagnosis and results of clinical assessment     Frequency/Duration  2-5 days/wk for 2 weeks PRN   Specific OT Treatment to include:   * Instruction/training on adapted ADL techniques and AE recommendations to increase functional independence within precautions       * Functional transfer/mobility training/DME recommendations for increased independence, safety, and fall prevention  * Patient/Family education to increase follow through with safety techniques and functional independence  * Therapeutic exercise to improve motor  progress towards set goals.   Continue with current plan of care    Treatment Time In: 1:45            Treatment Time Out: 2:10           Treatment Charges: Mins Units   Ther Ex  23527     Manual Therapy 14677     Thera Activities 97099 10 1   ADL/Home Mgt 01102 15 1   Neuro Re-ed 49197     Group Therapy      Orthotic manage/training  17617     Non-Billable Time     Total Timed Treatment 25 2       Jeanne Cameron, CLIFTON/JANETTE [id number]

## 2024-01-16 NOTE — PROGRESS NOTES
Department of Neurosurgery  Progress Note    CHIEF COMPLAINT: s/p lumbar laminectomy for epidural abscess    SUBJECTIVE:  c/o op site pain and bilateral hip pain    REVIEW OF SYSTEMS :  Constitutional: Negative for chills and fever.    Neurological: Negative for dizziness, tremors and speech change.     OBJECTIVE:   VITALS:  /82   Pulse 88   Temp 98 °F (36.7 °C) (Temporal)   Resp 16   Ht 1.88 m (6' 2.02\")   Wt 109.5 kg (241 lb 4.8 oz)   SpO2 98%   PF (!) 2 L/min   BMI 30.97 kg/m²   PHYSICAL:  CONSTITUTIONAL:  awake, alert, cooperative, no apparent distress, and appears stated age    DATA:  CBC:   Lab Results   Component Value Date/Time    WBC 10.0 01/16/2024 05:40 AM    RBC 3.44 01/16/2024 05:40 AM    HGB 9.8 01/16/2024 05:40 AM    HCT 29.7 01/16/2024 05:40 AM    MCV 86.3 01/16/2024 05:40 AM    MCH 28.5 01/16/2024 05:40 AM    MCHC 33.0 01/16/2024 05:40 AM    RDW 14.4 01/16/2024 05:40 AM     01/16/2024 05:40 AM    MPV 10.8 01/16/2024 05:40 AM     BMP:    Lab Results   Component Value Date/Time     01/16/2024 05:40 AM    K 4.4 01/16/2024 05:40 AM    K 4.5 11/16/2022 02:47 AM     01/16/2024 05:40 AM    CO2 21 01/16/2024 05:40 AM    BUN 21 01/16/2024 05:40 AM    LABALBU 2.6 01/14/2024 10:45 AM    CREATININE 1.4 01/16/2024 05:40 AM    CALCIUM 8.1 01/16/2024 05:40 AM    GFRAA 41 08/26/2022 06:32 PM    LABGLOM >60 01/16/2024 05:40 AM    GLUCOSE 95 01/16/2024 05:40 AM     PT/INR:  No results found for: \"PROTIME\", \"INR\"  PTT:    Lab Results   Component Value Date/Time    APTT 34.1 03/21/2022 06:26 AM   [APTT}    Current Inpatient Medications  Current Facility-Administered Medications: cyclobenzaprine (FLEXERIL) tablet 10 mg, 10 mg, Oral, TID  HYDROmorphone (DILAUDID) injection 0.25 mg, 0.25 mg, IntraVENous, Q4H PRN  vancomycin (VANCOCIN) 1,000 mg in sodium chloride 0.9 % 250 mL IVPB ([id number]), 1,000 mg, IntraVENous, Q12H  oxyCODONE (OXYCONTIN) extended release tablet 10 mg, 10 mg, Oral, 2  (VALIUM) tablet 5 mg, 5 mg, Oral, Q6H PRN  diphenhydrAMINE (BENADRYL) tablet 25 mg, 25 mg, Oral, Q6H PRN **OR** diphenhydrAMINE (BENADRYL) injection 25 mg, 25 mg, IntraVENous, Q6H PRN  benzocaine-menthol (CEPACOL SORE THROAT) lozenge 1 lozenge, 1 lozenge, Oral, Q2H PRN  ceFEPIme (MAXIPIME) 2,000 mg in sodium chloride 0.9 % 100 mL IVPB ([id number]), 2,000 mg, IntraVENous, q8h  metroNIDAZOLE (FLAGYL) 500 mg in 0.9% NaCl 100 mL IVPB premix, 500 mg, IntraVENous, Q8H  calcium carbonate (TUMS) chewable tablet 500 mg, 500 mg, Oral, TID PRN    ASSESSMENT:   S/p L4-S1 laminectomy for epidural abscess 1/14  Drain management  IV pain control    PLAN:  PT/OT  WBAT  Daily dressing changes  Pain control  AB per ID  Rehab placement planning      Electronically signed by Gaye Barros PA-C on 1/16/2024 at 10:48 AM

## 2024-01-16 NOTE — CARE COORDINATION
POD #2  lumbar laminectomy for epidural abscess History of drug abuse..ID consult--Continue cefepime Flagyl and vancomycin empirically echo  ordered Follow surgical cultures from 01/14 Repeat blood cultures. Has been accepted to [facility] and precert has been started Envelope and ambulance form in soft chart . Cm/sw to follow.Electronically signed by Kelsi Ayala RN on 1/16/2024 at 10:58 AM    Call from Lizabeth @ [facility] - has auth good thru end of week - she will follow.and PASSR was completed by Lizabeth.Electronically signed by Kelsi Ayala RN on 1/16/2024 at 2:13 PM

## 2024-01-16 NOTE — PROGRESS NOTES
Pharmacy Consultation Note  (Antibiotic Dosing and Monitoring)    Initial consult date: 1/15/24  Consulting physician/provider: Juan José  Drug: Vancomycin  Indication: Bone/Joint Infection    Age/  Gender Height Weight IBW  Allergy Information   48 y.o./male 188 cm (6' 2.02\") 112 kg (247 lb)     Ideal body weight: 82.2 kg (181 lb 4.8 oz)  Adjusted ideal body weight: 93.1 kg (205 lb 4.8 oz)   Patient has no allergy information on record.      Renal Function:  Recent Labs     01/14/24  1045 01/15/24  0534 01/16/24  0540   BUN 29* 32* 21*   CREATININE 1.7* 1.6* 1.4*         Intake/Output Summary (Last 24 hours) at 1/16/2024 1038  Last data filed at 1/16/2024 0853  Gross per 24 hour   Intake 590 ml   Output 1600 ml   Net -1010 ml       Vancomycin Monitoring:  Trough:  No results for input(s): \"VANCOTROUGH\" in the last 72 hours.  Random:  No results for input(s): \"VANCORANDOM\" in the last 72 hours.    Vancomycin Administration Times:  Recent vancomycin administrations                     vancomycin (VANCOCIN) 1,000 mg in sodium chloride 0.9 % 250 mL IVPB (Qcti6Ccz) (mg) 1,000 mg New Bag 01/16/24 0215    vancomycin (VANCOCIN) 1500 mg in sodium chloride 0.9 % 250 mL IVPB (mg) 1,500 mg New Bag 01/15/24 1507    vancomycin (VANCOCIN) 1,000 mg in sodium chloride 0.9 % 250 mL IVPB (Sgni4Abs) (mg) 1,000 mg New Bag 01/15/24 0939    vancomycin (VANCOCIN) injection (mg) 1,000 mg Given 01/14/24 0842                                               Assessment:  Patient is a 48 y.o. male who has been initiated on vancomycin  Estimated Creatinine Clearance: 86 mL/min (A) (based on SCr of 1.4 mg/dL (H)).  To dose vancomycin, pharmacy will be utilizing [employer] calculation software for goal AUC/CHRISTOS 400-600 mg/L-hr (predicted AUC/CHRISTOS = 536, Tr =17.2 mcg/mL)    Plan:  Continue vancomycin 1000 mg IV every 12 hours  Will check random vancomycin level 1/17 @0600 and adjust regimen if needed  Will continue to monitor renal function   Pharmacy to

## 2024-01-17 ENCOUNTER — HOSPITAL ENCOUNTER (INPATIENT)
Age: 49
Discharge: HOME | End: 2024-01-19
Attending: INTERNAL MEDICINE
Payer: COMMERCIAL

## 2024-01-17 ENCOUNTER — ANESTHESIA (OUTPATIENT)
Age: 49
End: 2024-01-17
Payer: COMMERCIAL

## 2024-01-17 ENCOUNTER — ANESTHESIA EVENT (OUTPATIENT)
Age: 49
End: 2024-01-17
Payer: COMMERCIAL

## 2024-01-17 VITALS
SYSTOLIC BLOOD PRESSURE: 117 MMHG | DIASTOLIC BLOOD PRESSURE: 74 MMHG | HEART RATE: 74 BPM | RESPIRATION RATE: 20 BRPM | OXYGEN SATURATION: 98 % | TEMPERATURE: 97.9 F

## 2024-01-17 LAB
ACB COMPLEX DNA BLD POS QL NAA+NON-PROBE: NOT DETECTED
ANION GAP SERPL CALCULATED.3IONS-SCNC: 9 MMOL/L (ref 7–16)
B FRAGILIS DNA BLD POS QL NAA+NON-PROBE: NOT DETECTED
BIOFIRE TEST COMMENT: ABNORMAL
BUN SERPL-MCNC: 17 MG/DL (ref 6–20)
C ALBICANS DNA BLD POS QL NAA+NON-PROBE: NOT DETECTED
C AURIS DNA BLD POS QL NAA+NON-PROBE: NOT DETECTED
C GATTII+NEOFOR DNA BLD POS QL NAA+N-PRB: NOT DETECTED
C GLABRATA DNA BLD POS QL NAA+NON-PROBE: NOT DETECTED
C KRUSEI DNA BLD POS QL NAA+NON-PROBE: NOT DETECTED
C PARAP DNA BLD POS QL NAA+NON-PROBE: NOT DETECTED
C TROPICLS DNA BLD POS QL NAA+NON-PROBE: NOT DETECTED
CALCIUM SERPL-MCNC: 8.1 MG/DL (ref 8.6–10.2)
CHLORIDE SERPL-SCNC: 103 MMOL/L (ref 98–107)
CO2 SERPL-SCNC: 23 MMOL/L (ref 22–29)
CREAT SERPL-MCNC: 1.2 MG/DL (ref 0.7–1.2)
DATE LAST DOSE: NORMAL
E CLOAC COMP DNA BLD POS NAA+NON-PROBE: NOT DETECTED
E COLI DNA BLD POS QL NAA+NON-PROBE: NOT DETECTED
E FAECALIS DNA BLD POS QL NAA+NON-PROBE: NOT DETECTED
E FAECIUM DNA BLD POS QL NAA+NON-PROBE: NOT DETECTED
ECHO AO SINUS VALSALVA DIAM: 3.6 CM
ECHO AO SINUS VALSALVA INDEX: 1.53 CM/M2
ECHO BSA: 2.42 M2
ENTEROBACTERALES DNA BLD POS NAA+N-PRB: NOT DETECTED
ERYTHROCYTE [DISTWIDTH] IN BLOOD BY AUTOMATED COUNT: 14.4 % (ref 11.5–15)
GFR SERPL CREATININE-BSD FRML MDRD: >60 ML/MIN/1.73M2
GLUCOSE SERPL-MCNC: 90 MG/DL (ref 74–99)
GP B STREP DNA BLD POS QL NAA+NON-PROBE: NOT DETECTED
HAEM INFLU DNA BLD POS QL NAA+NON-PROBE: NOT DETECTED
HCT VFR BLD AUTO: 31.2 % (ref 37–54)
HGB BLD-MCNC: 10.1 G/DL (ref 12.5–16.5)
K OXYTOCA DNA BLD POS QL NAA+NON-PROBE: NOT DETECTED
KLEBSIELLA SP DNA BLD POS QL NAA+NON-PRB: NOT DETECTED
KLEBSIELLA SP DNA BLD POS QL NAA+NON-PRB: NOT DETECTED
L MONOCYTOG DNA BLD POS QL NAA+NON-PROBE: NOT DETECTED
MCH RBC QN AUTO: 28 PG (ref 26–35)
MCHC RBC AUTO-ENTMCNC: 32.4 G/DL (ref 32–34.5)
MCV RBC AUTO: 86.4 FL (ref 80–99.9)
MECA+MECC+MREJ ISLT/SPM QL: NOT DETECTED
MICROORGANISM SPEC CULT: ABNORMAL
MICROORGANISM SPEC CULT: NORMAL
MICROORGANISM/AGENT SPEC: ABNORMAL
MICROORGANISM/AGENT SPEC: NORMAL
N MEN DNA BLD POS QL NAA+NON-PROBE: NOT DETECTED
P AERUGINOSA DNA BLD POS NAA+NON-PROBE: NOT DETECTED
PLATELET # BLD AUTO: 308 K/UL (ref 130–450)
PMV BLD AUTO: 11 FL (ref 7–12)
POTASSIUM SERPL-SCNC: 4.3 MMOL/L (ref 3.5–5)
PROTEUS SP DNA BLD POS QL NAA+NON-PROBE: NOT DETECTED
RBC # BLD AUTO: 3.61 M/UL (ref 3.8–5.8)
S AUREUS DNA BLD POS QL NAA+NON-PROBE: DETECTED
S AUREUS+CONS DNA BLD POS NAA+NON-PROBE: DETECTED
S EPIDERMIDIS DNA BLD POS QL NAA+NON-PRB: NOT DETECTED
S LUGDUNENSIS DNA BLD POS QL NAA+NON-PRB: NOT DETECTED
S MALTOPHILIA DNA BLD POS QL NAA+NON-PRB: NOT DETECTED
S MARCESCENS DNA BLD POS NAA+NON-PROBE: NOT DETECTED
S PNEUM DNA BLD POS QL NAA+NON-PROBE: NOT DETECTED
S PYO DNA BLD POS QL NAA+NON-PROBE: NOT DETECTED
SALMONELLA DNA BLD POS QL NAA+NON-PROBE: NOT DETECTED
SERVICE CMNT-IMP: ABNORMAL
SODIUM SERPL-SCNC: 135 MMOL/L (ref 132–146)
SPECIMEN DESCRIPTION: ABNORMAL
SPECIMEN DESCRIPTION: NORMAL
STREPTOCOCCUS DNA BLD POS NAA+NON-PROBE: NOT DETECTED
TME LAST DOSE: NORMAL H
VANCOMYCIN DOSE: NORMAL MG
VANCOMYCIN SERPL-MCNC: 20.3 UG/ML (ref 5–40)
WBC OTHER # BLD: 9.9 K/UL (ref 4.5–11.5)

## 2024-01-17 PROCEDURE — 2060000000 HC ICU INTERMEDIATE R&B

## 2024-01-17 PROCEDURE — 36415 COLL VENOUS BLD VENIPUNCTURE: CPT

## 2024-01-17 PROCEDURE — 2580000003 HC RX 258

## 2024-01-17 PROCEDURE — 80202 ASSAY OF VANCOMYCIN: CPT

## 2024-01-17 PROCEDURE — 93320 DOPPLER ECHO COMPLETE: CPT

## 2024-01-17 PROCEDURE — 2580000003 HC RX 258: Performed by: NEUROLOGICAL SURGERY

## 2024-01-17 PROCEDURE — 97530 THERAPEUTIC ACTIVITIES: CPT

## 2024-01-17 PROCEDURE — B24BZZ4 ULTRASONOGRAPHY OF HEART WITH AORTA, TRANSESOPHAGEAL: ICD-10-PCS | Performed by: INTERNAL MEDICINE

## 2024-01-17 PROCEDURE — 85027 COMPLETE CBC AUTOMATED: CPT

## 2024-01-17 PROCEDURE — 7100000010 HC PHASE II RECOVERY - FIRST 15 MIN: Performed by: INTERNAL MEDICINE

## 2024-01-17 PROCEDURE — 93325 DOPPLER ECHO COLOR FLOW MAPG: CPT | Performed by: INTERNAL MEDICINE

## 2024-01-17 PROCEDURE — 3700000000 HC ANESTHESIA ATTENDED CARE: Performed by: INTERNAL MEDICINE

## 2024-01-17 PROCEDURE — 97535 SELF CARE MNGMENT TRAINING: CPT

## 2024-01-17 PROCEDURE — 6360000002 HC RX W HCPCS

## 2024-01-17 PROCEDURE — 3700000001 HC ADD 15 MINUTES (ANESTHESIA): Performed by: INTERNAL MEDICINE

## 2024-01-17 PROCEDURE — 2580000003 HC RX 258: Performed by: INTERNAL MEDICINE

## 2024-01-17 PROCEDURE — 6360000002 HC RX W HCPCS: Performed by: INTERNAL MEDICINE

## 2024-01-17 PROCEDURE — 93312 ECHO TRANSESOPHAGEAL: CPT | Performed by: INTERNAL MEDICINE

## 2024-01-17 PROCEDURE — 6370000000 HC RX 637 (ALT 250 FOR IP): Performed by: INTERNAL MEDICINE

## 2024-01-17 PROCEDURE — 93320 DOPPLER ECHO COMPLETE: CPT | Performed by: INTERNAL MEDICINE

## 2024-01-17 PROCEDURE — 80048 BASIC METABOLIC PNL TOTAL CA: CPT

## 2024-01-17 PROCEDURE — 6370000000 HC RX 637 (ALT 250 FOR IP): Performed by: NEUROLOGICAL SURGERY

## 2024-01-17 PROCEDURE — 99233 SBSQ HOSP IP/OBS HIGH 50: CPT | Performed by: INTERNAL MEDICINE

## 2024-01-17 RX ORDER — HEPARIN 100 UNIT/ML
3 SYRINGE INTRAVENOUS PRN
Status: DISCONTINUED | OUTPATIENT
Start: 2024-01-17 | End: 2024-01-19 | Stop reason: HOSPADM

## 2024-01-17 RX ORDER — SODIUM CHLORIDE 9 MG/ML
INJECTION, SOLUTION INTRAVENOUS CONTINUOUS PRN
Status: DISCONTINUED | OUTPATIENT
Start: 2024-01-17 | End: 2024-01-17 | Stop reason: SDUPTHER

## 2024-01-17 RX ORDER — SODIUM CHLORIDE 0.9 % (FLUSH) 0.9 %
5-40 SYRINGE (ML) INJECTION EVERY 12 HOURS SCHEDULED
Status: DISCONTINUED | OUTPATIENT
Start: 2024-01-17 | End: 2024-01-19 | Stop reason: HOSPADM

## 2024-01-17 RX ORDER — PROPOFOL 10 MG/ML
INJECTION, EMULSION INTRAVENOUS PRN
Status: DISCONTINUED | OUTPATIENT
Start: 2024-01-17 | End: 2024-01-17 | Stop reason: SDUPTHER

## 2024-01-17 RX ORDER — LIDOCAINE HYDROCHLORIDE 10 MG/ML
5 INJECTION, SOLUTION EPIDURAL; INFILTRATION; INTRACAUDAL; PERINEURAL ONCE
Status: DISCONTINUED | OUTPATIENT
Start: 2024-01-17 | End: 2024-01-19 | Stop reason: HOSPADM

## 2024-01-17 RX ORDER — HEPARIN 100 UNIT/ML
3 SYRINGE INTRAVENOUS EVERY 12 HOURS SCHEDULED
Status: DISCONTINUED | OUTPATIENT
Start: 2024-01-17 | End: 2024-01-19 | Stop reason: HOSPADM

## 2024-01-17 RX ORDER — SODIUM CHLORIDE 9 MG/ML
INJECTION, SOLUTION INTRAVENOUS PRN
Status: DISCONTINUED | OUTPATIENT
Start: 2024-01-17 | End: 2024-01-19 | Stop reason: HOSPADM

## 2024-01-17 RX ORDER — SODIUM CHLORIDE 0.9 % (FLUSH) 0.9 %
5-40 SYRINGE (ML) INJECTION PRN
Status: DISCONTINUED | OUTPATIENT
Start: 2024-01-17 | End: 2024-01-19 | Stop reason: HOSPADM

## 2024-01-17 RX ADMIN — OXYCODONE HYDROCHLORIDE 10 MG: 10 TABLET, FILM COATED, EXTENDED RELEASE ORAL at 20:55

## 2024-01-17 RX ADMIN — WATER 2000 MG: 1 INJECTION INTRAMUSCULAR; INTRAVENOUS; SUBCUTANEOUS at 11:23

## 2024-01-17 RX ADMIN — CYCLOBENZAPRINE 10 MG: 10 TABLET, FILM COATED ORAL at 20:55

## 2024-01-17 RX ADMIN — HEPARIN 300 UNITS: 100 SYRINGE at 20:54

## 2024-01-17 RX ADMIN — ACETAMINOPHEN 650 MG: 325 TABLET ORAL at 11:23

## 2024-01-17 RX ADMIN — PROPOFOL 300 MG: 10 INJECTION, EMULSION INTRAVENOUS at 09:40

## 2024-01-17 RX ADMIN — WATER 2000 MG: 1 INJECTION INTRAMUSCULAR; INTRAVENOUS; SUBCUTANEOUS at 20:54

## 2024-01-17 RX ADMIN — GABAPENTIN 300 MG: 300 CAPSULE ORAL at 10:54

## 2024-01-17 RX ADMIN — GABAPENTIN 300 MG: 300 CAPSULE ORAL at 20:55

## 2024-01-17 RX ADMIN — HYDROMORPHONE HYDROCHLORIDE 0.25 MG: 1 INJECTION, SOLUTION INTRAMUSCULAR; INTRAVENOUS; SUBCUTANEOUS at 01:26

## 2024-01-17 RX ADMIN — HYDRALAZINE HYDROCHLORIDE 75 MG: 50 TABLET ORAL at 20:56

## 2024-01-17 RX ADMIN — ACETAMINOPHEN 650 MG: 325 TABLET ORAL at 16:51

## 2024-01-17 RX ADMIN — GABAPENTIN 300 MG: 300 CAPSULE ORAL at 15:17

## 2024-01-17 RX ADMIN — SENNOSIDES 8.6 MG: 8.6 TABLET, FILM COATED ORAL at 15:17

## 2024-01-17 RX ADMIN — SODIUM CHLORIDE, PRESERVATIVE FREE 10 ML: 5 INJECTION INTRAVENOUS at 20:55

## 2024-01-17 RX ADMIN — HYDRALAZINE HYDROCHLORIDE 75 MG: 50 TABLET ORAL at 10:54

## 2024-01-17 RX ADMIN — PANTOPRAZOLE SODIUM 40 MG: 40 TABLET, DELAYED RELEASE ORAL at 05:50

## 2024-01-17 RX ADMIN — CYCLOBENZAPRINE 10 MG: 10 TABLET, FILM COATED ORAL at 05:52

## 2024-01-17 RX ADMIN — CYCLOBENZAPRINE 10 MG: 10 TABLET, FILM COATED ORAL at 10:54

## 2024-01-17 RX ADMIN — ACETAMINOPHEN 650 MG: 325 TABLET ORAL at 05:50

## 2024-01-17 RX ADMIN — ISOSORBIDE DINITRATE 20 MG: 10 TABLET ORAL at 20:55

## 2024-01-17 RX ADMIN — HYDROMORPHONE HYDROCHLORIDE 0.25 MG: 1 INJECTION, SOLUTION INTRAMUSCULAR; INTRAVENOUS; SUBCUTANEOUS at 13:13

## 2024-01-17 RX ADMIN — CYCLOBENZAPRINE 10 MG: 10 TABLET, FILM COATED ORAL at 15:17

## 2024-01-17 RX ADMIN — ROSUVASTATIN CALCIUM 20 MG: 20 TABLET, FILM COATED ORAL at 20:55

## 2024-01-17 RX ADMIN — CARVEDILOL 12.5 MG: 6.25 TABLET, FILM COATED ORAL at 10:55

## 2024-01-17 RX ADMIN — VANCOMYCIN HYDROCHLORIDE 1000 MG: 1 INJECTION, POWDER, LYOPHILIZED, FOR SOLUTION INTRAVENOUS at 01:27

## 2024-01-17 RX ADMIN — HYDROMORPHONE HYDROCHLORIDE 0.25 MG: 1 INJECTION, SOLUTION INTRAMUSCULAR; INTRAVENOUS; SUBCUTANEOUS at 05:49

## 2024-01-17 RX ADMIN — CARVEDILOL 12.5 MG: 6.25 TABLET, FILM COATED ORAL at 16:51

## 2024-01-17 RX ADMIN — SODIUM CHLORIDE: 9 INJECTION, SOLUTION INTRAVENOUS at 09:32

## 2024-01-17 RX ADMIN — WATER 2000 MG: 1 INJECTION INTRAMUSCULAR; INTRAVENOUS; SUBCUTANEOUS at 05:48

## 2024-01-17 RX ADMIN — OXYCODONE HYDROCHLORIDE 10 MG: 10 TABLET, FILM COATED, EXTENDED RELEASE ORAL at 10:55

## 2024-01-17 RX ADMIN — ISOSORBIDE DINITRATE 20 MG: 10 TABLET ORAL at 10:54

## 2024-01-17 ASSESSMENT — PAIN SCALES - GENERAL
PAINLEVEL_OUTOF10: 0
PAINLEVEL_OUTOF10: 8
PAINLEVEL_OUTOF10: 0
PAINLEVEL_OUTOF10: 0
PAINLEVEL_OUTOF10: 8
PAINLEVEL_OUTOF10: 0
PAINLEVEL_OUTOF10: 0
PAINLEVEL_OUTOF10: 4
PAINLEVEL_OUTOF10: 4
PAINLEVEL_OUTOF10: 9
PAINLEVEL_OUTOF10: 0
PAINLEVEL_OUTOF10: 8
PAINLEVEL_OUTOF10: 0
PAINLEVEL_OUTOF10: 4
PAINLEVEL_OUTOF10: 10

## 2024-01-17 ASSESSMENT — PAIN DESCRIPTION - LOCATION
LOCATION: BACK

## 2024-01-17 ASSESSMENT — PAIN DESCRIPTION - ORIENTATION
ORIENTATION: LOWER
ORIENTATION: LOWER;MID
ORIENTATION: MID
ORIENTATION: LOWER

## 2024-01-17 ASSESSMENT — PAIN DESCRIPTION - DESCRIPTORS
DESCRIPTORS: ACHING
DESCRIPTORS: ACHING;DISCOMFORT
DESCRIPTORS: ACHING;DISCOMFORT
DESCRIPTORS: ACHING;DISCOMFORT;SORE
DESCRIPTORS: ACHING;DISCOMFORT
DESCRIPTORS: ACHING;DISCOMFORT

## 2024-01-17 ASSESSMENT — LIFESTYLE VARIABLES: SMOKING_STATUS: 1

## 2024-01-17 NOTE — ANESTHESIA PRE PROCEDURE
N/A 2024    LUMBAR LAMINECTOMY POSTERIOR L4 - S1 performed by Anali Viveros MD at [facility] OR    LAPAROSCOPY N/A 2022    LAPAROSCOPY DIAGNOSTIC,  EXPLORATORY INVERTED TO EXPLORATORY LAPOROTOMY, DISTAL SMALL BOWEL RESECTION, PRIMARY STAPLED ANASTAMOSIS, BILATERAL INGUINAL HERNIA REPAIR WITH MESH performed by Rashaad Levin MD at [facility] OR    LAPAROTOMY N/A 2022    LAPAROTOMY EXPLORATORY BILATERAL INGUINAL HERNIA REPAIR OPEN,  BOWEL RESECTION,  MESH X2 performed by Rashaad Levin MD at [facility] OR    SKIN GRAFT Right     R thigh       Social History:    Social History     Tobacco Use    Smoking status: Former     Current packs/day: 0.00     Average packs/day: 0.3 packs/day for 30.1 years (7.5 ttl pk-yrs)     Types: Cigarettes     Start date:      Quit date: 2022     Years since quittin.9    Smokeless tobacco: Never   Substance Use Topics    Alcohol use: Not Currently                                Counseling given: Not Answered      Vital Signs (Current):   Vitals:    24 0851   BP: 134/79   Pulse: 69   Resp: 18   Temp: 36.6 °C (97.9 °F)   TempSrc: Temporal   SpO2: 93%                                              BP Readings from Last 3 Encounters:   24 111/67   24 134/79   24 (!) 113/55       NPO Status: Time of last liquid consumption:                         Time of last solid consumption:                         Date of last liquid consumption: 24                        Date of last solid food consumption: 24    BMI:   Wt Readings from Last 3 Encounters:   24 109.5 kg (241 lb 6.4 oz)   24 112 kg (247 lb)   24 112 kg (247 lb)     There is no height or weight on file to calculate BMI.    CBC:   Lab Results   Component Value Date/Time    WBC 9.9 2024 05:22 AM    RBC 3.61 2024 05:22 AM    HGB 10.1 2024 05:22 AM    HCT 31.2 2024 05:22 AM    MCV 86.4 2024 05:22 AM    RDW 14.4 2024 05:22 AM

## 2024-01-17 NOTE — PROGRESS NOTES
Vancomycin has been discontinued   Clinical Pharmacy to sign-off  Physician to re-consult pharmacy if future dosing is needed    Thank you for the consult,   Shane Solorzano, PharmD 1/17/2024 8:33 AM

## 2024-01-17 NOTE — CARE COORDINATION
POD #3  lumbar laminectomy for epidural abscesss ID ocnsult  IV ancef and vanco TATUM ordered,echo neg for vegetation PT 12 ot 16 discharge plan [facility] and precert has been obtained (good thru end of week) Lizabeth @ [facility] will follow.Passr completed by [facility] and Envelope and ambulance form in soft chart.cm/sw to follow.Electronically signed by Kelsi Ayala RN on 1/17/2024 at 7:15 AM      Per Charge nurse / ID DR will need 6 weeks antibiotics .Electronically signed by Kelsi Ayala RN on 1/17/2024 at 2:55 PM

## 2024-01-17 NOTE — PROGRESS NOTES
Physical Therapy  Physical Therapy Treatment    Name: Vince Conroy  : 1975  MRN: [id number]      Date of Service: 2024    Evaluating PT:  Joleen Stafford PT, DPT [id number]    Room #:  8508/8508-B  Diagnosis:  Psoas muscle abscess (HCC) [K68.12]  Spinal abscess (HCC) [M46.20]  Osteomyelitis of vertebra of lumbar region (HCC) [M46.26]  Abscess in epidural space of lumbar spine [G06.1]  PMHx/PSHx:    Past Medical History:   Diagnosis Date    CAD (coronary artery disease)     CHF (congestive heart failure) ([facility]) 2022    GERD (gastroesophageal reflux disease)     Hx of drug abuse ([facility])     cocaine    Hypertension     NICM (nonischemic cardiomyopathy) ([facility])       Procedure/Surgery:  LUMBAR LAMINECTOMY POSTERIOR L4 - S1    Precautions:  Falls, spinal  Equipment Needs:  TBD, pt has cane and WW    SUBJECTIVE:    Pt lives with friend in a 2 story home with 5 stairs to enter and 1 rail.  Bed is on basement floor and bath is on basement floor.  Pt ambulated with WW vs cane PTA.    OBJECTIVE:   Initial Evaluation  Date: 1/15/24 Treatment  24 Short Term/ Long Term   Goals   AM-PAC 6 Clicks     Was pt agreeable to Eval/treatment? yes yes    Does pt have pain? 8/10 back pain 910 back pain    Bed Mobility  Rolling: MaxA  Supine to sit: ModA x2  Sit to supine: NT  Scooting: Elle Rolling: ModA  Supine to sit: MaxA  Sit to supine: MaxA  Scooting: Elle Rolling: Elle  Supine to sit: Elle  Sit to supine: Elle  Scooting: Independent    Transfers Sit to stand: ModA x2  Stand to sit: ModA x2  Stand pivot: Elle with WW Sit to stand: ModA from elevated EOB  Stand to sit: ModA  Stand pivot: Elle with WW Sit to stand: SBA  Stand to sit: SBA  Stand pivot: SBA with AAD   Ambulation    10 feet x2 with WW Elle 40 feet with WW Elle >150 feet with AAD SBA   Stair negotiation: ascended and descended  NT NT 14 steps with 1 rail SBA   ROM BUE:  Defer to OT note  BLE:  WFL     Strength BUE:  Defer to OT note  BLE:

## 2024-01-17 NOTE — PROCEDURES
Preprocedure diagnosis: Bacteremia    Procedures: TATUM    Primary procedure  Written informed consent was obtained, the TATUM was performed under stable fasting condition. The patient was set up for monitoring of surface EKG and pulse oximetry continuously. Blood pressure was monitored and with automatic cuff measurements. Supplemental oxygen was administered. The procedure was performed under anesthesia by anesthesiology. After ensuring adequate anesthesia, TATUM probe was intubated without difficulty.     Result: prelim- no echocardiographic evidence of endocarditis, mild MR, full report to follow.     Complication: None    Patient was monitored until awake and vitals remained stable.    Quinn Jules M.D.  [facility] cardiology

## 2024-01-17 NOTE — ANESTHESIA POSTPROCEDURE EVALUATION
Department of Anesthesiology  Postprocedure Note    Patient: Vince Conroy  MRN: [id number]  YOB: 1975  Date of evaluation: 1/17/2024    Procedure Summary     Date: 01/17/24 Room / Location: [facility] Cardiac Cath Lab; [facility] Noninvasive Cardiology    Anesthesia Start: 0938 Anesthesia Stop: 0957    Procedure: TATUM (PRN CONTRAST/BUBBLE/3D) Diagnosis: Abnormal EKG    Scheduled Providers: Irina Nix MD Responsible Provider: Irina Nix MD    Anesthesia Type: MAC ASA Status: 4          Anesthesia Type: No value filed.    Cecilia Phase I:      Cecilia Phase II:      Anesthesia Post Evaluation    Patient location during evaluation: PACU  Patient participation: complete - patient participated  Level of consciousness: awake and alert  Airway patency: patent  Nausea & Vomiting: no nausea and no vomiting  Cardiovascular status: blood pressure returned to baseline and hemodynamically stable  Respiratory status: acceptable and spontaneous ventilation  Hydration status: euvolemic  Multimodal analgesia pain management approach  Pain management: adequate        No notable events documented.

## 2024-01-17 NOTE — PROGRESS NOTES
Assist x 2 Max A  Supine > sit     Mauri: Min A with bed rail  With increase time, good recall of spinal precautions  Supine to sit: Minimal Assist   Sit to supine: Minimal Assist    Functional Transfers Sit to stand: Moderate Assist x2  Stand to sit: Moderate Assist x2  Stand pivot: Moderate Assist   Mod A  Sit < > stand   Elevated surface    Stand by Assist    Functional Mobility Minimal Assist  with ww  Short distance in room - bed to bathroom  Min A with walker  Able to walk out into hallway then back to room only short distances due to c/o L LE pain  Stand by Assist  with AAD   Functional distance   Balance Sitting: Stand by Assist      Standing: Minimal Assist   Sitting: Supervision  Standing: Min A with walker  Sitting: Independent       Standing: Stand by Assist    Activity Tolerance fair  Fair  Improved pain control this date   good   Visual/  Perceptual Glasses: yes                BUE  ROM/Strength/  Fine motor Coordination Hand dominance: R     RUE: ROM WFL     Strength: grossly 4/5      Strength:  WFL     Coordination:   WFL     LUE: ROM  WFL     Strength: grossly 4/5      Strength:  WFL     Coordination: WFL          Education:  Pt was educated through out treatment regarding proper technique & safety with bed mobility, functional transfers & light mobility, cues for walker management & ADL compensatory strategies to ease tasks, improve safety & prevent falls to return home safely.  Educated pt when able importance of OOB activity, changing positions and sitting up in chair for short periods of time.    Comments: Upon arrival pt was in bed & agreeable for therapy, nsg approved. At end of session pt was seated in chair and encouraged to stay up in chair for at least an 45-60 minutes, all lines and tubes intact, call light within reach.     Pt has made Fair progress towards set goals.   Continue with current plan of care    Treatment Time In: 2:55           Treatment Time Out: 3:20            Treatment Charges: Mins Units   Ther Ex  [id number]     Manual Therapy 84413     Thera Activities 12572 10 1   ADL/Home Mgt 39331 15 1   Neuro Re-ed 32341     Group Therapy      Orthotic manage/training  30759     Non-Billable Time     Total Timed Treatment 25 2       Jeanne Cameron, CLIFTON/L [id number]

## 2024-01-17 NOTE — PROGRESS NOTES
[facility] Hospitalist Progress Note    Admitting Date and Time: 1/13/2024  6:32 PM  Admit Dx: Psoas muscle abscess (HCC) [K68.12]  Spinal abscess (HCC) [M46.20]  Osteomyelitis of vertebra of lumbar region (HCC) [M46.26]  Abscess in epidural space of lumbar spine [G06.1]    Synopsis: 48-year-old gentleman presented to ED with chief complaint of back pain.  MRI spine concerning for osteomyelitis L4 with associated epidural abscess.  Seen by neurosurgery and underwent laminectomy, blood cultures growing MSSA, on cefazolin, ID following.  TATUM today was negative.  Needs to be restarted on Eliquis once okay with neurosurgery.    Subjective:  Patient is being followed for Psoas muscle abscess (HCC) [K68.12]  Spinal abscess (HCC) [M46.20]  Osteomyelitis of vertebra of lumbar region (HCC) [M46.26]  Abscess in epidural space of lumbar spine [G06.1]       Better pain control with Flexeril added.  Underwent TATUM today.          ROS: denies fever, chills, cp, sob, n/v, HA unless stated above.      cyclobenzaprine  10 mg Oral TID    ceFAZolin  2,000 mg IntraVENous Q8H    oxyCODONE  10 mg Oral 2 times per day    carvedilol  12.5 mg Oral BID WC    hydrALAZINE  75 mg Oral BID    isosorbide dinitrate  20 mg Oral TID    pantoprazole  40 mg Oral QAM AC    rosuvastatin  20 mg Oral Nightly    sodium chloride flush  5-40 mL IntraVENous 2 times per day    gabapentin  300 mg Oral TID    sodium chloride flush  5-40 mL IntraVENous 2 times per day    acetaminophen  650 mg Oral Q6H     HYDROmorphone, 0.25 mg, Q4H PRN  melatonin, 6 mg, Nightly PRN  sodium chloride flush, 5-40 mL, PRN  sodium chloride, , PRN  potassium chloride, 40 mEq, PRN   Or  potassium alternative oral replacement, 40 mEq, PRN   Or  potassium chloride, 10 mEq, PRN  magnesium sulfate, 2,000 mg, PRN  senna, 1 tablet, Daily PRN  aluminum & magnesium hydroxide-simethicone, 30 mL, Q6H PRN  acetaminophen, 650 mg, Q6H PRN   Or  acetaminophen, 650 mg, Q6H  posterior L4-S1 with neurosurgery on 1/14  Received vancomycin, now on cefazolin for MSSA bacteremia.  ID consulted, appreciate recommendations.  Leukocytosis resolved.  Blood culture 1/14-positive for staph aureus  Repeat blood culture 1/15- NTD  OR cultures-heavy Staph aureus.  Home apixaban on hold resume once okay with neuro surgery.  Getting as needed Dilaudid and OxyContin twice daily for pain.  He continues to complain of 20/10 severity pain, will add Flexeril and monitor response.  TATUM completed-no evidence of vegetations.    HFrEF  Nonischemic cardiomyopathy, nonobstructive coronary artery disease  Echo December 2023 reviewed.  EF 25 to 30%.  Currently chest pain-free.  Continue Coreg, hydralazine, Isordil, statin    CKD 3  Kidney function baseline, baseline is 1.6-1.8  Avoid nephrotoxic  meds as able.    History of DVT.  Home Eliquis is currently on hold, resume once okay with neurosurgery.    Drug abuse.  Urine drug screen is positive for cocaine, fentanyl, methadone.  Encouraged cessation.  He denies IV drug use      Dispo: On cefazolin, final ID recommendations for antibiotics are pending, will need line placed, TATUM today was negative.  ID, neurosurgery, cardiology following.    NOTE: This report was transcribed using voice recognition software. Every effort was made to ensure accuracy; however, inadvertent computerized transcription errors may be present.  Electronically signed by Sri Inman MD on 1/17/2024 at 10:37 AM

## 2024-01-17 NOTE — PROGRESS NOTES
[facility]  [facility]    Progress Note       HISTORY OF PRESENT ILLNESS:   48-year-old male with past medical history of CAD, CHF, GERD, HTN presents with back pain from [facility].  He has new back pain with left leg numbness and urinary incontinence.  MRI of the lumbar spine showed osteomyelitis of L4 with associated epidural abscess.  Status post L4, L5, S1 laminectomy and evacuation of epidural abscess 01/14.  ID consulted for epidural abscess.    Subjective:   Afebrile, WBC 10  Staph aureus bacteremia noted    Past Medical History:        Diagnosis Date    CAD (coronary artery disease)     CHF (congestive heart failure) ([facility]) 02/08/2022    GERD (gastroesophageal reflux disease)     Hx of drug abuse ([facility])     cocaine    Hypertension     NICM (nonischemic cardiomyopathy) ([facility])      Past Surgical History:        Procedure Laterality Date    CARDIAC CATHETERIZATION      LAMINECTOMY N/A 1/14/2024    LUMBAR LAMINECTOMY POSTERIOR L4 - S1 performed by Anali Viveros MD at [facility]    LAPAROSCOPY N/A 4/30/2022    LAPAROSCOPY DIAGNOSTIC,  EXPLORATORY INVERTED TO EXPLORATORY LAPOROTOMY, DISTAL SMALL BOWEL RESECTION, PRIMARY STAPLED ANASTAMOSIS, BILATERAL INGUINAL HERNIA REPAIR WITH MESH performed by Rashaad Levin MD at [facility]    LAPAROTOMY N/A 4/30/2022    LAPAROTOMY EXPLORATORY BILATERAL INGUINAL HERNIA REPAIR OPEN,  BOWEL RESECTION,  MESH X2 performed by Rashaad Levin MD at [facility] OR    SKIN GRAFT Right     R thigh     Current Medications:   Scheduled Meds:   cyclobenzaprine  10 mg Oral TID    ceFAZolin  2,000 mg IntraVENous Q8H    oxyCODONE  10 mg Oral 2 times per day    carvedilol  12.5 mg Oral BID WC    hydrALAZINE  75 mg Oral BID    isosorbide dinitrate  20 mg Oral TID    pantoprazole  40 mg Oral QAM AC    rosuvastatin  20 mg Oral Nightly    sodium chloride flush  5-40 mL IntraVENous 2 times per day    gabapentin  300 mg Oral TID    sodium chloride flush  5-40 mL IntraVENous  LEUKOCYTESUR NEGATIVE 01/14/2024 12:33 AM    UROBILINOGEN >8.0 01/14/2024 12:33 AM    BILIRUBINUR SMALL 01/14/2024 12:33 AM    GLUCOSEU NEGATIVE 01/14/2024 12:33 AM       No results found for: \"BHH0LAC\", \"BEART\", \"T6HVBDZU\", \"PHART\", \"THGBART\", \"LLF7ZAP\", \"PO2ART\", \"NJG6POD\"  Radiology:  [facility] FOR SURGICAL PROCEDURES   Final Result   Intraprocedural fluoroscopic spot images as above.  See separate procedure   report for more information.         CT ABDOMEN PELVIS W IV CONTRAST Additional Contrast? None   Final Result   1. Abnormal left psoas with suggestion of 2 collections measuring 1.5 x 1.9 x   1.5 cm and 1.0 x 0.8 x 2.4 cm.  Stranding/edema along the left psoas. Also   there is a 1.6 x 2.2 x 3.5 cm collection in the right psoas muscle.  These   collections are at the L4/L5 levels.  Findings suggest phlegmonous change and   abscess.  Also note that there is mild irregularity of the anterior inferior   corner of the L4 vertebral body.   2. Prominent left periaortic nodes are nonspecific but favored to be reactive.   3. Trace pericardial effusion.   4. 1.6 cm left adrenal nodule.  Could further evaluate with adrenal mass   protocol CT.         MRI LUMBAR SPINE W CONTRAST   Final Result   MR findings worrisome for osteomyelitis at L4 with associated epidural   phlegmon/abscess.      Partially visualized bilateral psoas muscle abscesses.             Microbiology:  Pending  No results for input(s): \"BC\" in the last 72 hours.  No results for input(s): \"ORG\" in the last 72 hours.  No results for input(s): \"BLOODCULT2\" in the last 72 hours.  No results for input(s): \"STREPNEUMAGU\" in the last 72 hours.  No results for input(s): \"LP1UAG\" in the last 72 hours.  No results for input(s): \"ASO\" in the last 72 hours.  No results for input(s): \"CULTRESP\" in the last 72 hours.    Assessment:  L4 discitis/osteomyelitis  L4 epidural abscess  MSSA bacteremia  Status post L4-S1 laminectomy with evacuation of epidural abscess

## 2024-01-18 PROBLEM — R78.81 BACTEREMIA: Status: ACTIVE | Noted: 2024-01-18

## 2024-01-18 PROBLEM — N18.30 CKD (CHRONIC KIDNEY DISEASE) STAGE 3, GFR 30-59 ML/MIN (HCC): Status: ACTIVE | Noted: 2024-01-18

## 2024-01-18 LAB
ANION GAP SERPL CALCULATED.3IONS-SCNC: 11 MMOL/L (ref 7–16)
BUN SERPL-MCNC: 17 MG/DL (ref 6–20)
CALCIUM SERPL-MCNC: 7.7 MG/DL (ref 8.6–10.2)
CHLORIDE SERPL-SCNC: 102 MMOL/L (ref 98–107)
CO2 SERPL-SCNC: 22 MMOL/L (ref 22–29)
CREAT SERPL-MCNC: 1.2 MG/DL (ref 0.7–1.2)
ERYTHROCYTE [DISTWIDTH] IN BLOOD BY AUTOMATED COUNT: 14.6 % (ref 11.5–15)
GFR SERPL CREATININE-BSD FRML MDRD: >60 ML/MIN/1.73M2
GLUCOSE SERPL-MCNC: 132 MG/DL (ref 74–99)
HCT VFR BLD AUTO: 30.6 % (ref 37–54)
HGB BLD-MCNC: 10 G/DL (ref 12.5–16.5)
MCH RBC QN AUTO: 28.7 PG (ref 26–35)
MCHC RBC AUTO-ENTMCNC: 32.7 G/DL (ref 32–34.5)
MCV RBC AUTO: 87.7 FL (ref 80–99.9)
MICROORGANISM SPEC CULT: NORMAL
MICROORGANISM/AGENT SPEC: NORMAL
PLATELET # BLD AUTO: 367 K/UL (ref 130–450)
PMV BLD AUTO: 10.8 FL (ref 7–12)
POTASSIUM SERPL-SCNC: 4.1 MMOL/L (ref 3.5–5)
RBC # BLD AUTO: 3.49 M/UL (ref 3.8–5.8)
SODIUM SERPL-SCNC: 135 MMOL/L (ref 132–146)
SPECIMEN DESCRIPTION: NORMAL
WBC OTHER # BLD: 10.5 K/UL (ref 4.5–11.5)

## 2024-01-18 PROCEDURE — 76937 US GUIDE VASCULAR ACCESS: CPT

## 2024-01-18 PROCEDURE — 02HV33Z INSERTION OF INFUSION DEVICE INTO SUPERIOR VENA CAVA, PERCUTANEOUS APPROACH: ICD-10-PCS | Performed by: NEUROLOGICAL SURGERY

## 2024-01-18 PROCEDURE — 6360000002 HC RX W HCPCS: Performed by: INTERNAL MEDICINE

## 2024-01-18 PROCEDURE — 6370000000 HC RX 637 (ALT 250 FOR IP): Performed by: INTERNAL MEDICINE

## 2024-01-18 PROCEDURE — 2580000003 HC RX 258: Performed by: INTERNAL MEDICINE

## 2024-01-18 PROCEDURE — 97530 THERAPEUTIC ACTIVITIES: CPT

## 2024-01-18 PROCEDURE — 80048 BASIC METABOLIC PNL TOTAL CA: CPT

## 2024-01-18 PROCEDURE — 2060000000 HC ICU INTERMEDIATE R&B

## 2024-01-18 PROCEDURE — 85027 COMPLETE CBC AUTOMATED: CPT

## 2024-01-18 PROCEDURE — C1751 CATH, INF, PER/CENT/MIDLINE: HCPCS

## 2024-01-18 PROCEDURE — 97535 SELF CARE MNGMENT TRAINING: CPT

## 2024-01-18 PROCEDURE — 36569 INSJ PICC 5 YR+ W/O IMAGING: CPT

## 2024-01-18 PROCEDURE — 36415 COLL VENOUS BLD VENIPUNCTURE: CPT

## 2024-01-18 PROCEDURE — 6370000000 HC RX 637 (ALT 250 FOR IP): Performed by: NEUROLOGICAL SURGERY

## 2024-01-18 RX ORDER — ENOXAPARIN SODIUM 100 MG/ML
40 INJECTION SUBCUTANEOUS DAILY
Status: DISCONTINUED | OUTPATIENT
Start: 2024-01-18 | End: 2024-01-18 | Stop reason: DRUGHIGH

## 2024-01-18 RX ORDER — OXYCODONE HYDROCHLORIDE AND ACETAMINOPHEN 5; 325 MG/1; MG/1
1 TABLET ORAL EVERY 4 HOURS PRN
Status: DISCONTINUED | OUTPATIENT
Start: 2024-01-18 | End: 2024-01-19 | Stop reason: HOSPADM

## 2024-01-18 RX ORDER — OXYCODONE HYDROCHLORIDE AND ACETAMINOPHEN 5; 325 MG/1; MG/1
2 TABLET ORAL EVERY 4 HOURS PRN
Status: DISCONTINUED | OUTPATIENT
Start: 2024-01-18 | End: 2024-01-19 | Stop reason: HOSPADM

## 2024-01-18 RX ORDER — ENOXAPARIN SODIUM 100 MG/ML
30 INJECTION SUBCUTANEOUS 2 TIMES DAILY
Status: DISCONTINUED | OUTPATIENT
Start: 2024-01-18 | End: 2024-01-19 | Stop reason: HOSPADM

## 2024-01-18 RX ORDER — QUETIAPINE FUMARATE 25 MG/1
25 TABLET, FILM COATED ORAL NIGHTLY
Status: DISCONTINUED | OUTPATIENT
Start: 2024-01-18 | End: 2024-01-19 | Stop reason: HOSPADM

## 2024-01-18 RX ADMIN — ACETAMINOPHEN 650 MG: 325 TABLET ORAL at 04:46

## 2024-01-18 RX ADMIN — GABAPENTIN 300 MG: 300 CAPSULE ORAL at 15:20

## 2024-01-18 RX ADMIN — ACETAMINOPHEN 650 MG: 325 TABLET ORAL at 12:12

## 2024-01-18 RX ADMIN — WATER 2000 MG: 1 INJECTION INTRAMUSCULAR; INTRAVENOUS; SUBCUTANEOUS at 19:57

## 2024-01-18 RX ADMIN — QUETIAPINE FUMARATE 25 MG: 25 TABLET ORAL at 20:01

## 2024-01-18 RX ADMIN — HYDROMORPHONE HYDROCHLORIDE 0.25 MG: 1 INJECTION, SOLUTION INTRAMUSCULAR; INTRAVENOUS; SUBCUTANEOUS at 01:01

## 2024-01-18 RX ADMIN — CYCLOBENZAPRINE 10 MG: 10 TABLET, FILM COATED ORAL at 15:21

## 2024-01-18 RX ADMIN — CYCLOBENZAPRINE 10 MG: 10 TABLET, FILM COATED ORAL at 12:12

## 2024-01-18 RX ADMIN — SODIUM CHLORIDE, PRESERVATIVE FREE 10 ML: 5 INJECTION INTRAVENOUS at 08:32

## 2024-01-18 RX ADMIN — ROSUVASTATIN CALCIUM 20 MG: 20 TABLET, FILM COATED ORAL at 19:58

## 2024-01-18 RX ADMIN — CYCLOBENZAPRINE 10 MG: 10 TABLET, FILM COATED ORAL at 01:01

## 2024-01-18 RX ADMIN — GABAPENTIN 300 MG: 300 CAPSULE ORAL at 08:32

## 2024-01-18 RX ADMIN — CARVEDILOL 12.5 MG: 6.25 TABLET, FILM COATED ORAL at 18:23

## 2024-01-18 RX ADMIN — OXYCODONE AND ACETAMINOPHEN 1 TABLET: 5; 325 TABLET ORAL at 10:21

## 2024-01-18 RX ADMIN — CYCLOBENZAPRINE 10 MG: 10 TABLET, FILM COATED ORAL at 19:58

## 2024-01-18 RX ADMIN — OXYCODONE AND ACETAMINOPHEN 1 TABLET: 5; 325 TABLET ORAL at 22:38

## 2024-01-18 RX ADMIN — ISOSORBIDE DINITRATE 20 MG: 10 TABLET ORAL at 19:58

## 2024-01-18 RX ADMIN — GABAPENTIN 300 MG: 300 CAPSULE ORAL at 19:58

## 2024-01-18 RX ADMIN — SODIUM CHLORIDE, PRESERVATIVE FREE 10 ML: 5 INJECTION INTRAVENOUS at 19:59

## 2024-01-18 RX ADMIN — ISOSORBIDE DINITRATE 20 MG: 10 TABLET ORAL at 15:33

## 2024-01-18 RX ADMIN — OXYCODONE AND ACETAMINOPHEN 1 TABLET: 5; 325 TABLET ORAL at 18:23

## 2024-01-18 RX ADMIN — HYDRALAZINE HYDROCHLORIDE 75 MG: 50 TABLET ORAL at 19:58

## 2024-01-18 RX ADMIN — CARVEDILOL 12.5 MG: 6.25 TABLET, FILM COATED ORAL at 08:32

## 2024-01-18 RX ADMIN — OXYCODONE HYDROCHLORIDE 10 MG: 10 TABLET, FILM COATED, EXTENDED RELEASE ORAL at 08:30

## 2024-01-18 RX ADMIN — WATER 2000 MG: 1 INJECTION INTRAMUSCULAR; INTRAVENOUS; SUBCUTANEOUS at 04:47

## 2024-01-18 RX ADMIN — CYCLOBENZAPRINE 10 MG: 10 TABLET, FILM COATED ORAL at 08:31

## 2024-01-18 RX ADMIN — ENOXAPARIN SODIUM 30 MG: 100 INJECTION SUBCUTANEOUS at 20:03

## 2024-01-18 RX ADMIN — HEPARIN 300 UNITS: 100 SYRINGE at 19:57

## 2024-01-18 RX ADMIN — PANTOPRAZOLE SODIUM 40 MG: 40 TABLET, DELAYED RELEASE ORAL at 06:17

## 2024-01-18 RX ADMIN — HYDRALAZINE HYDROCHLORIDE 75 MG: 50 TABLET ORAL at 08:31

## 2024-01-18 RX ADMIN — HYDROMORPHONE HYDROCHLORIDE 0.25 MG: 1 INJECTION, SOLUTION INTRAMUSCULAR; INTRAVENOUS; SUBCUTANEOUS at 04:46

## 2024-01-18 RX ADMIN — WATER 2000 MG: 1 INJECTION INTRAMUSCULAR; INTRAVENOUS; SUBCUTANEOUS at 12:13

## 2024-01-18 RX ADMIN — ISOSORBIDE DINITRATE 20 MG: 10 TABLET ORAL at 08:32

## 2024-01-18 ASSESSMENT — PAIN DESCRIPTION - LOCATION
LOCATION: BACK;ABDOMEN
LOCATION: BACK
LOCATION: ABDOMEN;BACK
LOCATION: BACK
LOCATION: ABDOMEN;BACK

## 2024-01-18 ASSESSMENT — PAIN DESCRIPTION - DESCRIPTORS
DESCRIPTORS: ACHING;DISCOMFORT;SORE
DESCRIPTORS: ACHING;DISCOMFORT
DESCRIPTORS: ACHING;CRAMPING;DISCOMFORT
DESCRIPTORS: ACHING;CRAMPING;DISCOMFORT
DESCRIPTORS: ACHING;DISCOMFORT
DESCRIPTORS: ACHING;DISCOMFORT;SORE
DESCRIPTORS: ACHING;CRAMPING

## 2024-01-18 ASSESSMENT — PAIN SCALES - GENERAL
PAINLEVEL_OUTOF10: 10
PAINLEVEL_OUTOF10: 9
PAINLEVEL_OUTOF10: 6
PAINLEVEL_OUTOF10: 0
PAINLEVEL_OUTOF10: 8
PAINLEVEL_OUTOF10: 0
PAINLEVEL_OUTOF10: 4
PAINLEVEL_OUTOF10: 9
PAINLEVEL_OUTOF10: 4
PAINLEVEL_OUTOF10: 0
PAINLEVEL_OUTOF10: 4
PAINLEVEL_OUTOF10: 8

## 2024-01-18 ASSESSMENT — PAIN DESCRIPTION - ONSET
ONSET: ON-GOING
ONSET: ON-GOING

## 2024-01-18 ASSESSMENT — PAIN DESCRIPTION - ORIENTATION
ORIENTATION: LOWER

## 2024-01-18 ASSESSMENT — PAIN DESCRIPTION - PAIN TYPE
TYPE: ACUTE PAIN
TYPE: ACUTE PAIN

## 2024-01-18 ASSESSMENT — PAIN - FUNCTIONAL ASSESSMENT
PAIN_FUNCTIONAL_ASSESSMENT: PREVENTS OR INTERFERES SOME ACTIVE ACTIVITIES AND ADLS

## 2024-01-18 ASSESSMENT — PAIN DESCRIPTION - FREQUENCY
FREQUENCY: CONTINUOUS
FREQUENCY: CONTINUOUS

## 2024-01-18 NOTE — PROGRESS NOTES
[facility] Hospitalist Progress Note    Admitting Date and Time: 1/13/2024  6:32 PM  Admit Dx: Psoas muscle abscess (HCC) [K68.12]  Spinal abscess (HCC) [M46.20]  Osteomyelitis of vertebra of lumbar region (HCC) [M46.26]  Abscess in epidural space of lumbar spine [G06.1]    Synopsis: 48-year-old gentleman presented to ED with chief complaint of back pain.  MRI spine concerning for osteomyelitis L4 with associated epidural abscess.  Seen by neurosurgery and underwent laminectomy, blood cultures growing MSSA, on cefazolin, ID following.  TATUM today was negative.  Needs to be restarted on Eliquis once okay with neurosurgery.    Subjective:  Patient is being followed for Psoas muscle abscess (HCC) [K68.12]  Spinal abscess (HCC) [M46.20]  Osteomyelitis of vertebra of lumbar region (HCC) [M46.26]  Abscess in epidural space of lumbar spine [G06.1]       Feeling okay without complaint pain control discussed  No CP or SOB  No fever or chills   No uncontrolled pain  No vomiting or diarrhea   No events reported overnight  Chart reviewed      lidocaine PF  5 mL IntraDERmal Once    sodium chloride flush  5-40 mL IntraVENous 2 times per day    heparin flush  3 mL IntraVENous 2 times per day    cyclobenzaprine  10 mg Oral TID    ceFAZolin  2,000 mg IntraVENous Q8H    oxyCODONE  10 mg Oral 2 times per day    carvedilol  12.5 mg Oral BID WC    hydrALAZINE  75 mg Oral BID    isosorbide dinitrate  20 mg Oral TID    pantoprazole  40 mg Oral QAM AC    rosuvastatin  20 mg Oral Nightly    sodium chloride flush  5-40 mL IntraVENous 2 times per day    gabapentin  300 mg Oral TID    sodium chloride flush  5-40 mL IntraVENous 2 times per day    acetaminophen  650 mg Oral Q6H     sodium chloride flush, 5-40 mL, PRN  sodium chloride, , PRN  heparin flush, 3 mL, PRN  HYDROmorphone, 0.25 mg, Q4H PRN  melatonin, 6 mg, Nightly PRN  sodium chloride flush, 5-40 mL, PRN  sodium chloride, , PRN  potassium chloride, 40 mEq, PRN   Or  potassium  line TATUM today was negative.  ID, neurosurgery, cardiology following.  [facility] pre-CERT obtained.    NOTE: This report was transcribed using voice recognition software. Every effort was made to ensure accuracy; however, inadvertent computerized transcription errors may be present.  Electronically signed by Bryan Velazco MD on 1/18/2024 at 8:39 AM

## 2024-01-18 NOTE — PROGRESS NOTES
CHG double lumen power picc Placement 1/18/2024    Product number: [id number]   Lot Number: [id number]      Ultrasound: yes   Left Brachial vein:                Upper Arm Circumference: (CM) 38    Size:(FR)/GUAGE 5.5/55 cm    Exposed Length: (CM) 2    Internal Length: (CM) 42   Cut: (CM) 11   Vein Measurement: 0.56 cm              Lidocaine Given: yes lidocaine 1%                Procedure performed by JOAN Echols RN  1/18/2024  4:02 PM      CHG double lumen power picc inserted using the VPS guidance system. Tip placed at the lower 1/3 of the SVC/CAJ. Andrew tracey.

## 2024-01-18 NOTE — DISCHARGE INSTR - COC
Continuity of Care Form    Patient Name: Vince Conroy   :  1975  MRN:  [id number]    Admit date:  2024  Discharge date:  24    Code Status Order: Full Code   Advance Directives:     Admitting Physician:  Sri Inman MD  PCP: Lenny Quiros DO    Discharging Nurse: KRISTOPHER  Discharging Hospital Unit/Room#: 8508/8508-B  Discharging Unit Phone Number: [phone]    Emergency Contact:   Extended Emergency Contact Information  Primary Emergency Contact: Esther Michaud  Address: [address] [place] [address]  Home Phone: [phone]  Mobile Phone: [phone]  Relation: Domestic Partner   needed? No  Secondary Emergency Contact: LjLul  Home Phone: [phone]  Mobile Phone: [phone]  Relation: Brother/Sister   needed? No    Past Surgical History:  Past Surgical History:   Procedure Laterality Date    CARDIAC CATHETERIZATION      LAMINECTOMY N/A 2024    LUMBAR LAMINECTOMY POSTERIOR L4 - S1 performed by Anali Viveros MD at [facility] [place]    LAPAROSCOPY N/A 2022    LAPAROSCOPY DIAGNOSTIC,  EXPLORATORY INVERTED TO EXPLORATORY LAPOROTOMY, DISTAL SMALL BOWEL RESECTION, PRIMARY STAPLED ANASTAMOSIS, BILATERAL INGUINAL HERNIA REPAIR WITH MESH performed by Rashaad Levin MD at [facility]    LAPAROTOMY N/A 2022    LAPAROTOMY EXPLORATORY BILATERAL INGUINAL HERNIA REPAIR OPEN,  BOWEL RESECTION,  MESH X2 performed by Rashaad Levin MD at [facility]    SKIN GRAFT Right     R thigh       Immunization History:   Immunization History   Administered Date(s) Administered    COVID-19, PFIZER GRAY top, DO NOT Dilute, (age 12 y+), IM, 30 mcg/0.3 mL 2022       Active Problems:  Patient Active Problem List   Diagnosis Code    Acute coronary syndrome (HCC) I24.9    Chronic systolic CHF (congestive heart failure) (HCC) I50.22    Chronic bilateral low back pain with bilateral sciatica M54.42, M54.41, G89.29    Elevated serum

## 2024-01-18 NOTE — PROGRESS NOTES
[facility]  [facility]    Progress Note       HISTORY OF PRESENT ILLNESS:   48-year-old male with past medical history of CAD, CHF, GERD, HTN presents with back pain from [facility].  He has new back pain with left leg numbness and urinary incontinence.  MRI of the lumbar spine showed osteomyelitis of L4 with associated epidural abscess.  Status post L4, L5, S1 laminectomy and evacuation of epidural abscess 01/14.  ID consulted for epidural abscess.    Subjective:   Afebrile, WBC 10  Staph aureus bacteremia noted    Past Medical History:        Diagnosis Date    CAD (coronary artery disease)     CHF (congestive heart failure) ([facility]) 02/08/2022    GERD (gastroesophageal reflux disease)     Hx of drug abuse ([facility])     cocaine    Hypertension     NICM (nonischemic cardiomyopathy) ([facility])      Past Surgical History:        Procedure Laterality Date    CARDIAC CATHETERIZATION      LAMINECTOMY N/A 1/14/2024    LUMBAR LAMINECTOMY POSTERIOR L4 - S1 performed by Anali Viveros MD at [facility] OR    LAPAROSCOPY N/A 4/30/2022    LAPAROSCOPY DIAGNOSTIC,  EXPLORATORY INVERTED TO EXPLORATORY LAPOROTOMY, DISTAL SMALL BOWEL RESECTION, PRIMARY STAPLED ANASTAMOSIS, BILATERAL INGUINAL HERNIA REPAIR WITH MESH performed by Rashaad Levin MD at [facility] OR    LAPAROTOMY N/A 4/30/2022    LAPAROTOMY EXPLORATORY BILATERAL INGUINAL HERNIA REPAIR OPEN,  BOWEL RESECTION,  MESH X2 performed by Rashaad Levin MD at [facility] OR    SKIN GRAFT Right     R thigh     Current Medications:   Scheduled Meds:   lidocaine PF  5 mL IntraDERmal Once    sodium chloride flush  5-40 mL IntraVENous 2 times per day    heparin flush  3 mL IntraVENous 2 times per day    cyclobenzaprine  10 mg Oral TID    ceFAZolin  2,000 mg IntraVENous Q8H    oxyCODONE  10 mg Oral 2 times per day    carvedilol  12.5 mg Oral BID WC    hydrALAZINE  75 mg Oral BID    isosorbide dinitrate  20 mg Oral TID    pantoprazole  40 mg Oral QAM AC    rosuvastatin  20 mg  \"LP1UAG\" in the last 72 hours.  No results for input(s): \"ASO\" in the last 72 hours.  No results for input(s): \"CULTRESP\" in the last 72 hours.    Assessment:  L4 discitis/osteomyelitis  L4 epidural abscess  MSSA bacteremia  Status post L4-S1 laminectomy with evacuation of epidural abscess 01/14  Surgical cultures from 01/14 growing MSSA and rare gram-positive cocci in pairs.  No previous bacteremia noted in the microbiology  Left lower leg numbness, urinary incontinence noted    Plan:    Continue cefazolin until 02/26.  TTE showed no evidence of vegetations  TATUM showed no evidence of vegetations  Repeat blood cultures are no growth so far, first negative blood culture is 01/15.  PICC line ordered  Prescription is in the chart  Follow-up with ID clinic within 2 weeks  Patient can be discharged from ID standpoint    Thank you for having us see this patient in consultation. I will be discussing this case with the treating physicians.      Electronically signed by Todd Can MD on 1/18/2024 at 2:35 PM

## 2024-01-18 NOTE — PROGRESS NOTES
Bryan Ohara MD,    Your patient is on a medication that requires a weight-based dose adjustment.    Renal Function Assessment:    Date Body Weight IBW  Adjusted BW SCr  CrCl Dialysis status   1/18/2024 108.2 kg (238 lb 9.6 oz)  Ideal body weight: 82.2 kg (181 lb 4.8 oz)  Adjusted ideal body weight: 92.6 kg (204 lb 3.5 oz) Serum creatinine: 1.2 mg/dL 01/18/24 0516  Estimated creatinine clearance: 100 mL/min N/a       Pharmacy has renally dose-adjusted the following medication(s):    Date Original Order Renally Adjusted Order   1/18/2024 Lovenox 40mg daily Lovenox 30mg BID       These changes were made per protocol according to the [facility] Renal Function-Based Dose Adjustments Policy    Please contact pharmacy with any questions regarding these changes.    Jaylene Hendrix RPH  1/18/2024  3:51 PM

## 2024-01-18 NOTE — PROGRESS NOTES
Department of Neurosurgery  Progress Note    CHIEF COMPLAINT: s/p lumbar laminectomy for epidural abscess    SUBJECTIVE:  States pain in back and legs is improving    REVIEW OF SYSTEMS :  Constitutional: Negative for chills and fever.    Neurological: Negative for dizziness, tremors and speech change.     OBJECTIVE:   VITALS:  /69   Pulse 77   Temp 97.3 °F (36.3 °C) (Temporal)   Resp 16   Ht 1.88 m (6' 2.02\")   Wt 108.2 kg (238 lb 9.6 oz)   SpO2 96%   BMI 30.62 kg/m²     PHYSICAL:  Alert, oriented  Appears stated age  PERRL  EOMI  GIBSON  Sensation intact to light touch  Dressing c/d/i    DATA:  CBC:   Lab Results   Component Value Date/Time    WBC 10.5 01/18/2024 05:16 AM    RBC 3.49 01/18/2024 05:16 AM    HGB 10.0 01/18/2024 05:16 AM    HCT 30.6 01/18/2024 05:16 AM    MCV 87.7 01/18/2024 05:16 AM    MCH 28.7 01/18/2024 05:16 AM    MCHC 32.7 01/18/2024 05:16 AM    RDW 14.6 01/18/2024 05:16 AM     01/18/2024 05:16 AM    MPV 10.8 01/18/2024 05:16 AM     BMP:    Lab Results   Component Value Date/Time     01/18/2024 05:16 AM    K 4.1 01/18/2024 05:16 AM    K 4.5 11/16/2022 02:47 AM     01/18/2024 05:16 AM    CO2 22 01/18/2024 05:16 AM    BUN 17 01/18/2024 05:16 AM    LABALBU 2.6 01/14/2024 10:45 AM    CREATININE 1.2 01/18/2024 05:16 AM    CALCIUM 7.7 01/18/2024 05:16 AM    GFRAA 41 08/26/2022 06:32 PM    LABGLOM >60 01/18/2024 05:16 AM    GLUCOSE 132 01/18/2024 05:16 AM     PT/INR:  No results found for: \"PROTIME\", \"INR\"  PTT:    Lab Results   Component Value Date/Time    APTT 34.1 03/21/2022 06:26 AM   [APTT}    Current Inpatient Medications  Current Facility-Administered Medications: oxyCODONE-acetaminophen (PERCOCET) 5-325 MG per tablet 1 tablet, 1 tablet, Oral, Q4H PRN  oxyCODONE-acetaminophen (PERCOCET) 5-325 MG per tablet 2 tablet, 2 tablet, Oral, Q4H PRN  lidocaine PF 1 % injection 5 mL, 5 mL, IntraDERmal, Once  sodium chloride flush 0.9 % injection 5-40 mL, 5-40 mL, IntraVENous, 2

## 2024-01-18 NOTE — CARE COORDINATION
POD #4  lumbar laminectomy for epidural abscesss ID ocnsult  IV ancef and vanco TATUM ordered,echo neg for vegetation. Picc line ordered.Ancef  IV Discharge plan is [facility] and Envelope and ambulance form in [facility].  Patient aware that we cannot  change facilities he can change once he goes to [facility] (wants [facility] as he has family that works there) cm/sw to follow.Electronically signed by Kelsi Ayala RN on 1/18/2024 at 9:19 AM    Message to IV team that picc line needed.Electronically signed by Kelsi Ayala RN on 1/18/2024 at 1:13 PM

## 2024-01-18 NOTE — DISCHARGE INSTRUCTIONS
[facility]  ([facility])  [address]  [address] [place] [address]  Phone [phone]   Fax [phone]        Tu Navas MD, FACP, MD Shahida Alexander MD Indra P. Limbu, MD Munir P. Shah, MD April I. Miller, RN, CNP      Hasmukh Horvath RN, [facility]      LAURA CHING MD SUNGKAPALEE, THRISSAWAN, MD     STANDING ORDERS (“ID Protocol”)     Visiting nurses are to write the Primary Care Physician and their own call back number on all laboratory requisition forms.   Abnormal lab values are called to the physician by the nurse and NOT by the laboratory.   Fax all labs to the office in a timely manner, during office hours. All faxes should include nurse’s name and call back number.  Vascular Access Devices or VADs (TLC, PICC, Midline, etc) will be replaced as necessary.  Draw all blood work from VADs, except for drug levels.  If unable to access a VAD, insert a peripheral catheter temporarily. Contact the Primary Care Physician or [facility] office for surgical referral.  Use tPA (Cathflo®, Alteplase®) as per agency protocol to restore patency of VAD.  Remove VAD upon completion of IV antibiotics, unless otherwise specified by the ordering physician.  If VAD cannot be removed, schedule appointment at office for removal.  Notify ordering physician or office if patient requires admission to the hospital with reason for admission.  Discontinue all blood work upon completion of IV antibiotics, unless otherwise specified by ordering physician.  Notify ordering physician if the patient does not receive the scheduled antibiotic for 24 hours or more.  The Pharmacy and Home Health Agency may adjust the timing of the infusion and blood work to accommodate the patient’s home care conditions.  When PICC or VAD is to be removed, documentation of length of inserted PICC. PICC or VAD length is to correlate with

## 2024-01-18 NOTE — PROGRESS NOTES
Occupational Therapy  OT BEDSIDE TREATMENT NOTE   [facility]  [address], [place]       Date:2024  Patient Name: Vince Conroy  MRN: [id number]  : 1975  Room: [facility]8508-     Per OT Eval:    Evaluating OT: Candi Hassan, OTR/L #[id number]     Referring Provider::  Anali Viveros MD                   Specific Provider Orders/Date: OT evaluation and treatment 24     Diagnosis:  Psoas muscle abscess (HCC) [K68.12]  Spinal abscess (HCC) [M46.20]  Osteomyelitis of vertebra of lumbar region (HCC) [M46.26]  Abscess in epidural space of lumbar spine [G06.1]       Pertinent Medical History:  has a past medical history of CAD (coronary artery disease), CHF (congestive heart failure) ([facility]), GERD (gastroesophageal reflux disease), Hx of drug abuse (HCC), Hypertension, and NICM (nonischemic cardiomyopathy) ([facility]).      Precautions:  Fall Risk, spinal neutrality,      Assessment of current deficits   [x] Functional mobility             [x]ADLs           [x] Strength                  []Cognition   [x] Functional transfers           [] IADLs         [x] Safety Awareness   [x]Endurance   [] Fine Coordination              [x] Balance      [] Vision/perception   []Sensation     []Gross Motor Coordination  [] ROM           [] Delirium                   [] Motor Control      OT PLAN OF CARE   OT POC based on physician orders, patient diagnosis and results of clinical assessment     Frequency/Duration  2-5 days/wk for 2 weeks PRN   Specific OT Treatment to include:   * Instruction/training on adapted ADL techniques and AE recommendations to increase functional independence within precautions       * Functional transfer/mobility training/DME recommendations for increased independence, safety, and fall prevention  * Patient/Family education to increase follow through with safety techniques and functional independence  * Therapeutic exercise to improve motor  to bathroom commode  Min A  Prior sessions Stand by Assist    Bed Mobility  Supine to sit: Moderate Assist x2  Sit to supine: Moderate Assist x 2 Mod A  Supine > sit     Mauri: SBAwith bed rail  With increase time, good recall of spinal precautions  Supine to sit: Minimal Assist   Sit to supine: Minimal Assist    Functional Transfers Sit to stand: Moderate Assist x2  Stand to sit: Moderate Assist x2  Stand pivot: Moderate Assist   Mod A  Sit < > stand   Elevated surface    Stand by Assist    Functional Mobility Minimal Assist  with ww  Short distance in room - bed to bathroom  Min A with walker  Per last session,  Focused on ADLs this date  Stand by Assist  with AAD   Functional distance   Balance Sitting: Stand by Assist      Standing: Minimal Assist   Sitting: Supervision  Standing: Min A with walker  Sitting: Independent       Standing: Stand by Assist    Activity Tolerance fair  Fair  Improved pain control this date    Seated at EOB 30 minutes during ADL task good   Visual/  Perceptual Glasses: yes                BUE  ROM/Strength/  Fine motor Coordination Hand dominance: R     RUE: ROM WFL     Strength: grossly 4/5      Strength:  WFL     Coordination:   WFL     LUE: ROM  WFL     Strength: grossly 4/5      Strength:  WFL     Coordination: WFL          Education:  Pt was educated through out treatment regarding proper technique & safety with bed mobility, functional transfers, maintaining spinal precautions & ADL compensatory strategies to ease tasks, improve safety & prevent falls to return home safely.  Educated pt when able importance of OOB activity, changing positions and sitting up in chair for short periods of time.    Comments: Upon arrival pt was in bed & agreeable for therapy, nsg approved. At end of session pt was seated at EOB, nsg present, wanting to change dressing on his incision, therefore pt agreeable to sit up at EOB, all needs met.     Pt has made Fair+ progress towards set goals.

## 2024-01-19 VITALS
SYSTOLIC BLOOD PRESSURE: 142 MMHG | OXYGEN SATURATION: 100 % | BODY MASS INDEX: 30.62 KG/M2 | HEART RATE: 85 BPM | HEIGHT: 74 IN | TEMPERATURE: 98.3 F | RESPIRATION RATE: 18 BRPM | DIASTOLIC BLOOD PRESSURE: 78 MMHG | WEIGHT: 238.6 LBS

## 2024-01-19 LAB
ANION GAP SERPL CALCULATED.3IONS-SCNC: 9 MMOL/L (ref 7–16)
BUN SERPL-MCNC: 13 MG/DL (ref 6–20)
CALCIUM SERPL-MCNC: 8.4 MG/DL (ref 8.6–10.2)
CHLORIDE SERPL-SCNC: 103 MMOL/L (ref 98–107)
CO2 SERPL-SCNC: 25 MMOL/L (ref 22–29)
CREAT SERPL-MCNC: 1.2 MG/DL (ref 0.7–1.2)
ERYTHROCYTE [DISTWIDTH] IN BLOOD BY AUTOMATED COUNT: 14.6 % (ref 11.5–15)
GFR SERPL CREATININE-BSD FRML MDRD: >60 ML/MIN/1.73M2
GLUCOSE SERPL-MCNC: 90 MG/DL (ref 74–99)
HCT VFR BLD AUTO: 28.3 % (ref 37–54)
HGB BLD-MCNC: 9.1 G/DL (ref 12.5–16.5)
MCH RBC QN AUTO: 28.5 PG (ref 26–35)
MCHC RBC AUTO-ENTMCNC: 32.2 G/DL (ref 32–34.5)
MCV RBC AUTO: 88.7 FL (ref 80–99.9)
PLATELET # BLD AUTO: 334 K/UL (ref 130–450)
PMV BLD AUTO: 10.4 FL (ref 7–12)
POTASSIUM SERPL-SCNC: 4.1 MMOL/L (ref 3.5–5)
RBC # BLD AUTO: 3.19 M/UL (ref 3.8–5.8)
SODIUM SERPL-SCNC: 137 MMOL/L (ref 132–146)
WBC OTHER # BLD: 9.1 K/UL (ref 4.5–11.5)

## 2024-01-19 PROCEDURE — 6370000000 HC RX 637 (ALT 250 FOR IP): Performed by: INTERNAL MEDICINE

## 2024-01-19 PROCEDURE — 85027 COMPLETE CBC AUTOMATED: CPT

## 2024-01-19 PROCEDURE — 6370000000 HC RX 637 (ALT 250 FOR IP): Performed by: NEUROLOGICAL SURGERY

## 2024-01-19 PROCEDURE — 2580000003 HC RX 258: Performed by: INTERNAL MEDICINE

## 2024-01-19 PROCEDURE — 80048 BASIC METABOLIC PNL TOTAL CA: CPT

## 2024-01-19 PROCEDURE — 6360000002 HC RX W HCPCS: Performed by: INTERNAL MEDICINE

## 2024-01-19 RX ORDER — ENOXAPARIN SODIUM 100 MG/ML
30 INJECTION SUBCUTANEOUS 2 TIMES DAILY
DISCHARGE
Start: 2024-01-19 | End: 2024-01-20

## 2024-01-19 RX ORDER — OXYCODONE HYDROCHLORIDE AND ACETAMINOPHEN 5; 325 MG/1; MG/1
1 TABLET ORAL EVERY 4 HOURS PRN
Qty: 6 TABLET | Refills: 0 | Status: SHIPPED | OUTPATIENT
Start: 2024-01-19 | End: 2024-01-20

## 2024-01-19 RX ORDER — SENNOSIDES A AND B 8.6 MG/1
2 TABLET, FILM COATED ORAL
Qty: 60 TABLET | Refills: 0 | DISCHARGE
Start: 2024-01-19 | End: 2024-02-18

## 2024-01-19 RX ORDER — FUROSEMIDE 40 MG/1
20 TABLET ORAL EVERY MORNING
Qty: 60 TABLET | Refills: 3 | Status: SHIPPED | OUTPATIENT
Start: 2024-01-19

## 2024-01-19 RX ORDER — CYCLOBENZAPRINE HCL 5 MG
5 TABLET ORAL 2 TIMES DAILY PRN
Qty: 14 TABLET | Refills: 0 | Status: SHIPPED | OUTPATIENT
Start: 2024-01-19 | End: 2024-01-26

## 2024-01-19 RX ORDER — PANTOPRAZOLE SODIUM 40 MG/1
40 TABLET, DELAYED RELEASE ORAL
Qty: 30 TABLET | Refills: 3 | Status: SHIPPED | OUTPATIENT
Start: 2024-01-19

## 2024-01-19 RX ORDER — SPIRONOLACTONE 50 MG/1
25 TABLET, FILM COATED ORAL EVERY MORNING
Qty: 30 TABLET | Refills: 3 | Status: SHIPPED | OUTPATIENT
Start: 2024-01-19

## 2024-01-19 RX ORDER — DIAZEPAM 5 MG/1
5 TABLET ORAL EVERY 6 HOURS PRN
Qty: 2 TABLET | Refills: 0 | Status: SHIPPED | OUTPATIENT
Start: 2024-01-19 | End: 2024-01-29

## 2024-01-19 RX ADMIN — SODIUM CHLORIDE, PRESERVATIVE FREE 10 ML: 5 INJECTION INTRAVENOUS at 09:03

## 2024-01-19 RX ADMIN — OXYCODONE AND ACETAMINOPHEN 1 TABLET: 5; 325 TABLET ORAL at 03:04

## 2024-01-19 RX ADMIN — HYDRALAZINE HYDROCHLORIDE 75 MG: 50 TABLET ORAL at 09:03

## 2024-01-19 RX ADMIN — WATER 2000 MG: 1 INJECTION INTRAMUSCULAR; INTRAVENOUS; SUBCUTANEOUS at 05:25

## 2024-01-19 RX ADMIN — GABAPENTIN 300 MG: 300 CAPSULE ORAL at 13:26

## 2024-01-19 RX ADMIN — GABAPENTIN 300 MG: 300 CAPSULE ORAL at 09:04

## 2024-01-19 RX ADMIN — WATER 2000 MG: 1 INJECTION INTRAMUSCULAR; INTRAVENOUS; SUBCUTANEOUS at 11:27

## 2024-01-19 RX ADMIN — CARVEDILOL 12.5 MG: 6.25 TABLET, FILM COATED ORAL at 09:03

## 2024-01-19 RX ADMIN — PANTOPRAZOLE SODIUM 40 MG: 40 TABLET, DELAYED RELEASE ORAL at 05:25

## 2024-01-19 RX ADMIN — OXYCODONE AND ACETAMINOPHEN 1 TABLET: 5; 325 TABLET ORAL at 09:04

## 2024-01-19 RX ADMIN — OXYCODONE AND ACETAMINOPHEN 1 TABLET: 5; 325 TABLET ORAL at 13:26

## 2024-01-19 RX ADMIN — CYCLOBENZAPRINE 10 MG: 10 TABLET, FILM COATED ORAL at 13:26

## 2024-01-19 RX ADMIN — ISOSORBIDE DINITRATE 20 MG: 10 TABLET ORAL at 13:26

## 2024-01-19 RX ADMIN — ACETAMINOPHEN 650 MG: 325 TABLET ORAL at 11:27

## 2024-01-19 RX ADMIN — HEPARIN 300 UNITS: 100 SYRINGE at 09:03

## 2024-01-19 RX ADMIN — ISOSORBIDE DINITRATE 20 MG: 10 TABLET ORAL at 09:03

## 2024-01-19 ASSESSMENT — PAIN SCALES - GENERAL
PAINLEVEL_OUTOF10: 3
PAINLEVEL_OUTOF10: 7
PAINLEVEL_OUTOF10: 8
PAINLEVEL_OUTOF10: 6

## 2024-01-19 ASSESSMENT — PAIN DESCRIPTION - PAIN TYPE: TYPE: ACUTE PAIN

## 2024-01-19 ASSESSMENT — PAIN DESCRIPTION - DESCRIPTORS
DESCRIPTORS: ACHING;DISCOMFORT
DESCRIPTORS: ACHING;DISCOMFORT
DESCRIPTORS: ACHING;DISCOMFORT;SORE

## 2024-01-19 ASSESSMENT — PAIN - FUNCTIONAL ASSESSMENT
PAIN_FUNCTIONAL_ASSESSMENT: PREVENTS OR INTERFERES SOME ACTIVE ACTIVITIES AND ADLS
PAIN_FUNCTIONAL_ASSESSMENT: PREVENTS OR INTERFERES SOME ACTIVE ACTIVITIES AND ADLS

## 2024-01-19 ASSESSMENT — PAIN DESCRIPTION - ONSET: ONSET: ON-GOING

## 2024-01-19 ASSESSMENT — PAIN DESCRIPTION - ORIENTATION
ORIENTATION: LOWER
ORIENTATION: LOWER

## 2024-01-19 ASSESSMENT — PAIN DESCRIPTION - LOCATION
LOCATION: BACK

## 2024-01-19 ASSESSMENT — PAIN DESCRIPTION - FREQUENCY: FREQUENCY: CONTINUOUS

## 2024-01-19 NOTE — PROGRESS NOTES
[facility] Hospitalist Progress Note    Admitting Date and Time: 1/13/2024  6:32 PM  Admit Dx: Psoas muscle abscess (HCC) [K68.12]  Spinal abscess (HCC) [M46.20]  Osteomyelitis of vertebra of lumbar region (HCC) [M46.26]  Abscess in epidural space of lumbar spine [G06.1]    Synopsis: 48-year-old gentleman presented to ED with chief complaint of back pain.  MRI spine concerning for osteomyelitis L4 with associated epidural abscess.  Seen by neurosurgery and underwent laminectomy, blood cultures growing MSSA, on cefazolin, ID following.  TATUM today was negative.  Needs to be restarted on Eliquis once okay with neurosurgery.    Subjective:  Patient is being followed for Psoas muscle abscess (HCC) [K68.12]  Spinal abscess (HCC) [M46.20]  Osteomyelitis of vertebra of lumbar region (HCC) [M46.26]  Abscess in epidural space of lumbar spine [G06.1]       Feeling okay without complaint pain control discussed  No CP or SOB  No fever or chills   No uncontrolled pain  No vomiting or diarrhea   No events reported overnight  Chart reviewed      enoxaparin  30 mg SubCUTAneous BID    QUEtiapine  25 mg Oral Nightly    lidocaine PF  5 mL IntraDERmal Once    sodium chloride flush  5-40 mL IntraVENous 2 times per day    heparin flush  3 mL IntraVENous 2 times per day    cyclobenzaprine  10 mg Oral TID    ceFAZolin  2,000 mg IntraVENous Q8H    carvedilol  12.5 mg Oral BID WC    hydrALAZINE  75 mg Oral BID    isosorbide dinitrate  20 mg Oral TID    pantoprazole  40 mg Oral QAM AC    rosuvastatin  20 mg Oral Nightly    sodium chloride flush  5-40 mL IntraVENous 2 times per day    gabapentin  300 mg Oral TID    sodium chloride flush  5-40 mL IntraVENous 2 times per day    acetaminophen  650 mg Oral Q6H     oxyCODONE-acetaminophen, 1 tablet, Q4H PRN  oxyCODONE-acetaminophen, 2 tablet, Q4H PRN  sodium chloride flush, 5-40 mL, PRN  sodium chloride, , PRN  heparin flush, 3 mL, PRN  [Held by provider] HYDROmorphone, 0.25 mg, Q4H

## 2024-01-19 NOTE — DISCHARGE SUMMARY
Physician Discharge Summary     Patient ID:  Vince Conroy  [id number]  48 y.o.  1975    Admit date: 1/13/2024    Discharge date and time:  1/19/2024    Discharge Diagnoses: Principal Problem:    Spinal abscess (HCC)  Active Problems:    Polysubstance abuse (HCC)    Chronic systolic CHF (congestive heart failure) (HCC)    Abscess in epidural space of lumbar spine    Bacteremia    CKD (chronic kidney disease) stage 3, GFR 30-59 ml/min (HCC)  Resolved Problems:    * No resolved hospital problems. *      Consults: IP CONSULT TO NEUROSURGERY  IP CONSULT TO INTERNAL MEDICINE  IP CONSULT TO NEUROSURGERY  IP CONSULT TO INFECTIOUS DISEASES  IP CONSULT TO PHARMACY    Procedures: See below    Hospital Course:     48-year-old gentleman presented to ED with chief complaint of back pain.  MRI spine concerning for osteomyelitis L4 with associated epidural abscess.  Seen by neurosurgery and underwent laminectomy, blood cultures growing MSSA, on cefazolin, ID following.  TATUM today was negative.  Needs to be restarted on Eliquis once okay with neurosurgery.    Osteomyelitis L4 with associated epidural abscess.  MSSA bacteremia  S/p lumbar laminectomy posterior L4-S1 with neurosurgery on 1/14  Received vancomycin, now on cefazolin for MSSA bacteremia.  ID consulted, appreciate recommendations.  Leukocytosis resolved.  Blood culture 1/14-positive for staph aureus  Repeat blood culture 1/15- NTD  OR cultures-heavy Staph aureus.  Home apixaban on hold resume once okay with neuro surgery.  Getting as needed Dilaudid and OxyContin twice daily for pain.  He continues to complain of 20/10 severity pain, will add Flexeril and monitor response.  TATUM completed-no evidence of vegetations.  EF 20 to 25%  Changed to oral for pain control, discontinue long-acting opiates for acute postoperative pain.      HFrEF chronic  Nonischemic cardiomyopathy, nonobstructive coronary artery disease  Echo December 2023 reviewed.  EF 25 to 30%.  Currently  neurosurgery    Note that 36 minutes was spent in preparing discharge papers, discussing discharge with patient, staff, consultants, medication review, arranging follow up, etc.    Signed:  Bryan Velazco MD  1/19/2024  2:06 PM

## 2024-01-19 NOTE — CARE COORDINATION
Discharge order noted. Call placed to Lizabeth @ [facility] and is able to come today. Patient updated .[facility] transport set up for 4 pm. Bedside RN and Lizabeth at [facility] also ntfd and patient. Patient states he will notify his family.Envelope and ambulance formin soft chart,  Cm/sw to follow.Electronically signed by Kelsi Ayala RN on 1/19/2024 at 10:19 AM

## 2024-01-19 NOTE — PROGRESS NOTES
[facility]  [facility]    Progress Note       HISTORY OF PRESENT ILLNESS:   48-year-old male with past medical history of CAD, CHF, GERD, HTN presents with back pain from [facility].  He has new back pain with left leg numbness and urinary incontinence.  MRI of the lumbar spine showed osteomyelitis of L4 with associated epidural abscess.  Status post L4, L5, S1 laminectomy and evacuation of epidural abscess 01/14.  ID consulted for epidural abscess.    Subjective:   Afebrile, WBC 10  Staph aureus bacteremia noted    Past Medical History:        Diagnosis Date    CAD (coronary artery disease)     CHF (congestive heart failure) ([facility]) 02/08/2022    GERD (gastroesophageal reflux disease)     Hx of drug abuse ([facility])     cocaine    Hypertension     NICM (nonischemic cardiomyopathy) ([facility])      Past Surgical History:        Procedure Laterality Date    CARDIAC CATHETERIZATION      LAMINECTOMY N/A 1/14/2024    LUMBAR LAMINECTOMY POSTERIOR L4 - S1 performed by Anali Viveros [place] at [facility]    LAPAROSCOPY N/A 4/30/2022    LAPAROSCOPY DIAGNOSTIC,  EXPLORATORY INVERTED TO EXPLORATORY LAPOROTOMY, DISTAL SMALL BOWEL RESECTION, PRIMARY STAPLED ANASTAMOSIS, BILATERAL INGUINAL HERNIA REPAIR WITH MESH performed by Rashaad Levin MD at [facility]    LAPAROTOMY N/A 4/30/2022    LAPAROTOMY EXPLORATORY BILATERAL INGUINAL HERNIA REPAIR OPEN,  BOWEL RESECTION,  MESH X2 performed by Rashaad Levin MD at [facility]    SKIN GRAFT Right     R thigh     Current Medications:   Scheduled Meds:   enoxaparin  30 mg SubCUTAneous BID    QUEtiapine  25 mg Oral Nightly    lidocaine PF  5 mL IntraDERmal Once    sodium chloride flush  5-40 mL IntraVENous 2 times per day    heparin flush  3 mL IntraVENous 2 times per day    cyclobenzaprine  10 mg Oral TID    ceFAZolin  2,000 mg IntraVENous Q8H    carvedilol  12.5 mg Oral BID     hydrALAZINE  75 mg Oral BID    isosorbide dinitrate  20 mg Oral TID    pantoprazole  40 mg  72 hours.  No results for input(s): \"LP1UAG\" in the last 72 hours.  No results for input(s): \"ASO\" in the last 72 hours.  No results for input(s): \"CULTRESP\" in the last 72 hours.    Assessment:  L4 discitis/osteomyelitis  L4 epidural abscess  MSSA bacteremia  Status post L4-S1 laminectomy with evacuation of epidural abscess 01/14  Surgical cultures from 01/14 growing MSSA and rare gram-positive cocci in pairs.  No previous bacteremia noted in the microbiology  Left lower leg numbness, urinary incontinence noted    Plan:    Continue cefazolin until 02/26.  TTE showed no evidence of vegetations  TATUM showed no evidence of vegetations  Repeat blood cultures are no growth so far, first negative blood culture is 01/15.  PICC line ordered  Prescription is in the chart  Follow-up with ID clinic within 2 weeks  Patient can be discharged from ID standpoint  He is going to [facility]   Reconciled     Thank you for having us see this patient in consultation. I will be discussing this case with the treating physicians.      Electronically signed by Gabriela Yan MD on 1/19/2024 at 1:19 PM

## 2024-01-20 LAB
MICROORGANISM SPEC CULT: NORMAL
MICROORGANISM SPEC CULT: NORMAL
SERVICE CMNT-IMP: NORMAL
SERVICE CMNT-IMP: NORMAL
SPECIMEN DESCRIPTION: NORMAL
SPECIMEN DESCRIPTION: NORMAL

## 2024-01-23 LAB
MICROORGANISM SPEC CULT: NORMAL
MICROORGANISM/AGENT SPEC: NORMAL
SPECIMEN DESCRIPTION: NORMAL

## 2024-01-25 ENCOUNTER — TELEPHONE (OUTPATIENT)
Dept: NEUROSURGERY | Age: 49
End: 2024-01-25

## 2024-01-25 NOTE — TELEPHONE ENCOUNTER
Need order for patient to have staple removal done on 01.29.2024 as patient is a resident at [facility] [phone] fax number please.    Thank you

## 2024-01-26 NOTE — TELEPHONE ENCOUNTER
Order for staple removal is faxed to [facility] [phone]; Documentation is scanned with fax conf.

## 2024-01-28 LAB
MICROORGANISM SPEC CULT: NORMAL
MICROORGANISM/AGENT SPEC: NORMAL
SPECIMEN DESCRIPTION: NORMAL

## 2024-01-29 ENCOUNTER — TELEPHONE (OUTPATIENT)
Dept: NEUROSURGERY | Age: 49
End: 2024-01-29

## 2024-01-29 ENCOUNTER — NURSING HOME VISIT (OUTPATIENT)
Dept: POST ACUTE CARE | Facility: EXTERNAL LOCATION | Age: 49
End: 2024-01-29

## 2024-01-29 DIAGNOSIS — I25.10 CORONARY ARTERY DISEASE, UNSPECIFIED VESSEL OR LESION TYPE, UNSPECIFIED WHETHER ANGINA PRESENT, UNSPECIFIED WHETHER NATIVE OR TRANSPLANTED HEART: ICD-10-CM

## 2024-01-29 DIAGNOSIS — F19.10 SUBSTANCE ABUSE (MULTI): Primary | ICD-10-CM

## 2024-01-29 DIAGNOSIS — M46.26 OSTEOMYELITIS OF LUMBAR SPINE (MULTI): ICD-10-CM

## 2024-01-29 DIAGNOSIS — G06.2 EPIDURAL ABSCESS (HHS-HCC): ICD-10-CM

## 2024-01-29 DIAGNOSIS — I50.9 CONGESTIVE HEART FAILURE, UNSPECIFIED HF CHRONICITY, UNSPECIFIED HEART FAILURE TYPE (MULTI): ICD-10-CM

## 2024-01-29 DIAGNOSIS — Z98.890 S/P LAMINECTOMY: ICD-10-CM

## 2024-01-29 NOTE — LETTER
Patient: Hebert Curtis  : 1975    Encounter Date: 2024    Date of Service: 2024      HPI/Subjective  Hebert Curtis is a 48 y.o. male  48-year-old male with past medical history of CAD, CHF, GERD, hypertension presented to hospital for acute evaluation of back pain. to found to have osteomyelitis of L4 with associated epidural abscess.  He underwent L4 to L5-S1 laminectomy and evacuation of epidural abscess on 2024.  He is discharged to [facility] for skilled rehabilitation patient is currently on IV cefazolin.  Hospital TTE and CHERRY showed no evidence of endocarditis.  Currently patient is complaining of moderate low back pain that he says is improved hospitalization.  Currently on as needed Percocet but due to history of polysubstance abuse we are monitoring his opiate intake appropriately.      Denies fevers, chills, weight loss, lightheadedness, dizziness, vision changes, sore throat, runny nose, CP, SOB, cough, palpitations, n/v/d, abd pain, black/bloody stools, or new numbness/weakness/tingling in arms/legs/face.      Other Medial, Surgical, Family, Social Hx per chart in PCC.   Medication list reviewed. Please see PCC.     Objective:   Physical Exam     Vital signs reviewed. Please see chart in PCC.     General: NAD. NCAT. AOx3  HEENT: PERRLA. EOMI. MMM. Nares patent bl.  Cardiovascular: RRR. No S1/S2 wnl.   Respiratory: CTABL. No acute respiratory distress.   GI: Soft, NT abdomen. BS present x 4.   : No CVAT BL  MSK: Generalized weakness. S/P L4-L5-S1 laminectomy and epidural abscess evacuation.   Extremities: No major edema.   Skin: No visible rashes or bruises. Incision site clean and dry.  Neuro: Cranial Nerves grossly intact. Motor/sensory wnl.   Psych: Mood wnl.          REVIEW OF SYSTEMS   ROS reviewed within HPI and is otherwise negative       Assessment and Plan  Encounter Diagnoses   Name Primary?   • Substance abuse (CMS/HCC) Yes   • Osteomyelitis of lumbar spine (CMS/HCC)     • Epidural abscess    • S/P laminectomy    • Coronary artery disease, unspecified vessel or lesion type, unspecified whether angina present, unspecified whether native or transplanted heart    • Congestive heart failure, unspecified HF chronicity, unspecified heart failure type (CMS/[facility])        -Continue cefazolin   -Percocet PRN for pain   -Statin and ASA continued  -Lasix, hydralazine, coreg continued   -Staples/stitches to be removed today by nurse  -Pre-Admission hospital notes reviewed  -Pertinent radiology, if any, reviewed   -Current rehab plan reviewed; continue pending any major changes  -Current medication therapy reviewed. Continue and monitor for adverse effects.  -Monitor vitals  -Monitor labs  -Continue home medications for chronic medical conditions.   -PT/OT as tolerated  -Low carb, Low sodium, Low fat diet advised         Charting was completed using voice recognition technology and may include unintended errors.    Dalia Pierce MD       Electronically Signed By: Dalia Pierce MD   1/30/24  2:04 AM

## 2024-01-30 LAB
MICROORGANISM SPEC CULT: NORMAL
MICROORGANISM/AGENT SPEC: NORMAL
SPECIMEN DESCRIPTION: NORMAL

## 2024-01-30 NOTE — PROGRESS NOTES
Date of Service: Jan 29, 2024      HPI/Subjective  Hebert Curtis is a 48 y.o. male  48-year-old male with past medical history of CAD, CHF, GERD, hypertension presented to hospital for acute evaluation of back pain. to found to have osteomyelitis of L4 with associated epidural abscess.  He underwent L4 to L5-S1 laminectomy and evacuation of epidural abscess on 1/14/2024.  He is discharged to [facility] for skilled rehabilitation patient is currently on IV cefazolin.  Hospital TTE and CHERRY showed no evidence of endocarditis.  Currently patient is complaining of moderate low back pain that he says is improved hospitalization.  Currently on as needed Percocet but due to history of polysubstance abuse we are monitoring his opiate intake appropriately.      Denies fevers, chills, weight loss, lightheadedness, dizziness, vision changes, sore throat, runny nose, CP, SOB, cough, palpitations, n/v/d, abd pain, black/bloody stools, or new numbness/weakness/tingling in arms/legs/face.      Other Medial, Surgical, Family, Social Hx per chart in PCC.   Medication list reviewed. Please see PCC.     Objective:   Physical Exam     Vital signs reviewed. Please see chart in PCC.     General: NAD. NCAT. AOx3  HEENT: PERRLA. EOMI. MMM. Nares patent bl.  Cardiovascular: RRR. No S1/S2 wnl.   Respiratory: CTABL. No acute respiratory distress.   GI: Soft, NT abdomen. BS present x 4.   : No CVAT BL  MSK: Generalized weakness. S/P L4-L5-S1 laminectomy and epidural abscess evacuation.   Extremities: No major edema.   Skin: No visible rashes or bruises. Incision site clean and dry.  Neuro: Cranial Nerves grossly intact. Motor/sensory wnl.   Psych: Mood wnl.          REVIEW OF SYSTEMS   ROS reviewed within HPI and is otherwise negative       Assessment and Plan  Encounter Diagnoses   Name Primary?    Substance abuse (CMS/HCC) Yes    Osteomyelitis of lumbar spine (CMS/HCC)     Epidural abscess     S/P laminectomy     Coronary artery disease,  unspecified vessel or lesion type, unspecified whether angina present, unspecified whether native or transplanted heart     Congestive heart failure, unspecified HF chronicity, unspecified heart failure type (CMS/[facility])        -Continue cefazolin   -Percocet PRN for pain   -Statin and ASA continued  -Lasix, hydralazine, coreg continued   -Staples/stitches to be removed today by nurse  -Pre-Admission hospital notes reviewed  -Pertinent radiology, if any, reviewed   -Current rehab plan reviewed; continue pending any major changes  -Current medication therapy reviewed. Continue and monitor for adverse effects.  -Monitor vitals  -Monitor labs  -Continue home medications for chronic medical conditions.   -PT/OT as tolerated  -Low carb, Low sodium, Low fat diet advised         Charting was completed using voice recognition technology and may include unintended errors.    Dalia Pierce MD

## 2024-02-04 LAB
MICROORGANISM SPEC CULT: NORMAL
MICROORGANISM/AGENT SPEC: NORMAL
SPECIMEN DESCRIPTION: NORMAL

## 2024-02-06 LAB
MICROORGANISM SPEC CULT: NORMAL
MICROORGANISM/AGENT SPEC: NORMAL
SPECIMEN DESCRIPTION: NORMAL

## 2024-02-11 LAB
MICROORGANISM SPEC CULT: NORMAL
MICROORGANISM/AGENT SPEC: NORMAL
SPECIMEN DESCRIPTION: NORMAL

## 2024-02-13 LAB
MICROORGANISM SPEC CULT: NORMAL
MICROORGANISM SPEC CULT: NORMAL
MICROORGANISM/AGENT SPEC: NORMAL
MICROORGANISM/AGENT SPEC: NORMAL
SPECIMEN DESCRIPTION: NORMAL
SPECIMEN DESCRIPTION: NORMAL

## 2024-02-20 LAB
MICROORGANISM SPEC CULT: NORMAL
MICROORGANISM/AGENT SPEC: NORMAL
SPECIMEN DESCRIPTION: NORMAL

## 2024-02-27 LAB
MICROORGANISM SPEC CULT: NORMAL
MICROORGANISM/AGENT SPEC: NORMAL
SPECIMEN DESCRIPTION: NORMAL

## 2024-03-14 ENCOUNTER — OFFICE VISIT (OUTPATIENT)
Dept: NEUROSURGERY | Age: 49
End: 2024-03-14

## 2024-03-14 DIAGNOSIS — G06.1 ABSCESS IN EPIDURAL SPACE OF LUMBAR SPINE: Primary | ICD-10-CM

## 2024-03-14 DIAGNOSIS — Z98.890 S/P LUMBAR LAMINECTOMY: ICD-10-CM

## 2024-03-14 PROCEDURE — 99024 POSTOP FOLLOW-UP VISIT: CPT | Performed by: PHYSICIAN ASSISTANT

## 2024-03-14 NOTE — PROGRESS NOTES
Post Operative Follow-up     This is a 48 year old male who presents to the office for a 2 month follow-up s/p L4-S1 laminectomy and evacuation of epidural abscess     Subjective: Patient presents from [facility] today for a 2 month follow up. States the back pain has progressively improved. Ambulating well with a walker. Participates in PT at the facility. PICC line still in place. Patient states he has not yet followed up with Infectious Disease. No issues with incision site.      Physical Exam:              WDWN, no apparent distress              Non-labored breathing               Vitals Stable              Alert and oriented x3              CN 3-12 intact              PERRL              EOMI              GIBSON well              Motor strength symmetric              Sensation to LT intact bilaterally   Incision healing well with no signs of infection                 Imaging: N/A     Assessment: This is a 48 y.o.  male presenting for a 2 month follow-up s/p L4-S1 laminectomy and evacuation of epidural abscess. Stable.         Plan:  -Pain control and expectations discussed  -Continue restrictions  -Recommend follow up with ID for abx  -Continue PT at this time. Will do outpatient therapy at 3 month follow up   -OARRS report reviewed   -Follow-up in neurosurgery clinic in 1 month for 3 month follow up  -Call or return to neurosurgery office sooner if symptoms worsen or if new issues arise in the interim.    Electronically signed by Gaye Barros PA-C on 3/14/2024 at 5:05 PM

## 2024-04-09 ENCOUNTER — OFFICE VISIT (OUTPATIENT)
Dept: NEUROSURGERY | Age: 49
End: 2024-04-09
Payer: COMMERCIAL

## 2024-04-09 DIAGNOSIS — M46.26 OSTEOMYELITIS OF VERTEBRA OF LUMBAR REGION (HCC): ICD-10-CM

## 2024-04-09 PROCEDURE — 99024 POSTOP FOLLOW-UP VISIT: CPT | Performed by: PHYSICIAN ASSISTANT

## 2024-04-09 PROCEDURE — 99212 OFFICE O/P EST SF 10 MIN: CPT

## 2024-04-09 RX ORDER — DIAZEPAM 5 MG/1
5 TABLET ORAL EVERY 6 HOURS PRN
Qty: 40 TABLET | Refills: 0 | Status: SHIPPED | OUTPATIENT
Start: 2024-04-09 | End: 2024-04-19

## 2024-04-09 RX ORDER — OXYCODONE HYDROCHLORIDE AND ACETAMINOPHEN 5; 325 MG/1; MG/1
1 TABLET ORAL EVERY 4 HOURS PRN
Qty: 42 TABLET | Refills: 0 | Status: SHIPPED | OUTPATIENT
Start: 2024-04-09 | End: 2024-04-16

## 2024-04-09 NOTE — PROGRESS NOTES
Post Operative Follow-up    Patient is status post: lumbar laminectomy for abscess.  Leg and back pain continue to improve.  AB done    Physical Exam  Alert and Oriented X 3  PERRLA, EOMI  GIBSON 5/5  Wound: C/D/I    A/P: patient is s/p lumbar laminectomy for abscess 3 months ago.  Having progress, but still needing narcotics and muscle relaxers.  Will consult pain management.  Will begin PT  
24

## 2024-04-16 ENCOUNTER — TELEPHONE (OUTPATIENT)
Dept: NEUROSURGERY | Age: 49
End: 2024-04-16

## 2024-04-16 NOTE — TELEPHONE ENCOUNTER
Patient had surgery on 1-14-24. He stopped in at  and is asking for refill of Percocet and Valium to [facility] on [address] in [place].  I told him we usually only order for 3 months after surgery date. Last refill was on April 9. Told him we will call him back.  [phone] phone

## 2024-04-16 NOTE — TELEPHONE ENCOUNTER
Pt called, no answer, left message.  If he returns the call, he may make followup appt if he is still having pain

## 2024-04-23 ENCOUNTER — OFFICE VISIT (OUTPATIENT)
Dept: NEUROSURGERY | Age: 49
End: 2024-04-23
Payer: COMMERCIAL

## 2024-04-23 VITALS — WEIGHT: 229 LBS | RESPIRATION RATE: 18 BRPM | BODY MASS INDEX: 29.39 KG/M2 | HEIGHT: 74 IN

## 2024-04-23 DIAGNOSIS — M46.26 OSTEOMYELITIS OF VERTEBRA OF LUMBAR REGION (HCC): ICD-10-CM

## 2024-04-23 PROCEDURE — 4004F PT TOBACCO SCREEN RCVD TLK: CPT | Performed by: PHYSICIAN ASSISTANT

## 2024-04-23 PROCEDURE — 99213 OFFICE O/P EST LOW 20 MIN: CPT | Performed by: PHYSICIAN ASSISTANT

## 2024-04-23 PROCEDURE — 99212 OFFICE O/P EST SF 10 MIN: CPT

## 2024-04-23 PROCEDURE — G8417 CALC BMI ABV UP PARAM F/U: HCPCS | Performed by: PHYSICIAN ASSISTANT

## 2024-04-23 PROCEDURE — G8427 DOCREV CUR MEDS BY ELIG CLIN: HCPCS | Performed by: PHYSICIAN ASSISTANT

## 2024-04-23 RX ORDER — DIAZEPAM 5 MG/1
5 TABLET ORAL EVERY 6 HOURS PRN
Qty: 40 TABLET | Refills: 0 | Status: SHIPPED | OUTPATIENT
Start: 2024-04-23 | End: 2024-05-03

## 2024-04-23 RX ORDER — OXYCODONE HYDROCHLORIDE AND ACETAMINOPHEN 5; 325 MG/1; MG/1
1 TABLET ORAL EVERY 4 HOURS PRN
Qty: 42 TABLET | Refills: 0 | Status: SHIPPED | OUTPATIENT
Start: 2024-04-23 | End: 2024-04-30

## 2024-04-23 NOTE — PROGRESS NOTES
Post Operative Follow-up    Patient is status post: lumbar laminectomy for abscess.  Leg and back pain are getting worse.    Physical Exam  Alert and Oriented X 3  PERRLA, EOMI  GIBSON 5/5  Wound: C/D/I    A/P: patient is s/p lumbar laminectomy for abscess 4 months ago with worsening of back and leg pain.  Will order lumbar MRi and blood work.

## (undated) DEVICE — TRAP,MUCUS SPECIMEN,40CC: Brand: MEDLINE

## (undated) DEVICE — ADHESIVE SKIN CLOSURE TOP 36 CC HI VISC DERMBND MINI

## (undated) DEVICE — SYRINGE 20ML LL S/C 50

## (undated) DEVICE — LUMBAR LAMINECTOMY: Brand: MEDLINE INDUSTRIES, INC.

## (undated) DEVICE — INSUFFLATION TUBING SET WITH FILTER, FUNNEL CONNECTOR AND LUER LOCK: Brand: JOSNOE MEDICAL INC

## (undated) DEVICE — GOWN,SIRUS,FABRNF,2XL,18/CS: Brand: MEDLINE

## (undated) DEVICE — 3M(TM) MEDIPORE(TM) +PAD SOFT CLOTH ADHESIVE WOUND DRESSING 3569: Brand: 3M™ MEDIPORE™

## (undated) DEVICE — Device

## (undated) DEVICE — DRAIN SURG PENROSE 0.75X12 IN PREM SIL STRL

## (undated) DEVICE — INTENDED FOR TISSUE SEPARATION, AND OTHER PROCEDURES THAT REQUIRE A SHARP SURGICAL BLADE TO PUNCTURE OR CUT.: Brand: BARD-PARKER ® STAINLESS STEEL BLADES

## (undated) DEVICE — NEEDLE SPNL L3.5IN PNK HUB S STL REG WALL FIT STYL W/ QNCKE

## (undated) DEVICE — ELECTRODE PT RET AD L9FT HI MOIST COND ADH HYDRGEL CORDED

## (undated) DEVICE — GENERATOR ELECSURG FORCETRAID

## (undated) DEVICE — TUBING, SUCTION, 3/16" X 12', STRAIGHT: Brand: MEDLINE

## (undated) DEVICE — GLOVE SURG SIGNATURE LTX ESS LTX PF 7.5

## (undated) DEVICE — TOWEL,OR,DSP,ST,BLUE,STD,6/PK,12PK/CS: Brand: MEDLINE

## (undated) DEVICE — CONTAINER SPEC ANAERB VACTNR

## (undated) DEVICE — STAPLER INT L75MM CUT LN L73MM STPL LN L77MM BLU B FRM 8

## (undated) DEVICE — KIT,ANTI FOG,W/SPONGE & FLUID,SOFT PACK: Brand: MEDLINE

## (undated) DEVICE — SWAB SPEC COLL SHFT L5.25IN POLYUR FOAM TIP SFT DBL MEDIA

## (undated) DEVICE — INSUFFLATION NEEDLE TO ESTABLISH PNEUMOPERITONEUM.: Brand: INSUFFLATION NEEDLE

## (undated) DEVICE — 3M™ IOBAN™ 2 ANTIMICROBIAL INCISE DRAPE 6640EZ: Brand: IOBAN™ 2

## (undated) DEVICE — PREMIUM WET SKIN PREP TRAY: Brand: MEDLINE INDUSTRIES, INC.

## (undated) DEVICE — TROCAR: Brand: KII FIOS FIRST ENTRY

## (undated) DEVICE — HYPODERMIC SAFETY NEEDLE: Brand: MAGELLAN

## (undated) DEVICE — GLOVE SURG SZ 85 L12IN FNGR THK79MIL GRN LTX FREE

## (undated) DEVICE — GLOVE SURG 8.5 PF POLYMER WHT STRL SIGN LTX ESSENTIAL LTX

## (undated) DEVICE — GLOVE ORANGE PI 8   MSG9080

## (undated) DEVICE — 4-PORT MANIFOLD: Brand: NEPTUNE 2

## (undated) DEVICE — STAPLER SKIN L39MM DIA0.53MM CRWN 5.7MM S STL FIX HD PROX

## (undated) DEVICE — BLADE ES L6IN ELASTOMERIC COAT EXT DURABLE BEND UPTO 90DEG

## (undated) DEVICE — WARMER SCP LAP

## (undated) DEVICE — 5.0MM PRECISION ROUND

## (undated) DEVICE — SPONGE LAP W18XL18IN WHT COT 4 PLY FLD STRUNG RADPQ DISP ST

## (undated) DEVICE — DECANTER BAG 9": Brand: MEDLINE INDUSTRIES, INC.

## (undated) DEVICE — DRAPE,REIN 53X77,STERILE: Brand: MEDLINE

## (undated) DEVICE — GENERAL LAPAROSCOPY: Brand: MEDLINE INDUSTRIES, INC.

## (undated) DEVICE — TROCAR: Brand: KII® SLEEVE

## (undated) DEVICE — JACKSON TABLE POSITIONER KIT: Brand: MEDLINE INDUSTRIES, INC.

## (undated) DEVICE — DRAIN SURG PENROSE 0.25X12 IN CLOSED WND DRAINAGE PREM SIL

## (undated) DEVICE — 3M™ IOBAN™ 2 ANTIMICROBIAL INCISE DRAPE 6650EZ: Brand: IOBAN™ 2

## (undated) DEVICE — GOWN,BREATHABLE SLV,AURORA,XLG,STRL: Brand: MEDLINE

## (undated) DEVICE — PACK,UNIVERSAL,NO GOWNS: Brand: MEDLINE

## (undated) DEVICE — TUBING SUCT 12FR MAL ALUM SHFT FN CAP VENT UNIV CONN W/ OBT

## (undated) DEVICE — SOLUTION SURG PREP 26 CC PURPREP

## (undated) DEVICE — RELOAD STPL L75MM OPN H3.8MM CLS 1.5MM WIRE DIA0.2MM REG

## (undated) DEVICE — LIQUIBAND RAPID ADHESIVE 36/CS 0.8ML: Brand: MEDLINE

## (undated) DEVICE — APPLICATOR MEDICATED 26 CC SOLUTION HI LT ORNG CHLORAPREP